# Patient Record
Sex: FEMALE | Race: BLACK OR AFRICAN AMERICAN | NOT HISPANIC OR LATINO | Employment: UNEMPLOYED | URBAN - METROPOLITAN AREA
[De-identification: names, ages, dates, MRNs, and addresses within clinical notes are randomized per-mention and may not be internally consistent; named-entity substitution may affect disease eponyms.]

---

## 2017-01-19 ENCOUNTER — HOSPITAL ENCOUNTER (INPATIENT)
Facility: HOSPITAL | Age: 82
LOS: 5 days | Discharge: HOME HEALTH | DRG: 682 | End: 2017-01-24
Attending: EMERGENCY MEDICINE | Admitting: INTERNAL MEDICINE
Payer: MEDICARE

## 2017-01-19 ENCOUNTER — NURSE TRIAGE (OUTPATIENT)
Dept: ADMINISTRATIVE | Facility: CLINIC | Age: 82
End: 2017-01-19

## 2017-01-19 DIAGNOSIS — D72.829 LEUKOCYTOSIS: ICD-10-CM

## 2017-01-19 DIAGNOSIS — N17.9 AKI (ACUTE KIDNEY INJURY): ICD-10-CM

## 2017-01-19 DIAGNOSIS — L03.90 CELLULITIS, UNSPECIFIED CELLULITIS SITE: Primary | ICD-10-CM

## 2017-01-19 DIAGNOSIS — I73.9 PERIPHERAL VASCULAR DISEASE, UNSPECIFIED: ICD-10-CM

## 2017-01-19 LAB
ALBUMIN SERPL BCP-MCNC: 2.7 G/DL
ALP SERPL-CCNC: 77 U/L
ALT SERPL W/O P-5'-P-CCNC: 12 U/L
ANION GAP SERPL CALC-SCNC: 9 MMOL/L
AST SERPL-CCNC: 22 U/L
BACTERIA #/AREA URNS AUTO: ABNORMAL /HPF
BASOPHILS # BLD AUTO: 0.03 K/UL
BASOPHILS NFR BLD: 0.2 %
BILIRUB SERPL-MCNC: 0.3 MG/DL
BILIRUB UR QL STRIP: NEGATIVE
BUN SERPL-MCNC: 57 MG/DL
CALCIUM SERPL-MCNC: 9 MG/DL
CHLORIDE SERPL-SCNC: 110 MMOL/L
CLARITY UR REFRACT.AUTO: ABNORMAL
CO2 SERPL-SCNC: 22 MMOL/L
COLOR UR AUTO: YELLOW
CREAT SERPL-MCNC: 2.5 MG/DL
DIFFERENTIAL METHOD: ABNORMAL
EOSINOPHIL # BLD AUTO: 0.1 K/UL
EOSINOPHIL NFR BLD: 0.5 %
ERYTHROCYTE [DISTWIDTH] IN BLOOD BY AUTOMATED COUNT: 14.6 %
EST. GFR  (AFRICAN AMERICAN): 20.3 ML/MIN/1.73 M^2
EST. GFR  (NON AFRICAN AMERICAN): 17.6 ML/MIN/1.73 M^2
FLUAV AG SPEC QL IA: NEGATIVE
FLUBV AG SPEC QL IA: NEGATIVE
GLUCOSE SERPL-MCNC: 92 MG/DL
GLUCOSE UR QL STRIP: NEGATIVE
HCT VFR BLD AUTO: 35.5 %
HGB BLD-MCNC: 11.9 G/DL
HGB UR QL STRIP: NEGATIVE
HYALINE CASTS UR QL AUTO: 19 /LPF
KETONES UR QL STRIP: NEGATIVE
LEUKOCYTE ESTERASE UR QL STRIP: NEGATIVE
LYMPHOCYTES # BLD AUTO: 1.7 K/UL
LYMPHOCYTES NFR BLD: 9.2 %
MCH RBC QN AUTO: 31.3 PG
MCHC RBC AUTO-ENTMCNC: 33.5 %
MCV RBC AUTO: 93 FL
MICROSCOPIC COMMENT: ABNORMAL
MONOCYTES # BLD AUTO: 0.8 K/UL
MONOCYTES NFR BLD: 4.1 %
NEUTROPHILS # BLD AUTO: 15.7 K/UL
NEUTROPHILS NFR BLD: 85.7 %
NITRITE UR QL STRIP: POSITIVE
PH UR STRIP: 5 [PH] (ref 5–8)
PLATELET # BLD AUTO: 191 K/UL
PMV BLD AUTO: 10 FL
POTASSIUM SERPL-SCNC: 4.9 MMOL/L
PROT SERPL-MCNC: 7.4 G/DL
PROT UR QL STRIP: NEGATIVE
RBC # BLD AUTO: 3.8 M/UL
RBC #/AREA URNS AUTO: 1 /HPF (ref 0–4)
SODIUM SERPL-SCNC: 141 MMOL/L
SP GR UR STRIP: 1.01 (ref 1–1.03)
SPECIMEN SOURCE: NORMAL
SQUAMOUS #/AREA URNS AUTO: 1 /HPF
URN SPEC COLLECT METH UR: ABNORMAL
UROBILINOGEN UR STRIP-ACNC: NEGATIVE EU/DL
WBC # BLD AUTO: 18.29 K/UL

## 2017-01-19 PROCEDURE — 94761 N-INVAS EAR/PLS OXIMETRY MLT: CPT

## 2017-01-19 PROCEDURE — 25000003 PHARM REV CODE 250: Performed by: EMERGENCY MEDICINE

## 2017-01-19 PROCEDURE — 87400 INFLUENZA A/B EACH AG IA: CPT

## 2017-01-19 PROCEDURE — 87040 BLOOD CULTURE FOR BACTERIA: CPT | Mod: 59

## 2017-01-19 PROCEDURE — 87077 CULTURE AEROBIC IDENTIFY: CPT

## 2017-01-19 PROCEDURE — 84145 PROCALCITONIN (PCT): CPT

## 2017-01-19 PROCEDURE — 96361 HYDRATE IV INFUSION ADD-ON: CPT

## 2017-01-19 PROCEDURE — 12000002 HC ACUTE/MED SURGE SEMI-PRIVATE ROOM

## 2017-01-19 PROCEDURE — 80053 COMPREHEN METABOLIC PANEL: CPT

## 2017-01-19 PROCEDURE — 96365 THER/PROPH/DIAG IV INF INIT: CPT

## 2017-01-19 PROCEDURE — 85025 COMPLETE CBC W/AUTO DIFF WBC: CPT

## 2017-01-19 PROCEDURE — 87186 SC STD MICRODIL/AGAR DIL: CPT

## 2017-01-19 PROCEDURE — 99285 EMERGENCY DEPT VISIT HI MDM: CPT | Mod: 25

## 2017-01-19 PROCEDURE — 63600175 PHARM REV CODE 636 W HCPCS: Performed by: EMERGENCY MEDICINE

## 2017-01-19 PROCEDURE — 87086 URINE CULTURE/COLONY COUNT: CPT

## 2017-01-19 PROCEDURE — 93005 ELECTROCARDIOGRAM TRACING: CPT

## 2017-01-19 PROCEDURE — 87088 URINE BACTERIA CULTURE: CPT

## 2017-01-19 PROCEDURE — 93010 ELECTROCARDIOGRAM REPORT: CPT | Mod: ,,, | Performed by: INTERNAL MEDICINE

## 2017-01-19 PROCEDURE — 81001 URINALYSIS AUTO W/SCOPE: CPT

## 2017-01-19 PROCEDURE — 99285 EMERGENCY DEPT VISIT HI MDM: CPT | Mod: ,,, | Performed by: EMERGENCY MEDICINE

## 2017-01-19 RX ORDER — ENOXAPARIN SODIUM 100 MG/ML
40 INJECTION SUBCUTANEOUS EVERY 24 HOURS
Status: DISCONTINUED | OUTPATIENT
Start: 2017-01-20 | End: 2017-01-20

## 2017-01-19 RX ORDER — TRAMADOL HYDROCHLORIDE 50 MG/1
50 TABLET ORAL EVERY 6 HOURS PRN
Status: DISCONTINUED | OUTPATIENT
Start: 2017-01-19 | End: 2017-01-19

## 2017-01-19 RX ORDER — BRIMONIDINE TARTRATE 1.5 MG/ML
1 SOLUTION/ DROPS OPHTHALMIC 3 TIMES DAILY
Status: DISCONTINUED | OUTPATIENT
Start: 2017-01-20 | End: 2017-01-24 | Stop reason: HOSPADM

## 2017-01-19 RX ORDER — NAPROXEN SODIUM 220 MG/1
81 TABLET, FILM COATED ORAL DAILY
Status: DISCONTINUED | OUTPATIENT
Start: 2017-01-20 | End: 2017-01-24 | Stop reason: HOSPADM

## 2017-01-19 RX ORDER — SODIUM CHLORIDE 9 MG/ML
INJECTION, SOLUTION INTRAVENOUS CONTINUOUS
Status: DISCONTINUED | OUTPATIENT
Start: 2017-01-20 | End: 2017-01-20

## 2017-01-19 RX ORDER — IPRATROPIUM BROMIDE AND ALBUTEROL SULFATE 2.5; .5 MG/3ML; MG/3ML
3 SOLUTION RESPIRATORY (INHALATION) EVERY 6 HOURS PRN
Status: DISCONTINUED | OUTPATIENT
Start: 2017-01-19 | End: 2017-01-24 | Stop reason: HOSPADM

## 2017-01-19 RX ADMIN — SODIUM CHLORIDE 1000 ML: 0.9 INJECTION, SOLUTION INTRAVENOUS at 04:01

## 2017-01-19 RX ADMIN — CEFTRIAXONE 1 G: 1 INJECTION, SOLUTION INTRAVENOUS at 07:01

## 2017-01-19 RX ADMIN — AZITHROMYCIN MONOHYDRATE 500 MG: 500 INJECTION, POWDER, LYOPHILIZED, FOR SOLUTION INTRAVENOUS at 11:01

## 2017-01-19 NOTE — IP AVS SNAPSHOT
[facility]  [address]  [facility] [address]  Phone: [phone]           Patient Discharge Instructions     Our goal is to set you up for success. This packet includes information on your condition, medications, and your home care. It will help you to care for yourself so you don't get sicker and need to go back to the hospital.     Please ask your nurse if you have any questions.        There are many details to remember when preparing to leave the hospital. Here is what you will need to do:    1. Take your medicine. If you are prescribed medications, review your Medication List in the following pages. You may have new medications to  at the pharmacy and others that you'll need to stop taking. Review the instructions for how and when to take your medications. Talk with your doctor or nurses if you are unsure of what to do.     2. Go to your follow-up appointments. Specific follow-up information is listed in the following pages. Your may be contacted by a transition nurse or clinical provider about future appointments. Be sure we have all of the phone numbers to reach you, if needed. Please contact your provider's office if you are unable to make an appointment.     3. Watch for warning signs. Your doctor or nurse will give you detailed warning signs to watch for and when to call for assistance. These instructions may also include educational information about your condition. If you experience any of warning signs to your health, call your doctor.               [facility]  Unless otherwise directed by your provider, please contact [facility], our nurse care line that is available for 24/7 assistance.     [phone] (toll-free)    Registered nurses in the [facility] provide clinical advisement, health education, appointment booking, and other advisory services.                    ** Verify the list of medication(s) below is accurate and up  to date. Carry this with you in case of emergency. If your medications have changed, please notify your healthcare provider.             Medication List      START taking these medications        Additional Info                      cadexomer iodine 0.9 % gel   Commonly known as:  IODOSORB   Quantity:  40 g   Refills:  0    Instructions:  Apply topically daily as needed for Wound Care.     Begin Date    AM    Noon    PM    Bedtime       doxycycline 100 MG tablet   Commonly known as:  VIBRA-TABS   Quantity:  12 tablet   Refills:  0   Dose:  100 mg   Indications:  Skin & soft tissue    Last time this was given:  100 mg on 1/24/2017  8:16 AM   Instructions:  Take 1 tablet (100 mg total) by mouth every 12 (twelve) hours.     Begin Date    AM    Noon    PM    Bedtime       pantoprazole 40 MG tablet   Commonly known as:  PROTONIX   Quantity:  90 tablet   Refills:  0   Dose:  40 mg    Last time this was given:  40 mg on 1/24/2017  8:16 AM   Instructions:  Take 1 tablet (40 mg total) by mouth once daily.     Begin Date    AM    Noon    PM    Bedtime       senna-docusate 8.6-50 mg 8.6-50 mg per tablet   Commonly known as:  PERICOLACE   Refills:  0   Dose:  1 tablet    Last time this was given:  1 tablet on 1/24/2017  8:16 AM   Instructions:  Take 1 tablet by mouth 2 (two) times daily.     Begin Date    AM    Noon    PM    Bedtime         CHANGE how you take these medications        Additional Info                      diclofenac sodium 1 % Gel   Quantity:  200 g   Refills:  1   What changed:    - how much to take  - when to take this  - reasons to take this  - additional instructions    Instructions:  Apply topically 3 (three) times daily. 4 grams topically tid to affected joints.     Begin Date    AM    Noon    PM    Bedtime       enalapril 10 MG tablet   Commonly known as:  VASOTEC   Quantity:  90 tablet   Refills:  8   Dose:  10 mg   What changed:  See the new instructions.    Instructions:  Take 1 tablet (10 mg total) by  mouth once daily. Restart on Saturday, after your BMP is drawn and kidney function remains normal     Begin Date    AM    Noon    PM    Bedtime       hydrochlorothiazide 12.5 mg capsule   Commonly known as:  MICROZIDE   Quantity:  90 capsule   Refills:  1   What changed:  See the new instructions.    Instructions:  TAKE 1 CAPSULE DAILY     Begin Date    AM    Noon    PM    Bedtime         CONTINUE taking these medications        Additional Info                      aspirin 81 mg Tab   Refills:  0   Dose:  81 mg    Instructions:  Take 81 mg by mouth once daily.     Begin Date    AM    Noon    PM    Bedtime       brimonidine 0.2% 0.2 % Drop   Commonly known as:  ALPHAGAN   Quantity:  10 mL   Refills:  12   Dose:  1 drop   Comments:  Use Clinton #[id number]    Instructions:  Place 1 drop into the right eye 3 (three) times daily.     Begin Date    AM    Noon    PM    Bedtime       CENTRUM ORAL   Refills:  0   Dose:  1 capsule    Instructions:  Take 1 capsule by mouth once daily.     Begin Date    AM    Noon    PM    Bedtime       dorzolamide-timolol 2-0.5% 22.3-6.8 mg/mL ophthalmic solution   Commonly known as:  COSOPT   Quantity:  30 mL   Refills:  3   Dose:  1 drop   Comments:  Use robison #[id number]    Instructions:  Place 1 drop into both eyes 3 (three) times daily.     Begin Date    AM    Noon    PM    Bedtime       LUMIGAN 0.01 % Drop   Quantity:  3 Bottle   Refills:  3   Dose:  1 drop   Comments:  Use Clinton #[id number]   Generic drug:  bimatoprost    Last time this was given:  1 drop on 1/23/2017 10:11 PM   Instructions:  Place 1 drop into the right eye every evening.     Begin Date    AM    Noon    PM    Bedtime       naproxen sodium 220 MG tablet   Commonly known as:  ANAPROX   Refills:  0   Dose:  220 mg    Instructions:  Take 220 mg by mouth 2 (two) times daily as needed (for pain).     Begin Date    AM    Noon    PM    Bedtime       triamcinolone acetonide 0.1% 0.1 % ointment   Commonly known as:   KENALOG   Quantity:  453.6 g   Refills:  5    Instructions:  Apply topically 2 (two) times daily.     Begin Date    AM    Noon    PM    Bedtime            Where to Get Your Medications      These medications were sent to [facility]unk[facility] - [address]  [address] [place] [address]     Phone:  [phone]     cadexomer iodine 0.9 % gel    doxycycline 100 MG tablet    enalapril 10 MG tablet    pantoprazole 40 MG tablet         You can get these medications from any pharmacy     You don't need a prescription for these medications     senna-docusate 8.6-50 mg 8.6-50 mg per tablet                  Please bring to all follow up appointments:    1. A copy of your discharge instructions.  2. All medicines you are currently taking in their original bottles.  3. Identification and insurance card.    Please arrive 15 minutes ahead of scheduled appointment time.    Please call 24 hours in advance if you must reschedule your appointment and/or time.        Your Scheduled Appointments     Jan 31, 2017  3:00 PM CST   Consult with MD Fawad Savage - Dermatology ([facility] )    [address]  [place] [address]   [phone]            Feb 01, 2017 10:00 AM CST   Established Patient Visit with MD Fawad Aguirre Jr. - Internal Medicine ([facility])    [address]  [place] [address]   [phone]            Mar 21, 2017  9:15 AM CDT   Established Patient Visit with JACOB Mantilla - Podiatry ([place] [facility] )    [address]  [place] [address]   [phone]            Mar 24, 2017 10:00 AM CDT   Established Patient Visit with MD Fawad Larry - Ophthalmology ([facility] )    [address]  [place] [address]   [phone]              Follow-up Information     Follow up with Fawad Hernandez - Podiatry In 1 week.    Specialty:  Podiatry    Contact  "information:    [address]  [facility] [id number] [phone]    Additional information:    [address] - [address]th Floor, [address]        Follow up with Lenin Sandhu MD In 1 week.    Specialty:  Internal Medicine    Contact information:    [address]  [facility] [address]  [phone]          Discharge Instructions     Future Orders    BASIC METABOLIC PANEL     Activity as tolerated     Call MD for:  increased confusion or weakness     Call MD for:  redness, tenderness, or signs of infection (pain, swelling, redness, odor or green/yellow discharge around incision site)     Call MD for:  severe uncontrolled pain     Call MD for:  temperature >100.4     Call MD for:  worsening rash     Diet general     Questions:    Total calories:      Fat restriction, if any:      Protein restriction, if any:      Na restriction, if any:      Fluid restriction:      Additional restrictions:          Primary Diagnosis     Your primary diagnosis was:  Acute Nontraumatic Kidney Injury      Admission Information     Date & Time Provider Department CSN    1/19/2017  4:02 PM Caterina Martinez MD [facility] [id number]      Care Providers     Provider Role Specialty Primary office phone    Caterina Martinez MD Attending Provider Hospitalist [phone]    Caterina Martinez MD Team Attending  Hospitalist [phone]      Your Vitals Were     BP Pulse Temp Resp Height Weight    138/65 (BP Location: Left arm, Patient Position: Lying, BP Method: Automatic) 78 98.6 °F (37 °C) (Oral) 18 5' 3" (1.6 m) 85.3 kg (188 lb)    SpO2 BMI             95% 33.3 kg/m2         Recent Lab Values     No lab values to display.      Pending Labs     Order Current Status    Aerobic culture Preliminary result    Blood culture Preliminary result    Blood culture Preliminary result      Allergies as of 1/24/2017        Reactions    Codeine     Other reaction(s): Unknown      Advance Directives     An advance directive " is a document which, in the event you are no longer able to make decisions for yourself, tells your healthcare team what kind of treatment you do or do not want to receive, or who you would like to make those decisions for you.  If you do not currently have an advance directive, [facility] encourages you to create one.  For more information call:  [phone]), [phone] ([phone]),  or log on to [email or web address].        Smoking Cessation     If you would like to quit smoking:   You may be eligible for free services if you are a [place] resident and started smoking cigarettes before September 1, 1988.  Call the Smoking Cessation Trust (SCT) toll free at (447) 304-1920 or (594) 392-9547.   Call 6-557-QUIT-NOW if you do not meet the above criteria.            Language Assistance Services     ATTENTION: Language assistance services are available, free of charge. Please call [phone].      ATENCIÓN: Si habla español, tiene a solomon disposición servicios gratuitos de asistencia lingüística. Llame al [phone].     CHÚ Ý: N?u b?n nói Ti?ng Vi?t, có các d?ch v? h? tr? ngôn ng? mi?n phí dành cho b?n. G?i s? [phone].        [facility] Sign-Up     Activating your [facility] account is as easy as 1-2-3!     1) Visit [email or web address], select Sign Up Now, enter this activation code and your date of birth, then select Next.  [email or web address]  Expires: 3/10/2017  4:09 PM      2) Create a username and password to use when you visit [facility] in the future and select a security question in case you lose your password and select Next.    3) Enter your e-mail address and click Sign Up!    Additional Information  If you have questions, please e-mail [email or web address] or call [phone] to talk to our [facility] staff. Remember, [facility] is NOT to be used for urgent needs. For medical emergencies, dial 911.          [facility] complies with applicable Federal civil  rights laws and does not discriminate on the basis of race, color, national origin, age, disability, or sex.

## 2017-01-19 NOTE — TELEPHONE ENCOUNTER
Called client in regard to her low blood pressure to see if she went to the emergency room the clients daughter stated that she was waiting on her father to go. She was more concerned with scheduling a dermatology appt to which I assisted her with. I explained to her that Per the on call nurse that It would be wise for her mother to visit the emergency room.

## 2017-01-19 NOTE — TELEPHONE ENCOUNTER
"  Reason for Disposition   Systolic BP < 80 and NOT dizzy, lightheaded or weak (feels normal)     EC calling states patient B/P 65/43 HR 78 EC denies any symptoms at time of call. EC states patient "passed out last night". Patient advised per [facility] protocol verbalized understanding, agreed with recommendations have another adult transport her to local ED of choice for medical evaluation treatment/intervention of current symptoms; EC had no further questions at end of call.    Protocols used: ST LOW BLOOD PRESSURE-A-OH    "

## 2017-01-19 NOTE — ED PROVIDER NOTES
"Encounter Date: 1/19/2017    SCRIBE #1 NOTE: I, Caterina Medina, am scribing for, and in the presence of,  Dr. Talley. I have scribed the entire note.       History     Chief Complaint   Patient presents with    Hypotension     60/35 at home taken by family; "feeling bad"     Review of patient's allergies indicates:   Allergen Reactions    Codeine      Other reaction(s): Unknown     HPI Comments: Time patient was seen by the provider: 4:18 PM      The patient is a 81 y.o. female with history of: HTN and COPD that presents to the ED with a complaint of hypotension with a systolic pressure in the 60's obtained 4-8 hours ago. Patient presented to an urgent care clinic this morning, and when similar readings (around 67 systolic per family) were obtained, patient was advised to present to the ED. At the time that patient was hypotensive, she did not report feeling ill. Upon arrival, patient's blood pressure is back to baseline. Patient did not take her regular blood pressure medications this morning and has not eaten today. She denies abdominal pain, diarrhea, loose stools, or cough, though she does note the she has not been urinating very much secondary to not drinking enough water. Patient has chronic bilateral lower extremity wounds that have been present for 5-6 months but does not currently have wound care due to difficulty in getting an appointment. Family notes that she has an appointment scheduled for 1/31/17.          The history is provided by the patient and a relative.     Past Medical History   Diagnosis Date    Arthritis of knee     Cataract     COPD (chronic obstructive pulmonary disease)     CTS (carpal tunnel syndrome)     Glaucoma (increased eye pressure)     HTN (hypertension)     Joint pain     Psoriasis 2007    Smoker     Strabismus      XT OS     Past Medical History Pertinent Negatives   Diagnosis Date Noted    Amblyopia 5/1/2014    Diabetes mellitus 5/1/2014    Diabetic retinopathy " 5/1/2014    Macular degeneration 5/1/2014    Retinal detachment 5/1/2014    Uveitis 5/1/2014     Past Surgical History   Procedure Laterality Date    Tubal ligation      Cataract extraction w/ intraocular lens implantw/ trabeculectomy Left 8/13/14     os ()    Cataract extraction w/  intraocular lens implant Left 8/13/2014     OS ()    Ahmed implant and surgical pi Left 1/27/16           Family History   Problem Relation Age of Onset    Glaucoma Father     Cancer Neg Hx     Psoriasis Neg Hx     Amblyopia Neg Hx     Blindness Neg Hx     Macular degeneration Neg Hx     Retinal detachment Neg Hx     Strabismus Neg Hx      Social History   Substance Use Topics    Smoking status: Current Every Day Smoker     Packs/day: 1.00     Years: 57.00    Smokeless tobacco: Never Used      Comment: Pt not ready to quit.    Alcohol use Yes      Comment: seldom alcohol use.     Review of Systems   Constitutional: Negative for chills.   HENT: Negative for congestion.    Eyes: Negative for redness.   Respiratory: Negative for cough.    Cardiovascular: Negative for chest pain.        Low blood pressure earlier today    Gastrointestinal: Negative for abdominal pain, constipation and diarrhea.   Genitourinary: Positive for decreased urine volume (secondary to decreased fluid intake).   Musculoskeletal: Negative for back pain and neck pain.   Skin: Positive for wound (bilateral lower extremities, chronic).   Neurological: Negative for headaches.       Physical Exam   Initial Vitals   BP Pulse Resp Temp SpO2   01/19/17 1310 01/19/17 1310 01/19/17 1310 01/19/17 1310 01/19/17 1310   156/72 80 16 97.9 °F (36.6 °C) 95 %     Physical Exam    Nursing note and vitals reviewed.  Constitutional: She appears well-developed and well-nourished. She is not diaphoretic. No distress.   HENT:   Head: Normocephalic and atraumatic.   Mouth/Throat: Oropharynx is clear and moist.   Edentulous    Eyes: Pupils  are equal, round, and reactive to light.   Cardiovascular: Normal rate, regular rhythm and normal heart sounds.   No murmur heard.  Pulmonary/Chest: Breath sounds normal. No respiratory distress. She has no wheezes. She has no rhonchi. She has no rales.   Abdominal: Soft. She exhibits no distension. There is no tenderness. There is no rebound and no guarding.   Hyperactive bowel sounds    Musculoskeletal: Normal range of motion.   Neurological: She is alert and oriented to person, place, and time.   Skin: Skin is warm and dry.   Chronic venous stasis changes associated with multiple ulcerations to bilateral lower extremities. There is mild erythema, but no tenderness or discharge noted.   Psychiatric: She has a normal mood and affect.         ED Course   Procedures  Labs Reviewed   CBC W/ AUTO DIFFERENTIAL - Abnormal; Notable for the following:        Result Value    WBC 18.29 (*)     RBC 3.80 (*)     Hemoglobin 11.9 (*)     Hematocrit 35.5 (*)     MCH 31.3 (*)     RDW 14.6 (*)     Gran # 15.7 (*)     Gran% 85.7 (*)     Lymph% 9.2 (*)     All other components within normal limits   COMPREHENSIVE METABOLIC PANEL - Abnormal; Notable for the following:     CO2 22 (*)     BUN, Bld 57 (*)     Creatinine 2.5 (*)     Albumin 2.7 (*)     eGFR if  20.3 (*)     eGFR if non  17.6 (*)     All other components within normal limits   CULTURE, URINE   CULTURE, BLOOD   CULTURE, BLOOD   URINALYSIS   INFLUENZA A AND B ANTIGEN     EKG Readings: (Independently Interpreted)   Rhythm: Normal Sinus Rhythm. Heart Rate: 84. Axis: Normal.   Normal intervals, no hypertrophy, no ST or T wave abnormalities, unchanged compared to previous EKG on 6/10/16          Medical Decision Making:   History:   Old Medical Records: I decided to obtain old medical records.  Initial Assessment:   This is an emergent evaluation of a 81 y.o.female patient with presentation of episode of asymptomatic hypotension, now resolved.    Initial differentials include but are not limited to: medication effect, vasovagal episode, orthostatic hypotension, less-likely arrhythmia, doubt infectious process or shock.   Plan: basic labs, cardiac monitoring, EKG, and IV fluids.    After my evaluation, believe the patient has acute kidney injury with creatinine 2.5, prior 1.1.  Patient also has noted leukocytosis, therefore chest x-ray and urinalysis was performed.  Chest x-ray showed possible left lower lobe pneumonia.  Urinalysis pending at this time.  I will give Rocephin to cover for either.  Will add azithromycin if UA is clear.  At this time, I believe the patient should be admitted to the hospital for further evaluation and management.    Case discussed with hospitalist, Dr. Cheung, who agrees and will assume care of this patient under their service.      Independently Interpreted Test(s):   I have ordered and independently interpreted EKG Reading(s) - see prior notes  Clinical Tests:   Lab Tests: Ordered and Reviewed  Medical Tests: Reviewed    Additional MDM:   EKG: I have independently interpreted EKG(s) - see notes.          Scribe Attestation:   Scribe #1: I performed the above scribed service and the documentation accurately describes the services I performed. I attest to the accuracy of the note.    Attending Attestation:           Physician Attestation for Scribe:  Physician Attestation Statement for Scribe #1: I, Dr. Talley, reviewed documentation, as scribed by Caterina Medina in my presence, and it is both accurate and complete.                 ED Course   Value Comment By Time   BP: (!) 156/72 (Reviewed) Rey Talley MD 01/19 1610   Temp: 97.9 °F (36.6 °C) (Reviewed) Rey Talley MD 01/19 1610   Pulse: 80 (Reviewed) Rey Talley MD 01/19 1610   Resp: 16 (Reviewed) Rey Talley MD 01/19 1610   SpO2: 95 % (Reviewed) Rey Talley MD 01/19 1610   WBC: (!) 18.29 (Reviewed) Rey Talley MD 01/19 1754   Hemoglobin: (!) 11.9  (Reviewed) Rey Talley MD 01/19 1754   Hematocrit: (!) 35.5 (Reviewed) Rey Talley MD 01/19 1754     Clinical Impression:   The primary encounter diagnosis was RCIARDO (acute kidney injury). A diagnosis of Leukocytosis was also pertinent to this visit.    Disposition:   Disposition: Admitted       Rey Talley MD  01/19/17 3086

## 2017-01-20 PROBLEM — I95.9 HYPOTENSION: Status: ACTIVE | Noted: 2017-01-20

## 2017-01-20 PROBLEM — I73.9 PERIPHERAL VASCULAR DISEASE, UNSPECIFIED: Status: ACTIVE | Noted: 2017-01-20

## 2017-01-20 LAB
ANION GAP SERPL CALC-SCNC: 7 MMOL/L
ANION GAP SERPL CALC-SCNC: 9 MMOL/L
BASOPHILS # BLD AUTO: 0.02 K/UL
BASOPHILS NFR BLD: 0.2 %
BUN SERPL-MCNC: 43 MG/DL
BUN SERPL-MCNC: 48 MG/DL
CALCIUM SERPL-MCNC: 8.4 MG/DL
CALCIUM SERPL-MCNC: 8.6 MG/DL
CHLORIDE SERPL-SCNC: 113 MMOL/L
CHLORIDE SERPL-SCNC: 113 MMOL/L
CO2 SERPL-SCNC: 19 MMOL/L
CO2 SERPL-SCNC: 20 MMOL/L
CREAT SERPL-MCNC: 1.6 MG/DL
CREAT SERPL-MCNC: 1.8 MG/DL
DIFFERENTIAL METHOD: ABNORMAL
EOSINOPHIL # BLD AUTO: 0.1 K/UL
EOSINOPHIL NFR BLD: 0.8 %
ERYTHROCYTE [DISTWIDTH] IN BLOOD BY AUTOMATED COUNT: 14.1 %
EST. GFR  (AFRICAN AMERICAN): 30.2 ML/MIN/1.73 M^2
EST. GFR  (AFRICAN AMERICAN): 34.8 ML/MIN/1.73 M^2
EST. GFR  (NON AFRICAN AMERICAN): 26.2 ML/MIN/1.73 M^2
EST. GFR  (NON AFRICAN AMERICAN): 30.2 ML/MIN/1.73 M^2
GLUCOSE SERPL-MCNC: 74 MG/DL
GLUCOSE SERPL-MCNC: 86 MG/DL
HCT VFR BLD AUTO: 31.1 %
HGB BLD-MCNC: 10.4 G/DL
LYMPHOCYTES # BLD AUTO: 1.4 K/UL
LYMPHOCYTES NFR BLD: 15.3 %
MAGNESIUM SERPL-MCNC: 2.3 MG/DL
MCH RBC QN AUTO: 31.1 PG
MCHC RBC AUTO-ENTMCNC: 33.4 %
MCV RBC AUTO: 93 FL
MONOCYTES # BLD AUTO: 0.8 K/UL
MONOCYTES NFR BLD: 9 %
NEUTROPHILS # BLD AUTO: 6.9 K/UL
NEUTROPHILS NFR BLD: 74.5 %
PHOSPHATE SERPL-MCNC: 3.3 MG/DL
PLATELET # BLD AUTO: 205 K/UL
PMV BLD AUTO: 10.2 FL
POTASSIUM SERPL-SCNC: 4.2 MMOL/L
POTASSIUM SERPL-SCNC: 4.2 MMOL/L
PROCALCITONIN SERPL IA-MCNC: <0.09 NG/ML
RBC # BLD AUTO: 3.34 M/UL
SODIUM SERPL-SCNC: 140 MMOL/L
SODIUM SERPL-SCNC: 141 MMOL/L
T4 FREE SERPL-MCNC: 1.05 NG/DL
TSH SERPL DL<=0.005 MIU/L-ACNC: 0.34 UIU/ML
WBC # BLD AUTO: 9.27 K/UL

## 2017-01-20 PROCEDURE — 83735 ASSAY OF MAGNESIUM: CPT

## 2017-01-20 PROCEDURE — 25000003 PHARM REV CODE 250: Performed by: EMERGENCY MEDICINE

## 2017-01-20 PROCEDURE — 85025 COMPLETE CBC W/AUTO DIFF WBC: CPT

## 2017-01-20 PROCEDURE — 93922 UPR/L XTREMITY ART 2 LEVELS: CPT | Performed by: SURGERY

## 2017-01-20 PROCEDURE — 94761 N-INVAS EAR/PLS OXIMETRY MLT: CPT

## 2017-01-20 PROCEDURE — 25000003 PHARM REV CODE 250: Performed by: INTERNAL MEDICINE

## 2017-01-20 PROCEDURE — 80048 BASIC METABOLIC PNL TOTAL CA: CPT

## 2017-01-20 PROCEDURE — 99223 1ST HOSP IP/OBS HIGH 75: CPT | Mod: AI,GC,, | Performed by: INTERNAL MEDICINE

## 2017-01-20 PROCEDURE — 80048 BASIC METABOLIC PNL TOTAL CA: CPT | Mod: 91

## 2017-01-20 PROCEDURE — 11000001 HC ACUTE MED/SURG PRIVATE ROOM

## 2017-01-20 PROCEDURE — 87077 CULTURE AEROBIC IDENTIFY: CPT

## 2017-01-20 PROCEDURE — 87075 CULTR BACTERIA EXCEPT BLOOD: CPT

## 2017-01-20 PROCEDURE — 84443 ASSAY THYROID STIM HORMONE: CPT

## 2017-01-20 PROCEDURE — 87070 CULTURE OTHR SPECIMN AEROBIC: CPT

## 2017-01-20 PROCEDURE — 99222 1ST HOSP IP/OBS MODERATE 55: CPT | Mod: ,,, | Performed by: PODIATRIST

## 2017-01-20 PROCEDURE — 84439 ASSAY OF FREE THYROXINE: CPT

## 2017-01-20 PROCEDURE — 87186 SC STD MICRODIL/AGAR DIL: CPT

## 2017-01-20 PROCEDURE — 36415 COLL VENOUS BLD VENIPUNCTURE: CPT

## 2017-01-20 PROCEDURE — 84100 ASSAY OF PHOSPHORUS: CPT

## 2017-01-20 PROCEDURE — 63600175 PHARM REV CODE 636 W HCPCS: Performed by: INTERNAL MEDICINE

## 2017-01-20 RX ORDER — SODIUM CHLORIDE 9 MG/ML
INJECTION, SOLUTION INTRAVENOUS CONTINUOUS
Status: DISCONTINUED | OUTPATIENT
Start: 2017-01-20 | End: 2017-01-20

## 2017-01-20 RX ORDER — PANTOPRAZOLE SODIUM 40 MG/1
40 TABLET, DELAYED RELEASE ORAL DAILY
Status: DISCONTINUED | OUTPATIENT
Start: 2017-01-20 | End: 2017-01-24 | Stop reason: HOSPADM

## 2017-01-20 RX ORDER — ONDANSETRON 4 MG/1
4 TABLET, ORALLY DISINTEGRATING ORAL EVERY 8 HOURS PRN
Status: DISCONTINUED | OUTPATIENT
Start: 2017-01-20 | End: 2017-01-24 | Stop reason: HOSPADM

## 2017-01-20 RX ORDER — HYDROCODONE BITARTRATE AND ACETAMINOPHEN 5; 325 MG/1; MG/1
1 TABLET ORAL EVERY 4 HOURS PRN
Status: DISCONTINUED | OUTPATIENT
Start: 2017-01-20 | End: 2017-01-24 | Stop reason: HOSPADM

## 2017-01-20 RX ORDER — ENOXAPARIN SODIUM 100 MG/ML
30 INJECTION SUBCUTANEOUS EVERY 24 HOURS
Status: DISCONTINUED | OUTPATIENT
Start: 2017-01-20 | End: 2017-01-20

## 2017-01-20 RX ORDER — HYDROCODONE BITARTRATE AND ACETAMINOPHEN 10; 325 MG/1; MG/1
1 TABLET ORAL EVERY 4 HOURS PRN
Status: DISCONTINUED | OUTPATIENT
Start: 2017-01-20 | End: 2017-01-24 | Stop reason: HOSPADM

## 2017-01-20 RX ORDER — CLINDAMYCIN HYDROCHLORIDE 150 MG/1
300 CAPSULE ORAL EVERY 6 HOURS
Status: DISCONTINUED | OUTPATIENT
Start: 2017-01-20 | End: 2017-01-23

## 2017-01-20 RX ORDER — ACETAMINOPHEN 325 MG/1
650 TABLET ORAL EVERY 6 HOURS PRN
Status: DISCONTINUED | OUTPATIENT
Start: 2017-01-20 | End: 2017-01-24 | Stop reason: HOSPADM

## 2017-01-20 RX ORDER — HEPARIN SODIUM 5000 [USP'U]/ML
5000 INJECTION, SOLUTION INTRAVENOUS; SUBCUTANEOUS 3 TIMES DAILY
Status: DISCONTINUED | OUTPATIENT
Start: 2017-01-20 | End: 2017-01-21

## 2017-01-20 RX ORDER — AMOXICILLIN 250 MG
1 CAPSULE ORAL 2 TIMES DAILY
Status: DISCONTINUED | OUTPATIENT
Start: 2017-01-20 | End: 2017-01-24 | Stop reason: HOSPADM

## 2017-01-20 RX ADMIN — CEFTRIAXONE 1 G: 1 INJECTION, SOLUTION INTRAVENOUS at 12:01

## 2017-01-20 RX ADMIN — BRIMONIDINE TARTRATE 1 DROP: 1.5 SOLUTION OPHTHALMIC at 10:01

## 2017-01-20 RX ADMIN — SODIUM CHLORIDE: 0.9 INJECTION, SOLUTION INTRAVENOUS at 05:01

## 2017-01-20 RX ADMIN — STANDARDIZED SENNA CONCENTRATE AND DOCUSATE SODIUM 1 TABLET: 8.6; 5 TABLET, FILM COATED ORAL at 09:01

## 2017-01-20 RX ADMIN — ASPIRIN 81 MG CHEWABLE TABLET 81 MG: 81 TABLET CHEWABLE at 09:01

## 2017-01-20 RX ADMIN — BRIMONIDINE TARTRATE 1 DROP: 1.5 SOLUTION OPHTHALMIC at 02:01

## 2017-01-20 RX ADMIN — CLINDAMYCIN HYDROCHLORIDE 300 MG: 150 CAPSULE ORAL at 05:01

## 2017-01-20 RX ADMIN — BRIMONIDINE TARTRATE 1 DROP: 1.5 SOLUTION OPHTHALMIC at 05:01

## 2017-01-20 RX ADMIN — CLINDAMYCIN HYDROCHLORIDE 300 MG: 150 CAPSULE ORAL at 06:01

## 2017-01-20 RX ADMIN — CLINDAMYCIN HYDROCHLORIDE 300 MG: 150 CAPSULE ORAL at 12:01

## 2017-01-20 RX ADMIN — SODIUM CHLORIDE 500 ML: 0.9 INJECTION, SOLUTION INTRAVENOUS at 12:01

## 2017-01-20 RX ADMIN — PANTOPRAZOLE SODIUM 40 MG: 40 TABLET, DELAYED RELEASE ORAL at 09:01

## 2017-01-20 RX ADMIN — STANDARDIZED SENNA CONCENTRATE AND DOCUSATE SODIUM 1 TABLET: 8.6; 5 TABLET, FILM COATED ORAL at 10:01

## 2017-01-20 RX ADMIN — BIMATOPROST 1 DROP: 0.1 SOLUTION/ DROPS OPHTHALMIC at 10:01

## 2017-01-20 RX ADMIN — SODIUM CHLORIDE: 0.9 INJECTION, SOLUTION INTRAVENOUS at 01:01

## 2017-01-20 RX ADMIN — HEPARIN SODIUM 5000 UNITS: 5000 INJECTION, SOLUTION INTRAVENOUS; SUBCUTANEOUS at 10:01

## 2017-01-20 RX ADMIN — HEPARIN SODIUM 5000 UNITS: 5000 INJECTION, SOLUTION INTRAVENOUS; SUBCUTANEOUS at 02:01

## 2017-01-20 NOTE — ED NOTES
Dr. Talley made aware that a BC x 1 was obtained and held from 1650 earlier today. The second set was not obtained because it was ordered after antibiotic therapy initiation.

## 2017-01-20 NOTE — PROGRESS NOTES
Patient Consulted by CTTS:     The following was discussed by the Tobacco Treatment Specialist: Pt states that she has no interest in quitting smoking at this time.

## 2017-01-20 NOTE — ED NOTES
Patient identifiers for Nelia Brizuela checked and correct.    LOC: Patient is awake, alert, and aware of environment with an appropriate affect. Patient is oriented x 3 and speaking appropriately.  APPEARANCE: Patient resting comfortably and in no acute distress. Patient is clean and well groomed, patient's clothing is properly fastened.  SKIN: The skin is warm and dry. Patient has normal skin turgor and moist mucus membranes. Multiple lesions noted to bilateral legs at different stages of healing, white drainage noted with an odor. Skin to bilateral feet appear dry and scaly. Describes a stinging pain to both feet.   MUSKULOSKELETAL: Patient is moving all extremities, generalized weakness noted, no obvious deformities noted. Pulses intact.   RESPIRATORY: Airway is open and patent. Respirations are spontaneous and non-labored with normal effort and rate.  CARDIAC: Patient has a normal rate and rhythm.  SR on cardiac monitor, bilateral leg edema  ABDOMEN: No distention noted. Soft and non-tender upon palpation.  NEUROLOGICAL: Facial expression is symmetrical. Hand grasps are equal bilaterally. Normal sensation in all extremities when touched with finger.    Allergies reported:   Review of patient's allergies indicates:   Allergen Reactions    Codeine      Other reaction(s): Unknown

## 2017-01-20 NOTE — CONSULTS
Consult received on patient. Multiple ulceration noted to patient's lower extremities and feet. Wound bed with moderate amount of yellow slough and exudate. Surrounding tissue dry with darker complexion, unsure if fungal/cellulitic involvement. Patient sees podiatry on outpatient basis. Recommend consulting Podiatry for further recommendations. Discussed with Dr. Caterina Martinez. Nursing to continue care.    Left leg    Right leg    Right lateral leg

## 2017-01-20 NOTE — ASSESSMENT & PLAN NOTE
-on bilateral LE with ulcer noted to have purulent drainage. Also with leukocytosis. Will treat for cellulitis with course of clinda.   -wound care consulted.

## 2017-01-20 NOTE — ASSESSMENT & PLAN NOTE
-Likely pre-renal in setting of hypotension and cellulitis.   -follow up urine electrolytes.   -received 1 L in ED and will start continuous fluids

## 2017-01-20 NOTE — PROGRESS NOTES
Brief Progress note    Patient seen and discussed on rounds    For further detail regarding plan please see H&P    RICARDO  - improving, will bolus 500 CC today.   - continue to monitor     Hypotension  - Improving   - infection vs poor PO intake    Leukocytosis  - UTI vs cellulitis   - will add ceftriaxone, obtain CT chest for concern regarding PNA     Lower extremity edema/concern for cellulitis    - Will continue clindamycin and perform JOSE ALBERTO.  - Will consult wound care/ Podiatry    Dispo: Continue inpatient care and IV abx, follow up on JOSE ALBERTO       Isra Nieves M.D.  IM PGY-1  Pager [phone]

## 2017-01-20 NOTE — PHARMACY MED REC
"MedMined Medication Reconciliation  Template    Patient was admitted on 1/19/2017 for RICARDO (acute kidney injury).      Patient's prior to admission medication regimen was as follows:  Prescriptions Prior to Admission   Medication Sig Dispense Refill Last Dose    aspirin 81 mg Tab Take 81 mg by mouth once daily.    1/18/2017    brimonidine 0.2% (ALPHAGAN) 0.2 % Drop Place 1 drop into the right eye 3 (three) times daily. 10 mL 12 1/19/2017    diclofenac sodium 1 % Gel Apply topically 3 (three) times daily. 4 grams topically tid to affected joints. (Patient taking differently: Apply 4 g topically 3 (three) times daily as needed (for joint pain). ) 200 g 1 1/18/2017    dorzolamide-timolol 2-0.5% (COSOPT) 22.3-6.8 mg/mL ophthalmic solution Place 1 drop into both eyes 3 (three) times daily. 30 mL 3 1/19/2017    enalapril (VASOTEC) 10 MG tablet TAKE 1 TABLET DAILY (Patient taking differently: TAKE 1 TABLET BY MOUTH DAILY) 90 tablet 8 1/18/2017    FOLIC ACID/MV,FE,OTHER MIN (CENTRUM ORAL) Take 1 capsule by mouth once daily.    1/18/2017    hydrochlorothiazide (MICROZIDE) 12.5 mg capsule TAKE 1 CAPSULE DAILY (Patient taking differently: TAKE 1 CAPSULE BY MOUTH DAILY) 90 capsule 1 1/18/2017    LUMIGAN 0.01 % Drop Place 1 drop into the right eye every evening. 3 Bottle 3 1/19/2017    naproxen sodium (ANAPROX) 220 MG tablet Take 220 mg by mouth 2 (two) times daily as needed (for pain).        triamcinolone acetonide 0.1% (KENALOG) 0.1 % ointment Apply topically 2 (two) times daily. 453.6 g 5          Please add appropriate    SmartPhrase below:  Admission Medication Reconciliation - Pharmacy Consult Note    The home medication history was taken by Christine Antony, pharmacy technician.  Based on information gathered and subsequent review by the clinical pharmacist, the items below may need attention.     You may go to "Admission" then "Reconcile Home Medications" tabs to review and/or act upon these items. Based on " information gathered and subsequent review by the clinical pharmacist, the items below may need attention.    Potentially problematic discrepancies with current MAR  o Patient IS taking the following which was not ordered upon admit  o Aspirin 81 mg QD  o Enalapril 10 mg QD (held)  o HCTZ 12.5 mg QD (held)  o Folic acid 1 mg QD  o Cosopt opht solution one gtt each eye TID     Please address this information as you see fit.  Feel free to contact us if you have any questions or require assistance.    Justine Greco, Pharm.D  EXT [phone]

## 2017-01-20 NOTE — SUBJECTIVE & OBJECTIVE
Review of Systems   Constitutional: Positive for chills. Negative for fever.   HENT: Negative for rhinorrhea, sinus pressure, sneezing and sore throat.    Eyes: Negative for visual disturbance.   Respiratory: Negative for cough and shortness of breath.    Cardiovascular: Negative for chest pain, palpitations and leg swelling.   Gastrointestinal: Positive for abdominal pain. Negative for constipation, diarrhea, nausea and vomiting.   Endocrine: Negative for polyuria.   Genitourinary: Negative for dysuria, frequency and hematuria.   Musculoskeletal: Negative for arthralgias and back pain.   Allergic/Immunologic: Negative for immunocompromised state.   Neurological: Negative for dizziness, syncope and light-headedness.   Hematological: Negative for adenopathy. Does not bruise/bleed easily.   Psychiatric/Behavioral: Negative for agitation and behavioral problems.     Objective:     Vital Signs (Most Recent):  Temp: 97.9 °F (36.6 °C) (01/19/17 2202)  Pulse: 83 (01/20/17 0116)  Resp: (!) 23 (01/20/17 0116)  BP: 133/70 (01/20/17 0116)  SpO2: 97 % (01/20/17 0116) Vital Signs (24h Range):  Temp:  [97.9 °F (36.6 °C)] 97.9 °F (36.6 °C)  Pulse:  [77-93] 83  Resp:  [16-23] 23  SpO2:  [95 %-100 %] 97 %  BP: (112-156)/(51-77) 133/70     Weight: 83.9 kg (185 lb)  Body mass index is 31.76 kg/(m^2).  No intake or output data in the 24 hours ending 01/20/17 0420   Physical Exam   Constitutional: She is oriented to person, place, and time. She appears well-developed and well-nourished.   HENT:   Head: Normocephalic and atraumatic.   Eyes: EOM are normal. Pupils are equal, round, and reactive to light.   Neck: Normal range of motion. Neck supple. No thyromegaly present.   Cardiovascular: Normal rate and regular rhythm.    No murmur heard.  Pulmonary/Chest: Effort normal. No respiratory distress. She has no wheezes.   Abdominal: Soft. She exhibits no distension. There is no tenderness.   Musculoskeletal:   Thick flaking skin with   multiple ulcerations to bilateral lower extremities. There is mild erythema, and purulent drainage noted from ulcerations.       Lymphadenopathy:     She has no cervical adenopathy.   Neurological: She is alert and oriented to person, place, and time. No cranial nerve deficit.   Psychiatric: She has a normal mood and affect. Her behavior is normal.       Significant Labs:   BMP:   Recent Labs  Lab 01/19/17  1806   GLU 92      K 4.9      CO2 22*   BUN 57*   CREATININE 2.5*   CALCIUM 9.0     CBC:   Recent Labs  Lab 01/19/17  1654   WBC 18.29*   HGB 11.9*   HCT 35.5*        CMP:   Recent Labs  Lab 01/19/17  1806      K 4.9      CO2 22*   GLU 92   BUN 57*   CREATININE 2.5*   CALCIUM 9.0   PROT 7.4   ALBUMIN 2.7*   BILITOT 0.3   ALKPHOS 77   AST 22   ALT 12   ANIONGAP 9   EGFRNONAA 17.6*       Significant Imaging: I have reviewed all pertinent imaging results/findings within the past 24 hours.

## 2017-01-20 NOTE — CONSULTS
Consult Note  Podiatry    Consult Requested By: Caterina Martinez MD  Reason for Consult: Concern for lower extremity cellulitis, requested by wound care    SUBJECTIVE     History of Present Illness:  Nelia Brizuela is a 81 y.o. female  has a past medical history of Arthritis of knee; Cataract; COPD (chronic obstructive pulmonary disease); CTS (carpal tunnel syndrome); Glaucoma (increased eye pressure); HTN (hypertension); Joint pain; Psoriasis; Smoker; and Strabismus. Admitted for RICARDO, likely pre-renal with hypotension and cellulitis, and consulted to podiatry for concern for lower extremity cellulitis, requested by wound care. Per chart review, pt has chronic rafi lower extremity wounds that have been present for 5-6 mo. Pt relates she has not been treating her wounds. Relates to pain on the lateral wound of the right leg. No other complaints at this time.     Scheduled Meds:   aspirin  81 mg Oral Daily    bimatoprost  1 drop Right Eye QHS    brimonidine 0.15 % OPTH DROP  1 drop Right Eye TID    cefTRIAXone (ROCEPHIN) IVPB  1 g Intravenous Q24H    clindamycin  300 mg Oral Q6H    heparin (porcine)  5,000 Units Subcutaneous TID    pantoprazole  40 mg Oral Daily    senna-docusate 8.6-50 mg  1 tablet Oral BID     Continuous Infusions:   PRN Meds:acetaminophen, albuterol-ipratropium 2.5mg-0.5mg/3mL, hydrocodone-acetaminophen 10-325mg, hydrocodone-acetaminophen 5-325mg, ondansetron    Review of patient's allergies indicates:   Allergen Reactions    Codeine      Other reaction(s): Unknown        Past Medical History   Diagnosis Date    Arthritis of knee     Cataract     COPD (chronic obstructive pulmonary disease)     CTS (carpal tunnel syndrome)     Glaucoma (increased eye pressure)     HTN (hypertension)     Joint pain     Psoriasis 2007    Smoker     Strabismus      XT OS     Past Surgical History   Procedure Laterality Date    Tubal ligation      Cataract extraction w/ intraocular lens implantw/  trabeculectomy Left 8/13/14     os ()    Cataract extraction w/  intraocular lens implant Left 8/13/2014     OS ()    Ahmed implant and surgical pi Left 1/27/16           Family History   Problem Relation Age of Onset    Glaucoma Father     Cancer Neg Hx     Psoriasis Neg Hx     Amblyopia Neg Hx     Blindness Neg Hx     Macular degeneration Neg Hx     Retinal detachment Neg Hx     Strabismus Neg Hx      Social History   Substance Use Topics    Smoking status: Current Every Day Smoker     Packs/day: 1.00     Years: 57.00    Smokeless tobacco: Never Used      Comment: Pt not ready to quit.    Alcohol use Yes      Comment: seldom alcohol use.       Review of Systems:  Constitutional: no fever or chills  Respiratory: no cough or shortness of breath  Cardiovascular: no chest pain or palpitations  Gastrointestinal: no nausea or vomiting, tolerating diet  Integument/Breast: no rash or pruritis  Musculoskeletal: no arthralgias or myalgias  Neurological: no history of numbness or paresthesias in the feet  Behavioral/Psych: no auditory or visual hallucinations    OBJECTIVE     Vital Signs (Most Recent):  Temp: 98.5 °F (36.9 °C) (01/20/17 1246)  Pulse: 93 (01/20/17 1300)  Resp: 20 (01/20/17 1246)  BP: 119/61 (01/20/17 1246)  SpO2: 96 % (01/20/17 1246)    Vital Signs Range (Last 24H):  Temp:  [97.9 °F (36.6 °C)-98.5 °F (36.9 °C)]   Pulse:  [75-93]   Resp:  [18-23]   BP: (111-140)/(51-77)   SpO2:  [94 %-100 %]     Physical Exam:  GENERAL: alert, cooperative  VASCULAR: Dorsalis Pedis: Left: trace Right: trace       Posterior Tibialis: Left: trace Right: trace  NEURO:      SWMF: Left: decreased Right: decreased  DERM: Hair: Left: absent Right: absent       Interdigital Spaces: Left: maceration Right: maceration atrophic skin changes noted from the distal leg inferiorly with tinea pedis on plantar aspects of foot, rafi.     Wound 1 anterior leg, left: Superficial granular ulcer  measuring 2.1x2.0 cm. No malodor, no periwound edema or erythema noted. Mildly tender to palpation.     Wound 2 anterior left, right Superficial granular ulcer measuring 2.0x1.9 cm. Mildly malodorous. No periwound edema or erythema noted. Mildly tender to palpation.     Wound 3 lateral leg, right. Superficial granular ulcer measuring 3.0x2.1. Malodorous with mild periwound edema and erythema. Very tender to palpation.                     Laboratory:  CBC:   Recent Labs  Lab 01/20/17  1107   WBC 9.27   RBC 3.34*   HGB 10.4*   HCT 31.1*      MCV 93   MCH 31.1*   MCHC 33.4     BMP:   Recent Labs  Lab 01/20/17  0742 01/20/17  1107   GLU 74 86    141   K 4.2 4.2   * 113*   CO2 20* 19*   BUN 48* 43*   CREATININE 1.8* 1.6*   CALCIUM 8.6* 8.4*   MG 2.3  --      CMP:   Recent Labs  Lab 01/19/17  1806  01/20/17  1107   GLU 92  < > 86   CALCIUM 9.0  < > 8.4*   ALBUMIN 2.7*  --   --    PROT 7.4  --   --      < > 141   K 4.9  < > 4.2   CO2 22*  < > 19*     < > 113*   BUN 57*  < > 43*   CREATININE 2.5*  < > 1.6*   ALKPHOS 77  --   --    ALT 12  --   --    AST 22  --   --    BILITOT 0.3  --   --    < > = values in this interval not displayed.  ESR: No results for input(s): SEDRATE in the last 168 hours.  CRP: No results for input(s): CRP in the last 168 hours.  Coagulation: No results for input(s): INR, APTT in the last 168 hours.    Invalid input(s): PT    Diagnostic Results:    VAS JOSE ALBERTO 1/20/17: Impression:   Rt JOSE ALBERTO (0.62) Segment/Brachial Index and PVR waveforms indicate moderate peripheral arterial obstructive disease.    Lt JOSE ALBERTO (0.77) Segment/Brachial Index and PVR waveforms indicate mild peripheral arterial obstructive disease.        ASSESSMENT/PLAN     Assessment:  LE cellulitis  Stasis ulcer x3, rafi  PVD  RICARDO    Plan:  -No acute surgical intervention necessary at this time, pt with cellulitis and stasis ulcers x 3  -Obtained culture of lateral right painful ulcer of distal extremity, continue  current ABX, tailor to culture results, consider ID consult  -Pt with low JOSE ALBERTO of .6 on right and .7 on left, recommend vascular consult for PVD  -Pt to elevate LE, rafi. Do not apply compression to LE due to low JOSE ALBERTO/PVD  -Wounds were dressed with adaptic, betadine, 2 rolls of kerlix, 2 rolls of loose ACE  -Nursing to apply daily dressing changes as above, orders placed.   -Appreciate the consult, podiatry will follow  -If questions, please contact the on-call podiatry resident    Thank you,     Yuridia Solares  DPM PGY-1  [phone]

## 2017-01-20 NOTE — H&P
[facility] Hospital Medicine  History and Physical     Patient Name: Nelia Brizuela  MRN: [id number]  Patient Class: IP- Inpatient   Admission Date: 1/19/2017  Length of Stay: 1 days  Attending Physician: Caterina Martinez MD  Primary Care Provider: Lenin Sandhu MD    [facility] Medicine Team: [facility] MED 3 DENG Peace    Subjective:     Principal Problem:RICARDO (acute kidney injury)    HPI:  Patient is an 80 yo female with history of HTN and COPD who presented to ED today after she had SBP reading of 60's x2. Patient's  takes her BP every morning with their home cuff and states it was ~60 SBP. They then took her to urgent care where it was again found to be ~60s systolic. She was advised to go to ED. Upon arrival her BP had returned to ~120 systolic without any intervention. Patient states she had some chills last night and mild abdominal pain but has otherwise felt at her baseline health yesterday and today. She did not take her BP meds this AM. She denies abdominal pain, diarrhea, loose stools, or cough, though she does note the she has not been urinating very much secondary to not drinking enough water. Patient has chronic bilateral lower extremity wounds that have been present for 5-6 months but does not currently have wound care due to difficulty in getting an appointment. Family notes that she has an appointment scheduled for 1/31/17.      In ED found to have RICARDO and leukocytosis on labs. Given IV CTX and azithro for possible PNA seen on CXR however patient is afebrile, and without SOB, chest pain cough or hypoxia. Given 1 L IVFs            Review of Systems   Constitutional: Positive for chills. Negative for fever.   HENT: Negative for rhinorrhea, sinus pressure, sneezing and sore throat.    Eyes: Negative for visual disturbance.   Respiratory: Negative for cough and shortness of breath.    Cardiovascular: Negative for chest pain, palpitations and leg swelling.    Gastrointestinal: Positive for abdominal pain. Negative for constipation, diarrhea, nausea and vomiting.   Endocrine: Negative for polyuria.   Genitourinary: Negative for dysuria, frequency and hematuria.   Musculoskeletal: Negative for arthralgias and back pain.   Allergic/Immunologic: Negative for immunocompromised state.   Neurological: Negative for dizziness, syncope and light-headedness.   Hematological: Negative for adenopathy. Does not bruise/bleed easily.   Psychiatric/Behavioral: Negative for agitation and behavioral problems.     Objective:     Vital Signs (Most Recent):  Temp: 97.9 °F (36.6 °C) (01/19/17 2202)  Pulse: 83 (01/20/17 0116)  Resp: (!) 23 (01/20/17 0116)  BP: 133/70 (01/20/17 0116)  SpO2: 97 % (01/20/17 0116) Vital Signs (24h Range):  Temp:  [97.9 °F (36.6 °C)] 97.9 °F (36.6 °C)  Pulse:  [77-93] 83  Resp:  [16-23] 23  SpO2:  [95 %-100 %] 97 %  BP: (112-156)/(51-77) 133/70     Weight: 83.9 kg (185 lb)  Body mass index is 31.76 kg/(m^2).  No intake or output data in the 24 hours ending 01/20/17 0420   Physical Exam   Constitutional: She is oriented to person, place, and time. She appears well-developed and well-nourished.   HENT:   Head: Normocephalic and atraumatic.   Eyes: EOM are normal. Pupils are equal, round, and reactive to light.   Neck: Normal range of motion. Neck supple. No thyromegaly present.   Cardiovascular: Normal rate and regular rhythm.    No murmur heard.  Pulmonary/Chest: Effort normal. No respiratory distress. She has no wheezes.   Abdominal: Soft. She exhibits no distension. There is no tenderness.   Musculoskeletal:   Thick flaking skin with  multiple ulcerations to bilateral lower extremities. There is mild erythema, and purulent drainage noted from ulcerations.       Lymphadenopathy:     She has no cervical adenopathy.   Neurological: She is alert and oriented to person, place, and time. No cranial nerve deficit.   Psychiatric: She has a normal mood and affect. Her  behavior is normal.       Significant Labs:   BMP:   Recent Labs  Lab 01/19/17  1806   GLU 92      K 4.9      CO2 22*   BUN 57*   CREATININE 2.5*   CALCIUM 9.0     CBC:   Recent Labs  Lab 01/19/17  1654   WBC 18.29*   HGB 11.9*   HCT 35.5*        CMP:   Recent Labs  Lab 01/19/17  1806      K 4.9      CO2 22*   GLU 92   BUN 57*   CREATININE 2.5*   CALCIUM 9.0   PROT 7.4   ALBUMIN 2.7*   BILITOT 0.3   ALKPHOS 77   AST 22   ALT 12   ANIONGAP 9   EGFRNONAA 17.6*       Significant Imaging: I have reviewed all pertinent imaging results/findings within the past 24 hours.    Assessment/Plan:      * RICARDO (acute kidney injury)  -Likely pre-renal in setting of hypotension and cellulitis.   -follow up urine electrolytes.   -received 1 L in ED and will start continuous fluids     Hypotension  Unclear etiology. Maybe from infection as patient has signs of cellulitis and WBC of 18. Resolved without intervention upon arrival to ED. Will continue to hold home BP meds and monitor.   -UA is positive for nitrites and has many bacteria. Follow up urine culture.     Psoriasis  -on bilateral LE with ulcer noted to have purulent drainage. Also with leukocytosis. Will treat for cellulitis with course of clinda.   -wound care consulted.     Tobacco abuse  Tobacco cessation         VTE Risk Mitigation         Ordered     enoxaparin injection 40 mg  Daily     Route:  Subcutaneous        01/19/17 1881     Medium Risk of VTE  Once      01/20/17 0034     Place SHANIKA hose  Until discontinued      01/20/17 0034     Place sequential compression device  Until discontinued      01/20/17 0034          DENG Peace  Department of Hospital Medicine   [facility]

## 2017-01-20 NOTE — PLAN OF CARE
01/20/17 1729   Discharge Assessment   Assessment Type Discharge Planning Assessment   Confirmed/corrected address and phone number on facesheet? Yes   Assessment information obtained from? Patient;Caregiver;Medical Record  (daughter at bedside)   Expected Length of Stay (days) 3   Communicated expected length of stay with patient/caregiver yes   Prior to hospitilization cognitive status: Alert/Oriented   Prior to hospitalization functional status: Assistive Equipment   Current cognitive status: Alert/Oriented   Current Functional Status: Assistive Equipment   Arrived From home or self-care   Lives With spouse   Able to Return to Prior Arrangements yes   Is patient able to care for self after discharge? Yes   Who are your caregiver(s) and their phone number(s)? 5 children live locally and can assist   Patient's perception of discharge disposition home or selfcare;home health   Readmission Within The Last 30 Days no previous admission in last 30 days   Patient currently being followed by outpatient case management? No   Patient currently receives home health services? No  (previously with [facility])   Does the patient currently use HME? Yes   Patient currently receives private duty nursing? No   Patient currently receives any other outside agency services? No   Equipment Currently Used at Home cane, straight;rollator   Do you have any problems affording any of your prescribed medications? No   Is the patient taking medications as prescribed? yes   Do you have any financial concerns preventing you from receiving the healthcare you need? No   Does the patient have transportation to healthcare appointments? Yes   Transportation Available family or friend will provide   On Dialysis? No   Does the patient receive services at the [facility]mad[facility] Clinic? No   Are there any open cases? No   Discharge Plan A Home with family;Home Health   Discharge Plan B Skilled Nursing Facility   Patient/Family In Agreement With Plan yes

## 2017-01-20 NOTE — ASSESSMENT & PLAN NOTE
Unclear etiology. Maybe from infection as patient has signs of cellulitis and WBC of 18. Resolved without intervention upon arrival to ED. Will continue to hold home BP meds and monitor.   -UA is positive for nitrites and has many bacteria. Follow up urine culture.

## 2017-01-21 PROBLEM — N39.0 UTI (URINARY TRACT INFECTION): Status: ACTIVE | Noted: 2017-01-21

## 2017-01-21 PROBLEM — L03.90 CELLULITIS: Status: ACTIVE | Noted: 2017-01-21

## 2017-01-21 PROBLEM — D72.829 LEUKOCYTOSIS: Status: ACTIVE | Noted: 2017-01-21

## 2017-01-21 LAB
ALBUMIN SERPL BCP-MCNC: 2.3 G/DL
ALP SERPL-CCNC: 58 U/L
ALT SERPL W/O P-5'-P-CCNC: 16 U/L
ANION GAP SERPL CALC-SCNC: 11 MMOL/L
ANION GAP SERPL CALC-SCNC: 9 MMOL/L
AST SERPL-CCNC: 39 U/L
BACTERIA UR CULT: NORMAL
BASOPHILS # BLD AUTO: 0.03 K/UL
BASOPHILS NFR BLD: 0.5 %
BILIRUB SERPL-MCNC: 0.3 MG/DL
BUN SERPL-MCNC: 28 MG/DL
BUN SERPL-MCNC: 31 MG/DL
CALCIUM SERPL-MCNC: 8.6 MG/DL
CALCIUM SERPL-MCNC: 9 MG/DL
CHLORIDE SERPL-SCNC: 112 MMOL/L
CHLORIDE SERPL-SCNC: 114 MMOL/L
CO2 SERPL-SCNC: 19 MMOL/L
CO2 SERPL-SCNC: 20 MMOL/L
CREAT SERPL-MCNC: 1.2 MG/DL
CREAT SERPL-MCNC: 1.3 MG/DL
DIFFERENTIAL METHOD: ABNORMAL
EOSINOPHIL # BLD AUTO: 0.1 K/UL
EOSINOPHIL NFR BLD: 1.5 %
ERYTHROCYTE [DISTWIDTH] IN BLOOD BY AUTOMATED COUNT: 14.4 %
EST. GFR  (AFRICAN AMERICAN): 44.8 ML/MIN/1.73 M^2
EST. GFR  (AFRICAN AMERICAN): 49.3 ML/MIN/1.73 M^2
EST. GFR  (NON AFRICAN AMERICAN): 38.8 ML/MIN/1.73 M^2
EST. GFR  (NON AFRICAN AMERICAN): 42.8 ML/MIN/1.73 M^2
GLUCOSE SERPL-MCNC: 62 MG/DL
GLUCOSE SERPL-MCNC: 77 MG/DL
HCT VFR BLD AUTO: 31.4 %
HGB BLD-MCNC: 10.5 G/DL
LYMPHOCYTES # BLD AUTO: 1.6 K/UL
LYMPHOCYTES NFR BLD: 24.6 %
MAGNESIUM SERPL-MCNC: 2 MG/DL
MCH RBC QN AUTO: 31.3 PG
MCHC RBC AUTO-ENTMCNC: 33.4 %
MCV RBC AUTO: 94 FL
MONOCYTES # BLD AUTO: 0.8 K/UL
MONOCYTES NFR BLD: 11.6 %
NEUTROPHILS # BLD AUTO: 4 K/UL
NEUTROPHILS NFR BLD: 61.5 %
PHOSPHATE SERPL-MCNC: 2.7 MG/DL
PLATELET # BLD AUTO: 210 K/UL
PMV BLD AUTO: 10.2 FL
POTASSIUM SERPL-SCNC: 4 MMOL/L
POTASSIUM SERPL-SCNC: 4.4 MMOL/L
PROT SERPL-MCNC: 6.4 G/DL
RBC # BLD AUTO: 3.36 M/UL
SODIUM SERPL-SCNC: 142 MMOL/L
SODIUM SERPL-SCNC: 143 MMOL/L
WBC # BLD AUTO: 6.54 K/UL

## 2017-01-21 PROCEDURE — 36415 COLL VENOUS BLD VENIPUNCTURE: CPT

## 2017-01-21 PROCEDURE — 99233 SBSQ HOSP IP/OBS HIGH 50: CPT | Mod: GC,,, | Performed by: INTERNAL MEDICINE

## 2017-01-21 PROCEDURE — 84100 ASSAY OF PHOSPHORUS: CPT

## 2017-01-21 PROCEDURE — 25000003 PHARM REV CODE 250: Performed by: INTERNAL MEDICINE

## 2017-01-21 PROCEDURE — 25000003 PHARM REV CODE 250: Performed by: EMERGENCY MEDICINE

## 2017-01-21 PROCEDURE — 11000001 HC ACUTE MED/SURG PRIVATE ROOM

## 2017-01-21 PROCEDURE — 83735 ASSAY OF MAGNESIUM: CPT

## 2017-01-21 PROCEDURE — 85025 COMPLETE CBC W/AUTO DIFF WBC: CPT

## 2017-01-21 PROCEDURE — 97165 OT EVAL LOW COMPLEX 30 MIN: CPT

## 2017-01-21 PROCEDURE — 63600175 PHARM REV CODE 636 W HCPCS: Performed by: INTERNAL MEDICINE

## 2017-01-21 PROCEDURE — 97530 THERAPEUTIC ACTIVITIES: CPT

## 2017-01-21 PROCEDURE — 97161 PT EVAL LOW COMPLEX 20 MIN: CPT

## 2017-01-21 PROCEDURE — 80048 BASIC METABOLIC PNL TOTAL CA: CPT

## 2017-01-21 PROCEDURE — 99223 1ST HOSP IP/OBS HIGH 75: CPT | Mod: ,,, | Performed by: SURGERY

## 2017-01-21 PROCEDURE — 80053 COMPREHEN METABOLIC PANEL: CPT

## 2017-01-21 RX ORDER — ENOXAPARIN SODIUM 100 MG/ML
40 INJECTION SUBCUTANEOUS EVERY 24 HOURS
Status: DISCONTINUED | OUTPATIENT
Start: 2017-01-21 | End: 2017-01-24 | Stop reason: HOSPADM

## 2017-01-21 RX ORDER — SODIUM BICARBONATE 650 MG/1
650 TABLET ORAL 2 TIMES DAILY
Status: DISCONTINUED | OUTPATIENT
Start: 2017-01-21 | End: 2017-01-22

## 2017-01-21 RX ADMIN — STANDARDIZED SENNA CONCENTRATE AND DOCUSATE SODIUM 1 TABLET: 8.6; 5 TABLET, FILM COATED ORAL at 10:01

## 2017-01-21 RX ADMIN — BIMATOPROST 1 DROP: 0.1 SOLUTION/ DROPS OPHTHALMIC at 10:01

## 2017-01-21 RX ADMIN — CLINDAMYCIN HYDROCHLORIDE 300 MG: 150 CAPSULE ORAL at 05:01

## 2017-01-21 RX ADMIN — BRIMONIDINE TARTRATE 1 DROP: 1.5 SOLUTION OPHTHALMIC at 02:01

## 2017-01-21 RX ADMIN — ASPIRIN 81 MG CHEWABLE TABLET 81 MG: 81 TABLET CHEWABLE at 10:01

## 2017-01-21 RX ADMIN — CEFTRIAXONE 1 G: 1 INJECTION, SOLUTION INTRAVENOUS at 10:01

## 2017-01-21 RX ADMIN — HEPARIN SODIUM 5000 UNITS: 5000 INJECTION, SOLUTION INTRAVENOUS; SUBCUTANEOUS at 06:01

## 2017-01-21 RX ADMIN — BRIMONIDINE TARTRATE 1 DROP: 1.5 SOLUTION OPHTHALMIC at 10:01

## 2017-01-21 RX ADMIN — PANTOPRAZOLE SODIUM 40 MG: 40 TABLET, DELAYED RELEASE ORAL at 10:01

## 2017-01-21 RX ADMIN — CLINDAMYCIN HYDROCHLORIDE 300 MG: 150 CAPSULE ORAL at 06:01

## 2017-01-21 RX ADMIN — SODIUM BICARBONATE 650 MG TABLET 650 MG: at 10:01

## 2017-01-21 RX ADMIN — BRIMONIDINE TARTRATE 1 DROP: 1.5 SOLUTION OPHTHALMIC at 06:01

## 2017-01-21 RX ADMIN — SODIUM CHLORIDE 500 ML: 0.9 INJECTION, SOLUTION INTRAVENOUS at 10:01

## 2017-01-21 RX ADMIN — ENOXAPARIN SODIUM 40 MG: 100 INJECTION SUBCUTANEOUS at 02:01

## 2017-01-21 RX ADMIN — CLINDAMYCIN HYDROCHLORIDE 300 MG: 150 CAPSULE ORAL at 01:01

## 2017-01-21 RX ADMIN — CLINDAMYCIN HYDROCHLORIDE 300 MG: 150 CAPSULE ORAL at 12:01

## 2017-01-21 NOTE — PROGRESS NOTES
[facility] Hospital Medicine  Progress Note    Patient Name: Nelia Brizuela  MRN: [id number]  Patient Class: IP- Inpatient   Admission Date: 1/19/2017  Length of Stay: 2 days  Attending Physician: Caterina Martinez MD  Primary Care Provider: Lenin Sandhu MD    [facility] Medicine Team: [facility] HOSP MED 3 DENG Mijares    Subjective:     Principal Problem:RICARDO (acute kidney injury)    HPI:  Patient is an 80 yo female with history of HTN and COPD who presented to ED today after she had SBP reading of 60's x2. Patient's  takes her BP every morning with their home cuff and states it was ~60 SBP. They then took her to urgent care where it was again found to be ~60s systolic. She was advised to go to ED. Upon arrival her BP had returned to ~120 systolic without any intervention. Patient states she had some chills last night and mild abdominal pain but has otherwise felt at her baseline health yesterday and today. She did not take her BP meds this AM. She denies abdominal pain, diarrhea, loose stools, or cough, though she does note the she has not been urinating very much secondary to not drinking enough water. Patient has chronic bilateral lower extremity wounds that have been present for 5-6 months but does not currently have wound care due to difficulty in getting an appointment. Family notes that she has an appointment scheduled for 1/31/17.      Hospital Course:       Interval History:   NAEON. Reports that she feels better today after being seated up in bed.     Review of Systems   Denies any chest pain or SOB. No fevers or chills. No abdominal pain n/v/d.   Objective:     Vital Signs (Most Recent):  Temp: 98 °F (36.7 °C) (01/21/17 0754)  Pulse: 92 (01/21/17 0900)  Resp: 18 (01/21/17 0754)  BP: 118/61 (01/21/17 0754)  SpO2: (!) 94 % (01/21/17 0754) Vital Signs (24h Range):  Temp:  [98 °F (36.7 °C)-98.7 °F (37.1 °C)] 98 °F (36.7 °C)  Pulse:  [74-95] 92  Resp:  [18-20] 18  SpO2:  [94 %-96 %]  94 %  BP: (109-128)/(54-67) 118/61     Weight: 85.3 kg (188 lb)  Body mass index is 33.3 kg/(m^2).  No intake or output data in the 24 hours ending 01/21/17 1228   Physical Exam   General: elderly in NAD  Neck: supple, symmetrical, trachea midline, no JVD  Cardiovascular: Heart: regular rate and rhythm, S1, S2 normal, no murmur, click, rub or gallop.  Lungs: clear to auscultation bilaterally and normal respiratory effort  Chest Wall: no tenderness  Abdomen/Rectal: Abdomen: Non distended. + BS. No masses. No TTP. No rebound or guarding. Negative Lopez's sign. Rectal: not examined  Extremities: LE wrapped b/l and appear c/d/i    Significant Labs:   Blood Culture:   Recent Labs  Lab 01/19/17  1650 01/19/17  2255   LABBLOO No Growth to date  No Growth to date No Growth to date  No Growth to date     CBC:   Recent Labs  Lab 01/19/17  1654 01/20/17  1107 01/21/17  0412   WBC 18.29* 9.27 6.54   HGB 11.9* 10.4* 10.5*   HCT 35.5* 31.1* 31.4*    205 210     CMP:   Recent Labs  Lab 01/19/17  1806 01/20/17  0742 01/20/17  1107 01/21/17  0412    140 141 142   K 4.9 4.2 4.2 4.0    113* 113* 114*   CO2 22* 20* 19* 19*   GLU 92 74 86 62*   BUN 57* 48* 43* 31*   CREATININE 2.5* 1.8* 1.6* 1.3   CALCIUM 9.0 8.6* 8.4* 8.6*   PROT 7.4  --   --  6.4   ALBUMIN 2.7*  --   --  2.3*   BILITOT 0.3  --   --  0.3   ALKPHOS 77  --   --  58   AST 22  --   --  39   ALT 12  --   --  16   ANIONGAP 9 7* 9 9   EGFRNONAA 17.6* 26.2* 30.2* 38.8*     Urine Culture:   Recent Labs  Lab 01/19/17 1948   LABURIN PRESUMPTIVE E COLI>100,000 cfu/mlIdentification and susceptibility pending         Assessment/Plan:      * RICARDO (acute kidney injury)  -etiology Likely pre-renal in setting of hypotension and cellulitis  -urine studies indicative of pre-renal injury   -Cr improving today to 1.7 with baseline being 1.1  -bolus today with NS 500ml and re-check afternoon bmp  -sod bicarb for metabolic acidosis    HTN (hypertension)  Hold home hctz  in light of recent low BP and resulting RICARDO      Hypotension  Multifactorial likely infectious and iatrogenic   hold home BP meds and monitor  Resolved        UTI (urinary tract infection)  Urine cx growing E coli  Continue rocephin pending cx sensitivity      Leukocytosis  Likely secondary to UTI and/or cellulitis  Resolved with wbc 9k today      Peripheral vascular disease, unspecified  Likely secondary to hx of smoking   JOSE ALBERTO with stenosis right JOSE ALBERTO of 0.62 and left JOSE ALBERTO revealing 0.77  Vascular surgery consulted, recommending angiogram prior to discharge. Await RICARDO to resolve   Continue ASA      Cellulitis  Hx of PVD   cx sent  Podiatry with wound recommendations  Continue po clindamycin for now      Tobacco abuse  Tobacco cessation        Psoriasis  -on bilateral LE with ulcer noted to have purulent drainage. Also with leukocytosis. Will treat for cellulitis with course of clinda.   -wound care consulted.       VTE Risk Mitigation         Ordered     enoxaparin injection 40 mg  Daily     Route:  Subcutaneous        01/21/17 1105     Medium Risk of VTE  Once      01/20/17 0034     Place SHANIKA hose  Until discontinued      01/20/17 0034     Place sequential compression device  Until discontinued      01/20/17 0034          DENG Mijares  Department of Hospital Medicine   [facility]

## 2017-01-21 NOTE — PLAN OF CARE
Problem: Occupational Therapy Goal  Goal: Occupational Therapy Goal  Goals to be met by: 1/28/17     Patient will increase functional independence with ADLs by performing:    UE Dressing with Modified [place].  LE Dressing with Modified [place] and Assistive Devices as needed.  Grooming while standing at sink with Modified [place].  Toileting from toilet with Modified [place] for hygiene and clothing management.   Stand pivot transfers with Modified [place].  Toilet transfer to toilet with Modified [place].  Outcome: Ongoing (interventions implemented as appropriate)  OT eval completed. The above goals are established to improve functional (I) and mobility.     DAVON Torres  1/21/2017  Pager: [phone]

## 2017-01-21 NOTE — ASSESSMENT & PLAN NOTE
-etiology Likely pre-renal in setting of hypotension and cellulitis  -urine studies indicative of pre-renal injury   -Cr improving today to 1.7 with baseline being 1.1  -bolus today with NS 500ml and re-check afternoon bmp  -sod bicarb for metabolic acidosis

## 2017-01-21 NOTE — ASSESSMENT & PLAN NOTE
Likely secondary to hx of smoking   JOSE ALBERTO with stenosis right JOSE ALBERTO of 0.62 and left JOSE ALBERTO revealing 0.77  Vascular surgery consulted, recommending angiogram prior to discharge. Await RICARDO to resolve   Continue ASA

## 2017-01-21 NOTE — PT/OT/SLP EVAL
Occupational Therapy  Evaluation    Nelia Brizuela   MRN: [id number]   Admitting Diagnosis: RICARDO (acute kidney injury)    OT Date of Treatment: 01/21/17   OT Start Time: 0939  OT Stop Time: 1008  OT Total Time (min): 29 min    Billable Minutes:  Evaluation 29 minutes    Diagnosis: RICARDO (acute kidney injury)   Decreased endurance; decreased balance, ROM.    Past Medical History   Diagnosis Date    Arthritis of knee     Cataract     COPD (chronic obstructive pulmonary disease)     CTS (carpal tunnel syndrome)     Glaucoma (increased eye pressure)     HTN (hypertension)     Joint pain     Psoriasis 2007    Smoker     Strabismus      XT OS      Past Surgical History   Procedure Laterality Date    Tubal ligation      Cataract extraction w/ intraocular lens implantw/ trabeculectomy Left 8/13/14     os ()    Cataract extraction w/  intraocular lens implant Left 8/13/2014     OS ()    Ahmed implant and surgical pi Left 1/27/16             Referring physician: CECE Martinez  Date referred to OT: 1/21/2017    General Precautions: Standard, fall    Do you have any cultural, spiritual, Ep[employer]alian conflicts, given your current situation?: none     Patient History:  Living Environment  Lives With: spouse  Living Arrangements: house  Home Accessibility:  (tub/shower is not a walk-in)  Home Layout: Able to live on 1st floor  Stair Railings at Home: none  Transportation Available: family or friend will provide  Living Environment Comment: Pt lives with  in a 1SH with no MICKEY, has a tub/shower with shower chair and regular toilet. Pt ambulates with QC or Rollator and uses w/c in community. Pt's  or daughter assists with dressing, tub t/f  Equipment Currently Used at Home: shower chair, rollator, cane, quad, bedside commode    Prior level of function:   Bed Mobility/Transfers: needs device  Grooming: independent  Bathing: needs device and assist (A with transfer; pt able to perform  "task)  Upper Body Dressing: needs assist  Lower Body Dressing: needs assist  Toileting: independent  Home Management Skills: needs assist  Homemaking Responsibilities: No  Mode of Transportation: Family, Friends     Dominant hand: right    Subjective:  Communicated with RN prior to session.  "I can't raise my arms too well. My  helps me get dressed and helps me into the tub."  Chief Complaint: none  Patient/Family stated goals: Go home    Pain Ratin/10  Pain Rating Post-Intervention: 0/10    Objective:  Patient found with: peripheral IV, telemetry, pt found supine in bed and agreeable to coeval with OT/PT    Cognitive Exam:  Oriented to: Person, Place, Time and Situation  Follows Commands/attention: Follows multistep  commands  Communication: clear/fluent  Memory:  No Deficits noted  Safety awareness/insight to disability: intact  Coping skills/emotional control: Appropriate to situation    Visual/perceptual:  Intact    Physical Exam:  Postural examination/scapula alignment: Rounded shoulder  Skin integrity: B LE covered with bandages from hindfoot to middle of lower leg  Edema: Moderate LEs    Sensation:   Intact UEs, some tingling in fingers 2/2 carpal tunnel per pt report    Upper Extremity Range of Motion:  Right Upper Extremity: AROM of shoulder flexion ~40 degrees, abduction 25 degrees; PROM ~ 75 degrees of flexion and 50 degrees of abduction  Left Upper Extremity: AROM of shoulder flexion ~90 degrees, abduction ~80 degrees    Upper Extremity Strength:  Right Upper Extremity: 2/5  Left Upper Extremity: 2+/5   Strength: Fair    Fine motor coordination:   Intact    Gross motor coordination: WFL    Functional Mobility:  Bed Mobility:  Scooting/Bridging: Supervision (to EOB)  Supine to Sit: Supervision    Transfers:  Sit <> Stand Assistance: Supervision  Sit <> Stand Assistive Device: Rolling Walker    Functional Ambulation: Supervision with RW, slow ondina, no LOB    Activities of Daily " "Living:    Feeding: Setup A.  UE Dressing Level of Assistance: Minimum assistance (to don gown around back and provide cues for technique 2/2 decreased ROM of R shoulder)  LE Dressing Level of Assistance: Minimum assistance (pt has Crocs which have a moveable heel strap; A provided to pull heel strap over heel 2/2 thick bandages)    Balance:   Static Sit: NORMAL: No deviations seen in posture held statically  Dynamic Sit: GOOD: Maintains balance through MODERATE excursions of active trunk movement  Static Stand: GOOD-: Takes MODERATE challenges from all directions inconsistently  Dynamic stand: GOOD-: Needs SUPERVISION only during gait and able to self right with moderate     Therapeutic Activities and Exercises:  Pt ed re OT role and POC. Pt performed supine to sit and scoot to EOB with S. Pt performed strength and ROM testing. Pt required Min A to don gown around back and for v/c for technique 2/2 R frozen shoulder. Pt donned slip on shoes with Setup A initially, but required Min A to pull heel strap over heel 2/2 bandages bilaterally. Pt performed sit to stand t/f with S and RW and ambulated with S and RW in hallway and back to room. Pt sat EOB and was setup with food tray.    AM-PAC 6 CLICK ADL  How much help from another person does this patient currently need?  1 = Unable, Total/Dependent Assistance  2 = A lot, Maximum/Moderate Assistance  3 = A little, Minimum/Contact Guard/Supervision  4 = None, Modified [place]/Independent    Putting on and taking off regular lower body clothing? : 3  Bathing (including washing, rinsing, drying)?: 3  Toileting, which includes using toilet, bedpan, or urinal? : 3  Putting on and taking off regular upper body clothing?: 3  Taking care of personal grooming such as brushing teeth?: 4  Eating meals?: 4  Total Score: 20    AM-PAC Raw Score CMS "G-Code Modifier Level of Impairment Assistance   6 % Total / Unable   7 - 9 CM 80 - 100% Maximal Assist   10 - 14 CL 60 - 80% " Moderate Assist   15 - 19 CK 40 - 60% Moderate Assist   20 - 22 CJ 20 - 40% Minimal Assist   23 CI 1-20% SBA / CGA   24 CH 0% Independent/ Mod I       Patient left sitting EOB with all lines intact, call button in reach and  present    Assessment:  Nelia Brizuela is a 81 y.o. female with a medical diagnosis of RICARDO (acute kidney injury) and presents with good bed mobility, transfer and ambulation skills. Pt able to perform most ADLs (I)ly, but has good family support and assistance as needed. Pt would benefit from cont OT while at [facility] to improve endurance for self care tasks. Pt would benefit from OPPT/OT to address R frozen shoulder, and further improve balance and endurance for safe mobility.    Rehab identified problem list/impairments: Rehab identified problem list/impairments: weakness, impaired endurance, impaired sensation, impaired self care skills, impaired functional mobilty, decreased lower extremity function, decreased upper extremity function, decreased ROM, impaired skin    Rehab potential is good.    Activity tolerance: Fair    Discharge recommendations: Discharge Facility/Level Of Care Needs: outpatient PT, outpatient OT (for frozen shoulder; for balance and endurance)     Barriers to discharge: Barriers to Discharge: None    Equipment recommendations: none     GOALS:   Occupational Therapy Goals        Problem: Occupational Therapy Goal    Goal Priority Disciplines Outcome Interventions   Occupational Therapy Goal     OT, PT/OT Ongoing (interventions implemented as appropriate)    Description:  Goals to be met by: 1/28/17     Patient will increase functional independence with ADLs by performing:    UE Dressing with Modified [place].  LE Dressing with Modified [place] and Assistive Devices as needed.  Grooming while standing at sink with Modified [place].  Toileting from toilet with Modified [place] for hygiene and clothing management.   Stand pivot transfers with Modified  [place].  Toilet transfer to toilet with Modified [place].                PLAN:  Patient to be seen 3 x/week to address the above listed problems via self-care/home management, therapeutic activities, therapeutic exercises  Plan of Care expires: 02/20/17  Plan of Care reviewed with: patient, spouse    Kishore Duran DAVON  1/21/2017  Pager: [phone]

## 2017-01-21 NOTE — CONSULTS
Consult Note  Vascular Surgery    Vascular Surgery Staff  I have seen and examined the patient and reviewed the residents note. I agree with their assessment and plan.    Chronic lower leg wounds consistent with venous stasis disease but also has significant underlying PAD.  Plan angiogram to evaluate LE perfusion.    May be discharged and scheduled as outpatient pending OR availability tomorrow.    Mercedes Jackson MD [facility]  Vascular & Endovascular Surgery  -----------------------------------------------------------------------------------------------------------------------------------------          Consult Requested By: medicine  Reason for Consult: bilateral feet ulcers    SUBJECTIVE:     History of Present Illness:  Patient is a 81 y.o. female with h/o COPD, HTN presenting with hypotension and RICARDO (creatinine 2.5 now to 1.6) with findings of bilateral lower extremity cellulitis and possible UTI (leukocytosis of 18 on presentation). Vascular surgery consulted for bilateral lower extremity wound evaluation for PAD.   Patient reports that she has had bilateral lower extremity wounds for several months- was supposed to see a dermatologist but has not been established- has history of psoriasis.  She is able to ambulate minimally. Has complaints of burning foot pain at night.    No h/o MI/stroke.  50 or more pack year smoking history; is on a program and smokes a few cigarettes a day    Scheduled Meds:   aspirin  81 mg Oral Daily    bimatoprost  1 drop Right Eye QHS    brimonidine 0.15 % OPTH DROP  1 drop Right Eye TID    cefTRIAXone (ROCEPHIN) IVPB  1 g Intravenous Q24H    clindamycin  300 mg Oral Q6H    heparin (porcine)  5,000 Units Subcutaneous TID    pantoprazole  40 mg Oral Daily    senna-docusate 8.6-50 mg  1 tablet Oral BID     Continuous Infusions:   PRN Meds:acetaminophen, albuterol-ipratropium 2.5mg-0.5mg/3mL, hydrocodone-acetaminophen 10-325mg, hydrocodone-acetaminophen 5-325mg,  ondansetron    Review of patient's allergies indicates:   Allergen Reactions    Codeine      Other reaction(s): Unknown       Past Medical History   Diagnosis Date    Arthritis of knee     Cataract     COPD (chronic obstructive pulmonary disease)     CTS (carpal tunnel syndrome)     Glaucoma (increased eye pressure)     HTN (hypertension)     Joint pain     Psoriasis 2007    Smoker     Strabismus      XT OS     Past Surgical History   Procedure Laterality Date    Tubal ligation      Cataract extraction w/ intraocular lens implantw/ trabeculectomy Left 8/13/14     os ()    Cataract extraction w/  intraocular lens implant Left 8/13/2014     OS ()    Ahmed implant and surgical pi Left 1/27/16           Family History   Problem Relation Age of Onset    Glaucoma Father     Cancer Neg Hx     Psoriasis Neg Hx     Amblyopia Neg Hx     Blindness Neg Hx     Macular degeneration Neg Hx     Retinal detachment Neg Hx     Strabismus Neg Hx      Social History   Substance Use Topics    Smoking status: Current Every Day Smoker     Packs/day: 1.00     Years: 57.00    Smokeless tobacco: Never Used      Comment: Pt not ready to quit.    Alcohol use Yes      Comment: seldom alcohol use.        Review of Systems:  Peripheral Vascular: Ischemic Rest Pain: none  Constitutional: no fever or chills  Respiratory: no cough or shortness of breath  Cardiovascular: no chest pain or palpitations  Gastrointestinal: no nausea or vomiting, tolerating diet  Integument/Breast: no rash or pruritis  Hematologic/Lymphatic: no easy bruising or lymphadenopathy  Musculoskeletal: no arthralgias or myalgias    OBJECTIVE:     Vital Signs (Most Recent)  Temp: 98.3 °F (36.8 °C) (01/20/17 1630)  Pulse: 95 (01/20/17 1700)  Resp: 18 (01/20/17 1630)  BP: (!) 109/54 (01/20/17 1630)  SpO2: 95 % (01/20/17 1630)    Vital Signs Range (Last 24H):  Temp:  [97.9 °F (36.6 °C)-98.5 °F (36.9 °C)]   Pulse:  [75-95]    Resp:  [18-23]   BP: (109-133)/(52-77)   SpO2:  [94 %-100 %]     Physical Exam:  General: well developed, well nourished, appears stated age, no distress  Head: normocephalic, atraumatic  Lungs:  clear to auscultation bilaterally and normal respiratory effort  Heart: regular rate and rhythm, S1, S2 normal, no murmur, rub or gallop  Abdomen: soft, non-tender non-distented; bowel sounds normal; no masses,  no organomegaly; aorta not palpable  Extremities: chronic evidence of venous stasis; left medial calf superficial ulceration; superficial ulceration of right great toe and lateral calf (see pictures below)  Pulses: 2+ femoral pulses bilaterally; signals distally;with biphasic AT on the left; monophasic AT and monophasic PT on the right  Neurologic:  No focal numbness or weakness  Alert and oriented. Thought content appropriate  Right leg:    Left Leg:         Laboratory:  CBC:   Recent Labs  Lab 01/20/17  1107   WBC 9.27   RBC 3.34*   HGB 10.4*   HCT 31.1*        CMP:   Recent Labs  Lab 01/19/17  1806  01/20/17  1107   GLU 92  < > 86   CALCIUM 9.0  < > 8.4*   ALBUMIN 2.7*  --   --    PROT 7.4  --   --      < > 141   K 4.9  < > 4.2   CO2 22*  < > 19*     < > 113*   BUN 57*  < > 43*   CREATININE 2.5*  < > 1.6*   ALKPHOS 77  --   --    ALT 12  --   --    AST 22  --   --    BILITOT 0.3  --   --    < > = values in this interval not displayed.  Coagulation: No results for input(s): INR, APTT in the last 168 hours.    Invalid input(s): PT    Diagnostic Results:  Indication:  -I73.9.  Results:  Lower Extremities Segmental Pressure [mmHg]:                    Right             Left  _______________________________________________________________  Brachial          111                 Posterior Tibial  69                86  Dorsalis Pedis    66                71  JOSE ALBERTO (Post. Tib.)  0.62              0.77  JOSE ALBERTO (Dors. Ped.)  0.59              0.64    Report Summary:  Impression:   Rt JOSE ALBERTO (0.62)  Segment/Brachial Index and PVR waveforms indicate moderate peripheral arterial obstructive disease.    Lt JOSE ALBERTO (0.77) Segment/Brachial Index and PVR waveforms indicate mild peripheral arterial obstructive disease.        ASSESSMENT/PLAN:   Patient is a 81 y.o. female with h/o COPD, HTN, psoariasis presenting with hypotension and RICARDO (creatinine 2.5 now to 1.6) with findings of bilateral lower extremity cellulitis and possible UTI (leukocytosis of 18 on presentation) with evidence of venous stasis ulcerations with likely arterial disease.    Recommend wrapping bilateral lower extremities and elevating above level of heart  Will need unna boots as outpatient; recommend setting up with [facility] wound care  Will discuss possible arterial disease; may consider angiogram prior to discharge  Needs smoking cessation  Vascular surgery to follow    Alejandra Prather DO  PGY-6, Vascular fellow   [phone]

## 2017-01-21 NOTE — SUBJECTIVE & OBJECTIVE
Interval History:   LUIS MIGUEL. Reports that she feels better today after being seated up in bed.     Review of Systems   Denies any chest pain or SOB. No fevers or chills. No abdominal pain n/v/d.   Objective:     Vital Signs (Most Recent):  Temp: 98 °F (36.7 °C) (01/21/17 0754)  Pulse: 92 (01/21/17 0900)  Resp: 18 (01/21/17 0754)  BP: 118/61 (01/21/17 0754)  SpO2: (!) 94 % (01/21/17 0754) Vital Signs (24h Range):  Temp:  [98 °F (36.7 °C)-98.7 °F (37.1 °C)] 98 °F (36.7 °C)  Pulse:  [74-95] 92  Resp:  [18-20] 18  SpO2:  [94 %-96 %] 94 %  BP: (109-128)/(54-67) 118/61     Weight: 85.3 kg (188 lb)  Body mass index is 33.3 kg/(m^2).  No intake or output data in the 24 hours ending 01/21/17 1228   Physical Exam   General: elderly in NAD  Neck: supple, symmetrical, trachea midline, no JVD  Cardiovascular: Heart: regular rate and rhythm, S1, S2 normal, no murmur, click, rub or gallop.  Lungs: clear to auscultation bilaterally and normal respiratory effort  Chest Wall: no tenderness  Abdomen/Rectal: Abdomen: Non distended. + BS. No masses. No TTP. No rebound or guarding. Negative Lopez's sign. Rectal: not examined  Extremities: LE wrapped b/l and appear c/d/i    Significant Labs:   Blood Culture:   Recent Labs  Lab 01/19/17  1650 01/19/17  2255   LABBLOO No Growth to date  No Growth to date No Growth to date  No Growth to date     CBC:   Recent Labs  Lab 01/19/17  1654 01/20/17  1107 01/21/17  0412   WBC 18.29* 9.27 6.54   HGB 11.9* 10.4* 10.5*   HCT 35.5* 31.1* 31.4*    205 210     CMP:   Recent Labs  Lab 01/19/17  1806 01/20/17  0742 01/20/17  1107 01/21/17  0412    140 141 142   K 4.9 4.2 4.2 4.0    113* 113* 114*   CO2 22* 20* 19* 19*   GLU 92 74 86 62*   BUN 57* 48* 43* 31*   CREATININE 2.5* 1.8* 1.6* 1.3   CALCIUM 9.0 8.6* 8.4* 8.6*   PROT 7.4  --   --  6.4   ALBUMIN 2.7*  --   --  2.3*   BILITOT 0.3  --   --  0.3   ALKPHOS 77  --   --  58   AST 22  --   --  39   ALT 12  --   --  16   ANIONGAP 9 7* 9  9   EGFRNONAA 17.6* 26.2* 30.2* 38.8*     Urine Culture:   Recent Labs  Lab 01/19/17 1948   LABURIN PRESUMPTIVE E COLI>100,000 cfu/mlIdentification and susceptibility pending

## 2017-01-21 NOTE — PLAN OF CARE
Problem: Patient Care Overview  Goal: Plan of Care Review  POC reviewed with pt and daughter. Pt given 500cc bolus today and started on oral antibiotics. Pt notified that she has pain medication if needed. No complaints of pain this shift. Pt has no questions or concerns at this time.

## 2017-01-21 NOTE — PLAN OF CARE
Problem: Physical Therapy Goal  Goal: Physical Therapy Goal  Goals to be met by: 2017     Patient will increase functional independence with mobility by performin. Supine to sit with Set-up [place]  2. Sit to supine with Set-up [place]  3. Sit to stand transfer with Supervision  4. Gait x 200 feet with Supervision using appropriate device  5. Stand for 10 minutes with Supervision using appropriate device  6. Lower extremity exercise program x 15 reps per handout, with assistance as needed  Outcome: Ongoing (interventions implemented as appropriate)  PT initial evaluation complete. Goals set.     Laurie Salmon, PT, DPT  2017  Pager: [phone]

## 2017-01-21 NOTE — PT/OT/SLP EVAL
Physical Therapy  Evaluation/ Treatment    Nelia Brizuela   MRN: [id number]   Admitting Diagnosis: RICARDO (acute kidney injury)    PT Received On: 01/21/17  PT Start Time: 0935     PT Stop Time: 1006    PT Total Time (min): 31 min   (co-eval with OT)    Billable Minutes:  Evaluation 20 and Therapeutic Activity 11    Diagnosis: RICARDO (acute kidney injury)    Past Medical History   Diagnosis Date    Arthritis of knee     Cataract     COPD (chronic obstructive pulmonary disease)     CTS (carpal tunnel syndrome)     Glaucoma (increased eye pressure)     HTN (hypertension)     Joint pain     Psoriasis 2007    Smoker     Strabismus      XT OS      Past Surgical History   Procedure Laterality Date    Tubal ligation      Cataract extraction w/ intraocular lens implantw/ trabeculectomy Left 8/13/14     os ()    Cataract extraction w/  intraocular lens implant Left 8/13/2014     OS ()    Ahmed implant and surgical pi Left 1/27/16           Referring physician: DENG Benitez  Date referred to PT: 1/21/17    General Precautions: Standard, fall  Orthopedic Precautions: N/A   Braces: N/A       Do you have any cultural, spiritual, Buddhism conflicts, given your current situation?: none stated    Patient History:  Lives With: spouse  Living Arrangements: house  Home Accessibility: other (see comments) (tub/shower is not walk in)  Home Layout: Able to live on 1st floor  Stair Railings at Home: none  Transportation Available: family or friend will provide  Living Environment Comment: Per pt, she lives with her  in single-level home, no MICKEY.  She has tub/shower combination, shower chair, and standard commode.  PTA, pt reports ambulating household distances with quad cane or rollator and uses wheelchair in the community.    Equipment Currently Used at Home: cane, quad, bedside commode, rollator, wheelchair, shower chair    Previous Level of Function:  Ambulation Skills: needs  "device  Transfer Skills: needs device  ADL Skills: needs device and assist  Work/Leisure Activity: needs device    Subjective:  Communicated with RN prior to session.  Pt agreeable to PT evaluation/ treatment.    "I have been in the bed for to long."    Chief Complaint: generalized weakness   Patient goals: to return to PLOF    Pain Ratin/10   Pain Rating Post-Intervention: 0/10    Objective:   Patient found supine, HOB elevated,  present with: peripheral IV, telemetry     Cognitive Exam:  Oriented to: Person, Place, Time and Situation    Follows Commands/attention: Follows multistep  commands  Communication: clear/fluent  Safety awareness/insight to disability: intact    Physical Exam:  Postural examination/scapula alignment: Rounded shoulder, Head forward and Posterior pelvic tilt    Skin integrity: Visible skin intact and intact guaze dressings to B LEs   Edema: None noted BLEs    Sensation:   Impaired  light/touch B LEs distal to knee    Lower Extremity Range of Motion:  Right Lower Extremity: WFL  Left Lower Extremity: WFL    Lower Extremity Strength:  Right Lower Extremity: WFL except hip flexors 2+/5  Left Lower Extremity: WFL except hip flexors 2+/5     Fine motor coordination:  Intact    Gross motor coordination: WFL    Functional Mobility:  Bed Mobility:  Scooting/Bridging: Stand by Assistance (seated anterior scoot to edge of bed)  Supine to Sit: Stand by Assistance, With side rail (increased time to complete task)  Sit to Supine:  (not assessed 2/2 pt sitting bedside to eat breakfast)    Transfers:  Sit <> Stand Assistance: Stand By Assistance (x 1 trial from bed in lowest position)  Sit <> Stand Assistive Device: Rolling Walker (verbal cues for RW and hand placement. pt places BUEs on RW prior to sit <> stand transfer despite verbal instruction to push-off of bed with BUEs.)    Gait:   Gait Distance: 90 ft with RW and SBA  Assistance 1: Stand by Assistance  Gait Assistive Device: Rolling " walker  Gait Pattern: reciprocal  Gait Deviation(s): decreased ondina, decreased step length, decreased stride length (Pt requires increased time to complete task.  Pt with difficulty performing dual task activity, turning head to speak to PT and walk while talking.  Pt performed static standing to talk and turn head and then continued with gait trial.)     Balance:   Static Sit: FAIR+: Able to take MINIMAL challenges from all directions  Dynamic Sit: FAIR+: Maintains balance through MINIMAL excursions of active trunk motion  Static Stand: FAIR: Maintains without assist but unable to take challenges  Dynamic stand: FAIR: Needs CONTACT GUARD during gait    Therapeutic Activities and Exercises:  PT initial evaluation complete. Pt educated on role of PT, safety with mobility, POC.  Pt sat edge of bed x 20 minutes with SBA for safety.  Pt with intermittent posterior lean and able to self-correct to return to midline position.   Pt educated on safety with RW management and importance of out of bed activity.     AM-PAC 6 CLICK MOBILITY  How much help from another person does this patient currently need?   1 = Unable, Total/Dependent Assistance  2 = A lot, Maximum/Moderate Assistance  3 = A little, Minimum/Contact Guard/Supervision  4 = None, Modified [place]/Independent    Turning over in bed (including adjusting bedclothes, sheets and blankets)?: 3  Sitting down on and standing up from a chair with arms (e.g., wheelchair, bedside commode, etc.): 3  Moving from lying on back to sitting on the side of the bed?: 3  Moving to and from a bed to a chair (including a wheelchair)?: 3  Need to walk in hospital room?: 3  Climbing 3-5 steps with a railing?: 2  Total Score: 17     AM-PAC Raw Score CMS G-Code Modifier Level of Impairment Assistance   6 % Total / Unable   7 - 9 CM 80 - 100% Maximal Assist   10 - 14 CL 60 - 80% Moderate Assist   15 - 19 CK 40 - 60% Moderate Assist   20 - 22 CJ 20 - 40% Minimal Assist   23  CI 1-20% SBA / CGA   24 CH 0% Independent/ Mod I     Patient left sitting at bedside with all lines intact, call button in reach and  present.    Assessment:   Nelia Brizuela is a 81 y.o. female with a medical diagnosis of RICARDO (acute kidney injury) and presents with below impairments.  She currently requires SBA with functional mobility and increased time to complete tasks.  She had difficulty performing dual task activity with gait trial, decreased ondina and static standing to compensate.  She will continue to benefit from skilled PT intervention to reduce risk of falls, reduce caregiver burden, and progress towards PLOF.  Upon discharge, she will benefit from OPPT services to address decreased shoulder strength/ ROM, impaired balance and endurance.      Rehab identified problem list/impairments: Rehab identified problem list/impairments: weakness, impaired endurance, impaired sensation, impaired functional mobilty, impaired self care skills, impaired balance, decreased lower extremity function, impaired skin    Rehab potential is good.    Activity tolerance: Good    Discharge recommendations: Discharge Facility/Level Of Care Needs: outpatient PT (for shoulder, balance, and endurance)     Barriers to discharge: Barriers to Discharge: None    Equipment recommendations: Equipment Needed After Discharge: none (pt has needed equipment)     GOALS:   Physical Therapy Goals        Problem: Physical Therapy Goal    Goal Priority Disciplines Outcome Goal Variances Interventions   Physical Therapy Goal     PT/OT, PT Ongoing (interventions implemented as appropriate)     Description:  Goals to be met by: 2017     Patient will increase functional independence with mobility by performin. Supine to sit with Set-up [address]  2. Sit to supine with Set-up [address]  3. Sit to stand transfer with Supervision  4. Gait  x 200 feet with Supervision using appropriate device  5. Stand for 10 minutes with  Supervision using appropriate device  6. Lower extremity exercise program x 15 reps per handout, with assistance as needed                PLAN:    Patient to be seen 3 x/week to address the above listed problems via gait training, therapeutic activities, therapeutic exercises  Plan of Care expires: 02/20/17  Plan of Care reviewed with: patient, spouse        Laurie Luis Antonio, PT, DPT  1/21/2017  Pager: [phone]

## 2017-01-22 PROBLEM — I95.9 HYPOTENSION: Status: RESOLVED | Noted: 2017-01-20 | Resolved: 2017-01-22

## 2017-01-22 LAB
ALBUMIN SERPL BCP-MCNC: 2.2 G/DL
ALP SERPL-CCNC: 54 U/L
ALT SERPL W/O P-5'-P-CCNC: 18 U/L
ANION GAP SERPL CALC-SCNC: 7 MMOL/L
AST SERPL-CCNC: 34 U/L
BASOPHILS # BLD AUTO: 0.02 K/UL
BASOPHILS NFR BLD: 0.4 %
BILIRUB SERPL-MCNC: 0.3 MG/DL
BUN SERPL-MCNC: 23 MG/DL
CALCIUM SERPL-MCNC: 8.7 MG/DL
CHLORIDE SERPL-SCNC: 114 MMOL/L
CO2 SERPL-SCNC: 22 MMOL/L
CREAT SERPL-MCNC: 1 MG/DL
DIFFERENTIAL METHOD: ABNORMAL
EOSINOPHIL # BLD AUTO: 0.1 K/UL
EOSINOPHIL NFR BLD: 2.4 %
ERYTHROCYTE [DISTWIDTH] IN BLOOD BY AUTOMATED COUNT: 14.4 %
EST. GFR  (AFRICAN AMERICAN): >60 ML/MIN/1.73 M^2
EST. GFR  (NON AFRICAN AMERICAN): 53.3 ML/MIN/1.73 M^2
GLUCOSE SERPL-MCNC: 69 MG/DL
HCT VFR BLD AUTO: 32.1 %
HGB BLD-MCNC: 10.5 G/DL
LYMPHOCYTES # BLD AUTO: 1.7 K/UL
LYMPHOCYTES NFR BLD: 31.3 %
MAGNESIUM SERPL-MCNC: 1.8 MG/DL
MCH RBC QN AUTO: 30.4 PG
MCHC RBC AUTO-ENTMCNC: 32.7 %
MCV RBC AUTO: 93 FL
MONOCYTES # BLD AUTO: 0.6 K/UL
MONOCYTES NFR BLD: 10.7 %
NEUTROPHILS # BLD AUTO: 2.9 K/UL
NEUTROPHILS NFR BLD: 54.8 %
PHOSPHATE SERPL-MCNC: 2.9 MG/DL
PLATELET # BLD AUTO: 252 K/UL
PMV BLD AUTO: 9.9 FL
POTASSIUM SERPL-SCNC: 4.6 MMOL/L
PROT SERPL-MCNC: 6.5 G/DL
RBC # BLD AUTO: 3.45 M/UL
SODIUM SERPL-SCNC: 143 MMOL/L
WBC # BLD AUTO: 5.31 K/UL

## 2017-01-22 PROCEDURE — 99233 SBSQ HOSP IP/OBS HIGH 50: CPT | Mod: GC,,, | Performed by: INTERNAL MEDICINE

## 2017-01-22 PROCEDURE — 25000003 PHARM REV CODE 250: Performed by: EMERGENCY MEDICINE

## 2017-01-22 PROCEDURE — 25000003 PHARM REV CODE 250: Performed by: INTERNAL MEDICINE

## 2017-01-22 PROCEDURE — 83735 ASSAY OF MAGNESIUM: CPT

## 2017-01-22 PROCEDURE — 84100 ASSAY OF PHOSPHORUS: CPT

## 2017-01-22 PROCEDURE — 80053 COMPREHEN METABOLIC PANEL: CPT

## 2017-01-22 PROCEDURE — 85025 COMPLETE CBC W/AUTO DIFF WBC: CPT

## 2017-01-22 PROCEDURE — 11000001 HC ACUTE MED/SURG PRIVATE ROOM

## 2017-01-22 PROCEDURE — 63600175 PHARM REV CODE 636 W HCPCS: Performed by: INTERNAL MEDICINE

## 2017-01-22 PROCEDURE — 36415 COLL VENOUS BLD VENIPUNCTURE: CPT

## 2017-01-22 PROCEDURE — 25000003 PHARM REV CODE 250: Performed by: STUDENT IN AN ORGANIZED HEALTH CARE EDUCATION/TRAINING PROGRAM

## 2017-01-22 PROCEDURE — 99232 SBSQ HOSP IP/OBS MODERATE 35: CPT | Mod: ,,, | Performed by: SURGERY

## 2017-01-22 RX ORDER — AMOXICILLIN AND CLAVULANATE POTASSIUM 500; 125 MG/1; MG/1
1 TABLET, FILM COATED ORAL 2 TIMES DAILY
Status: DISCONTINUED | OUTPATIENT
Start: 2017-01-22 | End: 2017-01-23

## 2017-01-22 RX ORDER — SODIUM CHLORIDE 9 MG/ML
INJECTION, SOLUTION INTRAVENOUS CONTINUOUS
Status: DISCONTINUED | OUTPATIENT
Start: 2017-01-23 | End: 2017-01-24

## 2017-01-22 RX ADMIN — STANDARDIZED SENNA CONCENTRATE AND DOCUSATE SODIUM 1 TABLET: 8.6; 5 TABLET, FILM COATED ORAL at 09:01

## 2017-01-22 RX ADMIN — BRIMONIDINE TARTRATE 1 DROP: 1.5 SOLUTION OPHTHALMIC at 08:01

## 2017-01-22 RX ADMIN — PANTOPRAZOLE SODIUM 40 MG: 40 TABLET, DELAYED RELEASE ORAL at 09:01

## 2017-01-22 RX ADMIN — SODIUM CHLORIDE: 0.9 INJECTION, SOLUTION INTRAVENOUS at 11:01

## 2017-01-22 RX ADMIN — ENOXAPARIN SODIUM 40 MG: 100 INJECTION SUBCUTANEOUS at 12:01

## 2017-01-22 RX ADMIN — CLINDAMYCIN HYDROCHLORIDE 300 MG: 150 CAPSULE ORAL at 12:01

## 2017-01-22 RX ADMIN — CLINDAMYCIN HYDROCHLORIDE 300 MG: 150 CAPSULE ORAL at 06:01

## 2017-01-22 RX ADMIN — AMOXICILLIN AND CLAVULANATE POTASSIUM 500 MG: 500; 125 TABLET, FILM COATED ORAL at 12:01

## 2017-01-22 RX ADMIN — BRIMONIDINE TARTRATE 1 DROP: 1.5 SOLUTION OPHTHALMIC at 09:01

## 2017-01-22 RX ADMIN — BIMATOPROST 1 DROP: 0.1 SOLUTION/ DROPS OPHTHALMIC at 09:01

## 2017-01-22 RX ADMIN — BRIMONIDINE TARTRATE 1 DROP: 1.5 SOLUTION OPHTHALMIC at 01:01

## 2017-01-22 RX ADMIN — ASPIRIN 81 MG CHEWABLE TABLET 81 MG: 81 TABLET CHEWABLE at 08:01

## 2017-01-22 RX ADMIN — AMOXICILLIN AND CLAVULANATE POTASSIUM 500 MG: 500; 125 TABLET, FILM COATED ORAL at 09:01

## 2017-01-22 RX ADMIN — STANDARDIZED SENNA CONCENTRATE AND DOCUSATE SODIUM 1 TABLET: 8.6; 5 TABLET, FILM COATED ORAL at 08:01

## 2017-01-22 NOTE — ASSESSMENT & PLAN NOTE
- Resolved   - Multifactorial likely infectious and iatrogenic   - hold home BP meds and monitor

## 2017-01-22 NOTE — ASSESSMENT & PLAN NOTE
- Hx of PVD   - cx sent  - Podiatry with wound recommendations  - Continue po clindamycin for now

## 2017-01-22 NOTE — SUBJECTIVE & OBJECTIVE
Interval History: NAEON.  Reports she is doing well this morning.  Tolerating diet without complaint, denies changes in BM.  Recently seen by vascular who told her she would be getting an angio on Monday.  Otherwise reports she feels in her usual state of health.          Review of Systems   Constitutional: Negative for chills and fever.   Respiratory: Negative for shortness of breath.    Cardiovascular: Negative for chest pain.   Gastrointestinal: Negative for constipation, diarrhea, nausea and vomiting.   Neurological: Negative for headaches.     Objective:     Vital Signs (Most Recent):  Temp: 98 °F (36.7 °C) (01/22/17 0804)  Pulse: 70 (01/22/17 0900)  Resp: 18 (01/22/17 0804)  BP: (!) 150/71 (01/22/17 0804)  SpO2: 95 % (01/22/17 0804) Vital Signs (24h Range):  Temp:  [97.9 °F (36.6 °C)-98.1 °F (36.7 °C)] 98 °F (36.7 °C)  Pulse:  [60-86] 70  Resp:  [18] 18  SpO2:  [93 %-96 %] 95 %  BP: (115-166)/(56-72) 150/71     Weight: 85.3 kg (188 lb)  Body mass index is 33.3 kg/(m^2).  No intake or output data in the 24 hours ending 01/22/17 0942   Physical Exam   Constitutional: She is oriented to person, place, and time. She appears well-developed and well-nourished.   HENT:   Head: Normocephalic and atraumatic.   Mouth/Throat: Oropharynx is clear and moist.   Eyes: EOM are normal. Pupils are equal, round, and reactive to light.   Neck: Normal range of motion. Neck supple.   Cardiovascular: Normal rate and regular rhythm.    No murmur heard.  Pulmonary/Chest: Effort normal and breath sounds normal. No respiratory distress. She has no wheezes.   Abdominal: Soft. Bowel sounds are normal. She exhibits no distension. There is no tenderness.   Musculoskeletal:        Neurological: She is alert and oriented to person, place, and time. No cranial nerve deficit.   Skin: Skin is warm and dry. There is erythema.   Thick flaking skin with  multiple ulcerations to bilateral lower extremities. There is mild erythema, and purulent  drainage noted from ulcerations. On admission, currenlty wrapped in bandage   Psychiatric: She has a normal mood and affect. Her behavior is normal.       Significant Labs:   CBC:   Recent Labs  Lab 01/20/17  1107 01/21/17  0412 01/22/17  0340   WBC 9.27 6.54 5.31   HGB 10.4* 10.5* 10.5*   HCT 31.1* 31.4* 32.1*    210 252     CMP:   Recent Labs  Lab 01/21/17  0412 01/21/17  1454 01/22/17  0340    143 143   K 4.0 4.4 4.6   * 112* 114*   CO2 19* 20* 22*   GLU 62* 77 69*   BUN 31* 28* 23   CREATININE 1.3 1.2 1.0   CALCIUM 8.6* 9.0 8.7   PROT 6.4  --  6.5   ALBUMIN 2.3*  --  2.2*   BILITOT 0.3  --  0.3   ALKPHOS 58  --  54*   AST 39  --  34   ALT 16  --  18   ANIONGAP 9 11 7*   EGFRNONAA 38.8* 42.8* 53.3*       Significant Imaging: I have reviewed all pertinent imaging results/findings within the past 24 hours.

## 2017-01-22 NOTE — ASSESSMENT & PLAN NOTE
- on bilateral LE with ulcer noted to have purulent drainage. Also with leukocytosis. Will treat for cellulitis with course of clinda.   - wound care consulted.

## 2017-01-22 NOTE — SUBJECTIVE & OBJECTIVE
Interval History: NAEON. Reports she is doing well.  Tolerating PO       Review of Systems   Constitutional: Negative for chills and fever.   Respiratory: Negative for shortness of breath.    Cardiovascular: Negative for chest pain and palpitations.   Gastrointestinal: Negative for constipation, diarrhea, nausea and vomiting.   Musculoskeletal:        Bilateral leg pain      Objective:     Vital Signs (Most Recent):  Temp: 98 °F (36.7 °C) (01/22/17 0804)  Pulse: 70 (01/22/17 0900)  Resp: 18 (01/22/17 0804)  BP: (!) 150/71 (01/22/17 0804)  SpO2: 95 % (01/22/17 0804) Vital Signs (24h Range):  Temp:  [97.9 °F (36.6 °C)-98.1 °F (36.7 °C)] 98 °F (36.7 °C)  Pulse:  [60-86] 70  Resp:  [18] 18  SpO2:  [93 %-96 %] 95 %  BP: (115-166)/(56-72) 150/71     Weight: 85.3 kg (188 lb)  Body mass index is 33.3 kg/(m^2).  No intake or output data in the 24 hours ending 01/22/17 1027   Physical Exam   Constitutional: She is oriented to person, place, and time. She appears well-developed and well-nourished.   HENT:   Head: Normocephalic and atraumatic.   Mouth/Throat: Oropharynx is clear and moist.   Eyes: EOM are normal. Pupils are equal, round, and reactive to light.   Neck: Normal range of motion. Neck supple.   Cardiovascular: Normal rate and regular rhythm.    No murmur heard.  Pulmonary/Chest: Effort normal and breath sounds normal. No respiratory distress. She has no wheezes.   Abdominal: Soft. Bowel sounds are normal. She exhibits no distension. There is no tenderness.   Musculoskeletal:        Neurological: She is alert and oriented to person, place, and time. No cranial nerve deficit.   Skin: Skin is warm and dry. There is erythema.   Thick flaking skin with  multiple ulcerations to bilateral lower extremities. There is mild erythema, and purulent drainage noted from ulcerations. On admission, currenlty wrapped in bandage   Psychiatric: She has a normal mood and affect. Her behavior is normal.       Significant Labs:   CBC:    Recent Labs  Lab 01/20/17  1107 01/21/17  0412 01/22/17  0340   WBC 9.27 6.54 5.31   HGB 10.4* 10.5* 10.5*   HCT 31.1* 31.4* 32.1*    210 252     CMP:   Recent Labs  Lab 01/21/17  0412 01/21/17  1454 01/22/17  0340    143 143   K 4.0 4.4 4.6   * 112* 114*   CO2 19* 20* 22*   GLU 62* 77 69*   BUN 31* 28* 23   CREATININE 1.3 1.2 1.0   CALCIUM 8.6* 9.0 8.7   PROT 6.4  --  6.5   ALBUMIN 2.3*  --  2.2*   BILITOT 0.3  --  0.3   ALKPHOS 58  --  54*   AST 39  --  34   ALT 16  --  18   ANIONGAP 9 11 7*   EGFRNONAA 38.8* 42.8* 53.3*       Significant Imaging: I have reviewed all pertinent imaging results/findings within the past 24 hours.

## 2017-01-22 NOTE — PROGRESS NOTES
[facility] Hospital Medicine  Progress Note    Patient Name: Nelia Brizuela  MRN: [id number]  Patient Class: IP- Inpatient   Admission Date: 1/19/2017  Length of Stay: 3 days  Attending Physician: Caterina Martinez MD  Primary Care Provider: Lenin Sandhu MD    [facility] Medicine Team: [facility] MED 3 Isra Nieves MD    Subjective:     Principal Problem:RICARDO (acute kidney injury)    HPI:  Patient is an 80 yo female with history of HTN and COPD who presented to ED today after she had SBP reading of 60's x2. Patient's  takes her BP every morning with their home cuff and states it was ~60 SBP. They then took her to urgent care where it was again found to be ~60s systolic. She was advised to go to ED. Upon arrival her BP had returned to ~120 systolic without any intervention. Patient states she had some chills last night and mild abdominal pain but has otherwise felt at her baseline health yesterday and today. She did not take her BP meds this AM. She denies abdominal pain, diarrhea, loose stools, or cough, though she does note the she has not been urinating very much secondary to not drinking enough water. Patient has chronic bilateral lower extremity wounds that have been present for 5-6 months but does not currently have wound care due to difficulty in getting an appointment. Family notes that she has an appointment scheduled for 1/31/17.             No new subjective & objective note has been filed under this hospital service since the last note was generated.    Assessment/Plan:      * RICARDO (acute kidney injury)  - improving/ resolved   -etiology Likely pre-renal in setting of hypotension and cellulitis  -urine studies indicative of pre-renal injury     Peripheral vascular disease, unspecified  - Likely secondary to hx of smoking   - JOSE ALBERTO with stenosis right JOSE ALBERTO of 0.62 and left JOSE ALBERTO revealing 0.77  - Vascular surgery consulted, recommending angiogram Monday    - Continue ASA, NPO overnight      Hypotension, resolved as of 1/22/2017  - Resolved   - Multifactorial likely infectious and iatrogenic   - hold home BP meds and monitor    Cellulitis  - Hx of PVD   - cx sent  - Podiatry with wound recommendations  - Continue po clindamycin for now    UTI (urinary tract infection)  - Urine cx growing E coli  - Continue rocephin, can transition to PO cipro given sensitivities     HTN (hypertension)  - Hold home hctz in light of recent low BP and resulting RICARDO  - will consider restarting soon, BP trending up     Psoriasis  - on bilateral LE with ulcer noted to have purulent drainage. Also with leukocytosis. Will treat for cellulitis with course of clinda.   - wound care consulted.       VTE Risk Mitigation         Ordered     enoxaparin injection 40 mg  Daily     Route:  Subcutaneous        01/21/17 1105     Medium Risk of VTE  Once      01/20/17 0034     Place SHANIKA hose  Until discontinued      01/20/17 0034     Place sequential compression device  Until discontinued      01/20/17 0034        Will discuss case with staff     Dispo: Continue inpatient care, Angio planned for tomorrow.      Isra Nieves MD  Department of Hospital Medicine   [facility]

## 2017-01-22 NOTE — ASSESSMENT & PLAN NOTE
- improving/ resolved   -etiology Likely pre-renal in setting of hypotension and cellulitis  -urine studies indicative of pre-renal injury

## 2017-01-22 NOTE — ASSESSMENT & PLAN NOTE
- Hold home hctz in light of recent low BP and resulting RICARDO  - will consider restarting soon, BP trending up

## 2017-01-22 NOTE — PROGRESS NOTES
Vascular Surgery  Progress Note    SUBJECTIVE:  No acute events overnight.  Afebrile and HD stable.  No complaints of pain this AM.    OBJECTIVE:  Gen: NAD  Heart: RRR  Lungs: CTA b/l with normal resp effort  Abd: soft, NT, ND  Ext: chronic venous stasis; left medial calf superficial ulceration; superficial ulceration of right great toe and lateral calf (see pictures below)  Pulse: 2+ femoral pulses bilaterally; signals distally;with biphasic AT on the left; monophasic AT and monophasic PT on the right    ASSESSMENT/PLAN:   Nelia Brizuela is a 81 y.o. female with h/o COPD, HTN, psoariasis presenting with hypotension and RICARDO (creatinine 2.5 now to 1.6) with findings of bilateral lower extremity cellulitis and possible UTI (leukocytosis of 18 on presentation) with evidence of venous stasis ulcerations with likely arterial disease.    -Continue compression wraps and elevation of b/l LE; recommend Unna boot as outpatient  -Smoking cessation of paramount importance  -Will plan for RLE angiogram   -Will need LLE angiogram in future    JD Hedrick MD  PGY-3  Pager: [phone]      Vascular Surgery Staff  I have seen and examined the patient and reviewed the residents note. I agree with their assessment and plan.    Plan right leg angio for tissue loss  I have explained the risks, benefits, medical treatments, and alternatives of the procedure in detail. Risks include but are not limited to bleeding, injury to vessel, kidney damage or failure, need for emergency surgery, limb loss or need for additional procedures or bypass surgery.  The patient voices understanding and all questions have been answered.  They agree to proceed as planned.      Mercedes Jackson MD FACS [facility]  Vascular & Endovascular Surgery

## 2017-01-22 NOTE — ASSESSMENT & PLAN NOTE
- Likely secondary to hx of smoking   - JOSE ALBERTO with stenosis right JOSE ALBERTO of 0.62 and left JOSE ALBERTO revealing 0.77  - Vascular surgery consulted, recommending angiogram Monday    - Continue ASA, NPO overnight

## 2017-01-23 LAB
ALBUMIN SERPL BCP-MCNC: 2.2 G/DL
ALP SERPL-CCNC: 47 U/L
ALT SERPL W/O P-5'-P-CCNC: 19 U/L
ANION GAP SERPL CALC-SCNC: 6 MMOL/L
AST SERPL-CCNC: 37 U/L
BASOPHILS # BLD AUTO: 0.02 K/UL
BASOPHILS NFR BLD: 0.3 %
BILIRUB SERPL-MCNC: 0.3 MG/DL
BUN SERPL-MCNC: 15 MG/DL
CALCIUM SERPL-MCNC: 8.5 MG/DL
CHLORIDE SERPL-SCNC: 113 MMOL/L
CO2 SERPL-SCNC: 23 MMOL/L
CREAT SERPL-MCNC: 1 MG/DL
DIFFERENTIAL METHOD: ABNORMAL
EOSINOPHIL # BLD AUTO: 0.1 K/UL
EOSINOPHIL NFR BLD: 2.4 %
ERYTHROCYTE [DISTWIDTH] IN BLOOD BY AUTOMATED COUNT: 14.3 %
EST. GFR  (AFRICAN AMERICAN): >60 ML/MIN/1.73 M^2
EST. GFR  (NON AFRICAN AMERICAN): 53.3 ML/MIN/1.73 M^2
GLUCOSE SERPL-MCNC: 83 MG/DL
HCT VFR BLD AUTO: 31 %
HGB BLD-MCNC: 11.2 G/DL
LYMPHOCYTES # BLD AUTO: 1.9 K/UL
LYMPHOCYTES NFR BLD: 31.7 %
MAGNESIUM SERPL-MCNC: 1.8 MG/DL
MCH RBC QN AUTO: 33.9 PG
MCHC RBC AUTO-ENTMCNC: 36.1 %
MCV RBC AUTO: 94 FL
MONOCYTES # BLD AUTO: 0.7 K/UL
MONOCYTES NFR BLD: 11.7 %
NEUTROPHILS # BLD AUTO: 3.2 K/UL
NEUTROPHILS NFR BLD: 53.6 %
PHOSPHATE SERPL-MCNC: 2.9 MG/DL
PLATELET # BLD AUTO: 222 K/UL
PMV BLD AUTO: 9.3 FL
POTASSIUM SERPL-SCNC: 4.7 MMOL/L
PROT SERPL-MCNC: 6.5 G/DL
RBC # BLD AUTO: 3.3 M/UL
SODIUM SERPL-SCNC: 142 MMOL/L
WBC # BLD AUTO: 5.89 K/UL

## 2017-01-23 PROCEDURE — C1894 INTRO/SHEATH, NON-LASER: HCPCS

## 2017-01-23 PROCEDURE — 99233 SBSQ HOSP IP/OBS HIGH 50: CPT | Mod: GC,,, | Performed by: INTERNAL MEDICINE

## 2017-01-23 PROCEDURE — 36246 INS CATH ABD/L-EXT ART 2ND: CPT | Mod: GC,,, | Performed by: SURGERY

## 2017-01-23 PROCEDURE — 94761 N-INVAS EAR/PLS OXIMETRY MLT: CPT

## 2017-01-23 PROCEDURE — 25000003 PHARM REV CODE 250: Performed by: INTERNAL MEDICINE

## 2017-01-23 PROCEDURE — 25000003 PHARM REV CODE 250: Performed by: STUDENT IN AN ORGANIZED HEALTH CARE EDUCATION/TRAINING PROGRAM

## 2017-01-23 PROCEDURE — 76937 US GUIDE VASCULAR ACCESS: CPT | Mod: 26,GC,, | Performed by: SURGERY

## 2017-01-23 PROCEDURE — B41GYZZ FLUOROSCOPY OF LEFT LOWER EXTREMITY ARTERIES USING OTHER CONTRAST: ICD-10-PCS | Performed by: SURGERY

## 2017-01-23 PROCEDURE — 99232 SBSQ HOSP IP/OBS MODERATE 35: CPT | Mod: ,,, | Performed by: SURGERY

## 2017-01-23 PROCEDURE — 11000001 HC ACUTE MED/SURG PRIVATE ROOM

## 2017-01-23 PROCEDURE — 94640 AIRWAY INHALATION TREATMENT: CPT

## 2017-01-23 PROCEDURE — 84100 ASSAY OF PHOSPHORUS: CPT

## 2017-01-23 PROCEDURE — 85025 COMPLETE CBC W/AUTO DIFF WBC: CPT

## 2017-01-23 PROCEDURE — 25000003 PHARM REV CODE 250: Performed by: SURGERY

## 2017-01-23 PROCEDURE — 83735 ASSAY OF MAGNESIUM: CPT

## 2017-01-23 PROCEDURE — B41FYZZ FLUOROSCOPY OF RIGHT LOWER EXTREMITY ARTERIES USING OTHER CONTRAST: ICD-10-PCS | Performed by: SURGERY

## 2017-01-23 PROCEDURE — 36415 COLL VENOUS BLD VENIPUNCTURE: CPT

## 2017-01-23 PROCEDURE — 25000003 PHARM REV CODE 250

## 2017-01-23 PROCEDURE — 63600175 PHARM REV CODE 636 W HCPCS

## 2017-01-23 PROCEDURE — 75716 ARTERY X-RAYS ARMS/LEGS: CPT | Mod: 26,GC,, | Performed by: SURGERY

## 2017-01-23 PROCEDURE — 25000242 PHARM REV CODE 250 ALT 637 W/ HCPCS: Performed by: STUDENT IN AN ORGANIZED HEALTH CARE EDUCATION/TRAINING PROGRAM

## 2017-01-23 PROCEDURE — 25000003 PHARM REV CODE 250: Performed by: EMERGENCY MEDICINE

## 2017-01-23 PROCEDURE — 80053 COMPREHEN METABOLIC PANEL: CPT

## 2017-01-23 RX ORDER — IPRATROPIUM BROMIDE AND ALBUTEROL SULFATE 2.5; .5 MG/3ML; MG/3ML
3 SOLUTION RESPIRATORY (INHALATION) ONCE
Status: COMPLETED | OUTPATIENT
Start: 2017-01-23 | End: 2017-01-23

## 2017-01-23 RX ORDER — CLINDAMYCIN HYDROCHLORIDE 150 MG/1
300 CAPSULE ORAL EVERY 6 HOURS
Status: DISPENSED | OUTPATIENT
Start: 2017-01-23 | End: 2017-01-23

## 2017-01-23 RX ORDER — DOXYCYCLINE HYCLATE 100 MG
100 TABLET ORAL EVERY 12 HOURS
Status: DISCONTINUED | OUTPATIENT
Start: 2017-01-24 | End: 2017-01-24 | Stop reason: HOSPADM

## 2017-01-23 RX ADMIN — ASPIRIN 81 MG CHEWABLE TABLET 81 MG: 81 TABLET CHEWABLE at 08:01

## 2017-01-23 RX ADMIN — AMOXICILLIN AND CLAVULANATE POTASSIUM 500 MG: 500; 125 TABLET, FILM COATED ORAL at 08:01

## 2017-01-23 RX ADMIN — BRIMONIDINE TARTRATE 1 DROP: 1.5 SOLUTION OPHTHALMIC at 03:01

## 2017-01-23 RX ADMIN — CLINDAMYCIN HYDROCHLORIDE 300 MG: 150 CAPSULE ORAL at 12:01

## 2017-01-23 RX ADMIN — CLINDAMYCIN HYDROCHLORIDE 300 MG: 150 CAPSULE ORAL at 05:01

## 2017-01-23 RX ADMIN — STANDARDIZED SENNA CONCENTRATE AND DOCUSATE SODIUM 1 TABLET: 8.6; 5 TABLET, FILM COATED ORAL at 08:01

## 2017-01-23 RX ADMIN — IPRATROPIUM BROMIDE AND ALBUTEROL SULFATE 3 ML: .5; 3 SOLUTION RESPIRATORY (INHALATION) at 10:01

## 2017-01-23 RX ADMIN — BRIMONIDINE TARTRATE 1 DROP: 1.5 SOLUTION OPHTHALMIC at 10:01

## 2017-01-23 RX ADMIN — BIMATOPROST 1 DROP: 0.1 SOLUTION/ DROPS OPHTHALMIC at 10:01

## 2017-01-23 RX ADMIN — BRIMONIDINE TARTRATE 1 DROP: 1.5 SOLUTION OPHTHALMIC at 08:01

## 2017-01-23 RX ADMIN — PANTOPRAZOLE SODIUM 40 MG: 40 TABLET, DELAYED RELEASE ORAL at 08:01

## 2017-01-23 RX ADMIN — STANDARDIZED SENNA CONCENTRATE AND DOCUSATE SODIUM 1 TABLET: 8.6; 5 TABLET, FILM COATED ORAL at 10:01

## 2017-01-23 NOTE — ASSESSMENT & PLAN NOTE
- Hold home hctz in light of recent low BP and resulting RICARDO  - continue to hold as patient will get contrast today

## 2017-01-23 NOTE — PLAN OF CARE
Problem: Fall Risk (Adult)  Goal: Identify Related Risk Factors and Signs and Symptoms  Related risk factors and signs and symptoms are identified upon initiation of Human Response Clinical Practice Guideline (CPG)   No falls/injuries noted.  Denies pain, remains npo for procedure this am except medications. Able to reposition self in bed with minimal to no assistance needed.  Cont to monitor.

## 2017-01-23 NOTE — ASSESSMENT & PLAN NOTE
- resolved   - etiology Likely pre-renal in setting of hypotension and cellulitis  - urine studies indicative of pre-renal injury

## 2017-01-23 NOTE — ASSESSMENT & PLAN NOTE
- Hx of PVD   - Cx sent, NGTD  - Podiatry with wound recommendations  - Continue po clindamycin for 4 days total (Day 4 today 1/23)  - will start doxycycline tomorrow (10 day total treatment of ABx)

## 2017-01-23 NOTE — ASSESSMENT & PLAN NOTE
- Urine cx growing E coli, uncomplicated UTI (requires 3-5 days of ABx)  - transitioned to augmentin, was on rocephin for 3 days complete augmentin dose today  - will be put on doxy tomorrow for cellulitis as well

## 2017-01-23 NOTE — PROGRESS NOTES
Dept of Vascular Surgery     Patient with appropriate lower extremity blood flow bilaterally   No additional vascular surgery intervention indicated at this time  Continue local wound care   Follow up with vascular surgery margot Rolon DO   Vascular Surgery Fellow  01/23/2017

## 2017-01-23 NOTE — ASSESSMENT & PLAN NOTE
- Likely secondary to hx of smoking   - JOSE ALBERTO with stenosis right JOSE ALBERTO of 0.62 and left JOSE ALBERTO revealing 0.77  - Vascular surgery consulted, will get angio today  - Continue ASA

## 2017-01-23 NOTE — OP NOTE
[facility]  Vascular Surgery  Operative Note    SUMMARY     Date of Procedure: 1/23/2017     Procedure:   1. Aortogram and bilateral lower extremity angiograms    Surgeon(s) and Role:     * Mercedes Jackson MD - Primary    Assisting Surgeon: Liz Rolon DO - fellow       Pre-Operative Diagnosis: Peripheral vascular disease, unspecified [I73.9]    Post-Operative Diagnosis: Post-Op Diagnosis Codes:     * Peripheral vascular disease, unspecified [I73.9]    Anesthesia: Local MAC    Description of the Findings of the Procedure:   RLE: patent CFA, Profunda, SFA, Popliteal, PT and AT   LLE: patent CFA, Profunda, SFA, Popliteal, PT and AT         Complications: No       Implants: * No implants in log *    Specimens:   Specimen     None           Estimated Blood Loss (EBL): 5cc        Indication: 81 y.o. female with h/o COPD, HTN, psoariasis presenting with hypotension and RICARDO (creatinine 2.5 now to 1.6) with findings of bilateral lower extremity cellulitis and possible UTI (leukocytosis of 18 on presentation) with evidence of venous stasis ulcerations with underlying peripheral arterial disease.    Operative Dictation:   After an informed consent was obtained, the patient was brought to the interventional suite and placed in the supine position. An appropriate time out was performed and the patient received perioperative antibiotics. The patient's bilateral groins were prepped and draped in usual sterile fashion. Using ultrasound guidance, the left common femoral artery vessel patency was confirmed and this was documented in the chart. Then direct ultrasound guidance the left CFA was entered with a 21-G needle, Mandril wire and 4-Fr micropuncture dilator/sheath. Then under fluoroscopic guidance, an 0.035-in wire was placed into the distal aorta. The micropuncture dilator was then exchanged over the wire for a 5-Tajik sheath. Sheathogram demonstrated access in the CFA. Next a VCF-catheter was placed over the  wire and into the distal aorta under fluoroscopy and an aortogram was performed which demonstrated patency (though very small caliber) distal abdominal aorta, patent bilateral common, internal and external iliac arteries and patent bilateral common femoral arteries. Next, the VCF catheter and 0.035 stiff angled glide wire were used to select the right common iliac artery. The wire was then advanced into the right external iliac artery and the VCF catheter was advanced over the wire into the distal right external iliac artery. The wire was removed and a right lower extremity angiogram with run off was performed which demonstrated patency of the right CFA, profunda and SFA throughout its length. The right popliteal artery is patent. There is patency of the right trifurcation with patent AT and PT to the right foot. The right peroneal is patent proximally but then tapers off in the mid right calf. Based on the images from this diagnostic angio, a decision was made that no intervention was necessary at this time. The wire and catheter were withdrawn under image guidance. Next, a left lower extremity angiogram was performed via the left femoral sheath which demonstrated patency of the left CFA, profunda, SFA and popliteal arteries. There is patency of the left trifurcation. The left AT and PT are patent in their entirety and supply perfusion to the left foot. The left peroneal is patent proximally but then becomes diminutive and occludes in the mid left calf. We then deployed a 5-Maori Mynx closure device without issue. At the conclusion of the case the patient was noted to have no evidence of a groin hematoma. All instrument and sponge counts were correct at the end of the case. The patient tolerated the procedure well and was transferred to the pacu for further recovery.                      Condition: Good    Disposition: PACU - hemodynamically stable.    Liz Rolon, DO   Vascular Surgery  Fellow  01/23/2017

## 2017-01-23 NOTE — ASSESSMENT & PLAN NOTE
- on bilateral LE with ulcer noted to have purulent drainage. Also with leukocytosis. Will treat for cellulitis with course of clindamycin PO.   - wound care consulted.

## 2017-01-23 NOTE — PROGRESS NOTES
[facility] Hospital Medicine  Progress Note    Patient Name: Nelia Brizuela  MRN: [id number]  Patient Class: IP- Inpatient   Admission Date: 1/19/2017  Length of Stay: 4 days  Attending Physician: Caterina Martinez MD  Primary Care Provider: Lenin Sandhu MD    [facility] Team: [facility] HOSP MED 3 Clarissa Blake MD    Subjective:     Principal Problem:RICARDO (acute kidney injury)    HPI:  Patient is an 82 yo female with history of HTN and COPD who presented to ED today after she had SBP reading of 60's x2. Patient's  takes her BP every morning with their home cuff and states it was ~60 SBP. They then took her to urgent care where it was again found to be ~60s systolic. She was advised to go to ED. Upon arrival her BP had returned to ~120 systolic without any intervention. Patient states she had some chills last night and mild abdominal pain but has otherwise felt at her baseline health yesterday and today. She did not take her BP meds this AM. She denies abdominal pain, diarrhea, loose stools, or cough, though she does note the she has not been urinating very much secondary to not drinking enough water. Patient has chronic bilateral lower extremity wounds that have been present for 5-6 months but does not currently have wound care due to difficulty in getting an appointment. Family notes that she has an appointment scheduled for 1/31/17.      Hospital Course:  Patient was admitted to hospital medicine for hypotension, RICARDO and concern for cellulitis/UTI.  Patient was started on clindamycin and rehydrated via IV fluids.  Her RICARDO and hypotension rapidly resolved.  Given her lower extremity wounds podiatry was consulted.  In addition Vascular Surgery were consulted regarding concerns for PAD given diminished pulses and JOSE ALBERTO results.  Vascular surgery plans an angiogram 1/23.  Patient was recently transition to Augmentin for UTI coverage given culture results.      Interval History: NAEON. No new  complaints. Patient to get angio today. Dressings clean dry and intact    Review of Systems   Constitutional: Negative for chills and fever.   Respiratory: Negative for shortness of breath.    Cardiovascular: Negative for chest pain and palpitations.   Gastrointestinal: Negative for constipation, diarrhea, nausea and vomiting.   Musculoskeletal:        Bilateral leg pain      Objective:     Vital Signs (Most Recent):  Temp: 97.8 °F (36.6 °C) (01/23/17 0735)  Pulse: 88 (01/23/17 0735)  Resp: 18 (01/23/17 0735)  BP: (!) 150/64 (01/23/17 0735)  SpO2: (!) 92 % (01/23/17 0735) Vital Signs (24h Range):  Temp:  [97.8 °F (36.6 °C)-98.5 °F (36.9 °C)] 97.8 °F (36.6 °C)  Pulse:  [70-94] 88  Resp:  [18] 18  SpO2:  [81 %-97 %] 92 %  BP: (128-150)/(64-72) 150/64     Weight: 85.3 kg (188 lb)  Body mass index is 33.3 kg/(m^2).  No intake or output data in the 24 hours ending 01/23/17 0856   Physical Exam   Constitutional: She is oriented to person, place, and time. She appears well-developed and well-nourished.   HENT:   Head: Normocephalic and atraumatic.   Mouth/Throat: Oropharynx is clear and moist.   Eyes: EOM are normal. Pupils are equal, round, and reactive to light.   Neck: Normal range of motion. Neck supple.   Cardiovascular: Normal rate and regular rhythm.    No murmur heard.  Pulmonary/Chest: Effort normal. No respiratory distress. She has wheezes.   Abdominal: Soft. Bowel sounds are normal. She exhibits no distension. There is no tenderness.   Musculoskeletal:        Neurological: She is alert and oriented to person, place, and time. No cranial nerve deficit.   Skin: Skin is warm and dry. There is erythema.   Thick flaking skin with multiple ulcerations to bilateral lower extremities. There is mild erythema, and purulent drainage noted from ulcerations. Ace bandages bilaterally. Warm extremities   Psychiatric: She has a normal mood and affect. Her behavior is normal.       Significant Labs:   CBC:     Recent Labs  Lab  01/22/17  0340 01/23/17  0738   WBC 5.31 5.89   HGB 10.5* 11.2*   HCT 32.1* 31.0*    222     CMP:     Recent Labs  Lab 01/21/17  1454 01/22/17  0340 01/23/17  0459    143 142   K 4.4 4.6 4.7   * 114* 113*   CO2 20* 22* 23   GLU 77 69* 83   BUN 28* 23 15   CREATININE 1.2 1.0 1.0   CALCIUM 9.0 8.7 8.5*   PROT  --  6.5 6.5   ALBUMIN  --  2.2* 2.2*   BILITOT  --  0.3 0.3   ALKPHOS  --  54* 47*   AST  --  34 37   ALT  --  18 19   ANIONGAP 11 7* 6*   EGFRNONAA 42.8* 53.3* 53.3*       Significant Imaging: I have reviewed all pertinent imaging results/findings within the past 24 hours.    Assessment/Plan:      * RICARDO (acute kidney injury)  - resolved   - etiology Likely pre-renal in setting of hypotension and cellulitis  - urine studies indicative of pre-renal injury         HTN (hypertension)  - Hold home hctz in light of recent low BP and resulting RICARDO  - continue to hold as patient will get contrast today       Psoriasis  - on bilateral LE with ulcer noted to have purulent drainage. Also with leukocytosis. Will treat for cellulitis with course of clindamycin PO.   - wound care consulted.       Peripheral vascular disease, unspecified  - Likely secondary to hx of smoking   - JOSE ALBERTO with stenosis right JOSE ALBERTO of 0.62 and left JOSE ALBERTO revealing 0.77  - Vascular surgery consulted, will get angio today  - Continue ASA      Cellulitis  - Hx of PVD   - Cx sent, NGTD  - Podiatry with wound recommendations  - Continue po clindamycin for 4 days total (Day 4 today 1/23)  - will start doxycycline tomorrow (10 day total treatment of ABx)      UTI (urinary tract infection)  - Urine cx growing E coli, uncomplicated UTI (requires 3-5 days of ABx)  - transitioned to augmentin, was on rocephin for 3 days complete augmentin dose today  - will be put on doxy tomorrow for cellulitis as well        VTE Risk Mitigation         Ordered     enoxaparin injection 40 mg  Daily     Route:  Subcutaneous        01/21/17 1105     Medium Risk of  VTE  Once      01/20/17 0034     Place SHANIKA hose  Until discontinued      01/20/17 0034     Place sequential compression device  Until discontinued      01/20/17 0034        Signing Physician:  Clarissa Blake MD   Internal Medicine PGY-1

## 2017-01-23 NOTE — SUBJECTIVE & OBJECTIVE
Interval History: NAEON. No new complaints. Patient to get angio today. Dressings clean dry and intact    Review of Systems   Constitutional: Negative for chills and fever.   Respiratory: Negative for shortness of breath.    Cardiovascular: Negative for chest pain and palpitations.   Gastrointestinal: Negative for constipation, diarrhea, nausea and vomiting.   Musculoskeletal:        Bilateral leg pain      Objective:     Vital Signs (Most Recent):  Temp: 97.8 °F (36.6 °C) (01/23/17 0735)  Pulse: 88 (01/23/17 0735)  Resp: 18 (01/23/17 0735)  BP: (!) 150/64 (01/23/17 0735)  SpO2: (!) 92 % (01/23/17 0735) Vital Signs (24h Range):  Temp:  [97.8 °F (36.6 °C)-98.5 °F (36.9 °C)] 97.8 °F (36.6 °C)  Pulse:  [70-94] 88  Resp:  [18] 18  SpO2:  [81 %-97 %] 92 %  BP: (128-150)/(64-72) 150/64     Weight: 85.3 kg (188 lb)  Body mass index is 33.3 kg/(m^2).  No intake or output data in the 24 hours ending 01/23/17 0856   Physical Exam   Constitutional: She is oriented to person, place, and time. She appears well-developed and well-nourished.   HENT:   Head: Normocephalic and atraumatic.   Mouth/Throat: Oropharynx is clear and moist.   Eyes: EOM are normal. Pupils are equal, round, and reactive to light.   Neck: Normal range of motion. Neck supple.   Cardiovascular: Normal rate and regular rhythm.    No murmur heard.  Pulmonary/Chest: Effort normal. No respiratory distress. She has wheezes.   Abdominal: Soft. Bowel sounds are normal. She exhibits no distension. There is no tenderness.   Musculoskeletal:        Neurological: She is alert and oriented to person, place, and time. No cranial nerve deficit.   Skin: Skin is warm and dry. There is erythema.   Thick flaking skin with multiple ulcerations to bilateral lower extremities. There is mild erythema, and purulent drainage noted from ulcerations. Ace bandages bilaterally. Warm extremities   Psychiatric: She has a normal mood and affect. Her behavior is normal.       Significant  Labs:   CBC:     Recent Labs  Lab 01/22/17  0340 01/23/17  0738   WBC 5.31 5.89   HGB 10.5* 11.2*   HCT 32.1* 31.0*    222     CMP:     Recent Labs  Lab 01/21/17  1454 01/22/17  0340 01/23/17  0459    143 142   K 4.4 4.6 4.7   * 114* 113*   CO2 20* 22* 23   GLU 77 69* 83   BUN 28* 23 15   CREATININE 1.2 1.0 1.0   CALCIUM 9.0 8.7 8.5*   PROT  --  6.5 6.5   ALBUMIN  --  2.2* 2.2*   BILITOT  --  0.3 0.3   ALKPHOS  --  54* 47*   AST  --  34 37   ALT  --  18 19   ANIONGAP 11 7* 6*   EGFRNONAA 42.8* 53.3* 53.3*       Significant Imaging: I have reviewed all pertinent imaging results/findings within the past 24 hours.

## 2017-01-23 NOTE — PROGRESS NOTES
Vascular Surgery  Progress Note    SUBJECTIVE:  No acute events overnight.  Afebrile, VSS, on room air. No complaints of pain.    OBJECTIVE:  Gen: NAD  Heart: RRR  Lungs: normal resp effort  Abd: soft, NT, ND  Ext: chronic venous stasis; left medial calf superficial ulceration; superficial ulceration of right great toe and lateral calf  Pulse: right DP biphasic, right PT monophasic. Left DP monophasic, left P biphasic    ASSESSMENT/PLAN:   Nelia Brizuela is a 81 y.o. female with h/o COPD, HTN, psoariasis presenting with hypotension and RICARDO (creatinine 2.5 now to 1.6) with findings of bilateral lower extremity cellulitis and possible UTI (leukocytosis of 18 on presentation) with evidence of venous stasis ulcerations with likely arterial disease.    - To OR today for RLE angiogram  -Continue compression wraps and elevation of b/l LE; recommend Unna boot as outpatient  -Smoking cessation of paramount importance  -Will need LLE angiogram in future    Jammie Marie MD, PGY-1  General Surgery  [phone]

## 2017-01-23 NOTE — CONSULTS
Consult received per nursing for BLE. Patient being seen by podiatry now for BLE. Will defer to podiatry's recommendations.

## 2017-01-23 NOTE — NURSING
Dressing changes done to bilateral lower extremis per physician orders.  Wounds very clean skin very dry discoloured flaky.  Slight odor noted, no drainage noted.  Patient tolerated procedure without any complaints.

## 2017-01-24 VITALS
TEMPERATURE: 98 F | OXYGEN SATURATION: 96 % | HEART RATE: 97 BPM | DIASTOLIC BLOOD PRESSURE: 74 MMHG | RESPIRATION RATE: 18 BRPM | BODY MASS INDEX: 33.31 KG/M2 | SYSTOLIC BLOOD PRESSURE: 148 MMHG | WEIGHT: 188 LBS | HEIGHT: 63 IN

## 2017-01-24 LAB
ANION GAP SERPL CALC-SCNC: 7 MMOL/L
BACTERIA BLD CULT: NORMAL
BACTERIA SPEC ANAEROBE CULT: NORMAL
BASOPHILS # BLD AUTO: 0.02 K/UL
BASOPHILS NFR BLD: 0.3 %
BUN SERPL-MCNC: 11 MG/DL
CALCIUM SERPL-MCNC: 8.3 MG/DL
CHLORIDE SERPL-SCNC: 115 MMOL/L
CO2 SERPL-SCNC: 19 MMOL/L
CREAT SERPL-MCNC: 0.8 MG/DL
DIFFERENTIAL METHOD: ABNORMAL
EOSINOPHIL # BLD AUTO: 0.1 K/UL
EOSINOPHIL NFR BLD: 2.1 %
ERYTHROCYTE [DISTWIDTH] IN BLOOD BY AUTOMATED COUNT: 14.3 %
EST. GFR  (AFRICAN AMERICAN): >60 ML/MIN/1.73 M^2
EST. GFR  (NON AFRICAN AMERICAN): >60 ML/MIN/1.73 M^2
GLUCOSE SERPL-MCNC: 74 MG/DL
HCT VFR BLD AUTO: 30.9 %
HGB BLD-MCNC: 10.3 G/DL
LYMPHOCYTES # BLD AUTO: 1.4 K/UL
LYMPHOCYTES NFR BLD: 24.2 %
MAGNESIUM SERPL-MCNC: 1.2 MG/DL
MAGNESIUM SERPL-MCNC: 2.4 MG/DL
MCH RBC QN AUTO: 31 PG
MCHC RBC AUTO-ENTMCNC: 33.3 %
MCV RBC AUTO: 93 FL
MONOCYTES # BLD AUTO: 0.6 K/UL
MONOCYTES NFR BLD: 10.6 %
NEUTROPHILS # BLD AUTO: 3.6 K/UL
NEUTROPHILS NFR BLD: 62.5 %
PHOSPHATE SERPL-MCNC: 3 MG/DL
PLATELET # BLD AUTO: 271 K/UL
PMV BLD AUTO: 9.7 FL
POTASSIUM SERPL-SCNC: 4 MMOL/L
RBC # BLD AUTO: 3.32 M/UL
SODIUM SERPL-SCNC: 141 MMOL/L
WBC # BLD AUTO: 5.78 K/UL

## 2017-01-24 PROCEDURE — 99238 HOSP IP/OBS DSCHRG MGMT 30/<: CPT | Mod: GC,,, | Performed by: INTERNAL MEDICINE

## 2017-01-24 PROCEDURE — 83735 ASSAY OF MAGNESIUM: CPT

## 2017-01-24 PROCEDURE — G0008 ADMIN INFLUENZA VIRUS VAC: HCPCS | Performed by: INTERNAL MEDICINE

## 2017-01-24 PROCEDURE — 63600175 PHARM REV CODE 636 W HCPCS: Performed by: STUDENT IN AN ORGANIZED HEALTH CARE EDUCATION/TRAINING PROGRAM

## 2017-01-24 PROCEDURE — 63600175 PHARM REV CODE 636 W HCPCS: Performed by: INTERNAL MEDICINE

## 2017-01-24 PROCEDURE — 80048 BASIC METABOLIC PNL TOTAL CA: CPT

## 2017-01-24 PROCEDURE — 25000003 PHARM REV CODE 250: Performed by: EMERGENCY MEDICINE

## 2017-01-24 PROCEDURE — 25000003 PHARM REV CODE 250: Performed by: INTERNAL MEDICINE

## 2017-01-24 PROCEDURE — 83735 ASSAY OF MAGNESIUM: CPT | Mod: 91

## 2017-01-24 PROCEDURE — 84100 ASSAY OF PHOSPHORUS: CPT

## 2017-01-24 PROCEDURE — 3E0234Z INTRODUCTION OF SERUM, TOXOID AND VACCINE INTO MUSCLE, PERCUTANEOUS APPROACH: ICD-10-PCS | Performed by: INTERNAL MEDICINE

## 2017-01-24 PROCEDURE — 85025 COMPLETE CBC W/AUTO DIFF WBC: CPT

## 2017-01-24 PROCEDURE — 90662 IIV NO PRSV INCREASED AG IM: CPT | Performed by: INTERNAL MEDICINE

## 2017-01-24 PROCEDURE — 90471 IMMUNIZATION ADMIN: CPT | Performed by: INTERNAL MEDICINE

## 2017-01-24 PROCEDURE — 99232 SBSQ HOSP IP/OBS MODERATE 35: CPT | Mod: ,,, | Performed by: PODIATRIST

## 2017-01-24 PROCEDURE — 36415 COLL VENOUS BLD VENIPUNCTURE: CPT

## 2017-01-24 RX ORDER — MAGNESIUM SULFATE HEPTAHYDRATE 40 MG/ML
2 INJECTION, SOLUTION INTRAVENOUS ONCE
Status: COMPLETED | OUTPATIENT
Start: 2017-01-24 | End: 2017-01-24

## 2017-01-24 RX ORDER — ENALAPRIL MALEATE 10 MG/1
10 TABLET ORAL DAILY
Qty: 90 TABLET | Refills: 8 | Status: SHIPPED | OUTPATIENT
Start: 2017-01-24 | End: 2017-02-01

## 2017-01-24 RX ORDER — PANTOPRAZOLE SODIUM 40 MG/1
40 TABLET, DELAYED RELEASE ORAL DAILY
Qty: 90 TABLET | Refills: 0 | Status: SHIPPED | OUTPATIENT
Start: 2017-01-24 | End: 2017-05-01 | Stop reason: SDUPTHER

## 2017-01-24 RX ORDER — AMOXICILLIN 250 MG
1 CAPSULE ORAL 2 TIMES DAILY
COMMUNITY
Start: 2017-01-24 | End: 2021-09-28

## 2017-01-24 RX ORDER — POLYETHYLENE GLYCOL 3350 17 G/17G
17 POWDER, FOR SOLUTION ORAL DAILY
Status: DISCONTINUED | OUTPATIENT
Start: 2017-01-24 | End: 2017-01-24 | Stop reason: HOSPADM

## 2017-01-24 RX ORDER — DOXYCYCLINE HYCLATE 100 MG
100 TABLET ORAL EVERY 12 HOURS
Qty: 12 TABLET | Refills: 0 | Status: SHIPPED | OUTPATIENT
Start: 2017-01-24 | End: 2017-01-30

## 2017-01-24 RX ADMIN — INFLUENZA A VIRUS A/CALIFORNIA/7/2009 X-179A (H1N1) ANTIGEN (FORMALDEHYDE INACTIVATED), INFLUENZA A VIRUS A/HONG KONG/4801/2014 X-263B (H3N2) ANTIGEN (FORMALDEHYDE INACTIVATED), AND INFLUENZA B VIRUS B/BRISBANE/60/2008 ANTIGEN (FORMALDEHYDE INACTIVATED) 0.5 ML: 60; 60; 60 INJECTION, SUSPENSION INTRAMUSCULAR at 04:01

## 2017-01-24 RX ADMIN — BRIMONIDINE TARTRATE 1 DROP: 1.5 SOLUTION OPHTHALMIC at 05:01

## 2017-01-24 RX ADMIN — MAGNESIUM SULFATE IN WATER 2 G: 40 INJECTION, SOLUTION INTRAVENOUS at 11:01

## 2017-01-24 RX ADMIN — DOXYCYCLINE HYCLATE 100 MG: 100 TABLET, COATED ORAL at 08:01

## 2017-01-24 RX ADMIN — ASPIRIN 81 MG CHEWABLE TABLET 81 MG: 81 TABLET CHEWABLE at 08:01

## 2017-01-24 RX ADMIN — PANTOPRAZOLE SODIUM 40 MG: 40 TABLET, DELAYED RELEASE ORAL at 08:01

## 2017-01-24 RX ADMIN — BRIMONIDINE TARTRATE 1 DROP: 1.5 SOLUTION OPHTHALMIC at 02:01

## 2017-01-24 RX ADMIN — MAGNESIUM SULFATE IN WATER 2 G: 40 INJECTION, SOLUTION INTRAVENOUS at 08:01

## 2017-01-24 RX ADMIN — ENOXAPARIN SODIUM 40 MG: 100 INJECTION SUBCUTANEOUS at 11:01

## 2017-01-24 RX ADMIN — STANDARDIZED SENNA CONCENTRATE AND DOCUSATE SODIUM 1 TABLET: 8.6; 5 TABLET, FILM COATED ORAL at 08:01

## 2017-01-24 NOTE — PLAN OF CARE
Problem: Patient Care Overview  Goal: Plan of Care Review  Plan of care discussed with patient.  Fall precautions in place. Patient has no complaints of pain. Discussed medications and care. Patient has no questions at this time.White board updated. Bed locked in lowest position. Side rails up x2. Will continue to monitor.

## 2017-01-24 NOTE — PROGRESS NOTES
D/C instruction given and reviewed with pt daughter/ spouse. IV removed with catheter intact. Pt waiting at bedside for hosp transport

## 2017-01-24 NOTE — ASSESSMENT & PLAN NOTE
- Likely secondary to hx of smoking   - JOSE ALBERTO with stenosis right JOSE ALBERTO of 0.62 and left JOSE ALBERTO revealing 0.77  - Vascular surgery following, angio yesterday shows patent arteries, no intervention needed  - Continue ASA

## 2017-01-24 NOTE — ASSESSMENT & PLAN NOTE
- Hold home hctz/enalapril in light of recent low BP and resulting RICARDO  - continue to hold as patient will got contrast yesterday

## 2017-01-24 NOTE — PLAN OF CARE
[facility]    HOME HEALTH ORDERS  FACE TO FACE ENCOUNTER    Patient Name: Nelia Brizuela  YOB: 1935    PCP: Lenin Sandhu MD   PCP Address: [address] / [place] [address]  PCP Phone Number: [phone]  PCP Fax: [phone]    Encounter Date: 01/24/2017    Admit to Home Health    Diagnoses:  Active Hospital Problems    Diagnosis  POA    *RICARDO (acute kidney injury) [N17.9]  Yes    Cellulitis [L03.90]  Yes    UTI (urinary tract infection) [N39.0]  Yes    Leukocytosis [D72.829]  Yes    Peripheral vascular disease, unspecified [I73.9]  Yes    Tobacco abuse [Z72.0]  Yes    Psoriasis [L40.9]  Yes     On feet  Tried multiple topicals including TAC and clobetasol.     [place]    8/2012: cbc, cbp and lipid panel normal      HTN (hypertension) [I10]  Yes      Resolved Hospital Problems    Diagnosis Date Resolved POA    Hypotension [I95.9] 01/22/2017 Yes       Future Appointments  Date Time Provider Department Center   1/31/2017 3:00 PM Breanna Farmer MD [facility]   2/1/2017 10:00 AM Lenin Sandhu Jr., MD [facility]   3/21/2017 9:15 AM Kathy Ruiz DPM [facility] POD [facility]   3/24/2017 10:00 AM Clotilde Mcelroy MD [facility]PH[facility]AL [facility]     Follow-up Information     Follow up with [facility] - Podiatry In 1 week.    Specialty:  Podiatry    Contact information:    [address]  [facility] [address] [phone]    Additional information:    [facility] [address]            I have seen and examined this patient face to face today. My clinical findings that support the need for the home health skilled services and home bound status are the following:  Weakness/numbness causing balance and gait disturbance due to Weakness/Debility making it taxing to leave home.    Allergies:  Review of patient's allergies indicates:   Allergen Reactions    Codeine      Other reaction(s): Unknown       Diet: regular  diet    Activities: activity as tolerated, elevate both legs while sitting/laying    Nursing:   SN to complete comprehensive assessment including routine vital signs. Instruct on disease process and s/s of complications to report to MD. Review/verify medication list sent home with the patient at time of discharge  and instruct patient/caregiver as needed. Frequency may be adjusted depending on start of care date.    Notify MD if SBP > 160 or < 90; DBP > 90 or < 50; HR > 120 or < 50; Temp > 101; Other:         CONSULTS:    Physical Therapy to evaluate and treat. Evaluate for home safety and equipment needs; Establish/upgrade home exercise program. Perform / instruct on therapeutic exercises, gait training, transfer training, and Range of Motion.  Aide to provide assistance with personal care, ADLs, and vital signs.    MISCELLANEOUS CARE:  Please draw BMP and send results to Dr. Clarissa Blake at [facility]    WOUND CARE ORDERS  Consult ET nurse        Apply the following to wound:   -iodosorb, gauze, 2 rolls of kerlix, 2 rolls of loose ACE      Medications: Review discharge medications with patient and family and provide education.      Current Discharge Medication List      START taking these medications    Details   cadexomer iodine (IODOSORB) 0.9 % gel Apply topically daily as needed for Wound Care.  Qty: 40 g, Refills: 0      doxycycline (VIBRA-TABS) 100 MG tablet Take 1 tablet (100 mg total) by mouth every 12 (twelve) hours.  Qty: 12 tablet, Refills: 0      pantoprazole (PROTONIX) 40 MG tablet Take 1 tablet (40 mg total) by mouth once daily.  Qty: 90 tablet, Refills: 0      senna-docusate 8.6-50 mg (PERICOLACE) 8.6-50 mg per tablet Take 1 tablet by mouth 2 (two) times daily.         CONTINUE these medications which have CHANGED    Details   enalapril (VASOTEC) 10 MG tablet Take 1 tablet (10 mg total) by mouth once daily. Restart on Saturday, after your BMP is drawn and kidney function remains normal  Qty:  90 tablet, Refills: 8         CONTINUE these medications which have NOT CHANGED    Details   aspirin 81 mg Tab Take 81 mg by mouth once daily.       brimonidine 0.2% (ALPHAGAN) 0.2 % Drop Place 1 drop into the right eye 3 (three) times daily.  Qty: 10 mL, Refills: 12    Comments: Use [place] #[id number]  Associated Diagnoses: POAG (primary open-angle glaucoma), severe stage      diclofenac sodium 1 % Gel Apply topically 3 (three) times daily. 4 grams topically tid to affected joints.  Qty: 200 g, Refills: 1    Associated Diagnoses: Arthritis of knee      dorzolamide-timolol 2-0.5% (COSOPT) 22.3-6.8 mg/mL ophthalmic solution Place 1 drop into both eyes 3 (three) times daily.  Qty: 30 mL, Refills: 3    Comments: Use [place] #[id number]  Associated Diagnoses: Chronic angle-closure glaucoma, severe stage, left      FOLIC ACID/MV,FE,OTHER MIN (CENTRUM ORAL) Take 1 capsule by mouth once daily.       hydrochlorothiazide (MICROZIDE) 12.5 mg capsule TAKE 1 CAPSULE DAILY  Qty: 90 capsule, Refills: 1      LUMIGAN 0.01 % Drop Place 1 drop into the right eye every evening.  Qty: 3 Bottle, Refills: 3    Comments: Use [place] #[id number]  Associated Diagnoses: Chronic angle-closure glaucoma, severe stage, left      naproxen sodium (ANAPROX) 220 MG tablet Take 220 mg by mouth 2 (two) times daily as needed (for pain).       triamcinolone acetonide 0.1% (KENALOG) 0.1 % ointment Apply topically 2 (two) times daily.  Qty: 453.6 g, Refills: 5             I certify that this patient is confined to her home and needs intermittent skilled nursing care and physical therapy.

## 2017-01-24 NOTE — DISCHARGE SUMMARY
[facility] Hospital Medicine  Discharge Summary      Patient Name: Nelia Brizuela  MRN: [id number]  Admission Date: 1/19/2017  Hospital Length of Stay: 5 days  Discharge Date and Time: No discharge date for patient encounter.  Attending Physician: Caterina Martinez MD   Discharging Provider: Clarissa Blake MD  Primary Care Provider: Lenin Sandhu MD  [facility] Medicine Team: [facility] HOSP MED 3 Clarissa Blake MD    HPI:   Patient is an 80 yo female with history of HTN and COPD who presented to ED today after she had SBP reading of 60's x2. Patient's  takes her BP every morning with their home cuff and states it was ~60 SBP. They then took her to urgent care where it was again found to be ~60s systolic. She was advised to go to ED. Upon arrival her BP had returned to ~120 systolic without any intervention. Patient states she had some chills last night and mild abdominal pain but has otherwise felt at her baseline health yesterday and today. She did not take her BP meds this AM. She denies abdominal pain, diarrhea, loose stools, or cough, though she does note the she has not been urinating very much secondary to not drinking enough water. Patient has chronic bilateral lower extremity wounds that have been present for 5-6 months but does not currently have wound care due to difficulty in getting an appointment. Family notes that she has an appointment scheduled for 1/31/17.      Procedure(s) (LRB):  ANGIOGRAM-EXTREMITY-LOWER (Right)      Indwelling Lines/Drains at time of discharge:   Lines/Drains/Airways          No matching active lines, drains, or airways        Hospital Course:       RICARDO (acute kidney injury)  - resolved   - etiology Likely pre-renal in setting of hypotension and cellulitis  - urine studies indicative of pre-renal injury   - will get BMP in two days to follow-up Cr before restarting ace       HTN (hypertension)  - Hold home enalapril in light of recent low BP and resolving RICARDO  - continue to  hold as patient will got contrast yesterday, will follow up BMP and restart this weekend if Cr still WNL     Psoriasis  - on bilateral LE with ulcer noted to have purulent drainage. Also with leukocytosis. Will treat for cellulitis with 10 day course of ABx (patient to go home with doxycyline)  - wound care consulted and will get HH with wound care        Peripheral vascular disease, unspecified  - Likely secondary to hx of smoking   - JOSE ALBERTO with stenosis right JOSE ALBERTO of 0.62 and left JOSE ALBERTO revealing 0.77  - Vascular surgery following, angio yesterday shows patent arteries, no intervention needed  - Continue ASA        Cellulitis  - Hx of PVD   - Cx sent, NGTD  - Podiatry with wound recommendations  - finished 4 days of clindamycin  - started doxycycline today (will complete 10 day total treatment of ABx)        UTI (urinary tract infection)  - Urine cx growing E coli, uncomplicated UTI (requires 3-5 days of ABx)  - transitioned to augmentin, was on rocephin for 3 days complete augmentin dose today  - completed ABx course for this    Consults:   Consults         Status Ordering Provider     Inpatient consult to Podiatry  Once     Provider:  (Not yet assigned)    Final result CARLOS SWARTZ     Inpatient consult to Vascular Surgery  Once     Provider:  (Not yet assigned)    Completed JENNIFER SHARIF     IP consult to case management  Once     Provider:  (Not yet assigned)    Acknowledged JENNIFER SHARIF          Significant Diagnostic Studies: Labs:   BMP:   Recent Labs  Lab 01/23/17  0459 01/24/17  0400 01/24/17  1450   GLU 83 74  --     141  --    K 4.7 4.0  --    * 115*  --    CO2 23 19*  --    BUN 15 11  --    CREATININE 1.0 0.8  --    CALCIUM 8.5* 8.3*  --    MG 1.8 1.2* 2.4   , CMP   Recent Labs  Lab 01/23/17  0459 01/24/17  0400    141   K 4.7 4.0   * 115*   CO2 23 19*   GLU 83 74   BUN 15 11   CREATININE 1.0 0.8   CALCIUM 8.5* 8.3*   PROT 6.5  --    ALBUMIN 2.2*  --    BILITOT 0.3   --    ALKPHOS 47*  --    AST 37  --    ALT 19  --    ANIONGAP 6* 7*   ESTGFRAFRICA >60.0 >60.0   EGFRNONAA 53.3* >60.0   , CBC   Recent Labs  Lab 01/23/17  0738 01/24/17  0400   WBC 5.89 5.78   HGB 11.2* 10.3*   HCT 31.0* 30.9*    271     Microbiology:   Urine Culture    Lab Results   Component Value Date    LABURIN ESCHERICHIA COLI  >100,000 cfu/ml   01/19/2017     Radiology:   Imaging Results         CT Chest Without Contrast (Final result) Result time:  01/21/17 01:31:58    Final result by Juana Torres MD (01/21/17 01:31:58)    Impression:      1.  Bibasilar atelectasis (left greater than right).  Interlobular septal thickening which can be seen with pulmonary edema.    2.  There is a 0.3 cm pulmonary micronodule in the right upper lobe. Per Fleischner Society guidelines; in a low risk patient, no follow up is needed. In a high risk patient (history of smoking or malignancy), consider 12 month CT chest follow up to assess for stability.    4.  Significant calcific atherosclerosis of the coronary vessels and aortic atherosclerosis.    5.  Degenerative change of the thoracic spine.        Electronically signed by: JUANA TORRES  Date:     01/21/17  Time:    01:31     Narrative:    Technique:  The chest was surveyed from the lung apices through the costophrenic angles without the use of intravenous contrast material.  Data was reformatted for contiguous 5 mm images in the axial, sagittal, and coronal planes.  Data was post processed for extraction of 1.25 mm images at 10 mm increments for effective high-resolution CT imaging without additional radiation to the patient.    Comparison: Chest radiograph 1/19/2017.    Findings:    Examination of the vascular and soft tissue structures at the base of the neck is unremarkable.    The thoracic aorta is non-aneurysmal with atherosclerotic calcification.  The heart is within normal limits of size and significant calcific atherosclerosis of the coronary vessels.   There is no significant axillary or mediastinal lymphadenopathy.  Hilar contours are within normal limits..      The trachea is midline, the proximal airways are patent and the lungs are symmetrically expanded.  There is no pneumothorax.  There is bibasilar atelectasis (left greater than right).  There is a 0.3 cm pulmonary micronodule in the right upper lobe (axial series 1, image 29). Per Fleischner Society guidelines; in a low risk patient, no follow up is needed. In a high risk patient (history of smoking or malignancy), consider 12 month CT chest follow up to assess for stability.  There is also interlobular septal thickening, which can be seen with pulmonary edema.    Incidentally visualized structures in the upper abdomen are within normal limits.    Osseous structures demonstrate multilevel degenerative change with degenerative endplate sclerosis noted.            VAS Ankle Brachial Indices Resting (Final result) Result time:  17 13:59:46    Final result by Interface, Lab In [facility] (17 13:59:46)    Narrative:    PAT NAME: RADHA LAMB  MED REC#: [id number]  :      1935  SEX:      F  EXAM NIMISHA: 2017 12:03  REF PHYS: REFERRAL, SELF      Indication:  -I73.9.  Results:  Lower Extremities Segmental Pressure [mmHg]:                    Right             Left  _______________________________________________________________  Brachial          111                 Posterior Tibial  69                86  Dorsalis Pedis    66                71  JOSE ALBERTO (Post. Tib.)  0.62              0.77  JOSE ALBERTO (Dors. Ped.)  0.59              0.64    Report Summary:  Impression:   Rt JOSE ALBERTO (0.62) Segment/Brachial Index and PVR waveforms indicate moderate peripheral arterial obstructive disease.    Lt JOSE ALBERTO (0.77) Segment/Brachial Index and PVR waveforms indicate mild peripheral arterial obstructive disease.    Sonographer: Ascencion Suarez RVT    Electronically Signed by:  Mercedes Jackson MD [[id number]                         On: 01/20/2017 13:59            X-Ray Chest AP Portable (Final result) Result time:  01/19/17 19:14:52    Final result by Sunny Cordova MD (01/19/17 19:14:52)    Impression:      Subtle patchy increased parenchymal attenuation projected over the left lower lung zone, nonspecific, could reflect atelectasis however developing consolidation is a configuration.  This is superimposed upon coarse interstitial attenuation bilaterally, grossly similar to the previous exam, mild superimpose edema is a consideration.        Electronically signed by: SUNNY CORDOVA MD  Date:     01/19/17  Time:    19:14     Narrative:    Chest AP portable    Indication:Elevated white blood cell count    Comparison:6/10/2014    Findings:  The cardiomediastinal silhouette is mildly prominent noting calcification of the aorta.  There is no pleural effusion.  The trachea is midline.  The lungs are symmetrically expanded bilaterally with coarse interstitial attenuation bilaterally, grossly similar to the previous exam, mild superimposed edema is not excluded. No large focal consolidation seen, noting domelike increased focus of attenuation projected over the right ron-diaphragm is suspected to reflect lobulation of the diaphragm rather than consolidative change.  There is patchy increased parenchymal attenuation projected over the left lower lung zone.  There is no pneumothorax.  The osseous structures are remarkable for degenerative changes of the spine and shoulders.                Pending Diagnostic Studies:     None        Final Active Diagnoses:    Diagnosis Date Noted POA    PRINCIPAL PROBLEM:  RICARDO (acute kidney injury) [N17.9] 01/19/2017 Yes    Cellulitis [L03.90] 01/21/2017 Yes    UTI (urinary tract infection) [N39.0] 01/21/2017 Yes    Leukocytosis [D72.829] 01/21/2017 Yes    Peripheral vascular disease, unspecified [I73.9] 01/20/2017 Yes    Tobacco abuse [Z72.0] 07/29/2014 Yes    Psoriasis [L40.9]  Yes    HTN  (hypertension) [I10]  Yes      Problems Resolved During this Admission:    Diagnosis Date Noted Date Resolved POA    Hypotension [I95.9] 01/20/2017 01/22/2017 Yes      No new Assessment & Plan notes have been filed under this hospital service since the last note was generated.  Service: Hospital Medicine      Discharged Condition: stable    Disposition: Home with Home health    Follow Up:  Follow-up Information     Follow up with Fawda Hernandez - Pod[facility] In 1 week.    Specialty:  Podiatry    Contact information:    [address]  [facility] [address] [phone]    Additional information:    [facility] - [address], [address]        Follow up with Lenin Sandhu MD In 1 week.    Specialty:  Internal Medicine    Contact information:    [address]  [facility] [address]  [phone]          Patient Instructions:     BASIC METABOLIC PANEL   Standing Status: Future  Standing Exp. Date: 03/25/18     Diet general     Activity as tolerated     Call MD for:  temperature >100.4     Call MD for:  severe uncontrolled pain     Call MD for:  redness, tenderness, or signs of infection (pain, swelling, redness, odor or green/yellow discharge around incision site)     Call MD for:  worsening rash     Call MD for:  increased confusion or weakness       Medications:  Reconciled Home Medications:   Current Discharge Medication List      START taking these medications    Details   cadexomer iodine (IODOSORB) 0.9 % gel Apply topically daily as needed for Wound Care.  Qty: 40 g, Refills: 0      doxycycline (VIBRA-TABS) 100 MG tablet Take 1 tablet (100 mg total) by mouth every 12 (twelve) hours.  Qty: 12 tablet, Refills: 0      pantoprazole (PROTONIX) 40 MG tablet Take 1 tablet (40 mg total) by mouth once daily.  Qty: 90 tablet, Refills: 0      senna-docusate 8.6-50 mg (PERICOLACE) 8.6-50 mg per tablet Take 1 tablet by mouth 2 (two) times daily.         CONTINUE these medications which have CHANGED    Details    enalapril (VASOTEC) 10 MG tablet Take 1 tablet (10 mg total) by mouth once daily. Restart on Saturday, after your BMP is drawn and kidney function remains normal  Qty: 90 tablet, Refills: 8         CONTINUE these medications which have NOT CHANGED    Details   aspirin 81 mg Tab Take 81 mg by mouth once daily.       brimonidine 0.2% (ALPHAGAN) 0.2 % Drop Place 1 drop into the right eye 3 (three) times daily.  Qty: 10 mL, Refills: 12    Comments: Use [facility] [facility] #[phone]  Associated Diagnoses: POAG (primary open-angle glaucoma), severe stage      diclofenac sodium 1 % Gel Apply topically 3 (three) times daily. 4 grams topically tid to affected joints.  Qty: 200 g, Refills: 1    Associated Diagnoses: Arthritis of knee      dorzolamide-timolol 2-0.5% (COSOPT) 22.3-6.8 mg/mL ophthalmic solution Place 1 drop into both eyes 3 (three) times daily.  Qty: 30 mL, Refills: 3    Comments: Use [facility] #[phone]  Associated Diagnoses: Chronic angle-closure glaucoma, severe stage, left      FOLIC ACID/MV,FE,OTHER MIN (CENTRUM ORAL) Take 1 capsule by mouth once daily.       hydrochlorothiazide (MICROZIDE) 12.5 mg capsule TAKE 1 CAPSULE DAILY  Qty: 90 capsule, Refills: 1      LUMIGAN 0.01 % Drop Place 1 drop into the right eye every evening.  Qty: 3 Bottle, Refills: 3    Comments: Use [facility] #[phone]  Associated Diagnoses: Chronic angle-closure glaucoma, severe stage, left      naproxen sodium (ANAPROX) 220 MG tablet Take 220 mg by mouth 2 (two) times daily as needed (for pain).       triamcinolone acetonide 0.1% (KENALOG) 0.1 % ointment Apply topically 2 (two) times daily.  Qty: 453.6 g, Refills: 5             Clarissa Blake MD  Department of Hospital Medicine  [facility]

## 2017-01-24 NOTE — SUBJECTIVE & OBJECTIVE
Interval History: NAEON. No new complaints. Patient happy to go home today and followup with podiatry in clinic.     Review of Systems   Constitutional: Negative for chills and fever.   Respiratory: Negative for shortness of breath.    Cardiovascular: Negative for chest pain and palpitations.   Gastrointestinal: Negative for constipation, diarrhea, nausea and vomiting.   Musculoskeletal:        Bilateral leg pain      Objective:     Vital Signs (Most Recent):  Temp: 98.6 °F (37 °C) (01/24/17 1150)  Pulse: 78 (01/24/17 1150)  Resp: 18 (01/24/17 1150)  BP: 138/65 (01/24/17 1150)  SpO2: 95 % (01/24/17 1150) Vital Signs (24h Range):  Temp:  [97.9 °F (36.6 °C)-98.6 °F (37 °C)] 98.6 °F (37 °C)  Pulse:  [67-84] 78  Resp:  [15-18] 18  SpO2:  [94 %-100 %] 95 %  BP: (119-150)/(63-75) 138/65     Weight: 85.3 kg (188 lb)  Body mass index is 33.3 kg/(m^2).  No intake or output data in the 24 hours ending 01/24/17 1307   Physical Exam   Constitutional: She is oriented to person, place, and time. She appears well-developed and well-nourished.   HENT:   Head: Normocephalic and atraumatic.   Mouth/Throat: Oropharynx is clear and moist.   Eyes: EOM are normal. Pupils are equal, round, and reactive to light.   Neck: Normal range of motion. Neck supple.   Cardiovascular: Normal rate and regular rhythm.    No murmur heard.  Pulmonary/Chest: Effort normal. No respiratory distress. She has wheezes.   Abdominal: Soft. Bowel sounds are normal. She exhibits no distension. There is no tenderness.   Musculoskeletal:        Neurological: She is alert and oriented to person, place, and time. No cranial nerve deficit.   Skin: Skin is warm and dry. There is erythema.   Thick flaking skin with multiple ulcerations to bilateral lower extremities. There is mild erythema, and purulent drainage noted from ulcerations. Ace bandages bilaterally. Warm extremities   Psychiatric: She has a normal mood and affect. Her behavior is normal.       Significant  Labs:   CBC:     Recent Labs  Lab 01/23/17  0738 01/24/17  0400   WBC 5.89 5.78   HGB 11.2* 10.3*   HCT 31.0* 30.9*    271     CMP:     Recent Labs  Lab 01/23/17  0459 01/24/17  0400    141   K 4.7 4.0   * 115*   CO2 23 19*   GLU 83 74   BUN 15 11   CREATININE 1.0 0.8   CALCIUM 8.5* 8.3*   PROT 6.5  --    ALBUMIN 2.2*  --    BILITOT 0.3  --    ALKPHOS 47*  --    AST 37  --    ALT 19  --    ANIONGAP 6* 7*   EGFRNONAA 53.3* >60.0       Significant Imaging: I have reviewed all pertinent imaging results/findings within the past 24 hours.

## 2017-01-24 NOTE — PHYSICIAN QUERY
PT Name: Nelia Brizuela  MR #: [id number]     Physician Query Form - Diagnosis Clarification    Reviewer Amanda Jason RN  Ext [id number]    This form is a permanent document in the medical record.     Query Date: January 24, 2017    By submitting this query, we are merely seeking further clarification of documentation.  Please utilize your independent clinical judgment when addressing the question(s) below.     (The Medical record reflects the following:)      Findings Supporting Clinical Information Location in Medical Record   Pneumonia  Given IV CTX and azithro for possible PNA seen on CXR however patient is afebrile, and without SOB, chest pain cough or hypoxia    will add ceftriaxone, obtain CT chest for concern regarding PNA     Bibasilar atelectasis (left greater than right). Interlobular septal thickening which can be seen with pulmonary edema.  H&P 1/20/17        PN 1/20/17       CT chest 1/20/17     Please clarify if the __Pneumonia _______ diagnosis has been:                 [   x]   Ruled In               [   ]   Ruled In, Now Resolved               [   ]   Resolved Prior to My Assessment               [   ]   Ruled Out               [   ]   Clinically insignificant               [   ]   Clinically undetermined               [   ]   Other/Clarification of findings:_____________________________________       Please document in your progress notes daily for the duration of treatment, until resolved, and include in your discharge summary.

## 2017-01-24 NOTE — ASSESSMENT & PLAN NOTE
- Hx of PVD   - Cx sent, NGTD  - Podiatry with wound recommendations  - finished 4 days of clindamycin  - started doxycycline today (will complete 10 day total treatment of ABx)

## 2017-01-24 NOTE — PROGRESS NOTES
[facility] Hospital Medicine  Progress Note    Patient Name: Nelia Brizuela  MRN: [id number]  Patient Class: IP- Inpatient   Admission Date: 1/19/2017  Length of Stay: 5 days  Attending Physician: Caterina Martinez MD  Primary Care Provider: Lenin Sandhu MD    [facility] Medicine Team: [facility] MED 3 Clarisas Blake MD    Subjective:     Principal Problem:RICARDO (acute kidney injury)      Interval History: NAEON. No new complaints. Patient happy to go home today and followup with podiatry in clinic.     Review of Systems   Constitutional: Negative for chills and fever.   Respiratory: Negative for shortness of breath.    Cardiovascular: Negative for chest pain and palpitations.   Gastrointestinal: Negative for constipation, diarrhea, nausea and vomiting.   Musculoskeletal:        Bilateral leg pain      Objective:     Vital Signs (Most Recent):  Temp: 98.6 °F (37 °C) (01/24/17 1150)  Pulse: 78 (01/24/17 1150)  Resp: 18 (01/24/17 1150)  BP: 138/65 (01/24/17 1150)  SpO2: 95 % (01/24/17 1150) Vital Signs (24h Range):  Temp:  [97.9 °F (36.6 °C)-98.6 °F (37 °C)] 98.6 °F (37 °C)  Pulse:  [67-84] 78  Resp:  [15-18] 18  SpO2:  [94 %-100 %] 95 %  BP: (119-150)/(63-75) 138/65     Weight: 85.3 kg (188 lb)  Body mass index is 33.3 kg/(m^2).  No intake or output data in the 24 hours ending 01/24/17 1307   Physical Exam   Constitutional: She is oriented to person, place, and time. She appears well-developed and well-nourished.   HENT:   Head: Normocephalic and atraumatic.   Mouth/Throat: Oropharynx is clear and moist.   Eyes: EOM are normal. Pupils are equal, round, and reactive to light.   Neck: Normal range of motion. Neck supple.   Cardiovascular: Normal rate and regular rhythm.    No murmur heard.  Pulmonary/Chest: Effort normal. No respiratory distress. She has wheezes.   Abdominal: Soft. Bowel sounds are normal. She exhibits no distension. There is no tenderness.   Musculoskeletal:        Neurological: She is alert and  oriented to person, place, and time. No cranial nerve deficit.   Skin: Skin is warm and dry. There is erythema.   Thick flaking skin with multiple ulcerations to bilateral lower extremities. There is mild erythema, and purulent drainage noted from ulcerations. Ace bandages bilaterally. Warm extremities   Psychiatric: She has a normal mood and affect. Her behavior is normal.       Significant Labs:   CBC:     Recent Labs  Lab 01/23/17  0738 01/24/17  0400   WBC 5.89 5.78   HGB 11.2* 10.3*   HCT 31.0* 30.9*    271     CMP:     Recent Labs  Lab 01/23/17  0459 01/24/17  0400    141   K 4.7 4.0   * 115*   CO2 23 19*   GLU 83 74   BUN 15 11   CREATININE 1.0 0.8   CALCIUM 8.5* 8.3*   PROT 6.5  --    ALBUMIN 2.2*  --    BILITOT 0.3  --    ALKPHOS 47*  --    AST 37  --    ALT 19  --    ANIONGAP 6* 7*   EGFRNONAA 53.3* >60.0       Significant Imaging: I have reviewed all pertinent imaging results/findings within the past 24 hours.    Assessment/Plan:      * RICARDO (acute kidney injury)  - resolved   - etiology Likely pre-renal in setting of hypotension and cellulitis  - urine studies indicative of pre-renal injury   - will get BMP in two days to follow-up Cr before restarting ace        HTN (hypertension)  - Hold home hctz/enalapril in light of recent low BP and resulting RICARDO  - continue to hold as patient will got contrast yesterday    Psoriasis  - on bilateral LE with ulcer noted to have purulent drainage. Also with leukocytosis. Will treat for cellulitis with 10 day course of ABx (patient to go home with doxycyline)  - wound care consulted and will get HH with wound care      Peripheral vascular disease, unspecified  - Likely secondary to hx of smoking   - JOSE ALBERTO with stenosis right JOSE ALBERTO of 0.62 and left JOSE ALBERTO revealing 0.77  - Vascular surgery following, angio yesterday shows patent arteries, no intervention needed  - Continue ASA      Cellulitis  - Hx of PVD   - Cx sent, NGTD  - Podiatry with wound  recommendations  - finished 4 days of clindamycin  - started doxycycline today (will complete 10 day total treatment of ABx)      UTI (urinary tract infection)  - Urine cx growing E coli, uncomplicated UTI (requires 3-5 days of ABx)  - transitioned to augmentin, was on rocephin for 3 days complete augmentin dose today  - completed ABx course for this        VTE Risk Mitigation         Ordered     enoxaparin injection 40 mg  Daily     Route:  Subcutaneous        01/21/17 1105     Medium Risk of VTE  Once      01/20/17 0034     Place SHANIKA hose  Until discontinued      01/20/17 0034     Place sequential compression device  Until discontinued      01/20/17 0034          Dispo: home with home health today, f/u BMP in 2 days.     Signing Physician:  Clarissa Blake MD   Internal Medicine PGY-1

## 2017-01-24 NOTE — ASSESSMENT & PLAN NOTE
- Urine cx growing E coli, uncomplicated UTI (requires 3-5 days of ABx)  - transitioned to augmentin, was on rocephin for 3 days complete augmentin dose today  - completed ABx course for this

## 2017-01-24 NOTE — ASSESSMENT & PLAN NOTE
- on bilateral LE with ulcer noted to have purulent drainage. Also with leukocytosis. Will treat for cellulitis with 10 day course of ABx (patient to go home with doxycyline)  - wound care consulted and will get HH with wound care

## 2017-01-24 NOTE — ASSESSMENT & PLAN NOTE
- resolved   - etiology Likely pre-renal in setting of hypotension and cellulitis  - urine studies indicative of pre-renal injury   - will get BMP in two days to follow-up Cr before restarting ace

## 2017-01-24 NOTE — PROGRESS NOTES
Progress Note  Podiatry      SUBJECTIVE     History of Present Illness:  Nelia Brizuela is a 81 y.o. female  has a past medical history of Arthritis of knee; Cataract; COPD (chronic obstructive pulmonary disease); CTS (carpal tunnel syndrome); Glaucoma (increased eye pressure); HTN (hypertension); Joint pain; Psoriasis; Smoker; and Strabismus. Admitted for RICARDO, likely pre-renal with hypotension and cellulitis, and consulted to podiatry for concern for lower extremity cellulitis, requested by wound care. Per chart review, pt has chronic rafi lower extremity wounds that have been present for 5-6 mo. Pt relates she has not been treating her wounds. Relates to pain on the lateral wound of the right leg. No other complaints at this time.     1/24/17: Patient was seen bedside for dressing changes. No acute distress, denies pain except the occasional burning pain to the wounds that doesn't last long. No acute changes noted.    Scheduled Meds:   aspirin  81 mg Oral Daily    bimatoprost  1 drop Right Eye QHS    brimonidine 0.15 % OPTH DROP  1 drop Right Eye TID    doxycycline  100 mg Oral Q12H    enoxaparin  40 mg Subcutaneous Daily    magnesium sulfate IVPB  2 g Intravenous Once    pantoprazole  40 mg Oral Daily    polyethylene glycol  17 g Oral Daily    senna-docusate 8.6-50 mg  1 tablet Oral BID     Continuous Infusions:   sodium chloride 0.9% 100 mL/hr at 01/22/17 2318     PRN Meds:acetaminophen, albuterol-ipratropium 2.5mg-0.5mg/3mL, cadexomer iodine, hydrocodone-acetaminophen 10-325mg, hydrocodone-acetaminophen 5-325mg, flu vacc [id number] 65yr up(PF), ondansetron    Review of patient's allergies indicates:   Allergen Reactions    Codeine      Other reaction(s): Unknown        Past Medical History   Diagnosis Date    Arthritis of knee     Cataract     COPD (chronic obstructive pulmonary disease)     CTS (carpal tunnel syndrome)     Glaucoma (increased eye pressure)     HTN (hypertension)     Joint pain      Psoriasis 2007    Smoker     Strabismus      XT OS     Past Surgical History   Procedure Laterality Date    Tubal ligation      Cataract extraction w/ intraocular lens implantw/ trabeculectomy Left 8/13/14     os ()    Cataract extraction w/  intraocular lens implant Left 8/13/2014     OS ()    Ahmed implant and surgical pi Left 1/27/16           Family History   Problem Relation Age of Onset    Glaucoma Father     Cancer Neg Hx     Psoriasis Neg Hx     Amblyopia Neg Hx     Blindness Neg Hx     Macular degeneration Neg Hx     Retinal detachment Neg Hx     Strabismus Neg Hx      Social History   Substance Use Topics    Smoking status: Current Every Day Smoker     Packs/day: 1.00     Years: 57.00    Smokeless tobacco: Never Used      Comment: Pt not ready to quit.    Alcohol use Yes      Comment: seldom alcohol use.       Review of Systems:  Constitutional: no fever or chills  Respiratory: no cough or shortness of breath  Cardiovascular: no chest pain or palpitations  Gastrointestinal: no nausea or vomiting, tolerating diet  Integument/Breast: no rash or pruritis  Musculoskeletal: no arthralgias or myalgias  Neurological: no history of numbness or paresthesias in the feet  Behavioral/Psych: no auditory or visual hallucinations    OBJECTIVE     Vital Signs (Most Recent):  Temp: 98.6 °F (37 °C) (01/24/17 0745)  Pulse: 73 (01/24/17 0745)  Resp: 18 (01/24/17 0745)  BP: 119/65 (01/24/17 0745)  SpO2: (!) 94 % (01/24/17 0745)    Vital Signs Range (Last 24H):  Temp:  [97.9 °F (36.6 °C)-98.6 °F (37 °C)]   Pulse:  [67-84]   Resp:  [14-22]   BP: ()/(54-75)   SpO2:  [94 %-100 %]     Physical Exam:  GENERAL: alert, cooperative  VASCULAR: Dorsalis Pedis: Left: trace Right: trace       Posterior Tibialis: Left: trace Right: trace  NEURO:      SWMF: Left: decreased Right: decreased  DERM: Hair: Left: absent Right: absent       Interdigital Spaces: Left: maceration Right:  maceration atrophic skin changes noted from the distal leg inferiorly with tinea pedis on plantar aspects of foot, rafi.     Wound 1 anterior leg, left: Superficial granular ulcer measuring 2.1x2.0 cm. No malodor, no periwound edema or erythema noted. Mildly tender to palpation.     Wound 2 anterior left, right Superficial granular ulcer measuring 2.0x1.9 cm. No malodor. No periwound edema or erythema noted. Mildly tender to palpation.     Wound 3 lateral leg, right. Superficial granular ulcer measuring 3.0x2.1. No malodor with no periwound edema and erythema. Tender to palpation.                     Laboratory:  CBC:     Recent Labs  Lab 01/24/17  0400   WBC 5.78   RBC 3.32*   HGB 10.3*   HCT 30.9*      MCV 93   MCH 31.0   MCHC 33.3     BMP:     Recent Labs  Lab 01/24/17  0400   GLU 74      K 4.0   *   CO2 19*   BUN 11   CREATININE 0.8   CALCIUM 8.3*   MG 1.2*     CMP:     Recent Labs  Lab 01/23/17  0459 01/24/17  0400   GLU 83 74   CALCIUM 8.5* 8.3*   ALBUMIN 2.2*  --    PROT 6.5  --     141   K 4.7 4.0   CO2 23 19*   * 115*   BUN 15 11   CREATININE 1.0 0.8   ALKPHOS 47*  --    ALT 19  --    AST 37  --    BILITOT 0.3  --      ESR: No results for input(s): SEDRATE in the last 168 hours.  CRP: No results for input(s): CRP in the last 168 hours.  Coagulation: No results for input(s): INR, APTT in the last 168 hours.    Invalid input(s): PT    Diagnostic Results:    VAS JOSE ALBERTO 1/20/17: Impression:   Rt JOSE ALBERTO (0.62) Segment/Brachial Index and PVR waveforms indicate moderate peripheral arterial obstructive disease.    Lt JOSE ALBERTO (0.77) Segment/Brachial Index and PVR waveforms indicate mild peripheral arterial obstructive disease.        ASSESSMENT/PLAN     Assessment:  LE cellulitis  Stasis ulcer x3, rafi  PVD  RICARDO    Plan:  -No acute surgical intervention necessary at this time cellulitis resolving with antibiotics and wound care, ulcers appear healthy.  -Angiogram yesterday per vascular surgery  Patient with appropriate lower extremity blood flow bilaterally. No additional vascular surgery intervention indicated at this time  -Pt to elevate LE, rafi. Do not apply compression to LE due to low JOSE ALBERTO/PVD  -Wounds were dressed with iodosorb, gauze, 2 rolls of kerlix, 2 rolls of loose ACE  -Nursing to apply daily dressing changes as above.   -Appreciate the consult, podiatry will follow  -OK for discharge from podiatry perspective, follow up in 1 week in clinic  -If questions, please contact the on-call podiatry resident    Thank you,     Manuel Jeff DPM PGY-3  Pager [phone]

## 2017-01-25 ENCOUNTER — PATIENT OUTREACH (OUTPATIENT)
Dept: ADMINISTRATIVE | Facility: CLINIC | Age: 82
End: 2017-01-25
Payer: MEDICARE

## 2017-01-25 LAB — BACTERIA BLD CULT: NORMAL

## 2017-01-25 NOTE — PATIENT INSTRUCTIONS
Discharge Instructions for Acute Kidney Injury  You have been diagnosed with acute kidney injury. This means that your kidneys are not working properly. When both kidneys are healthy, they help filter out fluid and waste from the blood and body. Acute kidney injury has many causes including urinary blockages, infection, lack of enough blood supply, and medications that can injure kidneys. In some cases, acute kidney injury is temporary, lasting several days to a few months, because the kidney has the capacity to repair itself. But, it may also result in chronic kidney disease or end stage renal failure. Here are some instructions for you to follow as you recover.  Home care  · Follow any instructions for eating and drinking given to you by your doctor.  ¨ Drink less fluid, if instructed by your doctor.  ¨ Keep a record of everything you eat and drink.  · Measure the amount of urine and stool you have each day.  · Weigh yourself every day, at the same time of day, and in the same kind of clothes. Keep a daily record of your daily weights.  · Take your temperature every day. Keep a record of the results.  · Learn to take your own blood pressure. Keep a record of your results. Ask your doctor when you should seek emergency medical attention. He or she will tell you which blood pressure reading is dangerous.  · Avoid contact with people who have infections (colds, bronchitis, or skin conditions).  · Practice good personal hygiene. This is especially important if you have a catheter in place when you leave the hospital. Doing so helps keep you safe from infection.  · Take your medications exactly as directed.  · You may require frequent blood and urine tests to monitor your kidney function.  Follow-up care  Make a follow-up appointment as directed by our staff.     When to seek medical care  Call your doctor right away if you have any of the following:  · Signs of bladder infection (urinating more often than usual;  burning, pain, bleeding, or hesitancy when you urinate)  · Signs of infection around your catheter (redness, swelling, warmth, or drainage)  · Rapid weight loss or gain, such as 3 pounds or more in 24 hours or 6 pounds or more in 7 days  · Fever above 100.4°F (38.0°C) or chills  · Muscle aches  · Night sweats  · Very little or no urine output  · Swelling of your hands, legs, or feet  · Back pain  · Abdominal pain  · Extreme tiredness   © 6445-8761 [employer]. [address] [place] [id number]. All rights reserved. This information is not intended as a substitute for professional medical care. Always follow your healthcare professional's instructions.

## 2017-01-25 NOTE — MEDICAL/APP STUDENT
HISTORY & PHYSICAL  Hospital Medicine    Team: [facility] [place] HOSP MED 3    PRESENTING HISTORY     Chief Complaint/Reason for Admission: Weakness, nausea, low blood pressure   History of Present Illness:  Ms. Nelia Brizuela is a 81 y.o. female with history of HTN and COPD who was admitted from ER after presenting with systolic blood pressure in the 60's and associated weakness and nausea. Ms. Brizuela states that she was in her normal state of health until the night before presentation, at which time she experienced some sweating and chills after going to bed. She states she awoke the next morning feeling better, but her daily home blood pressure reading was low (systolic ~60). She then presented to urgent care, where subsequent readings were similarly low, and she was advised to present to ER. During travel to urgent care Ms. Brizuela began to feel weak and nauseous, which continued off and on until presentation to ER at [facility]. Upon arrival at ER she was given fluids, at which time her blood pressure and symptoms improved.     Ms. Brizuela states that she likely ate and drank less the day or two prior to presenting, but denies nausea, vomiting, or diarrhea, and states that her urine output has been normal. She denies hematuria and dysuria. She did experience chills and sweating the night before presentation, but did not record her temperature and is unsure if she had a fever. She denies chest pain, shortness or breath, palpitations, or syncope. She denies cough, sputum, and rhinorrhea. She and her daughter had her medications with them at bedside and denied any recent adverse reactions or alterations in dose.     Review of Systems:  Constitutional: positive for chills, malaise and sweats, negative for anorexia, night sweats and weight loss  Eyes: positive for contacts/glasses, negative for color blindness, icterus, irritation, redness and visual disturbance  ENT: negative for nasal congestion, sore mouth and sore  throat  Respiratory: negative for cough, dyspnea on exertion, hemoptysis, pleurisy/chest pain, sputum and stridor  Cardiovascular: positive for lower extremity edema, negative for chest pain, chest pressure/discomfort, dyspnea, palpitations, near-syncope and orthopnea  Gastrointestinal: negative for abdominal pain, diarrhea, nausea and vomiting  Genitourinary: negative for dysuria, frequency, nocturia and urinary incontinence  Hematologic/Lymphatic: negative for bleeding, easy bruising, lymphadenopathy and petechiae  Musculoskeletal: positive for arthralgias, negative for bone pain, muscle weakness, myalgias and neck pain  Neurological: negative for coordination problems, dizziness, headaches, memory problems, paresthesia, seizures, speech problems and tremors    PAST HISTORY:     Past Medical History   Diagnosis Date    Arthritis of knee     Cataract     COPD (chronic obstructive pulmonary disease)     CTS (carpal tunnel syndrome)     Glaucoma (increased eye pressure)     HTN (hypertension)     Joint pain     Psoriasis 2007    Smoker     Strabismus      XT OS       Past Surgical History   Procedure Laterality Date    Tubal ligation      Cataract extraction w/ intraocular lens implantw/ trabeculectomy Left 8/13/14     os ()    Cataract extraction w/  intraocular lens implant Left 8/13/2014     OS ()    Ahmed implant and surgical pi Left 1/27/16             Family History   Problem Relation Age of Onset    Glaucoma Father     Cancer Neg Hx     Psoriasis Neg Hx     Amblyopia Neg Hx     Blindness Neg Hx     Macular degeneration Neg Hx     Retinal detachment Neg Hx     Strabismus Neg Hx        Social History     Social History    Marital status:      Spouse name: Murray    Number of children: N/A    Years of education: N/A     Occupational History    Retired      Retired     Social History Main Topics    Smoking status: Current Every Day Smoker      Packs/day: 1.00     Years: 57.00    Smokeless tobacco: Never Used      Comment: Pt not ready to quit.    Alcohol use Yes      Comment: seldom alcohol use.    Drug use: No    Sexual activity: Not Asked     Other Topics Concern    Are You Pregnant Or Think You May Be? No    Breast-Feeding No     Social History Narrative       MEDICATIONS & ALLERGIES:     No current facility-administered medications on file prior to encounter.      Current Outpatient Prescriptions on File Prior to Encounter   Medication Sig Dispense Refill    aspirin 81 mg Tab Take 81 mg by mouth once daily.       brimonidine 0.2% (ALPHAGAN) 0.2 % Drop Place 1 drop into the right eye 3 (three) times daily. 10 mL 12    diclofenac sodium 1 % Gel Apply topically 3 (three) times daily. 4 grams topically tid to affected joints. (Patient taking differently: Apply 4 g topically 3 (three) times daily as needed (for joint pain). ) 200 g 1    dorzolamide-timolol 2-0.5% (COSOPT) 22.3-6.8 mg/mL ophthalmic solution Place 1 drop into both eyes 3 (three) times daily. 30 mL 3    FOLIC ACID/MV,FE,OTHER MIN (CENTRUM ORAL) Take 1 capsule by mouth once daily.       hydrochlorothiazide (MICROZIDE) 12.5 mg capsule TAKE 1 CAPSULE DAILY (Patient taking differently: TAKE 1 CAPSULE BY MOUTH DAILY) 90 capsule 1    LUMIGAN 0.01 % Drop Place 1 drop into the right eye every evening. 3 Bottle 3    naproxen sodium (ANAPROX) 220 MG tablet Take 220 mg by mouth 2 (two) times daily as needed (for pain).       triamcinolone acetonide 0.1% (KENALOG) 0.1 % ointment Apply topically 2 (two) times daily. 453.6 g 5    [DISCONTINUED] enalapril (VASOTEC) 10 MG tablet TAKE 1 TABLET DAILY (Patient taking differently: TAKE 1 TABLET BY MOUTH DAILY) 90 tablet 8        Review of patient's allergies indicates:   Allergen Reactions    Codeine      Other reaction(s): Unknown       OBJECTIVE:     Vital Signs:  Temp: 98.5 F (36.9 C)  UT: 77-93  RR: 16-23  BP: 111-156/ 51-77  O2 Sat.:  94%-100% on room air   BMI is 33.3 kg/m^2      Physical Exam:  General: well developed, well nourished, no distress  Eyes: conjunctivae/corneas clear. PERRL..  Neck: supple, symmetrical, trachea midline, no JVD  Lungs:  wheezes anterior - bilateral  Cardiovascular: Heart: regular rate and rhythm, S1, S2 normal, no murmur, click, rub or gallop. Chest Wall: no tenderness. Extremities: no cyanosis or edema, or clubbing and lower limbs showed pitting edema bilaterally up to the knee, dry, darkened, and cracked skin. Preulcerative lesions are noted on the feet and ankles of both lower limbs, with a purulent wound on the right lateral leg. . Pulses: Right Pulses: RAD: barely palpable, FEM: not examined, POP: absent, DP: absent, PT: absent  Left Pulses: RAD: barely palpable, FEM: not examined, POP: absent, DP: absent, PT: absent.  Abdomen/Rectal: Abdomen: soft, non-tender non-distented; bowel sounds normal; no masses,  no organomegaly. Rectal: normal tone, no masses or tenderness and not examined  Skin: as described in lower limb findings   Neurologic: Normal strength and tone. No focal numbness or weakness  Psych/Behavioral:  Alert and oriented, appropriate affect.    Laboratory  Significant Labs:   BMP:   Recent Labs  Lab 01/19/17  1806   GLU 92      K 4.9      CO2 22*   BUN 57*   CREATININE 2.5*   CALCIUM 9.0      CBC:   Recent Labs  Lab 01/19/17  1654   WBC 18.29*   HGB 11.9*   HCT 35.5*         CMP:   Recent Labs  Lab 01/19/17  1806      K 4.9      CO2 22*   GLU 92   BUN 57*   CREATININE 2.5*   CALCIUM 9.0   PROT 7.4   ALBUMIN 2.7*   BILITOT 0.3   ALKPHOS 77   AST 22   ALT 12   ANIONGAP 9   EGFRNONAA 17.6*         Significant Imaging:     CXR: Showed patchy, course interstitial attenuation, and parenchymal attenuation over the L lower lobe    Diagnostic Results:    EKG: Sinus rhythm     Urinalysis: 1 RBC, Bacteria, Hyaline casts, positive nitrites; negative for WBC, protein, sugars,  ketones, or blood     Urine Cultures: No growth to date     ASSESSMENT & PLAN:       Problem Assessment & Treatment Plan:  Assessment:   Given her symptoms of subjective fever and chills the night before presentation, hypotension, and elevated WBC and creatinine, it is likely that Ms. Brizuela experienced prerenal acute kidney injury secondary to hypotension from infection. If, as she noted on history she also had lower-than-usual PO intake, this could have exacerbated her hypotension. The source of the infection is likely the apparent cellulitis on her right lateral leg, a UTI, or both. Ms. Brizuela appears to have peripheral vascular disease (absent pulses, skin changes, ulcerations), likely attributed to her long history of smoking, and her cellulitis likely began as infection of one of these ulcers. While speaking with Ms. Brizuela I noted that she uses a bedpan to urinate and was not comfortable ambulating to the toilet. It is possible that her reduced mobility may have resulted in fewer trips to the bathroom and subsequent UTI.      Other differentials I considered include RICARDO secondary to hypotension caused by pneumonia, hemodynamic instability following an acute MI, arrhythmia, and an adverse reaction to her thiazide diuretic therapy. Ms. Brizuela's chest x-ray did indicate possible early consolidation, she has an elevated WBC count, and she did have subjective fever and chills the night before presenting, however she reported no other symptoms of pneumonia, specifically cough, SOB, or pleuritic chest pain. On examination she had some wheeze, but no areas of decreased breath sounds, dullness to percussion, increased vocal resonance/fremitus, or egophony. Ms. Brizuela may have experienced an acute MI the night before or the morning of presentation, which caused hemodynamic instability and RICARDO. She did deny chest pain, however given her age and gender, it is possible that her symptoms may have been less severe.  However, her normal EKG results and correction of hemodynamic stability with fluids only make this less likely as well. Similarly, her normal EKG and improvement with fluids makes an arrhythmia and subsequent hemodynamic instability an unlikely cause of her hypotension and RICARDO. Finally, thiazide diuretics can cause hypotension, however this reaction is usually postural in nature and would likely not be sustained long enough to cause RICARDO. Furthermore the patient did not report any changes in dose or prior problems with this medication, making it a less likely precipitant of her symptoms.     Treatment plan:   1. RICARDO   -given fluids in ER  -continue to monitor     2. PVD  -JOSE ALBERTO ordered   -Consult vascular surgery     3. Cellulitis   -Obtain wound cultures   -Consult podiatry regarding wound care   -Begin p.o. Clindamycin     4. Hypotension  -Resolved   -Hold hold BP medications      5. UTI   -Urine culture pending  -Begin empiric therapy with Rocephin     6. HTN  -hold hydrochlorothiazide given recent RICARDO     7. Psoriasis  -consult podiatry     REFERENCES  1. Miguelito CEDENO, Antonino S, James A, Miguelito PENG. Sharath's Principles of Internal Medicine. 19th ed.. ed. [place]: [facility]; 2015.  2. Victor Hugo OLIVAREZ MD. Overview of the management of acute kidney injury (acute renal failure). UpToDate. 2016.  3. Stan CEDENO, Renetta MG. Loop diuretics cause less postural hypotension than thiazide diuretics in the frail elderly. Current medical research and opinion. 1988;11(4):232-5.       [place] [id number]

## 2017-01-25 NOTE — MEDICAL/APP STUDENT
Progress Note  Hospital Medicine    Patient Name: Nelia Brizuela  YOB: 1935    Admit Date: 1/19/2017                     LOS: 5    SUBJECTIVE:     Reason for Admission:  RICARDO (acute kidney injury)  Nelia Brizuela is an 82 y/o female with a history of COPD, 57 pack years of smoking, hypertension, and psoriasis who was admitted on 1/19/17 from ER for RICARDO secondary to hypotension caused by cellulitis and UTI.     Interval history: I visited Ms. Brizuela this morning. She had no acute events overnight and no complaints. She is NPO since last night ahead of her angiogram this morning, but reports a good appetite prior to that. She has not had a bowel motion and states that this is less frequent than usual for her. She reports no change in urine output, no dysuria, and no hematuria. Other than some discomfort yesterday after cleaning of her ulcers/wound culture, she has had no pain.     ROS:   CONSTITUTIONAL: no fever, chills, or sweating   CVS: no chest pain or palpitations   RESP: no SOB   : no dysuria, hematuria, or urinary frequency   RENAL: no flank pain   LIMBS: no change in amount of swelling or discomfort        OBJECTIVE:     Vital Signs Range (Last 24H):  Tmax: 98.5   CT: 69-94  RR: 18   BP: 128-150/64-72   O2 Sat: 81-97% on room air     Physical Exam:  General: well developed, well nourished, no distress  Eyes: conjunctivae/corneas clear. PERRL..  Lungs:  wheezes anterior - bilateral  Cardiovascular: Heart: regular rate and rhythm, S1, S2 normal, no murmur, click, rub or gallop. Chest Wall: no tenderness. Extremities: edema pitting edema up to the knee  and Lower limbs bandaged following wound care yesterday, dressings clean and dry, not causing discomfort . Pulses: Right Pulses: RAD: barely palpable, FEM: not palpated, POP: absent, DP: absent, PT: absent  Left Pulses: RAD: barely palpable, FEM: not palpated, POP: absent, DP: absent, PT: absent.  Abdomen/Rectal: Abdomen: soft, non-tender  non-distented; bowel sounds normal; no masses,  no organomegaly. Rectal: normal tone, no masses or tenderness and not examined  Skin: as described in lower limb exam   Psych/Behavioral:  Alert and oriented, appropriate affect.    Diagnostic Results:  Significant Labs:        Recent Labs  Lab 01/23/17  0738 01/24/17  0400   WBC 5.89 5.78   HGB 11.2* 10.3*   HCT 31.0* 30.9*    271      CMP:      Recent Labs  Lab 01/23/17  0459 01/24/17  0400    141   K 4.7 4.0   * 115*   CO2 23 19*   GLU 83 74   BUN 15 11   CREATININE 1.0 0.8   CALCIUM 8.5* 8.3*   PROT 6.5 --    ALBUMIN 2.2* --    BILITOT 0.3 --    ALKPHOS 47* --    AST 37 --    ALT 19 --    ANIONGAP 6* 7*   EGFRNONAA 53.3* >60.0       ASSESSMENT/PLAN:   Ms. Brizuela is an 82 y/o woman with       1. RICARDO   Resolved   -Likely pre-renal secondary to hypotension and cellulitis/UTI     2. Peripheral Vascular Disease  Same  -Likely secondary to smoking   -Vascular consult and JOSE ALBERTO results on 1/23 indicated moderate peripheral vascular disease in the right lower limb, mild in the left   -Currently being prepped for RLL angiogram   -Will likely need an angiogram in the LLL in the future, can follow up as an outpatient    3. Hypotension   Resolved   Hold home BP meds     4. Cellulitis   Improving  -Continue treatment with 10 days of doxycycline 100mg PO 1/day     5. UTI  Urine culture grew E. Coli; switch treatment to augmentin     6. VTE Prophylaxis  -Enoxaparin, 40mg injection 1/day     Plans for discharge:   Ms. Brizuela will most likely be ready for discharge following angiogram. Will make arrangements for home health today, should follow up on [facility] in 2 days.          Maddison Menjivar-Ash

## 2017-01-26 ENCOUNTER — TELEPHONE (OUTPATIENT)
Dept: INTERNAL MEDICINE | Facility: CLINIC | Age: 82
End: 2017-01-26

## 2017-01-26 DIAGNOSIS — L03.90 CELLULITIS, UNSPECIFIED CELLULITIS SITE: Primary | ICD-10-CM

## 2017-01-26 NOTE — TELEPHONE ENCOUNTER
----- Message from Leela Wynn sent at 1/26/2017  2:15 PM CST -----  Contact: Pt's  Murray Brizuela  Pt called asking for advice about a nurse that suppose to come out to care for her after she was discharged from the hospital.        Please call pt at [phone].        Thanks!

## 2017-01-26 NOTE — TELEPHONE ENCOUNTER
Pt  is requesting HH after hospital discharge  for dressing change Im not sure if the hospital was suppose to set this up but her  states that no one has came out if possible can you order HH for the client the  states that he has no one at home to help him with this     Please advise

## 2017-01-27 LAB — BACTERIA SPEC AEROBE CULT: NORMAL

## 2017-01-27 RX ORDER — HYDROCHLOROTHIAZIDE 12.5 MG/1
CAPSULE ORAL
Qty: 90 CAPSULE | Refills: 1 | Status: SHIPPED | OUTPATIENT
Start: 2017-01-27 | End: 2017-02-01

## 2017-01-31 ENCOUNTER — INITIAL CONSULT (OUTPATIENT)
Dept: DERMATOLOGY | Facility: CLINIC | Age: 82
End: 2017-01-31
Payer: MEDICARE

## 2017-01-31 DIAGNOSIS — I89.0 ELEPHANTIASIS: ICD-10-CM

## 2017-01-31 DIAGNOSIS — I89.0 LYMPHEDEMA OF BOTH LOWER EXTREMITIES: Primary | ICD-10-CM

## 2017-01-31 PROCEDURE — 99999 PR PBB SHADOW E&M-EST. PATIENT-LVL III: CPT | Mod: PBBFAC,,, | Performed by: DERMATOLOGY

## 2017-01-31 PROCEDURE — 99202 OFFICE O/P NEW SF 15 MIN: CPT | Mod: S$PBB,,, | Performed by: DERMATOLOGY

## 2017-01-31 PROCEDURE — 99213 OFFICE O/P EST LOW 20 MIN: CPT | Mod: PBBFAC | Performed by: DERMATOLOGY

## 2017-01-31 NOTE — PROGRESS NOTES
"  Subjective:       Patient ID:  Nelia Brizuela is a 81 y.o. female who presents for   Chief Complaint   Patient presents with    Psoriasis     HPI Comments: 81 y.o W with h/o PVD, HTN, COPD who presents to clinic for evaluation of lower extremities.  Pt was last seen by Dr. Tovar on 11/13/12 for psoriasis of bilateral plantar feet.  Today, she presents with 5-6 month history of bilateral lower extremity crusting, swelling and ulcerations.  Associated symptoms include burning and occasional itch.  Admitted to [facility] from 1/19/17-1/24/17, during which time she was treated for cellulitis with 10-day course of Doxycycline due to presence of ulcers (?).  Also underwent Aortogram and bilateral lower extremity angiograms w/Vascular Surgery on 1/23/16 and was found to have appropriate lower extremity blood flow bilaterally.  Vascular did not feel that there was any additional vascular surgery intervention indicated at this time and recommendations included continuing local wound care.  Pt was discharged with home health, including nurses who come to her house twice/week to change her leg dressings.  Pt states that nurses are "applying creams" to her legs, but is unsure which kinds.    Denies fevers, chills, night sweats.        Past Medical History   Diagnosis Date    Arthritis of knee     Cataract     COPD (chronic obstructive pulmonary disease)     CTS (carpal tunnel syndrome)     Glaucoma (increased eye pressure)     HTN (hypertension)     Joint pain     Psoriasis 2007    Smoker     Strabismus      XT OS     Review of Systems   Constitutional: Negative for fever, chills and night sweats.   HENT: Negative for sore throat.    Musculoskeletal: Negative for arthralgias.   Skin: Positive for itching, rash and dry skin. Negative for daily sunscreen use and activity-related sunscreen use.        Objective:    Physical Exam   Constitutional: She appears well-developed and well-nourished. No distress. "   Neurological: She is alert and oriented to person, place, and time. She is not disoriented.   Psychiatric: She has a normal mood and affect.   Skin:   Areas Examined (abnormalities noted in diagram):   Head / Face Inspection Performed  RUE Inspected  LUE Inspection Performed  RLE Inspected  LLE Inspection Performed  Nails and Digits Inspection Performed              Diagram Legend     Erythematous scaling macule/papule c/w actinic keratosis       Vascular papule c/w angioma      Pigmented verrucoid papule/plaque c/w seborrheic keratosis      Yellow umbilicated papule c/w sebaceous hyperplasia      Irregularly shaped tan macule c/w lentigo     1-2 mm smooth white papules consistent with Milia      Movable subcutaneous cyst with punctum c/w epidermal inclusion cyst      Subcutaneous movable cyst c/w pilar cyst      Firm pink to brown papule c/w dermatofibroma      Pedunculated fleshy papule(s) c/w skin tag(s)      Evenly pigmented macule c/w junctional nevus     Mildly variegated pigmented, slightly irregular-bordered macule c/w mildly atypical nevus      Flesh colored to evenly pigmented papule c/w intradermal nevus       Pink pearly papule/plaque c/w basal cell carcinoma      Erythematous hyperkeratotic cursted plaque c/w SCC      Surgical scar with no sign of skin cancer recurrence      Open and closed comedones      Inflammatory papules and pustules      Verrucoid papule consistent consistent with wart     Erythematous eczematous patches and plaques     Dystrophic onycholytic nail with subungual debris c/w onychomycosis     Umbilicated papule    Erythematous-base heme-crusted tan verrucoid plaque consistent with inflamed seborrheic keratosis     Erythematous Silvery Scaling Plaque c/w Psoriasis     See annotation      Assessment / Plan:        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

## 2017-01-31 NOTE — LETTER
January 31, 2017      Kathy Ruiz, DPM  [address] [facility] [address]           [facility] - Dermatology  [address]  [place] [address]  Phone: [phone]  Fax: [phone]          Patient: Nelia Brizuela   MR Number: [id number]   YOB: 1935   Date of Visit: 1/31/2017       Dear Dr. Kathy Ruiz:    Thank you for referring Nelia Brizuela to me for evaluation. Attached you will find relevant portions of my assessment and plan of care.    If you have questions, please do not hesitate to call me. I look forward to following Nelia Brizuela along with you.    Sincerely,    Breanna Farmer MD    Enclosure  CC:  No Recipients    If you would like to receive this communication electronically, please contact [email or web address] or [phone] to request more information on [facility] Link access.    For providers and/or their staff who would like to refer a patient to [facility], please contact us through our one-stop-shop provider referral line, [facility], at [phone].    If you feel you have received this communication in error or would no longer like to receive these types of communications, please e-mail [email or web address]

## 2017-02-01 ENCOUNTER — OFFICE VISIT (OUTPATIENT)
Dept: INTERNAL MEDICINE | Facility: CLINIC | Age: 82
End: 2017-02-01
Payer: MEDICARE

## 2017-02-01 VITALS
BODY MASS INDEX: 34.88 KG/M2 | HEART RATE: 71 BPM | DIASTOLIC BLOOD PRESSURE: 62 MMHG | HEIGHT: 63 IN | SYSTOLIC BLOOD PRESSURE: 138 MMHG | WEIGHT: 196.88 LBS

## 2017-02-01 DIAGNOSIS — I10 ESSENTIAL HYPERTENSION: ICD-10-CM

## 2017-02-01 DIAGNOSIS — I73.9 PERIPHERAL VASCULAR DISEASE, UNSPECIFIED: Primary | ICD-10-CM

## 2017-02-01 DIAGNOSIS — J44.9 CHRONIC OBSTRUCTIVE PULMONARY DISEASE, UNSPECIFIED COPD TYPE: ICD-10-CM

## 2017-02-01 DIAGNOSIS — N17.9 AKI (ACUTE KIDNEY INJURY): ICD-10-CM

## 2017-02-01 DIAGNOSIS — R60.0 BILATERAL LEG EDEMA: ICD-10-CM

## 2017-02-01 DIAGNOSIS — Z72.0 TOBACCO ABUSE: ICD-10-CM

## 2017-02-01 PROBLEM — D72.829 LEUKOCYTOSIS: Status: RESOLVED | Noted: 2017-01-21 | Resolved: 2017-02-01

## 2017-02-01 PROBLEM — N39.0 UTI (URINARY TRACT INFECTION): Status: RESOLVED | Noted: 2017-01-21 | Resolved: 2017-02-01

## 2017-02-01 PROBLEM — L03.90 CELLULITIS: Status: RESOLVED | Noted: 2017-01-21 | Resolved: 2017-02-01

## 2017-02-01 PROCEDURE — 99214 OFFICE O/P EST MOD 30 MIN: CPT | Mod: S$PBB,,, | Performed by: INTERNAL MEDICINE

## 2017-02-01 PROCEDURE — 90670 PCV13 VACCINE IM: CPT | Mod: PBBFAC | Performed by: INTERNAL MEDICINE

## 2017-02-01 PROCEDURE — 99213 OFFICE O/P EST LOW 20 MIN: CPT | Mod: PBBFAC | Performed by: INTERNAL MEDICINE

## 2017-02-01 PROCEDURE — 99999 PR PBB SHADOW E&M-EST. PATIENT-LVL III: CPT | Mod: PBBFAC,,, | Performed by: INTERNAL MEDICINE

## 2017-02-01 RX ORDER — HYDROCHLOROTHIAZIDE 12.5 MG/1
12.5 CAPSULE ORAL DAILY
Qty: 90 CAPSULE | Refills: 1 | Status: SHIPPED | OUTPATIENT
Start: 2017-02-01 | End: 2017-06-07 | Stop reason: SDUPTHER

## 2017-02-01 NOTE — MR AVS SNAPSHOT
[facility]  [address]  [facility] [address]  Phone: [phone]  Fax: [phone]                  Nelia Brizuela   2017 10:00 AM   Office Visit    Description:  Female : 1935   Provider:  Lenin Sandhu Jr., MD   Department:  [facility]           Reason for Visit     Follow-up           Diagnoses this Visit        Comments    Peripheral vascular disease, unspecified    -  Primary     RICARDO (acute kidney injury)         Bilateral leg edema         Tobacco abuse         Chronic obstructive pulmonary disease, unspecified COPD type         Essential hypertension                To Do List           Future Appointments        Provider Department Dept Phone    3/21/2017 9:15 AM JACOB Mantilla [facility] - Podiatry [phone]    3/24/2017 10:00 AM Clotilde Mcelroy MD [facility] - Ophthalmology [phone]      Goals (5 Years of Data)     None       These Medications        Disp Refills Start End    hydrochlorothiazide (MICROZIDE) 12.5 mg capsule 90 capsule 1 2017     Take 1 capsule (12.5 mg total) by mouth once daily. - Oral    Pharmacy: [facility] - [address] #: [phone]         [facility] On Call     [facility] On Call Nurse Care Line -  Assistance  Registered nurses in the [facility] On Call Center provide clinical advisement, health education, appointment booking, and other advisory services.  Call for this free service at [phone].             Medications           Message regarding Medications     Verify the changes and/or additions to your medication regime listed below are the same as discussed with your clinician today.  If any of these changes or additions are incorrect, please notify your healthcare provider.        CHANGE how you are taking these medications     Start Taking Instead of    hydrochlorothiazide (MICROZIDE) 12.5 mg capsule hydrochlorothiazide (MICROZIDE)  12.5 mg capsule    Dosage:  Take 1 capsule (12.5 mg total) by mouth once daily. Dosage:  TAKE 1 CAPSULE DAILY      STOP taking these medications     enalapril (VASOTEC) 10 MG tablet Take 1 tablet (10 mg total) by mouth once daily. Restart on Saturday, after your BMP is drawn and kidney function remains normal    naproxen sodium (ANAPROX) 220 MG tablet Take 220 mg by mouth 2 (two) times daily as needed (for pain).            Verify that the below list of medications is an accurate representation of the medications you are currently taking.  If none reported, the list may be blank. If incorrect, please contact your healthcare provider. Carry this list with you in case of emergency.           Current Medications     aspirin 81 mg Tab Take 81 mg by mouth once daily.     brimonidine 0.2% (ALPHAGAN) 0.2 % Drop Place 1 drop into the right eye 3 (three) times daily.    cadexomer iodine (IODOSORB) 0.9 % gel Apply topically daily as needed for Wound Care.    diclofenac sodium 1 % Gel Apply topically 3 (three) times daily. 4 grams topically tid to affected joints.    dorzolamide-timolol 2-0.5% (COSOPT) 22.3-6.8 mg/mL ophthalmic solution Place 1 drop into both eyes 3 (three) times daily.    FOLIC ACID/MV,FE,OTHER MIN (CENTRUM ORAL) Take 1 capsule by mouth once daily.     hydrochlorothiazide (MICROZIDE) 12.5 mg capsule Take 1 capsule (12.5 mg total) by mouth once daily.    LUMIGAN 0.01 % Drop Place 1 drop into the right eye every evening.    pantoprazole (PROTONIX) 40 MG tablet Take 1 tablet (40 mg total) by mouth once daily.    senna-docusate 8.6-50 mg (PERICOLACE) 8.6-50 mg per tablet Take 1 tablet by mouth 2 (two) times daily.    triamcinolone acetonide 0.1% (KENALOG) 0.1 % ointment Apply topically 2 (two) times daily.           Clinical Reference Information           Vital Signs - Last Recorded  Most recent update: 2/1/2017 10:38 AM by Fernanda Em LPN    BP Pulse Ht Wt BMI    138/62 (BP Location: Left arm, Patient  "Position: Sitting, BP Method: Automatic) 71 5' 3" (1.6 m) 89.3 kg (196 lb 13.9 oz) 34.87 kg/m2      Blood Pressure          Most Recent Value    BP  138/62      Allergies as of 2/1/2017     Codeine      Immunizations Administered on Date of Encounter - 2/1/2017     Name Date Dose VIS Date Route    Pneumococcal Conjugate - 13 Valent 2/1/2017 0.5 mL 11/5/2015 Intramuscular      Orders Placed During Today's Visit      Normal Orders This Visit    Pneumococcal Conjugate Vaccine (13 Valent) (IM)       [facility] Sign-Up     Activating your [facility] account is as easy as 1-2-3!     1) Visit [email or web address], select Sign Up Now, enter this activation code and your date of birth, then select Next.  [email or web address]  Expires: 3/10/2017  4:09 PM      2) Create a username and password to use when you visit [facility] in the future and select a security question in case you lose your password and select Next.    3) Enter your e-mail address and click Sign Up!    Additional Information  If you have questions, please e-mail [email or web address] or call [phone] to talk to our [facility] staff. Remember, [facility] is NOT to be used for urgent needs. For medical emergencies, dial 911.         Smoking Cessation     If you would like to quit smoking:   You may be eligible for free services if you are a [place] resident and started smoking cigarettes before September 1, 1988.  Call the Smoking Cessation Trust (SCT) toll free at (504) 365-9284 or (342) 074-0183.   Call 9-900-QUIT-NOW if you do not meet the above criteria.            "

## 2017-02-01 NOTE — PROGRESS NOTES
"She is a 81 -year-old female coming in to follow up her ongoing medical problems. SHe was hospitalized form 1/19- 1/24 for cellulitis, RICARDO and a uti.  She has home health.             1. She has COPD; still smoking ( some per her and 'plenty" Per her -- 1 ppd). She denies any difficulty breathing. She denies having a    chronic cough and has not had to use her inhaler recently.         2. She has bilateral lower extremity edema. SHe is off hctz due to RICARDO.  SHe  Is having home health wrap her legs twice a week.        3. Hypertension, has had for several years. She was on HCTZ and enalapril.   Blood pressure today is 138/62.  She denies any chest   pain, shortness of breath, palpitations, nausea or vomiting.      4. Arthritis of the knees SHe take either Naprosen for this and it has been doing well.    5. Glaucoma. She saw Optho recently and that not was reviewed.      7. tobacco abuse -- she smokes 1ppd per family      ROS : Gen - no fatigue -- weight stable Eyes - she has poor vision, but no new visual changes  ENT - no hoarseness or sore throat  CV - No chest pain or SOB. NO palpitations.-- she is sleeping in a chair at night- because she feels better. No PND or Orthopnea.    Pulm - no cough or wheezing  GI - no N/V/D   no dysuria or incontinence  MS - chronic back pain and pain of L>R hand and both feet.   Skin - no rash, or c/o of skin lesions  Neuro - no HA, dizziness--- memory is doing well.   Heme - no abnormal bleeding or bruising  Endo - no polydipsia, or temperature changes  Psych - no anxiety or depression        PHYSICAL EXAMINATION:  GENERAL: She is an elderly-appearing female in no acute distress.Her , daughter,  and son are with her today. SHe is sitting in wheel chair.    PERRL- wearing glassed.Hearing grossly normal-- no CI, Tm's normal.    NECK: Supple. She has no JVD. Thyroid is not enlarged.  CARDIOVASCULAR: S1 and S2, regular rate and rhythm.  Lunch are clear bilaterally- Breath " sounds are decreased.    ABDOMEN: Soft, nontender. No hepatosplenomegaly. No guarding or rebound    tenderness.  LOWER EXTREMITIES: She does have 2+ edema bilaterally. She is wearing  compression wraps.   She is in wheel chair today. She looks very comfortable today          ASSESSMENT:  1. Hypertension. Continue enalapril and the hydrochlorothiazide. coutinue check bp at home   2. Lower extremity edema; resart  compression stockings--  - 3. COPD and tobacco abuse. Recommend she stop smoking.  4. Tobacco abuse.   5. Elephantiasis-- continue wrapping legs.  - Has home health to wrap legs -- needs to continue .

## 2017-02-02 ENCOUNTER — TELEPHONE (OUTPATIENT)
Dept: INTERNAL MEDICINE | Facility: CLINIC | Age: 82
End: 2017-02-02

## 2017-02-02 NOTE — TELEPHONE ENCOUNTER
----- Message from Sarah Lizama sent at 1/31/2017  8:09 AM CST -----  Contact: lori [facility]/danuta/[phone]  [facility] called in regards to having questions about the pt Rx for enalapril (VASOTEC) 10 MG tablet. She also had questions about some labs that was suppose to have been drawn.        Please advise

## 2017-02-02 NOTE — TELEPHONE ENCOUNTER
Called home health and left a voice mail message to give the office a call back in  Regard to the clients medication

## 2017-02-03 NOTE — TELEPHONE ENCOUNTER
Gricelda with HH states that she feels the client needs a  to be sent to the house. She states that the  has been being very forceful with the client having her sit in her chair all day without moving now the client has two break downs one on her sacrum and another on her buttock Gricelda with HH has set up a wound care appt for the client please let me know when the orders for  is in the system

## 2017-02-07 ENCOUNTER — TELEPHONE (OUTPATIENT)
Dept: ALLERGY | Facility: CLINIC | Age: 82
End: 2017-02-07

## 2017-02-07 NOTE — TELEPHONE ENCOUNTER
----- Message from Abby Clark sent at 2/6/2017 11:05 AM CST -----  Contact: self [phone]  Gricelda with  is calling to check the status of an order for  .please advise, Thanks !

## 2017-02-08 ENCOUNTER — HOSPITAL ENCOUNTER (OUTPATIENT)
Dept: WOUND CARE | Facility: HOSPITAL | Age: 82
Discharge: HOME | End: 2017-02-08
Attending: SURGERY
Payer: MEDICARE

## 2017-02-08 VITALS
HEIGHT: 64 IN | WEIGHT: 196 LBS | BODY MASS INDEX: 33.46 KG/M2 | HEART RATE: 92 BPM | TEMPERATURE: 98 F | SYSTOLIC BLOOD PRESSURE: 128 MMHG | DIASTOLIC BLOOD PRESSURE: 59 MMHG

## 2017-02-08 DIAGNOSIS — M19.90 OSTEOARTHRITIS, UNSPECIFIED OSTEOARTHRITIS TYPE, UNSPECIFIED SITE: ICD-10-CM

## 2017-02-08 DIAGNOSIS — H04.123 DRY EYES: ICD-10-CM

## 2017-02-08 DIAGNOSIS — H40.89 GLAUCOMA OF LEFT EYE DUE TO COMBINATION OF MECHANISMS: ICD-10-CM

## 2017-02-08 DIAGNOSIS — Z72.0 TOBACCO ABUSE: ICD-10-CM

## 2017-02-08 DIAGNOSIS — H25.11 NUCLEAR SCLEROSIS, RIGHT: ICD-10-CM

## 2017-02-08 DIAGNOSIS — R60.0 BILATERAL LEG EDEMA: ICD-10-CM

## 2017-02-08 DIAGNOSIS — I10 ESSENTIAL HYPERTENSION: ICD-10-CM

## 2017-02-08 DIAGNOSIS — M75.00 ADHESIVE CAPSULITIS OF SHOULDER, UNSPECIFIED LATERALITY: ICD-10-CM

## 2017-02-08 DIAGNOSIS — J44.9 CHRONIC OBSTRUCTIVE PULMONARY DISEASE, UNSPECIFIED COPD TYPE: ICD-10-CM

## 2017-02-08 DIAGNOSIS — I73.9 PERIPHERAL VASCULAR DISEASE, UNSPECIFIED: Primary | ICD-10-CM

## 2017-02-08 DIAGNOSIS — M17.10 ARTHRITIS OF KNEE: ICD-10-CM

## 2017-02-08 DIAGNOSIS — H25.10 SENILE NUCLEAR SCLEROSIS, UNSPECIFIED LATERALITY: ICD-10-CM

## 2017-02-08 DIAGNOSIS — H50.112 EXOTROPIA OF LEFT EYE: ICD-10-CM

## 2017-02-08 DIAGNOSIS — N17.9 AKI (ACUTE KIDNEY INJURY): ICD-10-CM

## 2017-02-08 PROCEDURE — 99204 OFFICE O/P NEW MOD 45 MIN: CPT | Mod: 25

## 2017-02-08 PROCEDURE — 29580 STRAPPING UNNA BOOT: CPT

## 2017-02-08 NOTE — PROGRESS NOTES
Much of the documentation for this visit was completed in the [facility] system. Please see the attached documentation for further details about this patient's care.     Sandra Solis RN

## 2017-02-08 NOTE — PROGRESS NOTES
Much of the documentation for this visit was completed in the [facility] system. Please see the attached documentation for further details about this patient's care.     Robert Ramirez MD

## 2017-02-15 ENCOUNTER — HOSPITAL ENCOUNTER (OUTPATIENT)
Dept: WOUND CARE | Facility: HOSPITAL | Age: 82
Discharge: HOME | End: 2017-02-15
Attending: SURGERY
Payer: MEDICARE

## 2017-02-15 VITALS
DIASTOLIC BLOOD PRESSURE: 82 MMHG | TEMPERATURE: 97 F | BODY MASS INDEX: 33.46 KG/M2 | SYSTOLIC BLOOD PRESSURE: 143 MMHG | HEART RATE: 80 BPM | HEIGHT: 64 IN | WEIGHT: 196 LBS

## 2017-02-15 DIAGNOSIS — R60.0 BILATERAL LEG EDEMA: ICD-10-CM

## 2017-02-15 DIAGNOSIS — I73.9 PERIPHERAL VASCULAR DISEASE, UNSPECIFIED: Primary | ICD-10-CM

## 2017-02-15 PROCEDURE — 99212 OFFICE O/P EST SF 10 MIN: CPT

## 2017-02-15 NOTE — PATIENT INSTRUCTIONS
Elevate feet and legs to keep swelling down. Obtain compression stockings and wear toe to knee each leg daily. Moisturizer to dry skin legs and feet daily, not between toes. Return to Wound Center if new wounds develop.

## 2017-02-24 NOTE — PT/OT/SLP DISCHARGE
Occupational Therapy Discharge Summary    Nelia Brizuela  MRN: [id number]   RICARDO (acute kidney injury)   Patient Discharged from acute Occupational Therapy on 1/23/17.  Please refer to prior OT note dated on 1/21/17 for functional status.     Assessment:   Patient appropriate for care in another setting.  GOALS:   Occupational Therapy Goals        Problem: Occupational Therapy Goal    Goal Priority Disciplines Outcome Interventions   Occupational Therapy Goal     OT, PT/OT Ongoing (interventions implemented as appropriate)    Description:  Goals to be met by: 1/28/17     Patient will increase functional independence with ADLs by performing:    UE Dressing with Modified Hood.  LE Dressing with Modified Hood and Assistive Devices as needed.  Grooming while standing at sink with Modified Hood.  Toileting from toilet with Modified Hood for hygiene and clothing management.   Stand pivot transfers with Modified Hood.  Toilet transfer to toilet with Modified Hood.              Reasons for Discontinuation of Therapy Services  Transfer to alternate level of care.      Plan:  Patient Discharged to: Home with Home Health Service.    DAVON Torres  2/24/2017  Pager: [phone]

## 2017-03-21 ENCOUNTER — TELEPHONE (OUTPATIENT)
Dept: INTERNAL MEDICINE | Facility: CLINIC | Age: 82
End: 2017-03-21

## 2017-03-21 NOTE — TELEPHONE ENCOUNTER
----- Message from Kasandra Gustafson sent at 3/20/2017  3:44 PM CDT -----  This is a request for signature on home health orders. Please note the home health orders for this patient has been scanned into the MEDIA section of the patients chart under home health orders outside correspondence dated 3/20/2017. Please have Dr Sandhu sign orders and have orders faxed back to [facility] at [phone] as soon as possible to assist the home care agency to stay within state compliance. If you have any questions regarding home care orders, please call Donita Panda or Judit Hood [phone].

## 2017-03-24 ENCOUNTER — OFFICE VISIT (OUTPATIENT)
Dept: OPHTHALMOLOGY | Facility: CLINIC | Age: 82
End: 2017-03-24
Payer: MEDICARE

## 2017-03-24 DIAGNOSIS — H25.11 NUCLEAR SCLEROSIS, RIGHT: ICD-10-CM

## 2017-03-24 DIAGNOSIS — H40.1123 PRIMARY OPEN-ANGLE GLAUCOMA, LEFT EYE, SEVERE STAGE: Primary | ICD-10-CM

## 2017-03-24 DIAGNOSIS — H21.562 AFFERENT PUPILLARY DEFECT, LEFT: ICD-10-CM

## 2017-03-24 DIAGNOSIS — H31.402 CHOROIDAL DETACHMENT, LEFT: ICD-10-CM

## 2017-03-24 DIAGNOSIS — H40.043 STEROID RESPONDER, BILATERAL: ICD-10-CM

## 2017-03-24 DIAGNOSIS — H50.112 EXOTROPIA OF LEFT EYE: ICD-10-CM

## 2017-03-24 DIAGNOSIS — H04.123 DRY EYES, BILATERAL: ICD-10-CM

## 2017-03-24 DIAGNOSIS — H21.542 POSTERIOR SYNECHIAE, LEFT: ICD-10-CM

## 2017-03-24 DIAGNOSIS — H40.1112 PRIMARY OPEN-ANGLE GLAUCOMA, RIGHT EYE, MODERATE STAGE: ICD-10-CM

## 2017-03-24 DIAGNOSIS — H01.119 DERMATITIS OF EYELID, CONTACT OR ALLERGIC, UNSPECIFIED LATERALITY: ICD-10-CM

## 2017-03-24 DIAGNOSIS — H40.9 SEVERE STAGE GLAUCOMA: ICD-10-CM

## 2017-03-24 PROCEDURE — 92012 INTRM OPH EXAM EST PATIENT: CPT | Mod: S$PBB,,, | Performed by: OPHTHALMOLOGY

## 2017-03-24 PROCEDURE — 99212 OFFICE O/P EST SF 10 MIN: CPT | Mod: PBBFAC | Performed by: OPHTHALMOLOGY

## 2017-03-24 PROCEDURE — 99999 PR PBB SHADOW E&M-EST. PATIENT-LVL II: CPT | Mod: PBBFAC,,, | Performed by: OPHTHALMOLOGY

## 2017-03-24 RX ORDER — DORZOLAMIDE HYDROCHLORIDE AND TIMOLOL MALEATE 20; 5 MG/ML; MG/ML
1 SOLUTION/ DROPS OPHTHALMIC 3 TIMES DAILY
Qty: 30 ML | Refills: 3 | Status: SHIPPED | OUTPATIENT
Start: 2017-03-24 | End: 2017-11-06 | Stop reason: SDUPTHER

## 2017-03-24 NOTE — PROGRESS NOTES
HPI     DLS: 10/28/16    Pt here for IOP check;    Meds: Cosopt tid ou             Brimonidine tid od             Lumigan qhs od             AT's prn os     1. Primary open angle glaucoma, left eye, severe stage  2. Primary open angle glaucoma, right eye, moderate stage  3. Afferent pupillary defect, left eye   4. Posterior synechiae, left  5. Steroid responder, bilateral  6. Dermatitis of eyelid, contact or allergic, unspecified laterality  7. Exotropia of left eye  8 Dry eyes, bilateral       Last edited by Helena Mejias on 3/24/2017 10:25 AM.         Assessment /Plan     For exam results, see Encounter Report.    Primary open-angle glaucoma, left eye, severe stage    Primary open-angle glaucoma, right eye, moderate stage    Afferent pupillary defect, left    Posterior synechiae, left    Steroid responder, bilateral    Dermatitis of eyelid, contact or allergic, unspecified laterality    Exotropia of left eye - Left Eye    Dry eyes, bilateral    Severe stage glaucoma    Nuclear sclerosis, right    Choroidal detachment, left      1. 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   S/p ALT OU  S/p repeat ALT OS (3/17/93)  S/p SLT OS (12/4/08)  First HVF 1993  First photos 1992  On gtts when started at [facility] in 1992          Family history    +father        Glaucoma meds    cosopt ou / bimonidine ou/lumigan ou // dakota os        H/O adverse rxn to glaucoma drops    Intolerant to diamox 2/2 kidney problems        LASERS    H/O ALT ou / repeat ALT os 3/1993 / SLT os 12/4/2008        GLAUCOMA SURGERIES    Combined OS 8/13/2014 (early over filtration after LSL - had SF6 gas into AC)        OTHER EYE SURGERIES    none        CDR    0.7/0.9        Tbase    ?off gtt  (14-18/ 14-22 on T1/2 ou)        Tmax    ? Off gtts // on gtts 19/27            Ttarget  ?         HVF    8 test 1993 to 2008 - HVF //                   GVF - 11 test - 1997 to 2015  - full /  [place]        Gonio    +3 od/ PAS OS        CCT    505/497 - thin ou        OCT   5 test 2005 to 2014 - RNFL bord S od / dec. S/T/I, bord N os        HRT   11  test 2004 to 2016 - MR -  Dec sup/bord temp od // full (unreliable) os /// CDR 0.66 od // 0.35 (unreliable)os        Disc photos    1992, 2004 - slides // 2011 - OIS    - Ttoday    19/ 10   - Test done today IOP check    2. APD os   3. Nuclear sclerosis - becoming visually sig. OS>OD   4. Dry eyes - ATs. D/W pt at length   5. Exotropia of left eye - longstanding. observe   6. Neuralgic migraines - stable. Longstanding    7. 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   S/P combined - complex phaco / iol / trabeculectomy with mitomycin OS  Date:8/13/2014 - SN60WF 23.0  Filter failed - developed angle closure with iris bombe    POST -op ahmed IN  os - 1/27/2016   POY 1     anterior chamber deep  Bleb appearance  - elevated over plate  sidell neg   IOP   9 -  ( so low can try stopping cosopt again )   nasal choroidal - resolved  // AC fully formed     Eye drops   - cont glaucoma gtts   cont brimonidine tid od   IOP low os - stop cosopt os // continue tid od only  (3/24/2017)   Cont lumigan q hs od       Inf nasal  cornea thinning- + dellen with pannus over around site of tube insertion- OS   - increase lubrication - monitor - thera tears 3-6 x day // add refresh PM ointment at night   - consider referral to cornea clinic.      IOP stable  - NO glaucoma gtts os - stop cosopt   Increase the lubrication drops - 3-6 x day - thera tears // add ointment q hs  os - ++ Thinning near tube insertion -     if IOP increases os  can add  lumigan  back - or any of the other gtts she is using od          f/u in 3 months, (same day as  ) IOP check off cosopt od and corneal thinning check where tube enters IN quadrant os - ointment q hs added    - no  extra testing - in future will need GVF 's again

## 2017-04-05 ENCOUNTER — TELEPHONE (OUTPATIENT)
Dept: INTERNAL MEDICINE | Facility: CLINIC | Age: 82
End: 2017-04-05

## 2017-04-05 NOTE — TELEPHONE ENCOUNTER
Home Health orders faxed to [facility]. Spoke with Angie at [facility] to provide confirmation that the fax was sent.

## 2017-04-05 NOTE — TELEPHONE ENCOUNTER
----- Message from Kasandra Gustafson sent at 4/4/2017  3:50 PM CDT -----  This is a request for signature on home health orders. Please note the home health orders for this patient has been scanned into the MEDIA section of the patients chart under home health orders outside correspondence dated 4/4/2017. Please have Dr Sandhu sign orders and have orders faxed back to [facility] at [phone] as soon as possible to assist the home care agency to stay within state compliance. Please provide us confirmation that the signed orders have been faxed for our records. If you have any questions regarding home care orders, please call Donita Panda or Judit Hood [phone].     Thank you!

## 2017-05-01 RX ORDER — PANTOPRAZOLE SODIUM 40 MG/1
40 TABLET, DELAYED RELEASE ORAL DAILY
Qty: 90 TABLET | Refills: 3 | Status: SHIPPED | OUTPATIENT
Start: 2017-05-01 | End: 2017-06-07 | Stop reason: SDUPTHER

## 2017-05-01 NOTE — TELEPHONE ENCOUNTER
----- Message from Milka Mcgee sent at 5/1/2017 11:05 AM CDT -----  Contact: Danelle/ Yessica/ [phone]   Type: Rx    Name of medication(s): pantoprazole (PROTONIX) 40 MG tablet    Is this a refill? New rx? Refill     Who prescribed medication? Dr. Sandhu     Pharmacy Name, Phone, & Location: [facility] on file     Comments: Danelle is calling to have a refill on the medication above. Please call and advise     Thank you

## 2017-05-02 ENCOUNTER — TELEPHONE (OUTPATIENT)
Dept: INTERNAL MEDICINE | Facility: CLINIC | Age: 82
End: 2017-05-02

## 2017-05-02 NOTE — TELEPHONE ENCOUNTER
Contacted patient and scheduled appointment... Patient will  refill from local pharmacy.    VAISHNAVI

## 2017-05-02 NOTE — TELEPHONE ENCOUNTER
----- Message from Sabas Brice MA sent at 5/2/2017  2:53 PM CDT -----  Contact: Murray/Blfisd-[phone]  Please advise that the Pt Spouse is requesting a call back. He would like to know if the Pt needs to take the Medication pantoprazole (PROTONIX) 40 MG tablet. Please call. Thanks!

## 2017-06-07 ENCOUNTER — HOSPITAL ENCOUNTER (OUTPATIENT)
Dept: RADIOLOGY | Facility: HOSPITAL | Age: 82
Discharge: HOME | End: 2017-06-07
Attending: INTERNAL MEDICINE
Payer: MEDICARE

## 2017-06-07 ENCOUNTER — OFFICE VISIT (OUTPATIENT)
Dept: INTERNAL MEDICINE | Facility: CLINIC | Age: 82
End: 2017-06-07
Payer: MEDICARE

## 2017-06-07 VITALS
HEIGHT: 64 IN | BODY MASS INDEX: 31.09 KG/M2 | WEIGHT: 182.13 LBS | OXYGEN SATURATION: 96 % | HEART RATE: 87 BPM | DIASTOLIC BLOOD PRESSURE: 82 MMHG | SYSTOLIC BLOOD PRESSURE: 136 MMHG

## 2017-06-07 DIAGNOSIS — R60.0 BILATERAL LEG EDEMA: ICD-10-CM

## 2017-06-07 DIAGNOSIS — M75.01 ADHESIVE CAPSULITIS OF RIGHT SHOULDER: ICD-10-CM

## 2017-06-07 DIAGNOSIS — Z72.0 TOBACCO ABUSE: ICD-10-CM

## 2017-06-07 DIAGNOSIS — J44.9 CHRONIC OBSTRUCTIVE PULMONARY DISEASE, UNSPECIFIED COPD TYPE: ICD-10-CM

## 2017-06-07 DIAGNOSIS — I10 ESSENTIAL HYPERTENSION: Primary | ICD-10-CM

## 2017-06-07 DIAGNOSIS — M19.90 OSTEOARTHRITIS, UNSPECIFIED OSTEOARTHRITIS TYPE, UNSPECIFIED SITE: ICD-10-CM

## 2017-06-07 DIAGNOSIS — D64.9 ANEMIA, UNSPECIFIED TYPE: ICD-10-CM

## 2017-06-07 PROCEDURE — 99214 OFFICE O/P EST MOD 30 MIN: CPT | Mod: PBBFAC | Performed by: INTERNAL MEDICINE

## 2017-06-07 PROCEDURE — 73030 X-RAY EXAM OF SHOULDER: CPT | Mod: TC,RT

## 2017-06-07 PROCEDURE — 99214 OFFICE O/P EST MOD 30 MIN: CPT | Mod: S$PBB,,, | Performed by: INTERNAL MEDICINE

## 2017-06-07 PROCEDURE — 1159F MED LIST DOCD IN RCRD: CPT | Mod: ,,, | Performed by: INTERNAL MEDICINE

## 2017-06-07 PROCEDURE — 90471 IMMUNIZATION ADMIN: CPT | Mod: PBBFAC

## 2017-06-07 PROCEDURE — 99999 PR PBB SHADOW E&M-EST. PATIENT-LVL IV: CPT | Mod: PBBFAC,,, | Performed by: INTERNAL MEDICINE

## 2017-06-07 PROCEDURE — 73030 X-RAY EXAM OF SHOULDER: CPT | Mod: 26,RT,, | Performed by: RADIOLOGY

## 2017-06-07 PROCEDURE — 90715 TDAP VACCINE 7 YRS/> IM: CPT | Mod: PBBFAC

## 2017-06-07 PROCEDURE — 1125F AMNT PAIN NOTED PAIN PRSNT: CPT | Mod: ,,, | Performed by: INTERNAL MEDICINE

## 2017-06-07 RX ORDER — PANTOPRAZOLE SODIUM 40 MG/1
40 TABLET, DELAYED RELEASE ORAL DAILY
Qty: 90 TABLET | Refills: 3 | Status: SHIPPED | OUTPATIENT
Start: 2017-06-07 | End: 2018-06-07

## 2017-06-07 RX ORDER — HYDROCHLOROTHIAZIDE 12.5 MG/1
12.5 CAPSULE ORAL DAILY
Qty: 90 CAPSULE | Refills: 3 | Status: SHIPPED | OUTPATIENT
Start: 2017-06-07 | End: 2018-06-25 | Stop reason: SDUPTHER

## 2017-06-07 NOTE — PROGRESS NOTES
"She is a 81 -year-old female coming in to follow up her ongoing medical problems. SHe was hospitalizedin Jan 2017 for cellulitis, RICARDO and a uti.  She has finished home health.             1. She has COPD; still smoking ( some per her and plenty" Per her -- 1 ppd). She denies any difficulty breathing. She denies having a    chronic cough and has not had to use her inhaler recently.         2. She has bilateral lower extremity edema. SHe was off hctz due to RICARDO , SHe is back on the hctz now.    She was  having home health wrap her legs twice a week, but that has finished.  It goes up and down.          3. Hypertension, has had for several years. She was on HCTZ and enalapril.   Blood pressure today is 136/82.  She denies any chest   pain, shortness of breath, palpitations, nausea or vomiting.      4. OsteoArthritis of the knees SHe take either Naprosen for this and it has been doing well- they are not hurting today- she is stiff in the morning.  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 But it did not help very much.  Id does cause some pain.       5. Glaucoma. She saw Optho recently and that not was reviewed.      7. tobacco abuse -- she smokes 1ppd per family      ROS : Gen - no fatigue -- weight stable Eyes - she has poor vision, but no new visual changes--"doing pretty good"   ENT - no hoarseness or sore throat  CV - No chest pain or SOB. NO palpitations.-- she is sleeping in a chair at night- because she feels better. No PND or Orthopnea.    Pulm - no cough or wheezing  GI - no N/V/D   no dysuria or incontinence  MS - chronic back pain and pain of L>R hand and both feet.   Skin - no rash, or c/o of skin lesions  Neuro - no HA, dizziness--- memory is doing well.   Heme - no abnormal bleeding or bruising  Endo - no polydipsia, or temperature changes  Psych - no anxiety or depression        PHYSICAL EXAMINATION:  GENERAL: She is an " elderly-appearing female in no acute distress.Her , daughter,  and son are with her today. SHe is sitting in wheel chair.    PERRL- wearing glassed.Hearing grossly normal--   NECK: Supple. She has no JVD. Thyroid is not enlarged.  CARDIOVASCULAR: S1 and S2, regular rate and rhythm.  Lunch are clear bilaterally- Breath sounds are decreased.    ABDOMEN: Soft, nontender. No hepatosplenomegaly. No guarding or rebound    tenderness.  LOWER EXTREMITIES: She does have 2+ beefy edema bilaterally. She is not wearing  compression wraps.   She is in wheel chair today. She looks very comfortable today         ASSESSMENT:  1. Hypertension. Continue enalapril and the hydrochlorothiazide. coutinue check bp at home   2. Lower extremity edema;discussed   compression stockings- with her frozen shoulder this might be difficult to get on.  But she has been using it and it does help.  -  - 3. COPD and tobacco abuse. Recommend she stop smoking.  4. Tobacco abuse.   5. Recheck cmp and anemia from Jan 2017- labs- while in hosptial   6. Right rotator cuff problems-- decrease ROM unable to raise arm- will get xray and have her see ortho

## 2017-07-16 DIAGNOSIS — H40.2223 CHRONIC ANGLE-CLOSURE GLAUCOMA, SEVERE STAGE, LEFT: ICD-10-CM

## 2017-07-17 RX ORDER — BIMATOPROST 0.1 MG/ML
SOLUTION/ DROPS OPHTHALMIC
Qty: 15 ML | Refills: 2 | Status: SHIPPED | OUTPATIENT
Start: 2017-07-17 | End: 2017-11-06 | Stop reason: SDUPTHER

## 2017-07-28 ENCOUNTER — OFFICE VISIT (OUTPATIENT)
Dept: OPHTHALMOLOGY | Facility: CLINIC | Age: 82
End: 2017-07-28
Payer: MEDICARE

## 2017-07-28 DIAGNOSIS — H21.542 POSTERIOR SYNECHIAE, LEFT: ICD-10-CM

## 2017-07-28 DIAGNOSIS — H04.123 DRY EYES, BILATERAL: ICD-10-CM

## 2017-07-28 DIAGNOSIS — H40.1123 PRIMARY OPEN-ANGLE GLAUCOMA, LEFT EYE, SEVERE STAGE: Primary | ICD-10-CM

## 2017-07-28 DIAGNOSIS — H40.1112 PRIMARY OPEN-ANGLE GLAUCOMA, RIGHT EYE, MODERATE STAGE: ICD-10-CM

## 2017-07-28 DIAGNOSIS — H50.112 EXOTROPIA OF LEFT EYE: ICD-10-CM

## 2017-07-28 DIAGNOSIS — H21.562 AFFERENT PUPILLARY DEFECT, LEFT: ICD-10-CM

## 2017-07-28 DIAGNOSIS — H40.043 STEROID RESPONDER, BILATERAL: ICD-10-CM

## 2017-07-28 DIAGNOSIS — H01.119 DERMATITIS OF EYELID, CONTACT OR ALLERGIC, UNSPECIFIED LATERALITY: ICD-10-CM

## 2017-07-28 PROCEDURE — 99999 PR PBB SHADOW E&M-EST. PATIENT-LVL II: CPT | Mod: PBBFAC,,, | Performed by: OPHTHALMOLOGY

## 2017-07-28 PROCEDURE — 92012 INTRM OPH EXAM EST PATIENT: CPT | Mod: S$PBB,,, | Performed by: OPHTHALMOLOGY

## 2017-07-28 PROCEDURE — 99212 OFFICE O/P EST SF 10 MIN: CPT | Mod: PBBFAC | Performed by: OPHTHALMOLOGY

## 2017-07-28 NOTE — PROGRESS NOTES
HPI     DLS: 3/24/17    Pt here for IOP check;  Pt states OS seems to be blurry.     Meds: Blink Tears prn ou            Refresh PM qhs ou            Dorzolamide tid ou            Alphagan tid od            Lumigan qhs od    1. Primary open angle glaucoma, left eye, severe stage  2. Primary open angle glaucoma, right eye, moderate stage  3. Afferent pupillary defect, left eye  4. Posterior synechiae, left eye  5. Steroid responder, bilateral  6. Dermatitis of eyelid, contact or allergic, unspecified laterality  7. Exotropia of left eye  8. Dry eyes, bilateral  9. Severe stage glaucoma  10. Nuclear sclerosis, right  11. Choroidal detachment, left    Last edited by Helena Mejias on 7/28/2017 11:15 AM. (History)            Assessment /Plan     For exam results, see Encounter Report.    There are no diagnoses linked to this encounter.    1. 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   S/p ALT OU  S/p repeat ALT OS (3/17/93)  S/p SLT OS (12/4/08)  First HVF 1993  First photos 1992  On gtts when started at [facility] in 1992          Family history    +father        Glaucoma meds    cosopt ou / bimonidine ou/lumigan ou // dakota os        H/O adverse rxn to glaucoma drops    Intolerant to diamox 2/2 kidney problems        LASERS    H/O ALT ou / repeat ALT os 3/1993 / SLT os 12/4/2008        GLAUCOMA SURGERIES    Combined OS 8/13/2014 (early over filtration after LSL - had SF6 gas into AC)        OTHER EYE SURGERIES    none        CDR    0.7/0.9        Tbase    ?off gtt  (14-18/ 14-22 on T1/2 ou)        Tmax    ? Off gtts // on gtts 19/27            Ttarget  ?         HVF    8 test 1993 to 2008 - HVF //                   GVF - 11 test - 1997 to 2015 - full /  [place]        Gonio    +3 od/ PAS OS        CCT    505/497 - thin ou        OCT   5 test 2005 to 2014 - RNFL bord S od / dec. S/T/I, bord N os        HRT   11  test 2004 to 2016 - MR -   Dec sup/bord temp od // full (unreliable) os /// CDR 0.66 od // 0.35 (unreliable)os        Disc photos    1992, 2004 - slides // 2011 - OIS    - Ttoday    19/ 10   - Test done today IOP check    2. APD os   3. Nuclear sclerosis - becoming visually sig. OS>OD   4. Dry eyes - ATs. D/W pt at length   5. Exotropia of left eye - longstanding. observe   6. Neuralgic migraines - stable. Longstanding    7. 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   S/P combined - complex phaco / iol / trabeculectomy with mitomycin OS  Date:8/13/2014 - SN60WF 23.0  Filter failed - developed angle closure with iris bombe    POST -op ahmed [place]  os - 1/27/2016   POY 1     anterior chamber deep  Bleb appearance  - elevated over plate  sidell neg   IOP   9 -  ( so low can try stopping cosopt again )   nasal choroidal - resolved  // AC fully formed     Eye drops   - cont glaucoma gtts   cont brimonidine tid od   Cosopt TID OU  Cont lumigan q hs od       Inf nasal  cornea thinning- + dellen with pannus over around site of tube insertion- OS   - increase lubrication - monitor - thera tears 3-6 x day // add refresh PM ointment at night   - consider referral to cornea clinic.      IOP stable  - NO glaucoma gtts os - stop cosopt   Increase the lubrication drops - 3-6 x day - thera tears // add ointment q hs  os - ++ Thinning near tube insertion -     if IOP increases os  can add  lumigan  back - or any of the other gtts she is using od          f/u in 4 months, (same day as  ) IOP check off cosopt od and corneal thinning check where tube enters IN quadrant os - ointment q hs added    - no extra testing - in future will need GVF 's again   - F/U with DFE / OCT - hold off on any further VF testing for now - even the GVF is becoming difficult

## 2017-11-06 ENCOUNTER — OFFICE VISIT (OUTPATIENT)
Dept: OPHTHALMOLOGY | Facility: CLINIC | Age: 82
End: 2017-11-06
Payer: MEDICARE

## 2017-11-06 DIAGNOSIS — H50.112 EXOTROPIA OF LEFT EYE: ICD-10-CM

## 2017-11-06 DIAGNOSIS — H40.1112 PRIMARY OPEN-ANGLE GLAUCOMA, RIGHT EYE, MODERATE STAGE: ICD-10-CM

## 2017-11-06 DIAGNOSIS — H21.542 POSTERIOR SYNECHIAE, LEFT: ICD-10-CM

## 2017-11-06 DIAGNOSIS — H40.043 STEROID RESPONDER, BILATERAL: ICD-10-CM

## 2017-11-06 DIAGNOSIS — H01.119 DERMATITIS OF EYELID, CONTACT OR ALLERGIC, UNSPECIFIED LATERALITY: ICD-10-CM

## 2017-11-06 DIAGNOSIS — H40.2223 CHRONIC ANGLE-CLOSURE GLAUCOMA, SEVERE STAGE, LEFT: ICD-10-CM

## 2017-11-06 DIAGNOSIS — H21.562 AFFERENT PUPILLARY DEFECT, LEFT: ICD-10-CM

## 2017-11-06 DIAGNOSIS — H40.1123 PRIMARY OPEN-ANGLE GLAUCOMA, LEFT EYE, SEVERE STAGE: Primary | ICD-10-CM

## 2017-11-06 DIAGNOSIS — H04.123 DRY EYES, BILATERAL: ICD-10-CM

## 2017-11-06 PROCEDURE — 99212 OFFICE O/P EST SF 10 MIN: CPT | Mod: PBBFAC | Performed by: OPHTHALMOLOGY

## 2017-11-06 PROCEDURE — 99999 PR PBB SHADOW E&M-EST. PATIENT-LVL II: CPT | Mod: PBBFAC,,, | Performed by: OPHTHALMOLOGY

## 2017-11-06 PROCEDURE — 92012 INTRM OPH EXAM EST PATIENT: CPT | Mod: S$PBB,,, | Performed by: OPHTHALMOLOGY

## 2017-11-06 PROCEDURE — 92133 CPTRZD OPH DX IMG PST SGM ON: CPT | Mod: PBBFAC | Performed by: OPHTHALMOLOGY

## 2017-11-06 RX ORDER — DORZOLAMIDE HYDROCHLORIDE AND TIMOLOL MALEATE 20; 5 MG/ML; MG/ML
1 SOLUTION/ DROPS OPHTHALMIC 3 TIMES DAILY
Qty: 30 ML | Refills: 3 | Status: SHIPPED | OUTPATIENT
Start: 2017-11-06 | End: 2018-03-06 | Stop reason: SDUPTHER

## 2017-11-06 RX ORDER — BIMATOPROST 0.1 MG/ML
SOLUTION/ DROPS OPHTHALMIC
Qty: 15 ML | Refills: 2 | Status: SHIPPED | OUTPATIENT
Start: 2017-11-06 | End: 2018-03-06 | Stop reason: SDUPTHER

## 2017-11-06 RX ORDER — BRIMONIDINE TARTRATE 2 MG/ML
1 SOLUTION/ DROPS OPHTHALMIC 3 TIMES DAILY
Qty: 10 ML | Refills: 12 | Status: SHIPPED | OUTPATIENT
Start: 2017-11-06 | End: 2018-03-06 | Stop reason: SDUPTHER

## 2017-11-06 NOTE — PROGRESS NOTES
HPI     DLS: 7/28/17    Pt here for 4 month check;    Meds: Blink Tears prn ou             Refresh PM qhs ou             Dorzolamide/timolol tid oD             Alphagan tid od            Lumigan qhs od    1. Primary open angle glaucoma, left eye, severe stage  2. Primary open angle glaucoma, right eye, moderate stage  3. Afferent pupillary defect, left eye  4. Posterior synechiae, left eye  5. Steroid responder, bilateral  6. Dermatitis of eyelid, contact or allergic, unspecified laterality  7. Exotropia of left eye  8. Dry eyes, bilateral  9. Severe stage glaucoma  10. Nuclear sclerosis, right  11. Choroidal detachment, left     Last edited by Clotilde Mcelroy MD on 11/6/2017 12:34 PM. (History)          Assessment /Plan     For exam results, see Encounter Report.    Afferent pupillary defect, left    Primary open-angle glaucoma, left eye, severe stage  -     Posterior Segment OCT Optic Nerve- Both eyes    Primary open-angle glaucoma, right eye, moderate stage  -     Posterior Segment OCT Optic Nerve- Both eyes    Posterior synechiae, left    Steroid responder, bilateral    Dermatitis of eyelid, contact or allergic, unspecified laterality    Exotropia of left eye - Left Eye    Dry eyes, bilateral        1. 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   S/p ALT OU  S/p repeat ALT OS (3/17/93)  S/p SLT OS (12/4/08)  First HVF 1993  First photos 1992  On gtts when started at [facility] in 1992          Family history    +father        Glaucoma meds    cosopt ou / bimonidine ou/lumigan ou // dakota os        H/O adverse rxn to glaucoma drops    Intolerant to diamox 2/2 kidney problems        LASERS    H/O ALT ou / repeat ALT os 3/1993 / SLT os 12/4/2008        GLAUCOMA SURGERIES    Combined OS 8/13/2014 (early over filtration after LSL - had SF6 gas into AC)        OTHER EYE SURGERIES    none        CDR    0.7/0.9        Tbase    ?off gtt   (14-18/ 14-22 on T1/2 ou)        Tmax    ? Off gtts // on gtts 19/27            Ttarget  ?         HVF    8 test 1993 to 2008 - HVF //                   GVF - 11 test - 1997 to 2015 - full /  [place]        Gonio    +3 od/ PAS OS        CCT    505/497 - thin ou        OCT   6 test 2005 to 2017 - RNFL wnl od / border NI os        HRT   11  test 2004 to 2016 - MR -  Dec sup/bord temp od // full (unreliable) os /// CDR 0.66 od // 0.35 (unreliable)os        Disc photos    1992, 2004 - slides // 2011 - OIS    - Ttoday    16/ 10   - Test done today OCT    2. APD os   3. Nuclear sclerosis - becoming visually sig. OS>OD   4. Dry eyes - ATs. D/W pt at length   5. Exotropia of left eye - longstanding. observe   6. Neuralgic migraines - stable. Longstanding    7. 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   S/P combined - complex phaco / iol / trabeculectomy with mitomycin OS  Date:8/13/2014 - SN60WF 23.0  Filter failed - developed angle closure with iris bombe    S/p ahmed IN  os - 1/27/2016   anterior chamber deep  Bleb appearance  - elevated over plate  sidell neg   IOP   10  nasal choroidal - resolved  // AC fully formed     Eye drops   cont brimonidine tid od   Cosopt BID OD  Cont lumigan q hs od   Not using glaucoma drops in her left eye      Inf nasal  cornea thinning- + dellen with pannus over around site of tube insertion- OS   - increase lubrication - monitor - thera tears 3-6 x day // add refresh PM ointment at night   - consider referral to cornea clinic.    - appears stable    IOP stable  - NO glaucoma gtts os - stop cosopt   Increase the lubrication drops - 3-6 x day - thera tears // add ointment q hs  os - ++ Thinning near tube insertion -     if IOP increases os  can add  lumigan  back - or any of the other gtts she is using od          f/u in 4 months, (same day as  ) - F/U with HRT /  Gonio -  hold off on any further VF testing for now - even the GVF is becoming difficult

## 2018-03-01 ENCOUNTER — EXTERNAL CHRONIC CARE MANAGEMENT (OUTPATIENT)
Dept: PRIMARY CARE CLINIC | Facility: CLINIC | Age: 83
End: 2018-03-01
Payer: MEDICARE

## 2018-03-06 ENCOUNTER — OFFICE VISIT (OUTPATIENT)
Dept: OPHTHALMOLOGY | Facility: CLINIC | Age: 83
End: 2018-03-06
Payer: MEDICARE

## 2018-03-06 ENCOUNTER — CLINICAL SUPPORT (OUTPATIENT)
Dept: OPHTHALMOLOGY | Facility: CLINIC | Age: 83
End: 2018-03-06
Payer: MEDICARE

## 2018-03-06 DIAGNOSIS — H01.119 DERMATITIS OF EYELID, CONTACT OR ALLERGIC, UNSPECIFIED LATERALITY: ICD-10-CM

## 2018-03-06 DIAGNOSIS — H40.1112 PRIMARY OPEN-ANGLE GLAUCOMA, RIGHT EYE, MODERATE STAGE: Primary | ICD-10-CM

## 2018-03-06 DIAGNOSIS — Z96.89 HISTORY OF GLAUCOMA TUBE SHUNT PROCEDURE: ICD-10-CM

## 2018-03-06 DIAGNOSIS — H21.542 POSTERIOR SYNECHIAE, LEFT: ICD-10-CM

## 2018-03-06 DIAGNOSIS — H18.49 CORNEAL DELLEN OF LEFT EYE: ICD-10-CM

## 2018-03-06 DIAGNOSIS — H04.123 DRY EYES, BILATERAL: ICD-10-CM

## 2018-03-06 DIAGNOSIS — H25.11 SENILE NUCLEAR SCLEROSIS, RIGHT: ICD-10-CM

## 2018-03-06 DIAGNOSIS — H50.112 EXOTROPIA OF LEFT EYE: ICD-10-CM

## 2018-03-06 DIAGNOSIS — H40.043 STEROID RESPONDER, BILATERAL: ICD-10-CM

## 2018-03-06 DIAGNOSIS — H21.562 AFFERENT PUPILLARY DEFECT, LEFT: ICD-10-CM

## 2018-03-06 DIAGNOSIS — H40.2223 CHRONIC ANGLE-CLOSURE GLAUCOMA, SEVERE STAGE, LEFT: ICD-10-CM

## 2018-03-06 DIAGNOSIS — Z96.1 PSEUDOPHAKIA, LEFT EYE: ICD-10-CM

## 2018-03-06 PROCEDURE — 99999 PR PBB SHADOW E&M-EST. PATIENT-LVL II: CPT | Mod: PBBFAC,,, | Performed by: OPHTHALMOLOGY

## 2018-03-06 PROCEDURE — 92134 CPTRZ OPH DX IMG PST SGM RTA: CPT | Mod: PBBFAC | Performed by: OPHTHALMOLOGY

## 2018-03-06 PROCEDURE — 92012 INTRM OPH EXAM EST PATIENT: CPT | Mod: S$PBB,,, | Performed by: OPHTHALMOLOGY

## 2018-03-06 PROCEDURE — 99212 OFFICE O/P EST SF 10 MIN: CPT | Mod: PBBFAC | Performed by: OPHTHALMOLOGY

## 2018-03-06 PROCEDURE — 92133 CPTRZD OPH DX IMG PST SGM ON: CPT | Mod: 26,S$PBB,, | Performed by: OPHTHALMOLOGY

## 2018-03-06 RX ORDER — BIMATOPROST 0.1 MG/ML
SOLUTION/ DROPS OPHTHALMIC
Qty: 3 BOTTLE | Refills: 3 | Status: SHIPPED | OUTPATIENT
Start: 2018-03-06 | End: 2018-10-28 | Stop reason: SDUPTHER

## 2018-03-06 RX ORDER — DORZOLAMIDE HYDROCHLORIDE AND TIMOLOL MALEATE 20; 5 MG/ML; MG/ML
1 SOLUTION/ DROPS OPHTHALMIC 3 TIMES DAILY
Qty: 30 ML | Refills: 3 | Status: SHIPPED | OUTPATIENT
Start: 2018-03-06 | End: 2018-10-29 | Stop reason: SDUPTHER

## 2018-03-06 RX ORDER — BRIMONIDINE TARTRATE 2 MG/ML
1 SOLUTION/ DROPS OPHTHALMIC 3 TIMES DAILY
Qty: 30 ML | Refills: 3 | Status: SHIPPED | OUTPATIENT
Start: 2018-03-06 | End: 2018-10-29 | Stop reason: SDUPTHER

## 2018-03-06 NOTE — PROGRESS NOTES
HPI     DLS: 11/06/17    Pt here for HRT review;    Meds: Blink tears prn ou             Refresh PM qhs ou             Dorzolamide/Timolol tid od             Alphagan tid od              Lumigan qhs od     1. Primary open angle glaucoma, left eye, severe stage   2. Primary open angle glaucoma, right eye, moderate stage   3. Afferent pupillary defect, left eye   4. Posterior synechiae, left eye   5. Steroid responder, bilateral   6. Dermatitis of eyelid, contact or allergic, unspecified laterality   7. Exotropia of left eye   8. Dry eyes, bilateral   9. Severe stage glaucoma   10. Nuclear sclerosis, right   11. Choroidal detachment, left     Last edited by Helena Mejias on 3/6/2018 10:23 AM. (History)            Assessment /Plan     For exam results, see Encounter Report.    Primary open-angle glaucoma, right eye, moderate stage  -     Berlin Retina Tomography (HRT) - OU - Both Eyes    Chronic angle-closure glaucoma, severe stage, left    Afferent pupillary defect, left    Posterior synechiae, left    Steroid responder, bilateral    Senile nuclear sclerosis, right    Dermatitis of eyelid, contact or allergic, unspecified laterality    Exotropia of left eye - Left Eye    Dry eyes, bilateral    Corneal dellen of left eye    History of glaucoma tube shunt procedure    Pseudophakia, left eye        1. 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   S/p ALT OU  S/p repeat ALT OS (3/17/93)  S/p SLT OS (12/4/08)  First HVF 1993  First photos 1992  On gtts when started at [facility] in 1992          Family history    +father        Glaucoma meds    cosopt ou / bimonidine ou/lumigan ou // dakota os        H/O adverse rxn to glaucoma drops    Intolerant to diamox 2/2 kidney problems        LASERS    H/O ALT ou / repeat ALT os 3/1993 / SLT os 12/4/2008        GLAUCOMA SURGERIES    Combined OS 8/13/2014 (early over filtration after LSL - had SF6 gas  into AC)           Ahmed - [place] - os - 1/27/2016         OTHER EYE SURGERIES    none        CDR    0.7/0.9        Tbase    ?off gtt  (14-18/ 14-22 on T1/2 ou)        Tmax    ? Off gtts // on gtts 19/27            Ttarget  ?         HVF    8 test 1993 to 2008 - HVF //                   GVF - 11 test - 1997 to 2015 - full /  [place]        Gonio    +3 od/ PAS OS        CCT    505/497 - thin ou        OCT   6 test 2005 to 2017 - RNFL wnl od / border NI os        HRT   12  test 2004 to 2018 - MR -  Dec. S/T, bord I od // xx os /// CDR 0.71 od // xx os         Disc photos    1992, 2004 - slides // 2011 - OIS    - Ttoday    14 / 10   - Test done today HRT - OD    2. APD os   3. Nuclear sclerosis - becoming visually sig. OS>OD   4. Dry eyes - ATs. D/W pt at length   5. Exotropia of left eye - longstanding. observe   6. Neuralgic migraines - stable. Longstanding    7. 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

## 2018-03-08 ENCOUNTER — TELEPHONE (OUTPATIENT)
Dept: OPHTHALMOLOGY | Facility: CLINIC | Age: 83
End: 2018-03-08

## 2018-03-31 PROCEDURE — 99490 CHRNC CARE MGMT STAFF 1ST 20: CPT | Mod: S$PBB,,, | Performed by: INTERNAL MEDICINE

## 2018-03-31 PROCEDURE — 99490 CHRNC CARE MGMT STAFF 1ST 20: CPT | Mod: PBBFAC | Performed by: INTERNAL MEDICINE

## 2018-04-01 ENCOUNTER — EXTERNAL CHRONIC CARE MANAGEMENT (OUTPATIENT)
Dept: PRIMARY CARE CLINIC | Facility: CLINIC | Age: 83
End: 2018-04-01
Payer: MEDICARE

## 2018-04-16 ENCOUNTER — OFFICE VISIT (OUTPATIENT)
Dept: INTERNAL MEDICINE | Facility: CLINIC | Age: 83
End: 2018-04-16
Payer: MEDICARE

## 2018-04-16 ENCOUNTER — LAB VISIT (OUTPATIENT)
Dept: LAB | Facility: HOSPITAL | Age: 83
End: 2018-04-16
Attending: INTERNAL MEDICINE
Payer: MEDICARE

## 2018-04-16 VITALS
BODY MASS INDEX: 26.61 KG/M2 | HEART RATE: 68 BPM | DIASTOLIC BLOOD PRESSURE: 82 MMHG | WEIGHT: 155.88 LBS | SYSTOLIC BLOOD PRESSURE: 100 MMHG | HEIGHT: 64 IN

## 2018-04-16 DIAGNOSIS — R60.0 BILATERAL LEG EDEMA: ICD-10-CM

## 2018-04-16 DIAGNOSIS — Z72.0 TOBACCO ABUSE: ICD-10-CM

## 2018-04-16 DIAGNOSIS — I10 ESSENTIAL HYPERTENSION: ICD-10-CM

## 2018-04-16 DIAGNOSIS — I73.9 PERIPHERAL VASCULAR DISEASE, UNSPECIFIED: ICD-10-CM

## 2018-04-16 DIAGNOSIS — J44.9 CHRONIC OBSTRUCTIVE PULMONARY DISEASE, UNSPECIFIED COPD TYPE: Primary | ICD-10-CM

## 2018-04-16 DIAGNOSIS — R63.4 ABNORMAL WEIGHT LOSS: ICD-10-CM

## 2018-04-16 DIAGNOSIS — M75.01 ADHESIVE CAPSULITIS OF RIGHT SHOULDER: ICD-10-CM

## 2018-04-16 PROBLEM — N17.9 AKI (ACUTE KIDNEY INJURY): Status: RESOLVED | Noted: 2017-01-19 | Resolved: 2018-04-16

## 2018-04-16 LAB
ALBUMIN SERPL BCP-MCNC: 3.2 G/DL
ALP SERPL-CCNC: 64 U/L
ALT SERPL W/O P-5'-P-CCNC: 14 U/L
ANION GAP SERPL CALC-SCNC: 11 MMOL/L
AST SERPL-CCNC: 21 U/L
BASOPHILS # BLD AUTO: 0.04 K/UL
BASOPHILS NFR BLD: 0.5 %
BILIRUB SERPL-MCNC: 0.3 MG/DL
BUN SERPL-MCNC: 23 MG/DL
CALCIUM SERPL-MCNC: 10 MG/DL
CHLORIDE SERPL-SCNC: 102 MMOL/L
CHOLEST SERPL-MCNC: 190 MG/DL
CHOLEST/HDLC SERPL: 3.5 {RATIO}
CO2 SERPL-SCNC: 29 MMOL/L
CREAT SERPL-MCNC: 0.8 MG/DL
DIFFERENTIAL METHOD: ABNORMAL
EOSINOPHIL # BLD AUTO: 0.3 K/UL
EOSINOPHIL NFR BLD: 3.7 %
ERYTHROCYTE [DISTWIDTH] IN BLOOD BY AUTOMATED COUNT: 15.1 %
EST. GFR  (AFRICAN AMERICAN): >60 ML/MIN/1.73 M^2
EST. GFR  (NON AFRICAN AMERICAN): >60 ML/MIN/1.73 M^2
GLUCOSE SERPL-MCNC: 97 MG/DL
HCT VFR BLD AUTO: 38.7 %
HDLC SERPL-MCNC: 55 MG/DL
HDLC SERPL: 28.9 %
HGB BLD-MCNC: 12.5 G/DL
LDLC SERPL CALC-MCNC: 114.2 MG/DL
LYMPHOCYTES # BLD AUTO: 1.6 K/UL
LYMPHOCYTES NFR BLD: 20.4 %
MCH RBC QN AUTO: 30 PG
MCHC RBC AUTO-ENTMCNC: 32.3 G/DL
MCV RBC AUTO: 93 FL
MONOCYTES # BLD AUTO: 0.6 K/UL
MONOCYTES NFR BLD: 7.7 %
NEUTROPHILS # BLD AUTO: 5.3 K/UL
NEUTROPHILS NFR BLD: 67.4 %
NONHDLC SERPL-MCNC: 135 MG/DL
PLATELET # BLD AUTO: 319 K/UL
PMV BLD AUTO: 9.6 FL
POTASSIUM SERPL-SCNC: 3.9 MMOL/L
PROT SERPL-MCNC: 8.4 G/DL
RBC # BLD AUTO: 4.16 M/UL
SODIUM SERPL-SCNC: 142 MMOL/L
TRIGL SERPL-MCNC: 104 MG/DL
TSH SERPL DL<=0.005 MIU/L-ACNC: 0.83 UIU/ML
WBC # BLD AUTO: 7.84 K/UL

## 2018-04-16 PROCEDURE — 80053 COMPREHEN METABOLIC PANEL: CPT

## 2018-04-16 PROCEDURE — 84443 ASSAY THYROID STIM HORMONE: CPT

## 2018-04-16 PROCEDURE — 99214 OFFICE O/P EST MOD 30 MIN: CPT | Mod: S$PBB,,, | Performed by: INTERNAL MEDICINE

## 2018-04-16 PROCEDURE — 36415 COLL VENOUS BLD VENIPUNCTURE: CPT

## 2018-04-16 PROCEDURE — 99213 OFFICE O/P EST LOW 20 MIN: CPT | Mod: PBBFAC | Performed by: INTERNAL MEDICINE

## 2018-04-16 PROCEDURE — 85025 COMPLETE CBC W/AUTO DIFF WBC: CPT

## 2018-04-16 PROCEDURE — 99999 PR PBB SHADOW E&M-EST. PATIENT-LVL III: CPT | Mod: PBBFAC,,, | Performed by: INTERNAL MEDICINE

## 2018-04-16 PROCEDURE — 84165 PROTEIN E-PHORESIS SERUM: CPT | Mod: 26,,, | Performed by: PATHOLOGY

## 2018-04-16 PROCEDURE — 80061 LIPID PANEL: CPT

## 2018-04-16 PROCEDURE — 84165 PROTEIN E-PHORESIS SERUM: CPT

## 2018-04-17 LAB
ALBUMIN SERPL ELPH-MCNC: 3.26 G/DL
ALPHA1 GLOB SERPL ELPH-MCNC: 0.44 G/DL
ALPHA2 GLOB SERPL ELPH-MCNC: 1.29 G/DL
B-GLOBULIN SERPL ELPH-MCNC: 0.92 G/DL
GAMMA GLOB SERPL ELPH-MCNC: 1.79 G/DL
PATHOLOGIST INTERPRETATION SPE: NORMAL
PROT SERPL-MCNC: 7.7 G/DL

## 2018-04-26 ENCOUNTER — TELEPHONE (OUTPATIENT)
Dept: ADMINISTRATIVE | Facility: HOSPITAL | Age: 83
End: 2018-04-26

## 2018-04-26 DIAGNOSIS — S81.802D WOUND OF LEFT LOWER EXTREMITY, SUBSEQUENT ENCOUNTER: Primary | ICD-10-CM

## 2018-04-26 NOTE — TELEPHONE ENCOUNTER
"Please see message from [facility] nurse-      Pt. stated ulcer around   ankle"was better but now worse", ulcer has   increased in size, no drainage per pt., flesh   around ulcer appears "white" per pt., pt. was   receiving wound care and HH for ulcer, informed   pt. to elevate legs and will contact pcp for HH   visit and wound care, pt. not currently taking   antibiotics, hx of lower leg edema bilat.,   peripheral vascular disease. please make Dr. Sandhu aware for advisement.     Thank you,   Randell Marin LPN, CC   [facility]   "

## 2018-04-30 PROCEDURE — 99490 CHRNC CARE MGMT STAFF 1ST 20: CPT | Mod: S$PBB,,, | Performed by: INTERNAL MEDICINE

## 2018-04-30 PROCEDURE — 99490 CHRNC CARE MGMT STAFF 1ST 20: CPT | Mod: PBBFAC | Performed by: INTERNAL MEDICINE

## 2018-05-01 ENCOUNTER — EXTERNAL CHRONIC CARE MANAGEMENT (OUTPATIENT)
Dept: PRIMARY CARE CLINIC | Facility: CLINIC | Age: 83
End: 2018-05-01
Payer: MEDICARE

## 2018-05-31 PROCEDURE — 99490 CHRNC CARE MGMT STAFF 1ST 20: CPT | Mod: S$PBB,,, | Performed by: INTERNAL MEDICINE

## 2018-05-31 PROCEDURE — 99490 CHRNC CARE MGMT STAFF 1ST 20: CPT | Mod: PBBFAC | Performed by: INTERNAL MEDICINE

## 2018-06-27 RX ORDER — HYDROCHLOROTHIAZIDE 12.5 MG/1
CAPSULE ORAL
Qty: 90 CAPSULE | Refills: 3 | Status: SHIPPED | OUTPATIENT
Start: 2018-06-27 | End: 2019-06-25 | Stop reason: SDUPTHER

## 2018-06-30 ENCOUNTER — EXTERNAL CHRONIC CARE MANAGEMENT (OUTPATIENT)
Dept: PRIMARY CARE CLINIC | Facility: CLINIC | Age: 83
End: 2018-06-30
Payer: MEDICARE

## 2018-06-30 PROCEDURE — 99490 CHRNC CARE MGMT STAFF 1ST 20: CPT | Mod: PBBFAC | Performed by: INTERNAL MEDICINE

## 2018-06-30 PROCEDURE — 99490 CHRNC CARE MGMT STAFF 1ST 20: CPT | Mod: S$PBB,,, | Performed by: INTERNAL MEDICINE

## 2018-07-03 ENCOUNTER — TELEPHONE (OUTPATIENT)
Dept: ADMINISTRATIVE | Facility: CLINIC | Age: 83
End: 2018-07-03

## 2018-07-03 NOTE — PROGRESS NOTES
[facility]. Lenin Sandhu.  services.

## 2018-07-04 RX ORDER — PANTOPRAZOLE SODIUM 40 MG/1
TABLET, DELAYED RELEASE ORAL
Qty: 90 TABLET | Refills: 3 | Status: SHIPPED | OUTPATIENT
Start: 2018-07-04 | End: 2019-06-28 | Stop reason: SDUPTHER

## 2018-07-17 ENCOUNTER — OFFICE VISIT (OUTPATIENT)
Dept: OPHTHALMOLOGY | Facility: CLINIC | Age: 83
End: 2018-07-17
Payer: MEDICARE

## 2018-07-17 DIAGNOSIS — H40.043 STEROID RESPONDER, BILATERAL: ICD-10-CM

## 2018-07-17 DIAGNOSIS — H25.11 SENILE NUCLEAR SCLEROSIS, RIGHT: ICD-10-CM

## 2018-07-17 DIAGNOSIS — H01.119 DERMATITIS OF EYELID, CONTACT OR ALLERGIC, UNSPECIFIED LATERALITY: ICD-10-CM

## 2018-07-17 DIAGNOSIS — H50.112 EXOTROPIA OF LEFT EYE: ICD-10-CM

## 2018-07-17 DIAGNOSIS — H40.2223 CHRONIC ANGLE-CLOSURE GLAUCOMA, SEVERE STAGE, LEFT: ICD-10-CM

## 2018-07-17 DIAGNOSIS — H40.1112 PRIMARY OPEN-ANGLE GLAUCOMA, RIGHT EYE, MODERATE STAGE: Primary | ICD-10-CM

## 2018-07-17 DIAGNOSIS — H04.123 DRY EYES, BILATERAL: ICD-10-CM

## 2018-07-17 DIAGNOSIS — H21.562 AFFERENT PUPILLARY DEFECT, LEFT: ICD-10-CM

## 2018-07-17 DIAGNOSIS — H18.49 CORNEAL DELLEN OF LEFT EYE: ICD-10-CM

## 2018-07-17 DIAGNOSIS — H21.542 POSTERIOR SYNECHIAE, LEFT: ICD-10-CM

## 2018-07-17 PROCEDURE — 92014 COMPRE OPH EXAM EST PT 1/>: CPT | Mod: S$PBB,,, | Performed by: OPHTHALMOLOGY

## 2018-07-17 PROCEDURE — 99999 PR PBB SHADOW E&M-EST. PATIENT-LVL II: CPT | Mod: PBBFAC,,, | Performed by: OPHTHALMOLOGY

## 2018-07-17 PROCEDURE — 99212 OFFICE O/P EST SF 10 MIN: CPT | Mod: PBBFAC | Performed by: OPHTHALMOLOGY

## 2018-07-17 PROCEDURE — 92250 FUNDUS PHOTOGRAPHY W/I&R: CPT | Mod: PBBFAC | Performed by: OPHTHALMOLOGY

## 2018-07-17 NOTE — PROGRESS NOTES
HPI     DLS: 3/06/18    Pt here for 4 month check/DFE;  Pt states vision seems to be cloudy especially the OS.    Meds: Blink Tears prn ou             Refresh PM qhs ou             Dorzolamide/Timolol tid od             Alphagan tid od             Lumigan qhs od     1. Primary open angle glaucoma, left eye, severe stage   2. Primary open angle glaucoma, right eye, moderate stage   3. Afferent pupillary defect, left eye   4. Posterior synechiae, left eye   5. Steroid responder, bilateral   6. Dermatitis of eyelid, contact or allergic, unspecified laterality   7. Exotropia of left eye   8. Dry eyes, bilateral   9. Severe stage glaucoma   10. Nuclear sclerosis, right   11. Choroidal detachment, left    Last edited by Helena Mejias on 7/17/2018 10:46 AM. (History)            Assessment /Plan     For exam results, see Encounter Report.    Primary open-angle glaucoma, right eye, moderate stage    Chronic angle-closure glaucoma, severe stage, left    Afferent pupillary defect, left    Posterior synechiae, left    Steroid responder, bilateral    Senile nuclear sclerosis, right    Dermatitis of eyelid, contact or allergic, unspecified laterality    Exotropia of left eye - Left Eye    Dry eyes, bilateral    Corneal dellen of left eye        1. 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   S/p ALT OU  S/p repeat ALT OS (3/17/93)  S/p SLT OS (12/4/08)  First HVF 1993  First photos 1992  On gtts when started at [facility] in 1992          Family history    +father        Glaucoma meds    cosopt ou / bimonidine ou/lumigan ou // [place] os        H/O adverse rxn to glaucoma drops    Intolerant to diamox 2/2 kidney problems        LASERS    H/O ALT ou / repeat ALT os 3/1993 / SLT os 12/4/2008        GLAUCOMA SURGERIES    Combined OS 8/13/2014 (early over filtration after LSL - had SF6 gas into AC)           Ahmed - [place] - os - 1/27/2016         OTHER EYE  SURGERIES    none        CDR    0.7/0.9        Tbase    ?off gtt  (14-18/ 14-22 on T1/2 ou)        Tmax    ? Off gtts // on gtts 19/27            Ttarget  ?         HVF    8 test 1993 to 2008 - HVF //                   GVF - 11 test - 1997 to 2015 - full /  [place]        Gonio    +3 od/ PAS OS        CCT    505/497 - thin ou        OCT   6 test 2005 to 2017 - RNFL wnl od / border NI os        HRT   12  test 2004 to 2018 - MR -  Dec. S/T, bord I od // xx os /// CDR 0.71 od // xx os         Disc photos    1992, 2004 - slides // 2011,2018- OIS    - Ttoday    18 / 15   - Test done today photos   2. APD os   3. Nuclear sclerosis - becoming visually sig. OS>OD   4. Dry eyes - ATs. D/W pt at length   5. Exotropia of left eye - longstanding. observe   6. Neuralgic migraines - stable. Longstanding    7. 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

## 2018-07-31 ENCOUNTER — EXTERNAL CHRONIC CARE MANAGEMENT (OUTPATIENT)
Dept: PRIMARY CARE CLINIC | Facility: CLINIC | Age: 83
End: 2018-07-31
Payer: MEDICARE

## 2018-07-31 PROCEDURE — 99490 CHRNC CARE MGMT STAFF 1ST 20: CPT | Mod: PBBFAC | Performed by: INTERNAL MEDICINE

## 2018-07-31 PROCEDURE — 99490 CHRNC CARE MGMT STAFF 1ST 20: CPT | Mod: S$PBB,,, | Performed by: INTERNAL MEDICINE

## 2018-08-08 ENCOUNTER — OFFICE VISIT (OUTPATIENT)
Dept: INTERNAL MEDICINE | Facility: CLINIC | Age: 83
End: 2018-08-08
Payer: MEDICARE

## 2018-08-08 VITALS
BODY MASS INDEX: 24.01 KG/M2 | HEIGHT: 64 IN | DIASTOLIC BLOOD PRESSURE: 78 MMHG | OXYGEN SATURATION: 97 % | SYSTOLIC BLOOD PRESSURE: 130 MMHG | WEIGHT: 140.63 LBS | HEART RATE: 91 BPM

## 2018-08-08 DIAGNOSIS — Z72.0 TOBACCO ABUSE: Primary | ICD-10-CM

## 2018-08-08 DIAGNOSIS — R63.4 WEIGHT LOSS: ICD-10-CM

## 2018-08-08 DIAGNOSIS — R91.1 PULMONARY NODULE: ICD-10-CM

## 2018-08-08 DIAGNOSIS — J44.9 CHRONIC OBSTRUCTIVE PULMONARY DISEASE, UNSPECIFIED COPD TYPE: ICD-10-CM

## 2018-08-08 PROCEDURE — 99999 PR PBB SHADOW E&M-EST. PATIENT-LVL IV: CPT | Mod: PBBFAC,,, | Performed by: INTERNAL MEDICINE

## 2018-08-08 PROCEDURE — 99215 OFFICE O/P EST HI 40 MIN: CPT | Mod: S$PBB,,, | Performed by: INTERNAL MEDICINE

## 2018-08-08 PROCEDURE — 99214 OFFICE O/P EST MOD 30 MIN: CPT | Mod: PBBFAC | Performed by: INTERNAL MEDICINE

## 2018-08-08 RX ORDER — NYSTATIN AND TRIAMCINOLONE ACETONIDE 100000; 1 [USP'U]/G; MG/G
CREAM TOPICAL 4 TIMES DAILY
Qty: 15 G | Refills: 4 | Status: SHIPPED | OUTPATIENT
Start: 2018-08-08 | End: 2018-08-08 | Stop reason: SDUPTHER

## 2018-08-08 RX ORDER — MEGESTROL ACETATE 40 MG/ML
400 SUSPENSION ORAL DAILY
Qty: 300 ML | Refills: 11 | Status: SHIPPED | OUTPATIENT
Start: 2018-08-08 | End: 2018-08-23

## 2018-08-08 RX ORDER — NYSTATIN AND TRIAMCINOLONE ACETONIDE 100000; 1 [USP'U]/G; MG/G
CREAM TOPICAL 4 TIMES DAILY
Qty: 15 G | Refills: 4 | Status: SHIPPED | OUTPATIENT
Start: 2018-08-08 | End: 2019-05-20 | Stop reason: SDUPTHER

## 2018-08-08 NOTE — PROGRESS NOTES
"Subjective:       Patient ID: Nelia Brizuela is a 82 y.o. female.    Chief Complaint: Follow-up and Weight Loss    Nelia Brizuela is a 82 y.o. Lady with HTN, COPD, PVD, smoker is here for loss of appetite and weight loss.    She has lost 40 pounds since 06/17, which was unintentional. She has lost her appetite since 4/18 and has reduced her food intake.   She has no fever, cough, SOB, hematuria, change in BM except for a recent episode of Diarrhea that has resolved.    She has developed a fungal infection since 1 week in both her feet, Lt >Rt, associated with itching, no pain or wound. She has applied topical Antifungals and powder.          Review of Systems   Constitutional: Positive for appetite change and unexpected weight change (Loss 40 pounds in 1 year). Negative for chills and fever.   HENT: Negative for rhinorrhea, sore throat and voice change.    Respiratory: Negative for cough and shortness of breath.    Cardiovascular: Positive for leg swelling. Negative for chest pain and palpitations.   Gastrointestinal: Negative for abdominal distention, abdominal pain, blood in stool, constipation, diarrhea, nausea and vomiting.   Endocrine: Negative for cold intolerance and heat intolerance.   Genitourinary: Negative for dysuria, frequency and hematuria.   Musculoskeletal: Negative for arthralgias (Currently no pain).   Skin: Positive for color change (Feet - white discoluration between toes).   Neurological: Negative for dizziness and headaches.   Psychiatric/Behavioral: Negative for agitation and confusion.       Objective:       /78 (BP Location: Right arm, Patient Position: Sitting, BP Method: Large (Manual))   Pulse 91   Ht 5' 4" (1.626 m)   Wt 63.8 kg (140 lb 10.5 oz)   SpO2 97%   BMI 24.14 kg/m²     Physical Exam   Constitutional: She is oriented to person, place, and time.   HENT:   Head: Normocephalic and atraumatic.   Eyes: EOM are normal. Pupils are equal, round, and reactive to light.   Neck: No " JVD present. No thyromegaly present.   Cardiovascular: Normal rate, regular rhythm and normal heart sounds.    No murmur heard.  Pulmonary/Chest: Effort normal and breath sounds normal. She has no wheezes. She has no rales.   Abdominal: Soft. Bowel sounds are normal. She exhibits no distension. There is no tenderness.   Musculoskeletal: She exhibits edema.   Lymphadenopathy:     She has no cervical adenopathy.   Neurological: She is alert and oriented to person, place, and time. No cranial nerve deficit.   Skin:   Fungal infection between toes of both feet   Psychiatric: Judgment normal.       Assessment:       1. Tobacco abuse    2. Chronic obstructive pulmonary disease, unspecified COPD type    3. Pulmonary nodule    4. Weight loss        Plan:       Weight loss  Over the last year she has lost 40 pounds, She has lost her appetite since last 4 months and has reduced her intake of food.  T stimulate her appetite -     megestrol (MEGACE) 400 mg/10 mL (40 mg/mL) Susp; Take 10 mLs (400 mg total) by mouth once daily.     Given her history of smoking, COPD, and pulmonary nodule - Have concern for lung cancer.  - CT Chest    -     Comprehensive metabolic panel; Future; Expected date: 08/08/2018  -     CBC auto differential; Future; Expected date: 08/08/2018  -     TSH; Future; Expected date: 08/08/2018    Tenia Pedis  Present between toes in both feet.  -     nystatin-triamcinolone (MYCOLOG II) cream; Apply topically 4 (four) times daily.  Dispense: 15 g; Refill: 4    Tobacco abuse  Advised on smoking cessation, Patient not ready to quit but says will try.    Chronic obstructive pulmonary disease, unspecified COPD type   Continue to smoke, no SOB / cough.  Currently not on inhalers.    Pulmonary nodule  -     CT Chest Without Contrast; Future            Leg swelling    - continue using stockings and elevation of leg.  On HCTZ    Hypertension  Controlled  On HCTZ    Health Maintenance  Glaucoma, Nuclear sclerosis -  Following up with Opthalmology.      RTC in 2 months for follow up with Dr Sandhu.    Rafael Alvarez  PGY - 1

## 2018-08-08 NOTE — PROGRESS NOTES
"I have personally taken the history and examined this patient and agree with the resident's note as stated above with the following thoughts:  /78 (BP Location: Right arm, Patient Position: Sitting, BP Method: Large (Manual))   Pulse 91   Ht 5' 4" (1.626 m)   Wt 63.8 kg (140 lb 10.5 oz)   SpO2 97%   BMI 24.14 kg/m²    Discussed liberalizing her  diet . Will try megase-- She has good support with children and  ( he is also elderly and in poor health.  She is alert and realizes she has not been eating.  WIll check SPEP, and will recheck CT of lungs.  Follow up in  2 months    "

## 2018-08-10 ENCOUNTER — HOSPITAL ENCOUNTER (OUTPATIENT)
Dept: RADIOLOGY | Facility: HOSPITAL | Age: 83
Discharge: HOME | End: 2018-08-10
Attending: INTERNAL MEDICINE
Payer: MEDICARE

## 2018-08-10 ENCOUNTER — TELEPHONE (OUTPATIENT)
Dept: INTERNAL MEDICINE | Facility: CLINIC | Age: 83
End: 2018-08-10

## 2018-08-10 DIAGNOSIS — R91.1 PULMONARY NODULE: ICD-10-CM

## 2018-08-10 DIAGNOSIS — R93.89 ABNORMAL CHEST CT: Primary | ICD-10-CM

## 2018-08-10 DIAGNOSIS — R91.8 OTHER NONSPECIFIC ABNORMAL FINDING OF LUNG FIELD: ICD-10-CM

## 2018-08-10 DIAGNOSIS — J43.1 PANLOBULAR EMPHYSEMA: ICD-10-CM

## 2018-08-10 DIAGNOSIS — Z72.0 TOBACCO ABUSE: ICD-10-CM

## 2018-08-10 PROCEDURE — 71250 CT THORAX DX C-: CPT | Mod: TC,PO

## 2018-08-10 NOTE — TELEPHONE ENCOUNTER
Dr. Sandhu patient    Abnormal CT scan of the chest, she is apparently needing a PET CT scan and she also will need to be seen in Pulmonary as soon as possible.  Referral is in for both, please assist with scheduling.  Thank you

## 2018-08-10 NOTE — TELEPHONE ENCOUNTER
----- Message from Ingrid Mohr sent at 8/10/2018  8:43 AM CDT -----  Contact: [facility]  Prior Authorization Needed    Medication: megestrol (MEGACE) 400 mg/10 mL (40 mg/mL) Susp    Pharmacy Info: [facility] [address] [place] [address], [place] - [address]    Plan does not cover this medication. Please call plan at [phone] to initiate prior authorization or call/fax pharmacy to change medication. Patient ID#[id number]    Note chart when prior authorization has been submitted.    Please notify pharmacy when prior authorization has been approved.    Thank You

## 2018-08-13 NOTE — TELEPHONE ENCOUNTER
Spoke to pt and scheduled PET CT for 8/15/18.     The first available with pulmonary is 9/20/18. Will call the department to see if an sooner appt can be made.

## 2018-08-13 NOTE — TELEPHONE ENCOUNTER
9/20/18 appt scheduled and pt was placed on cancellation list. Please let me know if this is ok

## 2018-08-15 ENCOUNTER — HOSPITAL ENCOUNTER (OUTPATIENT)
Dept: RADIOLOGY | Facility: HOSPITAL | Age: 83
Discharge: HOME | End: 2018-08-15
Attending: INTERNAL MEDICINE
Payer: MEDICARE

## 2018-08-15 ENCOUNTER — TELEPHONE (OUTPATIENT)
Dept: INTERNAL MEDICINE | Facility: CLINIC | Age: 83
End: 2018-08-15

## 2018-08-15 DIAGNOSIS — R93.89 ABNORMAL CHEST CT: ICD-10-CM

## 2018-08-15 DIAGNOSIS — R91.8 OTHER NONSPECIFIC ABNORMAL FINDING OF LUNG FIELD: ICD-10-CM

## 2018-08-15 LAB — POCT GLUCOSE: 93 MG/DL (ref 70–110)

## 2018-08-15 PROCEDURE — 78815 PET IMAGE W/CT SKULL-THIGH: CPT | Mod: 26,PS,, | Performed by: RADIOLOGY

## 2018-08-15 PROCEDURE — 78815 PET IMAGE W/CT SKULL-THIGH: CPT | Mod: TC

## 2018-08-15 PROCEDURE — A9552 F18 FDG: HCPCS

## 2018-08-15 NOTE — TELEPHONE ENCOUNTER
----- Message from Maria Eugenia Thrasher sent at 8/15/2018  2:29 PM CDT -----  Contact: , Murray Johnson call  [phone]  Patient  stated that Sher Plunkett  [phone] (Phone) or [phone] (Fax)  has been trying to reach you about her medication to fill for her appetite and your office has not gotten back to them.Patient  do not know the name of the Rx. Please call their Rx in to their pharmacy.

## 2018-08-16 ENCOUNTER — TELEPHONE (OUTPATIENT)
Dept: INTERNAL MEDICINE | Facility: CLINIC | Age: 83
End: 2018-08-16

## 2018-08-16 DIAGNOSIS — R94.8 ABNORMAL GASTROINTESTINAL PET SCAN: Primary | ICD-10-CM

## 2018-08-17 NOTE — TELEPHONE ENCOUNTER
----- Message from Mariya Mijares MD sent at 8/16/2018  5:01 PM CDT -----      ----- Message -----  From: Sergey, Rad Results In  Sent: 8/15/2018   4:20 PM  To: Mariya Mijares MD

## 2018-08-17 NOTE — TELEPHONE ENCOUNTER
Please call-- I have reviewed her Pet scan-- the recommend having her see [facility] Pulmonary doctor and also they would like to get a ct of the abdomen to better see.  Can we see about getting her in to pulmonary sooner than 9/20/2018?-- order for CT abdomen is in

## 2018-08-17 NOTE — TELEPHONE ENCOUNTER
Does she need the ct scan done before she see's pulmonary? I have her ct scan for Tuesday and they have an appointment for Pulmonary at 9:30 the same day.

## 2018-08-18 NOTE — TELEPHONE ENCOUNTER
No-- that is fine- and good work getting her in -- THe CT is of a spot in the abdomen.  Thanks again on the quick work

## 2018-08-20 ENCOUNTER — TELEPHONE (OUTPATIENT)
Dept: INTERNAL MEDICINE | Facility: CLINIC | Age: 83
End: 2018-08-20

## 2018-08-20 NOTE — TELEPHONE ENCOUNTER
----- Message from Nelsy Reis sent at 8/20/2018  2:32 PM CDT -----  Contact: Murray () [phone]  Pt will like to speak to the doctor or nurse in regarding megestrol (MEGACE) 400 mg/10 mL (40 mg/mL) Susp, insurance will not pay for it.

## 2018-08-21 ENCOUNTER — HOSPITAL ENCOUNTER (OUTPATIENT)
Dept: RADIOLOGY | Facility: HOSPITAL | Age: 83
Discharge: HOME | End: 2018-08-21
Attending: INTERNAL MEDICINE
Payer: MEDICARE

## 2018-08-21 DIAGNOSIS — R94.8 ABNORMAL GASTROINTESTINAL PET SCAN: ICD-10-CM

## 2018-08-21 PROCEDURE — 74178 CT ABD&PLV WO CNTR FLWD CNTR: CPT | Mod: TC,PO

## 2018-08-21 PROCEDURE — 25500020 PHARM REV CODE 255: Mod: PO | Performed by: INTERNAL MEDICINE

## 2018-08-21 RX ADMIN — IOHEXOL 90 ML: 350 INJECTION, SOLUTION INTRAVENOUS at 02:08

## 2018-08-21 RX ADMIN — IOHEXOL 30 ML: 300 INJECTION, SOLUTION INTRAVENOUS at 01:08

## 2018-08-22 ENCOUNTER — TELEPHONE (OUTPATIENT)
Dept: INTERNAL MEDICINE | Facility: CLINIC | Age: 83
End: 2018-08-22

## 2018-08-23 ENCOUNTER — HOSPITAL ENCOUNTER (OUTPATIENT)
Dept: PULMONOLOGY | Facility: CLINIC | Age: 83
Discharge: HOME | End: 2018-08-23
Payer: MEDICARE

## 2018-08-23 ENCOUNTER — OFFICE VISIT (OUTPATIENT)
Dept: PULMONOLOGY | Facility: CLINIC | Age: 83
End: 2018-08-23
Payer: MEDICARE

## 2018-08-23 VITALS
OXYGEN SATURATION: 97 % | WEIGHT: 140 LBS | HEART RATE: 75 BPM | HEIGHT: 64 IN | DIASTOLIC BLOOD PRESSURE: 80 MMHG | BODY MASS INDEX: 23.9 KG/M2 | SYSTOLIC BLOOD PRESSURE: 130 MMHG

## 2018-08-23 DIAGNOSIS — R06.89 DYSPNEA AND RESPIRATORY ABNORMALITIES: Primary | ICD-10-CM

## 2018-08-23 DIAGNOSIS — R06.00 DYSPNEA AND RESPIRATORY ABNORMALITIES: ICD-10-CM

## 2018-08-23 DIAGNOSIS — R06.89 DYSPNEA AND RESPIRATORY ABNORMALITIES: ICD-10-CM

## 2018-08-23 DIAGNOSIS — R06.00 DYSPNEA AND RESPIRATORY ABNORMALITIES: Primary | ICD-10-CM

## 2018-08-23 LAB
POST FEV1 FVC: 0.67
POST FEV1: 1.1
POST FVC: 1.64
PRE FEV1 FVC: 67
PRE FEV1: 1.1
PRE FVC: 1.65
PREDICTED FEV1 FVC: 76
PREDICTED FEV1: 2
PREDICTED FVC: 2.66

## 2018-08-23 PROCEDURE — 99215 OFFICE O/P EST HI 40 MIN: CPT | Mod: PBBFAC,25 | Performed by: INTERNAL MEDICINE

## 2018-08-23 PROCEDURE — 99999 PR PBB SHADOW E&M-EST. PATIENT-LVL V: CPT | Mod: PBBFAC,,, | Performed by: INTERNAL MEDICINE

## 2018-08-23 PROCEDURE — 94729 DIFFUSING CAPACITY: CPT | Mod: PBBFAC | Performed by: INTERNAL MEDICINE

## 2018-08-23 PROCEDURE — 99204 OFFICE O/P NEW MOD 45 MIN: CPT | Mod: 25,S$PBB,, | Performed by: INTERNAL MEDICINE

## 2018-08-23 PROCEDURE — 94060 EVALUATION OF WHEEZING: CPT | Mod: PBBFAC | Performed by: INTERNAL MEDICINE

## 2018-08-23 NOTE — PROGRESS NOTES
"Subjective:       Patient ID: Nelia Brizuela is a 82 y.o. female.    Chief Complaint: COPD and Abnormal Ct Scan    83 y/o female PMH COPD, tobacco abuse referred for abnormal CT and weight loss. No cough, hemoptysis, limited activity 2/2 arthritis in knees not dyspnea.  Continues to smoke 1ppd x 60. No exacerbations, no inhalers, no wheezing. Never seen a pulmonologist in the past. Appetite has improved. Denies dysphagia      Past Medical History:   Diagnosis Date    Arthritis of knee     Cataract     COPD (chronic obstructive pulmonary disease)     CTS (carpal tunnel syndrome)     Glaucoma (increased eye pressure)     HTN (hypertension)     Joint pain     Psoriasis 2007    Smoker     Strabismus     XT OS     Past Surgical History:   Procedure Laterality Date    AHMED IMPLANT AND SURGICAL PI Left 1/27/16         CATARACT EXTRACTION W/  INTRAOCULAR LENS IMPLANT Left 8/13/2014    OS ()    CATARACT EXTRACTION W/ INTRAOCULAR LENS IMPLANTW/ TRABECULECTOMY Left 8/13/14    os ()    TUBAL LIGATION       Family History   Problem Relation Age of Onset    Glaucoma Father     Cancer Neg Hx     Psoriasis Neg Hx     Amblyopia Neg Hx     Blindness Neg Hx     Macular degeneration Neg Hx     Retinal detachment Neg Hx     Strabismus Neg Hx        Review of Systems   Constitutional: Positive for weight loss and appetite change. Negative for fever, chills, weight gain, activity change, fatigue, night sweats and weakness.   HENT: Negative.    Eyes: Negative.    Respiratory: Negative.    Cardiovascular: Negative.    Genitourinary: Negative.    Endocrine: endocrine negative   Musculoskeletal: Negative.    Skin: Negative.    Gastrointestinal: Negative.    Neurological: Negative.    Psychiatric/Behavioral: Negative.        Objective:       Vitals:    08/23/18 0853   BP: 130/80   Pulse: 75   SpO2: 97%   Weight: 63.5 kg (140 lb)   Height: 5' 4" (1.626 m)       Physical Exam "   Constitutional: She appears cachectic. No distress.   HENT:   Head: Normocephalic.   Mouth/Throat: Oropharynx is clear and moist.   Neck: Normal range of motion. No JVD present.   Cardiovascular: Normal rate and regular rhythm.   Pulmonary/Chest: Normal expansion. No stridor. She has no wheezes.   Decreased throughout   Abdominal: Soft. She exhibits no distension. There is no tenderness.   Musculoskeletal: Normal range of motion. She exhibits edema.   Neurological: She is alert.   Skin: Skin is warm. She is not diaphoretic.   Psychiatric: She has a normal mood and affect. Her behavior is normal.     Personal Diagnostic Review    1. CT chest with areas of tree-n-bud and GGO in RUL RLL with infiltrate and likely some atelectasis; previous CT in 2017 without similar findings; however, CXR back to 2014 do show some opacities in RLL and blunting of the right costophrenic angle    2. PFT moderate obstruction without reversibility; DLCO moderately reduced       Assessment:        1. Abnormal CT-patient without significant symptoms; radiographically appears inflamatory.  Possibly could be silent aspiration.  No productive cough that would be consistent with a CAP or NTM. Endobronchial lesion is possible, but seems less likely.  2. COPD- Gold Class A  3. Tobacco abuse- interest in quitting     Plan:       1. Will have speech therapy assess oropharyngeal swallowing and check a short interval CT to assess for resolution of the infiltrates  2. No indication for inhaler therapy  3. Discussed smoking cessation at length, will refer to smoking cessation program  4. Prevanr at next visit    RTC after CT chest    Scar Huber MD  Department of Pulmonary & Critical Care  Cell: [phone]

## 2018-08-23 NOTE — PATIENT INSTRUCTIONS
Planning to Quit Smoking  Your healthcare provider may have told you that you need to give up tobacco. Only you can decide if and when you are ready to quit. Quitting is hard to do. But the benefits will be worth it. When you  decide to quit, come up with a plan thats right for you. Discuss your plan with your healthcare provider. And talk to your provider about medicines to help you quit.    Line up support  To quit smoking, youll need a plan and some help. Pick a date within the next 2 to 4 weeks to quit. Use the time between now and that date to arrange for support.  · Classes and counselors. Quit-smoking classes  people like you through the process. Get to know others in a class, and support each other beyond the class. Telephone counseling also helps you keep on track. Ask your healthcare provider, local hospital, or public health department to put you in touch with a class and a phone counselor.  · Family and friends. Tell your family and friends about your quit date. Ask them to support your change. If they smoke, arrange to see them in smoke-free places. Forbid smoking in your home and car.     Finding something to replace cigarettes may be hard to do. Be aware that some things you choose may be as harmful as cigarettes:  · Smokeless (chewing) tobacco is just as harmful as regular tobacco. Tobacco should not be used as a substitute for cigarettes.  · Herbal medicines or teas may affect how your body handles nicotine. Talk to your healthcare provider before using these products.  · E-cigarettes have less toxins than the smoke from a regular cigarette. But the FDA says that these devices may still have substances that can cause cancer. E-cigarettes are not well regulated. They have not been studied enough to know if they are a good aid to help you stop smoking. Talk with your healthcare provider before using these products.      Quit-smoking products  Many products can help you quit smoking. Some are  prescription medicines that help curb your cravings and withdrawal symptoms. Other products slowly lessen the level of nicotine your body absorbs. Nicotine is the highly addictive substance found in cigarettes, cigars, and chewing tobacco. Nicotine replacement products can help get your body used to slowly decreasing amounts of nicotine after you quit smoking. These products include a nicotine patch, gum, lozenge, nasal spray, and inhaler. Be sure you follow the directions for your medicine or product carefully. Your healthcare provider may tell you to start taking the prescription medicine a week before you plan to quit. Do not smoke while you use nicotine products. Doing so can be very harmful to your health.  For more information  · https://smokefree.gov/geho-wn-wb-expert  · National Cancer [place] Smoking Quitline: 877-44U-QUIT (035-751-6529)   Date Last Reviewed: 2/1/2017  © 0471-7522 [employer]. [address] [place] [address], [place] [address]. All rights reserved. This information is not intended as a substitute for professional medical care. Always follow your healthcare professional's instructions.

## 2018-08-23 NOTE — LETTER
August 23, 2018      Mariya Mijares MD  [address]  [place] [address]           [facility]  [address]  [place] [address]  Phone: [phone]          Patient: Nelia Brizuela   MR Number: [id number]   YOB: 1935   Date of Visit: 8/23/2018       Dear Dr. Mariya Mijares:    Thank you for referring Nelia Brizuela to me for evaluation. Attached you will find relevant portions of my assessment and plan of care.    If you have questions, please do not hesitate to call me. I look forward to following Nelia Brizuela along with you.    Sincerely,    Scar Huber MD    Enclosure  CC:  No Recipients    If you would like to receive this communication electronically, please contact [email or web address] or [phone] to request more information on [employer] Link access.    For providers and/or their staff who would like to refer a patient to [facility], please contact us through our one-stop-shop provider referral line, [facility], at [phone].    If you feel you have received this communication in error or would no longer like to receive these types of communications, please e-mail [email or web address]

## 2018-08-28 ENCOUNTER — TELEPHONE (OUTPATIENT)
Dept: INTERNAL MEDICINE | Facility: CLINIC | Age: 83
End: 2018-08-28

## 2018-08-28 NOTE — TELEPHONE ENCOUNTER
"----- Message from Ingrid Lynn Onur sent at 8/28/2018  8:33 AM CDT -----  Contact: [id number] [phone]  Prior Authorization Needed    Rx: megestrol (MEGACE) 400 mg/10 mL (40 mg/mL) Susp     To submit the PA:    1: Go to " [email or web address]://key.[email or web address] " and click "Enter a Key"    2. Enter the patient's last name and date of birth and the key.      KEY: [id number]    3. Complete the forms and click "send to Plan"    Note chart when prior authorization has been submitted.    Please notify pharmacy when prior authorization has been approved.    Thank You    "

## 2018-08-29 NOTE — TELEPHONE ENCOUNTER
A PA was already completed for this medication. The insurance company denied it. Is there another medication you would like to try?

## 2018-08-31 ENCOUNTER — EXTERNAL CHRONIC CARE MANAGEMENT (OUTPATIENT)
Dept: PRIMARY CARE CLINIC | Facility: CLINIC | Age: 83
End: 2018-08-31
Payer: MEDICARE

## 2018-08-31 PROCEDURE — 99490 CHRNC CARE MGMT STAFF 1ST 20: CPT | Mod: PBBFAC | Performed by: INTERNAL MEDICINE

## 2018-08-31 PROCEDURE — 99490 CHRNC CARE MGMT STAFF 1ST 20: CPT | Mod: S$PBB,,, | Performed by: INTERNAL MEDICINE

## 2018-09-03 ENCOUNTER — TELEPHONE (OUTPATIENT)
Dept: ADMINISTRATIVE | Facility: CLINIC | Age: 83
End: 2018-09-03

## 2018-09-13 ENCOUNTER — TELEPHONE (OUTPATIENT)
Dept: INTERNAL MEDICINE | Facility: CLINIC | Age: 83
End: 2018-09-13

## 2018-09-13 NOTE — TELEPHONE ENCOUNTER
----- Message from Christine Ivory sent at 9/13/2018 12:57 PM CDT -----  Contact: Jorge/ [facility]/ [phone]  Stepan MELLO is requesting a call from the nurse in regards to wound care recommendation.    Please advise, thank you

## 2018-09-13 NOTE — TELEPHONE ENCOUNTER
Spoke with Jorge he says it pt leg has healed he wants to know if you want them to continue or do want to see her.

## 2018-09-30 ENCOUNTER — EXTERNAL CHRONIC CARE MANAGEMENT (OUTPATIENT)
Dept: PRIMARY CARE CLINIC | Facility: CLINIC | Age: 83
End: 2018-09-30
Payer: MEDICARE

## 2018-09-30 PROCEDURE — 99490 CHRNC CARE MGMT STAFF 1ST 20: CPT | Mod: PBBFAC | Performed by: INTERNAL MEDICINE

## 2018-09-30 PROCEDURE — 99490 CHRNC CARE MGMT STAFF 1ST 20: CPT | Mod: S$PBB,,, | Performed by: INTERNAL MEDICINE

## 2018-10-02 ENCOUNTER — HOSPITAL ENCOUNTER (OUTPATIENT)
Dept: RADIOLOGY | Facility: HOSPITAL | Age: 83
Discharge: HOME | End: 2018-10-02
Attending: INTERNAL MEDICINE
Payer: MEDICARE

## 2018-10-02 DIAGNOSIS — R06.89 DYSPNEA AND RESPIRATORY ABNORMALITIES: ICD-10-CM

## 2018-10-02 DIAGNOSIS — R06.00 DYSPNEA AND RESPIRATORY ABNORMALITIES: ICD-10-CM

## 2018-10-02 PROCEDURE — 71250 CT THORAX DX C-: CPT | Mod: TC,PO

## 2018-10-03 ENCOUNTER — OFFICE VISIT (OUTPATIENT)
Dept: PULMONOLOGY | Facility: CLINIC | Age: 83
End: 2018-10-03
Payer: MEDICARE

## 2018-10-03 VITALS
HEART RATE: 82 BPM | OXYGEN SATURATION: 97 % | SYSTOLIC BLOOD PRESSURE: 110 MMHG | DIASTOLIC BLOOD PRESSURE: 80 MMHG | BODY MASS INDEX: 23.53 KG/M2 | WEIGHT: 137.81 LBS | HEIGHT: 64 IN

## 2018-10-03 DIAGNOSIS — T17.800D ASPIRATION INTO LOWER RESPIRATORY TRACT, SUBSEQUENT ENCOUNTER: Primary | ICD-10-CM

## 2018-10-03 DIAGNOSIS — Z72.0 TOBACCO ABUSE: ICD-10-CM

## 2018-10-03 PROCEDURE — 99215 OFFICE O/P EST HI 40 MIN: CPT | Mod: PBBFAC | Performed by: INTERNAL MEDICINE

## 2018-10-03 PROCEDURE — 99999 PR PBB SHADOW E&M-EST. PATIENT-LVL V: CPT | Mod: PBBFAC,,, | Performed by: INTERNAL MEDICINE

## 2018-10-03 PROCEDURE — 99213 OFFICE O/P EST LOW 20 MIN: CPT | Mod: S$PBB,,, | Performed by: INTERNAL MEDICINE

## 2018-10-03 RX ORDER — FLUTICASONE PROPIONATE 50 MCG
1 SPRAY, SUSPENSION (ML) NASAL DAILY
Qty: 1 BOTTLE | Refills: 12 | Status: ON HOLD | OUTPATIENT
Start: 2018-10-03 | End: 2023-01-01

## 2018-10-03 RX ORDER — CETIRIZINE HYDROCHLORIDE 10 MG/1
10 TABLET ORAL DAILY
Qty: 30 TABLET | Refills: 12 | Status: ON HOLD | COMMUNITY
Start: 2018-10-03 | End: 2021-07-02 | Stop reason: HOSPADM

## 2018-10-03 NOTE — PROGRESS NOTES
Subjective:       Patient ID: Nelia Brizuela is a 82 y.o. female.    Chief Complaint: Abnormal CT chest/COPD    HPI:  Nelia Brizuela is a 82 y.o. female PMH COPD, tobacco abuse presents for f/u regarding abnormal CT/COPD.  Patient notes occasional cough with minimal white sputum.  Able to ambulate around house at baseline. Notes weight loss.  No fever/chills/hemoptysis.  Denies dysphagia/GERD.  Notes rhinitis.        Past Medical History:   Diagnosis Date    Arthritis of knee     Cataract     COPD (chronic obstructive pulmonary disease)     CTS (carpal tunnel syndrome)     Glaucoma (increased eye pressure)     HTN (hypertension)     Joint pain     Psoriasis 2007    Smoker     Strabismus     XT OS     Past Surgical History:   Procedure Laterality Date    AHMED IMPLANT AND SURGICAL PI Left 1/27/16         ANGIOGRAM-EXTREMITY-LOWER Right 1/23/2017    [facility]    CATARACT EXTRACTION W/  INTRAOCULAR LENS IMPLANT Left 8/13/2014    OS ()    CATARACT EXTRACTION W/ INTRAOCULAR LENS IMPLANTW/ TRABECULECTOMY Left 8/13/14    os ()    INSERTION-IMPLANT-GLAUCOMA Left 1/27/2016    [facility]ST FLR    INSERTION-INTRAOCULAR LENS (IOL) Left 8/13/2014    [facility]    PHACOEMULSIFICATION-ASPIRATION-CATARACT Left 8/13/2014    [facility]LR    TRABECULECTOMY WITH  EXPRESS MINI SHUNT (CPT = 71628) Left 8/13/2014    [facility]ST FLR    TUBAL LIGATION       Family History   Problem Relation Age of Onset    Glaucoma Father     Cancer Neg Hx     Psoriasis Neg Hx     Amblyopia Neg Hx     Blindness Neg Hx     Macular degeneration Neg Hx     Retinal detachment Neg Hx     Strabismus Neg Hx        Review of Systems   Constitutional: Positive for weight loss and appetite change. Negative  "for fever, chills, weight gain, activity change, fatigue, night sweats and weakness.   HENT: Positive for postnasal drip and rhinorrhea.    Eyes: Negative.    Respiratory: Positive for cough and sputum production. Negative for hemoptysis, shortness of breath, wheezing and dyspnea on extertion.    Cardiovascular: Negative.    Genitourinary: Negative.    Endocrine: endocrine negative   Musculoskeletal: Negative.    Skin: Negative.    Gastrointestinal: Negative.    Neurological: Negative.    Psychiatric/Behavioral: Negative.        Objective:       Vitals:    10/03/18 1009   BP: 110/80   Pulse: 82   SpO2: 97%   Weight: 62.5 kg (137 lb 12.6 oz)   Height: 5' 4" (1.626 m)       Physical Exam   Constitutional: She appears cachectic. No distress.   HENT:   Head: Normocephalic.   Mouth/Throat: Oropharynx is clear and moist.   Neck: Normal range of motion. No JVD present.   Cardiovascular: Normal rate and regular rhythm.   Pulmonary/Chest: Normal expansion. No stridor. She has no wheezes.   Decreased throughout   Abdominal: Soft. She exhibits no distension. There is no tenderness.   Musculoskeletal: Normal range of motion. She exhibits edema.   Neurological: She is alert.   Skin: Skin is warm. She is not diaphoretic.   Psychiatric: She has a normal mood and affect. Her behavior is normal.     Personal Diagnostic Review    1. CT chest with areas of tree-n-bud and GGO in RUL RLL with infiltrate and likely some atelectasis; previous CT in 2017 without similar findings; however, CXR back to 2014 do show some opacities in RLL and blunting of the right costophrenic angle; CT chest 10/2018 with some resolution of RLL and RML infiltrates.    2. PFT moderate obstruction without reversibility; DLCO moderately reduced       Assessment:        1. Abnormal CT-patient without significant symptoms; radiographically still appears inflamatory.  Favor aspiration.  Minimal productive cough that would not be consistent with a NTM. Endobronchial " lesion/malignancy seems less likely   2. COPD- Gold Class A  3. Tobacco abuse- maybe interested in quiting    4. Rhinitis     Plan:       1. Discussed bronchoscopy vs. Repeat imaging/Speech eval, patient favors the latter which seems reasonable; plan for CT chest 3 months; educated on not eating within 2 hours prior to lying down and sleeping propped up on 2 pillows.  2. No indication for inhaler therapy  3. Discussed smoking cessation at length, will refer to smoking cessation program  4. Patient declined flu and PNA vaccine today, will get with her   5. Flonase/Zyrtec    RTC after CT chest    Scar Huber MD  Department of Pulmonary & Critical Care  Cell: [phone]

## 2018-10-03 NOTE — PATIENT INSTRUCTIONS
How to Quit Smoking  Smoking is one of the hardest habits to break. About half of all people who have ever smoked have been able to quit. Most people who still smoke want to quit. Here are some of the best ways to stop smoking.    Keep trying  Most smokers make many attempts at quitting before they are successful. Its important not to give up.  Go cold turkey  Most former smokers quit cold turkey (all at once). Trying to cut back gradually doesn't seem to work as well, perhaps because it continues the smoking habit. Also, it is possible to inhale more while smoking fewer cigarettes. This results in the same amount of nicotine in your body.  Get support  Support programs can be a big help, especially for heavy smokers. These groups offer lectures, ways to change behavior, and peer support. Here are some ways to find a support program:  · Free national quitline: 800-QUIT-NOW (632-134-8750).  · Hospital quit-smoking programs.  · American Lung Association: (490.129.1801).  · American Cancer Society (142-960-4813).  Support at home is important too. Nonsmokers can offer praise and encouragement. If the smoker in your life finds it hard to quit, encourage them to keep trying.  Over-the-counter medicines  Nicotine replacement therapy may make quitting easier. Certain aids, such as the nicotine patch, gum, and lozenges, are available without a prescription. It is best to use these under a doctors care, though. The skin patch provides a steady supply of nicotine. Nicotine gum and lozenges give temporary bursts of low levels of nicotine. Both methods reduce the craving for cigarettes. Warning: If you have nausea, vomiting, dizziness, weakness, or a fast heartbeat, stop using these products and see your doctor.  Prescription medicines  After reviewing your smoking patterns and past attempts to quit, your doctor may offer a prescription medicine such as bupropion, varenicline, a nicotine inhaler, or nasal spray. Each has  "advantages and side effects. Your doctor can review these with you.  Health benefits of quitting  The benefits of quitting start right away and keep improving the longer you go without smoking. These benefits occur at any age.  So whether you are 17 or 70, quitting is a good decision. Some of the benefits include:  · 20 minutes: Blood pressure and pulse return to normal.  · 8 hours: Oxygen levels return to normal.  · 2 days: Ability to smell and taste begin to improve as damaged nerves regrow.  · 2 to 3 weeks: Circulation and lung function improve.  · 1 to 9 months: Coughing, congestion, and shortness of breath decrease; tiredness decreases.  · 1 year: Risk of heart attack decreases by half.  · 5 years: Risk of lung cancer decreases by half; risk of stroke becomes the same as a nonsmokers.  For more on how to quit smoking, try these online resources:   · Smokefree.gov  · "Clearing the Air" booklet from the National Cancer [place]: smokefree.gov/sites/default/files/pdf/clearing-the-air-accessible.pdf  Date Last Reviewed: 3/1/2017  © 0046-2498 [employer], [employer]. [address] [place] [address]. All rights reserved. This information is not intended as a substitute for professional medical care. Always follow your healthcare professional's instructions.        "

## 2018-10-03 NOTE — PROGRESS NOTES
Subjective:       Patient ID: Nelia Brizuela is a 82 y.o. female.    Chief Complaint: Abnormal CT chest/COPD      Past Medical History:   Diagnosis Date    Arthritis of knee     Cataract     COPD (chronic obstructive pulmonary disease)     CTS (carpal tunnel syndrome)     Glaucoma (increased eye pressure)     HTN (hypertension)     Joint pain     Psoriasis 2007    Smoker     Strabismus     XT OS     Past Surgical History:   Procedure Laterality Date    AHMED IMPLANT AND SURGICAL PI Left 1/27/16         ANGIOGRAM-EXTREMITY-LOWER Right 1/23/2017    Performed by [facility] at [facility] CATH LAB    CATARACT EXTRACTION W/  INTRAOCULAR LENS IMPLANT Left 8/13/2014    OS ()    CATARACT EXTRACTION W/ INTRAOCULAR LENS IMPLANTW/ TRABECULECTOMY Left 8/13/14    os ()    INSERTION-IMPLANT-GLAUCOMA Left 1/27/2016    Performed [facility] at [facility] OR 1ST FLR    INSERTION-INTRAOCULAR LENS (IOL) Left 8/13/2014    [facility] OR 1ST FLR    PHACOEMULSIFICATION-ASPIRATION-CATARACT Left 8/13/2014    [facility] OR 1ST FLR    TRABECULECTOMY WITH  EXPRESS MINI SHUNT (CPT = 24045) Left 8/13/2014    [facility] OR 1ST FLR    TUBAL LIGATION       Family History   Problem Relation Age of Onset    Glaucoma Father     Cancer Neg Hx     Psoriasis Neg Hx     Amblyopia Neg Hx     Blindness Neg Hx     Macular degeneration Neg Hx     Retinal detachment Neg Hx     Strabismus Neg Hx        Review of Systems   Constitutional: Positive for weight loss and appetite change. Negative for fever, chills, weight gain, activity change, fatigue, night sweats and weakness.   HENT: Positive for postnasal drip and rhinorrhea.    Eyes: Negative.    Respiratory: Positive for cough and sputum production. Negative for hemoptysis, shortness of breath, wheezing and dyspnea on extertion.   "  Cardiovascular: Negative.    Genitourinary: Negative.    Endocrine: endocrine negative   Musculoskeletal: Negative.    Skin: Negative.    Gastrointestinal: Negative.    Neurological: Negative.    Psychiatric/Behavioral: Negative.        Objective:       Vitals:    10/03/18 1009   BP: 110/80   Pulse: 82   SpO2: 97%   Weight: 62.5 kg (137 lb 12.6 oz)   Height: 5' 4" (1.626 m)       Physical Exam   Constitutional: She appears cachectic. No distress.   HENT:   Head: Normocephalic.   Mouth/Throat: Oropharynx is clear and moist.   Neck: Normal range of motion. No JVD present.   Cardiovascular: Normal rate and regular rhythm.   Pulmonary/Chest: Normal expansion. No stridor. She has no wheezes.   Decreased throughout   Abdominal: Soft. She exhibits no distension. There is no tenderness.   Musculoskeletal: Normal range of motion. She exhibits edema.   Neurological: She is alert.   Skin: Skin is warm. She is not diaphoretic.   Psychiatric: She has a normal mood and affect. Her behavior is normal.     Personal Diagnostic Review    1. CT chest with areas of tree-n-bud and GGO in RUL RLL with infiltrate and likely some atelectasis; previous CT in 2017 without similar findings; however, CXR back to 2014 do show some opacities in RLL and blunting of the right costophrenic angle    2. PFT moderate obstruction without reversibility; DLCO moderately reduced       Assessment:        1. Abnormal CT-patient without significant symptoms; radiographically appears inflamatory.  Possibly could be silent aspiration.  No productive cough that would be consistent with a CAP or NTM. Endobronchial lesion is possible, but seems less likely.  2. COPD- Gold Class A  3. Tobacco abuse- interest in quitting     Plan:       1. Will have speech therapy assess oropharyngeal swallowing and check a short interval CT to assess for resolution of the infiltrates  2. No indication for inhaler therapy  3. Discussed smoking cessation at length, will refer to " smoking cessation program  4. Prevanr at next visit    RTC after CT chest    Scar Huber MD  Department of Pulmonary & Critical Care  Cell: [phone]

## 2018-10-15 ENCOUNTER — OFFICE VISIT (OUTPATIENT)
Dept: INTERNAL MEDICINE | Facility: CLINIC | Age: 83
End: 2018-10-15
Payer: MEDICARE

## 2018-10-15 ENCOUNTER — IMMUNIZATION (OUTPATIENT)
Dept: INTERNAL MEDICINE | Facility: CLINIC | Age: 83
End: 2018-10-15
Payer: MEDICARE

## 2018-10-15 VITALS
OXYGEN SATURATION: 96 % | HEIGHT: 64 IN | HEART RATE: 90 BPM | DIASTOLIC BLOOD PRESSURE: 74 MMHG | SYSTOLIC BLOOD PRESSURE: 132 MMHG | BODY MASS INDEX: 24.01 KG/M2 | WEIGHT: 140.63 LBS

## 2018-10-15 DIAGNOSIS — I10 ESSENTIAL HYPERTENSION: ICD-10-CM

## 2018-10-15 DIAGNOSIS — I73.9 PERIPHERAL VASCULAR DISEASE, UNSPECIFIED: ICD-10-CM

## 2018-10-15 DIAGNOSIS — J43.1 PANLOBULAR EMPHYSEMA: ICD-10-CM

## 2018-10-15 DIAGNOSIS — R91.1 PULMONARY NODULE: ICD-10-CM

## 2018-10-15 DIAGNOSIS — R60.0 BILATERAL LEG EDEMA: ICD-10-CM

## 2018-10-15 DIAGNOSIS — Z72.0 TOBACCO ABUSE: Primary | ICD-10-CM

## 2018-10-15 PROCEDURE — 99214 OFFICE O/P EST MOD 30 MIN: CPT | Mod: S$PBB,,, | Performed by: INTERNAL MEDICINE

## 2018-10-15 PROCEDURE — 99214 OFFICE O/P EST MOD 30 MIN: CPT | Mod: PBBFAC | Performed by: INTERNAL MEDICINE

## 2018-10-15 PROCEDURE — 90662 IIV NO PRSV INCREASED AG IM: CPT | Mod: PBBFAC

## 2018-10-15 PROCEDURE — 99999 PR PBB SHADOW E&M-EST. PATIENT-LVL IV: CPT | Mod: PBBFAC,,, | Performed by: INTERNAL MEDICINE

## 2018-10-15 PROCEDURE — 90732 PPSV23 VACC 2 YRS+ SUBQ/IM: CPT | Mod: PBBFAC

## 2018-10-16 NOTE — PROGRESS NOTES
HISTORY OF PRESENT ILLNESS:  Ms. Pickens is here today with her , son and   daughter.  She has lost a considerable amount of weight when I saw her last in   August, 40 pounds.  Her weight has been stable since then.  She has been eating   better.  Insurance company would not pay for her Megace, so she did not get her   Megace.  Her  reports that she has been eating better.  She agrees she   has been eating better.  She is weighing 140 pounds, BMI 24.14.  She has history   of pulmonary nodules and abnormal chest CT.  She is having some abdominal   issues.  We got a CAT scan of the abdomen.  It showed some patchy infiltrates in   the right end up getting a PET scan of her lungs and then we had her see   Pulmonary.  Now, it is on the right side.  There is question about whether she   is having aspiration, which she says she is not aspirating.  Her previous CT   scan back in 2014 did not show any of this.  She did see Pulmonary earlier this   month and they reviewed her most recent CT scan and they are going to followup   with CT scan again in about three months.  They are recommending aspiration   precautions.  She does have COPD.  She is still an active smoker, not wanting to   quit smoking.  Her weight has been stable.  She has had no fevers or chills.    She continues to have bilateral lower extremity edema.  Home Health was called   who says her legs are healed.  She has continued wrapping her legs.  She does   sleep in a chair sometimes leaving her legs down.  Family is trying to make they   are putting her legs up as much as possible.  She also has had hypertension,   which she has had for multiple years.  Blood pressure today is 132/74.  She is   on hydrochlorothiazide 12.5 for this.  Otherwise, no new complaints.  She is due   for both the flu shot and pneumococcal vaccine today.    REVIEW OF SYSTEMS:  She denies any dyspnea on exertion or any shortness of   breath.  No PND or orthopnea.  She has had  trouble with her shoulder in the   past, but this resolved.  She denies cough, wheezing.  She did continue to have   bilateral lower extremity edema.    PHYSICAL EXAMINATION:  GENERAL:  She is a well-appearing 82-year-old female.  At first, she appears to   be I want to say a little demented.  However, after raising my voice I find that   it is more of a hearing issue and she is alert and oriented x4.  She is able to   answer questions for herself and add some conversation.  She is alert to   person, place, time and situation.  NECK:  Supple.  She has no JVD.  LUNGS:  Sound clear bilaterally.  CARDIOVASCULAR:  S1 and S2, regular rate and rhythm without murmur, gallop or   rub.  ABDOMEN:  Soft, nontender, no hepatosplenomegaly, no guarding, rebound or   tenderness.  LOWER EXTREMITIES:  Both wrapped.  I got a report from Home Health that the   wounds on her legs have healed, but she has severe venous stasis changes of the   lower extremities.    ASSESSMENT:  Hypertension, bilateral lower extremity edema, weight loss, which   has stabilized, COPD with pulmonary nodules and abnormal CT scan suggestive of a   possible aspiration, but no signs or symptoms at the current time.  It looks   like she is going to followed with Pulmonary again.  Also, she is not really   having any other complaints at the current time.  We discussed continue pushing   her diet and if she has any problems, she is going to give me a call.      [facility]  dd: 10/16/2018 17:21:13 (CDT)  td: 10/17/2018 00:31:33 (CDT)  Doc ID   #[id number]  Job ID #[id number]    CC:

## 2018-10-28 DIAGNOSIS — H40.1112 PRIMARY OPEN-ANGLE GLAUCOMA, RIGHT EYE, MODERATE STAGE: ICD-10-CM

## 2018-10-29 RX ORDER — DORZOLAMIDE HYDROCHLORIDE AND TIMOLOL MALEATE 20; 5 MG/ML; MG/ML
1 SOLUTION/ DROPS OPHTHALMIC 3 TIMES DAILY
Qty: 10 ML | Refills: 3 | Status: SHIPPED | OUTPATIENT
Start: 2018-10-29 | End: 2018-11-09 | Stop reason: SDUPTHER

## 2018-10-29 RX ORDER — BIMATOPROST 0.1 MG/ML
1 SOLUTION/ DROPS OPHTHALMIC NIGHTLY
Qty: 7.5 ML | Refills: 3 | Status: SHIPPED | OUTPATIENT
Start: 2018-10-29 | End: 2018-10-29 | Stop reason: SDUPTHER

## 2018-10-29 RX ORDER — BRIMONIDINE TARTRATE 2 MG/ML
1 SOLUTION/ DROPS OPHTHALMIC 3 TIMES DAILY
Qty: 30 ML | Refills: 3 | Status: SHIPPED | OUTPATIENT
Start: 2018-10-29 | End: 2019-03-11 | Stop reason: SDUPTHER

## 2018-10-29 RX ORDER — BIMATOPROST 0.1 MG/ML
1 SOLUTION/ DROPS OPHTHALMIC NIGHTLY
Qty: 2.5 ML | Refills: 3 | Status: SHIPPED | OUTPATIENT
Start: 2018-10-29 | End: 2019-03-11 | Stop reason: SDUPTHER

## 2018-10-29 RX ORDER — BIMATOPROST 0.1 MG/ML
SOLUTION/ DROPS OPHTHALMIC
Qty: 15 ML | Refills: 2 | Status: SHIPPED | OUTPATIENT
Start: 2018-10-29 | End: 2018-10-29 | Stop reason: SDUPTHER

## 2018-10-29 RX ORDER — DORZOLAMIDE HYDROCHLORIDE AND TIMOLOL MALEATE 20; 5 MG/ML; MG/ML
1 SOLUTION/ DROPS OPHTHALMIC 3 TIMES DAILY
Qty: 30 ML | Refills: 3 | Status: SHIPPED | OUTPATIENT
Start: 2018-10-29 | End: 2018-10-29 | Stop reason: SDUPTHER

## 2018-10-31 ENCOUNTER — EXTERNAL CHRONIC CARE MANAGEMENT (OUTPATIENT)
Dept: PRIMARY CARE CLINIC | Facility: CLINIC | Age: 83
End: 2018-10-31
Payer: MEDICARE

## 2018-10-31 PROCEDURE — 99490 CHRNC CARE MGMT STAFF 1ST 20: CPT | Mod: PBBFAC | Performed by: INTERNAL MEDICINE

## 2018-10-31 PROCEDURE — 99490 CHRNC CARE MGMT STAFF 1ST 20: CPT | Mod: S$PBB,,, | Performed by: INTERNAL MEDICINE

## 2018-11-05 ENCOUNTER — TELEPHONE (OUTPATIENT)
Dept: ADMINISTRATIVE | Facility: CLINIC | Age: 83
End: 2018-11-05

## 2018-11-05 NOTE — PROGRESS NOTES
See initial evaluation in POC.    JANELLE Vega.   Clinician  Speech-Language Pathology  11/06/2018

## 2018-11-05 NOTE — TELEPHONE ENCOUNTER
Home Health Recert 10/25/2018 to 12/23/2018 with [facility] , Dr Lenin Sandhu.  service.

## 2018-11-06 ENCOUNTER — CLINICAL SUPPORT (OUTPATIENT)
Dept: REHABILITATION | Facility: HOSPITAL | Age: 83
End: 2018-11-06
Attending: INTERNAL MEDICINE
Payer: MEDICARE

## 2018-11-06 DIAGNOSIS — R13.10 DYSPHAGIA, UNSPECIFIED TYPE: ICD-10-CM

## 2018-11-06 PROCEDURE — G8996 SWALLOW CURRENT STATUS: HCPCS | Mod: CJ,PO

## 2018-11-06 PROCEDURE — 92610 EVALUATE SWALLOWING FUNCTION: CPT | Mod: PO

## 2018-11-06 PROCEDURE — G8997 SWALLOW GOAL STATUS: HCPCS | Mod: CI,PO

## 2018-11-06 NOTE — PROGRESS NOTES
Assessment /Plan     For exam results, see Encounter Report.    Primary open-angle glaucoma, right eye, moderate stage    Chronic angle-closure glaucoma, severe stage, left    Afferent pupillary defect, left    Posterior synechiae, left    Steroid responder, bilateral    Senile nuclear sclerosis, right    Dermatitis of eyelid, contact or allergic, unspecified laterality    Exotropia of left eye - Left Eye    Dry eyes, bilateral    Corneal dellen of left eye    History of glaucoma tube shunt procedure            1. 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   S/p ALT OU  S/p repeat ALT OS (3/17/93)  S/p SLT OS (12/4/08)  First HVF 1993  First photos 1992  On gtts when started at [facility] in 1992          Family history    +father        Glaucoma meds    cosopt ou / bimonidine ou/lumigan ou // [place] os        H/O adverse rxn to glaucoma drops    Intolerant to diamox 2/2 kidney problems        LASERS    H/O ALT ou / repeat ALT os 3/1993 / SLT os 12/4/2008        GLAUCOMA SURGERIES    Combined OS 8/13/2014 (early over filtration after LSL - had SF6 gas into AC)           Ahmed - [place] - os - 1/27/2016         OTHER EYE SURGERIES    none        CDR    0.7/0.9        Tbase    ?off gtt  (14-18/ 14-22 on T1/2 ou)        Tmax    ? Off gtts // on gtts 19/27            Ttarget  ?         HVF    8 test 1993 to 2008 - HVF //                   GVF - 11 test - 1997 to 2015 - full /  [place]        Gonio    +3 od/ PAS OS        CCT    505/497 - thin ou        OCT   6 test 2005 to 2017 - RNFL wnl od / border NI os        HRT   12  test 2004 to 2018 - MR -  Dec. S/T, bord I od // xx os /// CDR 0.71 od // xx os         Disc photos    1992, 2004 - slides // 2011,2018- OIS    - Ttoday    17/17  - Test done today photos- IOP    2. APD os   3. Nuclear sclerosis - becoming visually sig. OS>OD   4. Dry eyes - ATs. D/W pt at length   5. Exotropia of  left eye - longstanding. observe   6. Neuralgic migraines - stable. Longstanding    7. 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

## 2018-11-06 NOTE — PLAN OF CARE
Outpatient Neurological Rehabilitation  Speech and Language Therapy Evaluation    Date: 11/6/2018   Start Time:  1115  Stop Time:  1150    Name: Nelia Brizuela   MRN: [id number]    Therapy Diagnosis:   Encounter Diagnosis   Name Primary?    Dysphagia, unspecified type      Physician: Scar Huber MD  Physician Orders: Amb consult to   Medical Diagnosis: T17.800D (ICD-10-CM) - Aspiration into lower respiratory tract, subsequent encounter    Visit #:  1  Visits Authorized: 20  Date of Evaluation:  11/6/2018  Insurance Authorization Period:  10/3/2018 - 12/31/2018  Plan of Care Certification:    11/6/2018 - 1/3/2019     Procedure Min.   Swallowing and Oral Function Evaluation    35     Total Minutes: 35  Total Untimed Units: 0  Charges Billed/# of units: 1    Precautions:Standard, Fall and aspiration  Subjective   Date of Onset: August 2018  History of Current Condition:   Pt reports she does not have issues swallowing, however, she feels that the food is not digesting properly. Pt's  is concerned about pt's swallow function and is concerned the food is going into her lungs. Pt reported she does not cough before, during, or after meals. Per chart review, pt was previously diagnosed with COPD (diagnosis date unknown, per pt's chart)  Past Medical History: Nelia Brizuela  has a past medical history of Arthritis of knee, Cataract, COPD (chronic obstructive pulmonary disease), CTS (carpal tunnel syndrome), Glaucoma (increased eye pressure), HTN (hypertension), Joint pain, Psoriasis (2007), Smoker, and Strabismus.  Nelia Brizuela  has a past surgical history that includes Tubal ligation; Cataract extraction w/ intraocular lens implant w/ trabeculectomy (Left, 8/13/14); Cataract extraction w/  intraocular lens implant (Left, 8/13/2014); AHMED IMPLANT AND SURGICAL PI (Left, 1/27/16); ANGIOGRAM-EXTREMITY-LOWER (Right, 1/23/2017); INSERTION-IMPLANT-GLAUCOMA (Left, 1/27/2016); PHACOEMULSIFICATION-ASPIRATION-CATARACT  "(Left, 8/13/2014); INSERTION-INTRAOCULAR LENS (IOL) (Left, 8/13/2014); and TRABECULECTOMY WITH  EXPRESS MINI SHUNT (CPT = 47901) (Left, 8/13/2014).  Medical Hx and Allergies: Nelia has a current medication list which includes the following prescription(s): aspirin, brimonidine 0.2%, cetirizine, diclofenac sodium, dorzolamide-timolol 2-0.5%, fluticasone, folic acid/multivit,iron,, hydrochlorothiazide, lumigan, nystatin-triamcinolone, pantoprazole, senna-docusate 8.6-50 mg, triamcinolone acetonide 0.1%, and megestrol.   Review of patient's allergies indicates:   Allergen Reactions    Codeine      Other reaction(s): Unknown     Imaging:     CT scan:   "-Lungs: Patchy nodular interstitial and alveolar opacities are seen throughout the right lower lobe and right middle lobe and to a lesser degree right upper lobe concerning for multifocal airspace disease.  Left lung is clear.  -Pleura: No thickening or fluid.  -Mediastinum/Ashley:No significant adenopathy  -Axilla: No adenopathy.  -Thyroid: Normal lower gland.  -Heart/Aorta: Heart size is normal.  Severe coronary artery disease.  No pericardial effusion. Aorta normal caliber. Aorta demonstrates atherosclerotic disease.  -Bones/Chest Wall: Multilevel disc degenerative changes throughout the mid cervical spine with significant disc space narrowing and ankylosis.  No vertebral body height loss.  -Upper Abdomen: Moderate constipation."    X-ray:  "Heart: Normal size. No effusion.  Coronary artery disease.  No pericardial effusion.  Lung Bases: Right lower lobe infiltrate consistent with pneumonia.  Left lung is clear.  Liver: Normal size and attenuation. No focal lesions.  Gallbladder: No calcified gallstones.  Bile Ducts: No dilatation.  Pancreas: No mass. No peripancreatic fat stranding.  Spleen: Normal.  Adrenals: Normal.  Kidneys/Ureters: Normal enhancement.  No mass or  hydroureteronephrosis.  Bladder: No wall thickening.  Reproductive organs: Enlarged myomatous " "uterus.  GI Tract/Mesentery: No evidence of bowel obstruction or inflammation.  Moderate to severe constipation.  Normal appendix.  Scattered diverticulosis seen throughout the descending and sigmoid colon.  Peritoneal Space: No ascites or free air.  Retroperitoneum: No significant adenopathy.  Abdominal wall: Eventration or wide mouth hernia at the umbilicus.  Vasculature: No aneurysm.  Severe atherosclerotic disease.  Bones: No acute fracture.  Moderate multilevel DJD throughout the bones.  No suspicious lytic or sclerotic lesions."     Pet CT:  "Quality of the study: Adequate.  There are scattered areas of patchy reticulation in the right upper and middle lobes.  There is a ill-defined focal area of consolidation in the lateral segment right lower lobe that demonstrates radiotracer avidity.  There are patchy nodular opacities throughout the right lower lobe with focal consolidation in the medial/posterior segment.  This area demonstrates hypermetabolic activity.  These findings are stable in appearance since CT chest 08/10/2018 and are new since CT chest 01/20/2017 and are favored to represent a inflammatory changes.  Neoplastic etiology cannot be definitively excluded and follow-up is recommended.  There is a focal area increased radiotracer uptake abutting or within the lumen of small bowel in the left upper quadrant.  Significance of this lesion is unknown and may represent a neoplastic process.  This area is not well visualized without contrast.  Follow-up recommended.  Index areas:  Lateral segment right lower lobe consolidation, SUV max 2.3  Medial/posterior segment right lower lobe base consolidation, SUV max 5.0  Lesion abutting or within the small bowel left upper quadrant, SUV max 5.8  Physiologic uptake of the tracer is present within the brain, salivary glands, myocardium, GI and  tracts.  Incidental CT findings:  Abundant coronary artery and aortic calcification.  Large calcified fibroids. " " Diverticulosis coli without evidence of diverticulitis.  Multilevel degenerative changes of the spine."    Prior Therapy:  N/a  Social History:   lives with an adult   Prior Level of Function: Assistance   Current Level of Function: Assistance - dressing, walking, cooking/eating   Pain:   8/10  Pain Location / Description: Shoulder and hands (worse on left shoulder and worse in right hand)  Nutrition:  Regular diet with thin liquids  Patient Therapy Goals:  "for my food to go down better"  Objective   Formal Assessment:    Clinical Swallow Exam/ Sara Mechanism Exam:  Oral Motor Exam:  Oral Musculature: overall weakness  Structure Abnormalities: none present  Dentition: scattered - pt had dentures  Secretion Management: WFL  Mucosal Quality: WFL  Mandibular Strength and Mobility: mildly reduced  Rest: WFL   Lateralization: WFL  Protrusion: limited ROM  Retraction:  WFL  Involuntary Movement: present - small fasciculation on right side of cheek when pt alternated pucker/smile  Oral Labial Strength and Mobility: mildly reduced  Rest: WFL   Pucker: mildly reduced  Retraction: WFL  Alternating Pucker / Retraction: mildly reduced  Involuntary Movement: absent   Lingual Strength and Mobility: moderately impaired  Rest: slightly enlarged  Protrustion: moderately reduced - deviation to left side   Retraction: moderately reduced  Lateralization: mildly reduced  Involuntary Movement: absent   Velar Elevation: WFL  Rest: WFL  Symmetry: WFL  Elevation: WFL  Sustained Elevation: WFL  Involuntary Movement: absent   Buccal Strength and Mobility: moderately reduced  Volitional Cough: present  Volitional Swallow: presnt  Voice Prior to PO Intake: clear    Trigeminal Nerve (CN V): mildly reduced, indicating impairment    Facial Nerve (CN VII): mildly reduced, indicating impairment    Glossopharyngeal Nerve (IX) and Vagus (X): WFL    Hypoglossal Nerve (XII): moderately reduced, indicating impairment    Facial, " Glossopharyngeal, and Vagus Nerves: WFL    [place] Swallow Protocol:  Pass  [place] Swallow Protocol dictates pt remain NPO if fail screener; (Martínr et al. 2014) however, as objective swallow assessment is not available for greater than a week, pt will remain on current diet until objective assessment is completed unless otherwise indicated.     Clinical Swallow Examination:   Pt presented with:   THIN:-3 oz water test, self regulated cup sip of water x5    PUREE:- tsp bites of pudding x3    DENTAL SOFT:- tsp bites of peaches x2    SOLID: -bite of stormy cracker x2    DESCRIPTION:   Oral Phase: Pt demonstrated adequate lip to cup seal with thin liquids, however, she demonstrated limited ability to strip pudding from utensil for po intake. Slight anterior spillage was noted with peaches and cracker secondary to labial weakness. Increased mastication noted with dental soft and solid consistencies secondary to pt's preference to eat without dentures in place. Delayed A-P transit of puree, dental soft, and solid consistencies. Mild oral and lingual residue observed with cracker x2. Pt was prompted to initiate dry swallows x2 (unsuccessful) and liquid rise x1 (which successfully cleared residue).     Pharyngeal Phrase: Decreased hyolaryngeal elevation and excursion upon palpation with all consistencies administered, which may be appropriate due to pt's age. Initiation of the pharyngeal swallow appeared adequate and timely for patient's age. Vocal quality was clear throughout the study with no overt s/s of aspiration; however, silent aspiration cannot be ruled out at bedside.     Treatment   Treatment Time In: n/a  Treatment Time Out: n/a  Total Treatment Time: n/a  n/a    Education: MBSS, POC, role of SLP and aspiration precautions  was discussed with pt. Patient and family members expressed understanding.   Home Program: n/a  Assessment   Swallowing  Current status:  FCM:  LEVEL 5: Swallowing is safe with minimal diet  restriction and/or occasionally requires minimal cueing to use compensatory strategies. The individual may occasionally self-cue. All nutrition and hydration needs are met by mouth at mealtime.   - CJ CJ at least 20% < 40% impaired, limited or restricted  Projected status:  FCM:   LEVEL 6: Swallowing is safe, and the individual eats and drinks independently and may rarely require minimal cueing. The individual usually self-cues when difficulty occurs. May need to avoid specific food items (e.g., popcorn and nuts), or require additional time (due to dysphagia).   - CI CI at least 1% but less than 20% impaired, limited or restricted  Discharge status:  FCM:  n/a      Nelia presents to [facility] s/p medical diagnosis of Aspiration into lower respiratory tract, subsequent encounter.  Demonstrates impairments including limitations as described in the problem list.She presents with mild oropharyngeal dysphagia c/b overall generalized weakness of the oral musculature, delayed A-P transit, mild oral and lingual residue, and decreased hyolaryngeal elevation and excursion.  Positive prognostic factors include family support. Negative prognostic factors include progression and nature of COPD.Barriers to progress include transportation. Patient will benefit from skilled, outpatient neurological rehabilitation speech therapy.    Rehab Potential: fair  Pt's spiritual, cultural and educational needs considered and pt agreeable to plan of care and goals. (further goals to be determined following MBSS).    Short Term Goals (4 weeks):   1. Pt will complete MBSS to fully assess swallow function and objectively r/o aspiration/penetration (further goals to follow completion).    Long Term Goals (8 weeks):   1. Pt will maintain adequate hydration/nutrition with optimum safety and efficiency of swallowing function on PO intake without every s/s of aspiration for the highest appropriate diet  "level.     Plan     Plan of Care Certification Period: 11/6/2018 - 1/3/2019    Recommended Treatment Plan:  Patient will participate in the [facility] neurological rehabilitation program for speech therapy frequency pending results from MBSS to address her swallow deficits, to educate patient and their family, and to participate in a home exercise program.      Other Recommendations: Video Swallow Study to assess swallow function, Nutrition consult, GI consult      MARIA D Vega   Clinician  Speech-Language Pathology  11/05/2018    "I SHELLEY Dunaway, CCC-SLP certify that I was present in the room directing the student and service delivery and guiding them using my skilled judgement. As the co-signing therapist, I have reviewed the student's documentation, and am responsible for the treatment, assessment and plan."     SHELLEY Dunaway, CCC-SLP  Speech-Language Pathologist  11/08/2018    "

## 2018-11-08 PROBLEM — R13.10 DYSPHAGIA: Status: ACTIVE | Noted: 2018-11-08

## 2018-11-09 ENCOUNTER — OFFICE VISIT (OUTPATIENT)
Dept: OPHTHALMOLOGY | Facility: CLINIC | Age: 83
End: 2018-11-09
Payer: MEDICARE

## 2018-11-09 DIAGNOSIS — H21.542 POSTERIOR SYNECHIAE, LEFT: ICD-10-CM

## 2018-11-09 DIAGNOSIS — H50.112 EXOTROPIA OF LEFT EYE: ICD-10-CM

## 2018-11-09 DIAGNOSIS — H18.49 CORNEAL DELLEN OF LEFT EYE: ICD-10-CM

## 2018-11-09 DIAGNOSIS — H40.1112 PRIMARY OPEN-ANGLE GLAUCOMA, RIGHT EYE, MODERATE STAGE: Primary | ICD-10-CM

## 2018-11-09 DIAGNOSIS — H01.119 DERMATITIS OF EYELID, CONTACT OR ALLERGIC, UNSPECIFIED LATERALITY: ICD-10-CM

## 2018-11-09 DIAGNOSIS — H21.562 AFFERENT PUPILLARY DEFECT, LEFT: ICD-10-CM

## 2018-11-09 DIAGNOSIS — H25.11 SENILE NUCLEAR SCLEROSIS, RIGHT: ICD-10-CM

## 2018-11-09 DIAGNOSIS — H40.043 STEROID RESPONDER, BILATERAL: ICD-10-CM

## 2018-11-09 DIAGNOSIS — H04.123 DRY EYES, BILATERAL: ICD-10-CM

## 2018-11-09 DIAGNOSIS — Z96.89 HISTORY OF GLAUCOMA TUBE SHUNT PROCEDURE: ICD-10-CM

## 2018-11-09 DIAGNOSIS — H40.2223 CHRONIC ANGLE-CLOSURE GLAUCOMA, SEVERE STAGE, LEFT: ICD-10-CM

## 2018-11-09 PROCEDURE — 99212 OFFICE O/P EST SF 10 MIN: CPT | Mod: PBBFAC | Performed by: OPHTHALMOLOGY

## 2018-11-09 PROCEDURE — 99999 PR PBB SHADOW E&M-EST. PATIENT-LVL II: CPT | Mod: PBBFAC,,, | Performed by: OPHTHALMOLOGY

## 2018-11-09 PROCEDURE — 92012 INTRM OPH EXAM EST PATIENT: CPT | Mod: S$PBB,,, | Performed by: OPHTHALMOLOGY

## 2018-11-09 RX ORDER — DORZOLAMIDE HYDROCHLORIDE AND TIMOLOL MALEATE 20; 5 MG/ML; MG/ML
1 SOLUTION/ DROPS OPHTHALMIC 3 TIMES DAILY
Qty: 3 BOTTLE | Refills: 3 | Status: SHIPPED | OUTPATIENT
Start: 2018-11-09 | End: 2019-03-11 | Stop reason: SDUPTHER

## 2018-11-09 RX ORDER — TIMOLOL MALEATE 5 MG/ML
1 SOLUTION/ DROPS OPHTHALMIC 2 TIMES DAILY
Qty: 5 ML | Refills: 12 | Status: SHIPPED | OUTPATIENT
Start: 2018-11-09 | End: 2019-03-11

## 2018-11-09 RX ORDER — BRINZOLAMIDE 10 MG/ML
1 SUSPENSION/ DROPS OPHTHALMIC 3 TIMES DAILY
Qty: 10 ML | Refills: 12 | Status: SHIPPED | OUTPATIENT
Start: 2018-11-09 | End: 2019-03-11

## 2018-11-30 ENCOUNTER — EXTERNAL CHRONIC CARE MANAGEMENT (OUTPATIENT)
Dept: PRIMARY CARE CLINIC | Facility: CLINIC | Age: 83
End: 2018-11-30
Payer: MEDICARE

## 2018-11-30 PROCEDURE — 99490 CHRNC CARE MGMT STAFF 1ST 20: CPT | Mod: S$PBB,,, | Performed by: INTERNAL MEDICINE

## 2018-11-30 PROCEDURE — 99490 CHRNC CARE MGMT STAFF 1ST 20: CPT | Mod: PBBFAC | Performed by: INTERNAL MEDICINE

## 2018-12-13 ENCOUNTER — TELEPHONE (OUTPATIENT)
Dept: INTERNAL MEDICINE | Facility: CLINIC | Age: 83
End: 2018-12-13

## 2018-12-13 NOTE — TELEPHONE ENCOUNTER
Spoke with home health nurse, pt is feeling ok, not complaining of any health issues. HH nurse has returned to pt's home one hour later, b/p was rechecked 146/93, pulse is still between 100-116.;

## 2018-12-13 NOTE — TELEPHONE ENCOUNTER
----- Message from Jhoana Price sent at 12/13/2018 12:48 PM CST -----  Contact: [facility] - [place] [phone]  Gricelda is informing MD that patients blood pressure   Right arm 160/116  Left pal279/94   Heart rate 100-124    Please advise,thanks

## 2018-12-19 NOTE — TELEPHONE ENCOUNTER
Spoke with HH nurse she went out Monday her BP was  138/86 HR was 108. Nurse says she's going out tomorrow and if its high she'll give us a call.

## 2018-12-28 ENCOUNTER — PES CALL (OUTPATIENT)
Dept: ADMINISTRATIVE | Facility: CLINIC | Age: 83
End: 2018-12-28

## 2018-12-31 ENCOUNTER — TELEPHONE (OUTPATIENT)
Dept: ADMINISTRATIVE | Facility: CLINIC | Age: 83
End: 2018-12-31

## 2018-12-31 NOTE — TELEPHONE ENCOUNTER
Home Health Recert 112/24/2018 - 02/21/2019 with [facility] (Isaac Rapp) - Dr. Lenin Sandhu. Patient received  services.

## 2019-01-31 ENCOUNTER — EXTERNAL CHRONIC CARE MANAGEMENT (OUTPATIENT)
Dept: PRIMARY CARE CLINIC | Facility: CLINIC | Age: 84
End: 2019-01-31
Payer: MEDICARE

## 2019-01-31 PROCEDURE — 99490 PR CHRONIC CARE MGMT, 1ST 20 MIN: ICD-10-PCS | Mod: S$PBB,,, | Performed by: INTERNAL MEDICINE

## 2019-01-31 PROCEDURE — 99490 CHRNC CARE MGMT STAFF 1ST 20: CPT | Mod: S$PBB,,, | Performed by: INTERNAL MEDICINE

## 2019-01-31 PROCEDURE — 99490 CHRNC CARE MGMT STAFF 1ST 20: CPT | Mod: PBBFAC | Performed by: INTERNAL MEDICINE

## 2019-02-28 ENCOUNTER — EXTERNAL CHRONIC CARE MANAGEMENT (OUTPATIENT)
Dept: PRIMARY CARE CLINIC | Facility: CLINIC | Age: 84
End: 2019-02-28
Payer: MEDICARE

## 2019-02-28 ENCOUNTER — TELEPHONE (OUTPATIENT)
Dept: ADMINISTRATIVE | Facility: CLINIC | Age: 84
End: 2019-02-28

## 2019-02-28 PROCEDURE — 99490 CHRNC CARE MGMT STAFF 1ST 20: CPT | Mod: PBBFAC | Performed by: INTERNAL MEDICINE

## 2019-02-28 PROCEDURE — 99490 PR CHRONIC CARE MGMT, 1ST 20 MIN: ICD-10-PCS | Mod: S$PBB,,, | Performed by: INTERNAL MEDICINE

## 2019-02-28 PROCEDURE — 99490 CHRNC CARE MGMT STAFF 1ST 20: CPT | Mod: S$PBB,,, | Performed by: INTERNAL MEDICINE

## 2019-03-10 NOTE — PROGRESS NOTES
Assessment /Plan     For exam results, see Encounter Report.    Primary open-angle glaucoma, right eye, moderate stage    Chronic angle-closure glaucoma, severe stage, left    Afferent pupillary defect, left    Posterior synechiae, left    Steroid responder, bilateral    Senile nuclear sclerosis, right    Dermatitis of eyelid, contact or allergic, unspecified laterality    Exotropia of left eye - Left Eye    Dry eyes, bilateral    History of glaucoma tube shunt procedure          1. 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   S/p ALT OU  S/p repeat ALT OS (3/17/93)  S/p SLT OS (12/4/08)  First HVF 1993  First photos 1992  On gtts when started at [facility] in 1992          Family history    +father        Glaucoma meds    cosopt ou / bimonidine ou/lumigan ou // dakota os        H/O adverse rxn to glaucoma drops    Intolerant to diamox 2/2 kidney problems        LASERS    H/O ALT ou / repeat ALT os 3/1993 / SLT os 12/4/2008        GLAUCOMA SURGERIES    Combined OS 8/13/2014 (early over filtration after LSL - had SF6 gas into AC)           Ahmed - [place] - os - 1/27/2016         OTHER EYE SURGERIES    none        CDR    0.7/0.9        Tbase    ?off gtt  (14-18/ 14-22 on T1/2 ou)        Tmax    ? Off gtts // on gtts 19/27            Ttarget  ?         HVF    8 test 1993 to 2008 - HVF //                   GVF - 11 test - 1997 to 2015 - full /  [place]        Gonio    +3 od/ PAS OS        CCT    505/497 - thin ou        OCT   6 test 2005 to 2017 - RNFL wnl od / border NI os        HRT   12  test 2004 to 2018 - MR -  Dec. S/T, bord I od // xx os /// CDR 0.71 od // xx os         Disc photos    1992, 2004 - slides // 2011,2018- OIS    - Ttoday    17/17  - Test done today photos- IOP // no ancillary testing possible any more    2. APD os   3. Nuclear sclerosis - becoming visually sig. OS>OD   4. Dry eyes - ATs. D/W pt at length   5.  Exotropia of left eye - longstanding. observe   6. Neuralgic migraines - stable. Longstanding    7. 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   S/P combined - complex phaco / iol / trabeculectomy with mitomycin OS  Date:8/13/2014 - SN60WF 23.0  Filter failed - developed angle closure with iris bombe  S/p ahmed [place]  os - 1/27/2016      Eye drops   IOP stable, at target  cont brimonidine tid od   Cosopt BID OD  Cont lumigan q hs od     Not using glaucoma drops in her left eye (if IOP creeps up can add some back - but limited sight and presently off gtts post tube   AT's os and ointment at night - has a dellen anterior to the tube insertion IN        f/u in 4 months, (same day as  ) - // DILATE for fundus exam  PT IS NO LONGER ABLE TO DO ANY VF'S - NOT EVEN OROZCO'S / and not photos or OCT's ect   No further ancillary testing - can do dilated exams and fundus exams and IOP checks

## 2019-03-11 ENCOUNTER — OFFICE VISIT (OUTPATIENT)
Dept: OPHTHALMOLOGY | Facility: CLINIC | Age: 84
End: 2019-03-11
Payer: MEDICARE

## 2019-03-11 DIAGNOSIS — H01.119 DERMATITIS OF EYELID, CONTACT OR ALLERGIC, UNSPECIFIED LATERALITY: ICD-10-CM

## 2019-03-11 DIAGNOSIS — H21.542 POSTERIOR SYNECHIAE, LEFT: ICD-10-CM

## 2019-03-11 DIAGNOSIS — H04.123 DRY EYES, BILATERAL: ICD-10-CM

## 2019-03-11 DIAGNOSIS — H40.1112 PRIMARY OPEN-ANGLE GLAUCOMA, RIGHT EYE, MODERATE STAGE: Primary | ICD-10-CM

## 2019-03-11 DIAGNOSIS — H50.112 EXOTROPIA OF LEFT EYE: ICD-10-CM

## 2019-03-11 DIAGNOSIS — H40.2223 CHRONIC ANGLE-CLOSURE GLAUCOMA, SEVERE STAGE, LEFT: ICD-10-CM

## 2019-03-11 DIAGNOSIS — Z96.89 HISTORY OF GLAUCOMA TUBE SHUNT PROCEDURE: ICD-10-CM

## 2019-03-11 DIAGNOSIS — H40.043 STEROID RESPONDER, BILATERAL: ICD-10-CM

## 2019-03-11 DIAGNOSIS — H25.11 SENILE NUCLEAR SCLEROSIS, RIGHT: ICD-10-CM

## 2019-03-11 DIAGNOSIS — H21.562 AFFERENT PUPILLARY DEFECT, LEFT: ICD-10-CM

## 2019-03-11 PROCEDURE — 99999 PR PBB SHADOW E&M-EST. PATIENT-LVL II: CPT | Mod: PBBFAC,,, | Performed by: OPHTHALMOLOGY

## 2019-03-11 PROCEDURE — 92012 PR EYE EXAM, EST PATIENT,INTERMED: ICD-10-PCS | Mod: S$PBB,,, | Performed by: OPHTHALMOLOGY

## 2019-03-11 PROCEDURE — 99999 PR PBB SHADOW E&M-EST. PATIENT-LVL II: ICD-10-PCS | Mod: PBBFAC,,, | Performed by: OPHTHALMOLOGY

## 2019-03-11 PROCEDURE — 99212 OFFICE O/P EST SF 10 MIN: CPT | Mod: PBBFAC | Performed by: OPHTHALMOLOGY

## 2019-03-11 PROCEDURE — 92012 INTRM OPH EXAM EST PATIENT: CPT | Mod: S$PBB,,, | Performed by: OPHTHALMOLOGY

## 2019-03-11 RX ORDER — BRIMONIDINE TARTRATE 2 MG/ML
1 SOLUTION/ DROPS OPHTHALMIC 3 TIMES DAILY
Qty: 30 ML | Refills: 3 | Status: SHIPPED | OUTPATIENT
Start: 2019-03-11 | End: 2019-07-15 | Stop reason: SDUPTHER

## 2019-03-11 RX ORDER — BIMATOPROST 0.1 MG/ML
1 SOLUTION/ DROPS OPHTHALMIC NIGHTLY
Qty: 3 BOTTLE | Refills: 3 | Status: SHIPPED | OUTPATIENT
Start: 2019-03-11 | End: 2019-07-15 | Stop reason: SDUPTHER

## 2019-03-11 RX ORDER — DORZOLAMIDE HYDROCHLORIDE AND TIMOLOL MALEATE 20; 5 MG/ML; MG/ML
1 SOLUTION/ DROPS OPHTHALMIC 3 TIMES DAILY
Qty: 3 BOTTLE | Refills: 3 | Status: SHIPPED | OUTPATIENT
Start: 2019-03-11 | End: 2019-07-15 | Stop reason: SDUPTHER

## 2019-03-31 ENCOUNTER — EXTERNAL CHRONIC CARE MANAGEMENT (OUTPATIENT)
Dept: PRIMARY CARE CLINIC | Facility: CLINIC | Age: 84
End: 2019-03-31
Payer: MEDICARE

## 2019-03-31 PROCEDURE — 99490 PR CHRONIC CARE MGMT, 1ST 20 MIN: ICD-10-PCS | Mod: S$PBB,,, | Performed by: INTERNAL MEDICINE

## 2019-03-31 PROCEDURE — 99490 CHRNC CARE MGMT STAFF 1ST 20: CPT | Mod: PBBFAC | Performed by: INTERNAL MEDICINE

## 2019-03-31 PROCEDURE — 99490 CHRNC CARE MGMT STAFF 1ST 20: CPT | Mod: S$PBB,,, | Performed by: INTERNAL MEDICINE

## 2019-05-20 RX ORDER — NYSTATIN AND TRIAMCINOLONE ACETONIDE 100000; 1 [USP'U]/G; MG/G
CREAM TOPICAL
Qty: 15 G | Refills: 4 | Status: SHIPPED | OUTPATIENT
Start: 2019-05-20 | End: 2020-07-24

## 2019-06-11 ENCOUNTER — LAB VISIT (OUTPATIENT)
Dept: LAB | Facility: HOSPITAL | Age: 84
End: 2019-06-11
Attending: INTERNAL MEDICINE
Payer: MEDICARE

## 2019-06-11 ENCOUNTER — OFFICE VISIT (OUTPATIENT)
Dept: INTERNAL MEDICINE | Facility: CLINIC | Age: 84
End: 2019-06-11
Payer: MEDICARE

## 2019-06-11 VITALS
SYSTOLIC BLOOD PRESSURE: 132 MMHG | HEIGHT: 64 IN | BODY MASS INDEX: 21.46 KG/M2 | WEIGHT: 125.69 LBS | HEART RATE: 87 BPM | OXYGEN SATURATION: 96 % | DIASTOLIC BLOOD PRESSURE: 84 MMHG

## 2019-06-11 DIAGNOSIS — I73.9 PERIPHERAL VASCULAR DISEASE, UNSPECIFIED: ICD-10-CM

## 2019-06-11 DIAGNOSIS — J43.1 PANLOBULAR EMPHYSEMA: ICD-10-CM

## 2019-06-11 DIAGNOSIS — R63.4 WEIGHT LOSS: ICD-10-CM

## 2019-06-11 DIAGNOSIS — Z72.0 TOBACCO ABUSE: ICD-10-CM

## 2019-06-11 DIAGNOSIS — I10 ESSENTIAL HYPERTENSION: Primary | ICD-10-CM

## 2019-06-11 DIAGNOSIS — R91.1 PULMONARY NODULE: ICD-10-CM

## 2019-06-11 LAB
ALBUMIN SERPL BCP-MCNC: 2.8 G/DL (ref 3.5–5.2)
ALP SERPL-CCNC: 58 U/L (ref 55–135)
ALT SERPL W/O P-5'-P-CCNC: 7 U/L (ref 10–44)
ANION GAP SERPL CALC-SCNC: 11 MMOL/L (ref 8–16)
AST SERPL-CCNC: 19 U/L (ref 10–40)
BASOPHILS # BLD AUTO: 0.04 K/UL (ref 0–0.2)
BASOPHILS NFR BLD: 0.7 % (ref 0–1.9)
BILIRUB SERPL-MCNC: 0.2 MG/DL (ref 0.1–1)
BUN SERPL-MCNC: 24 MG/DL (ref 8–23)
CALCIUM SERPL-MCNC: 9.7 MG/DL (ref 8.7–10.5)
CHLORIDE SERPL-SCNC: 102 MMOL/L (ref 95–110)
CO2 SERPL-SCNC: 30 MMOL/L (ref 23–29)
CREAT SERPL-MCNC: 1.1 MG/DL (ref 0.5–1.4)
DIFFERENTIAL METHOD: ABNORMAL
EOSINOPHIL # BLD AUTO: 0.1 K/UL (ref 0–0.5)
EOSINOPHIL NFR BLD: 1.2 % (ref 0–8)
ERYTHROCYTE [DISTWIDTH] IN BLOOD BY AUTOMATED COUNT: 16.8 % (ref 11.5–14.5)
EST. GFR  (AFRICAN AMERICAN): 54 ML/MIN/1.73 M^2
EST. GFR  (NON AFRICAN AMERICAN): 47 ML/MIN/1.73 M^2
GLUCOSE SERPL-MCNC: 62 MG/DL (ref 70–110)
HCT VFR BLD AUTO: 37.5 % (ref 37–48.5)
HGB BLD-MCNC: 11.7 G/DL (ref 12–16)
LYMPHOCYTES # BLD AUTO: 1.8 K/UL (ref 1–4.8)
LYMPHOCYTES NFR BLD: 29.6 % (ref 18–48)
MCH RBC QN AUTO: 30 PG (ref 27–31)
MCHC RBC AUTO-ENTMCNC: 31.2 G/DL (ref 32–36)
MCV RBC AUTO: 96 FL (ref 82–98)
MONOCYTES # BLD AUTO: 0.6 K/UL (ref 0.3–1)
MONOCYTES NFR BLD: 9.4 % (ref 4–15)
NEUTROPHILS # BLD AUTO: 3.6 K/UL (ref 1.8–7.7)
NEUTROPHILS NFR BLD: 58.9 % (ref 38–73)
PLATELET # BLD AUTO: 211 K/UL (ref 150–350)
PMV BLD AUTO: 9.7 FL (ref 9.2–12.9)
POTASSIUM SERPL-SCNC: 3.9 MMOL/L (ref 3.5–5.1)
PROT SERPL-MCNC: 8.4 G/DL (ref 6–8.4)
RBC # BLD AUTO: 3.9 M/UL (ref 4–5.4)
SODIUM SERPL-SCNC: 143 MMOL/L (ref 136–145)
WBC # BLD AUTO: 6.04 K/UL (ref 3.9–12.7)

## 2019-06-11 PROCEDURE — 85025 COMPLETE CBC W/AUTO DIFF WBC: CPT

## 2019-06-11 PROCEDURE — 99213 OFFICE O/P EST LOW 20 MIN: CPT | Mod: PBBFAC | Performed by: INTERNAL MEDICINE

## 2019-06-11 PROCEDURE — 99999 PR PBB SHADOW E&M-EST. PATIENT-LVL III: ICD-10-PCS | Mod: PBBFAC,,, | Performed by: INTERNAL MEDICINE

## 2019-06-11 PROCEDURE — 36415 COLL VENOUS BLD VENIPUNCTURE: CPT

## 2019-06-11 PROCEDURE — 80053 COMPREHEN METABOLIC PANEL: CPT

## 2019-06-11 PROCEDURE — 99214 OFFICE O/P EST MOD 30 MIN: CPT | Mod: S$PBB,,, | Performed by: INTERNAL MEDICINE

## 2019-06-11 PROCEDURE — 99214 PR OFFICE/OUTPT VISIT, EST, LEVL IV, 30-39 MIN: ICD-10-PCS | Mod: S$PBB,,, | Performed by: INTERNAL MEDICINE

## 2019-06-11 PROCEDURE — 99999 PR PBB SHADOW E&M-EST. PATIENT-LVL III: CPT | Mod: PBBFAC,,, | Performed by: INTERNAL MEDICINE

## 2019-06-11 NOTE — PROGRESS NOTES
HISTORY OF PRESENT ILLNESS:  Ms. Pickens is here today with her   and    .  She has lost 15 # since last visit.  She had lost weight last year and then stabilized.     She has been eating Regularly but not a lot.  SHe has not been drinking ensure.  Insurance company would not pay for her Megace, so she did not get her   Megace.  Her  reports that she has been eating better.    She is weighing 125  pounds, BMI 21.      She has historyof pulmonary nodules and abnormal chest CT.  She is having some abdominal   issues.  We got a CAT scan of the abdomen.  It showed some patchy infiltrates in   the right end up getting a PET scan of her lungs and then we had her see   Pulmonary.  Now, it is on the right side.  There is question about whether she   is having aspiration, which she says she is not aspirating.  Her previous CT   scan back in 2014 did not show any of this.  She did see Pulmonary  In October 2018.  That not was reviewed today.  SHe was suppose to follow up again with a CT scan again ealier this year but never followed up.  She has a minimal cough.  . Pulmonary  recommended aspiration   precautions.  She does have COPD.  She is still an active smoker, not wanting to   quit smoking.   She has had no fevers or chills.    She continues to have bilateral lower extremity edema.   She has continued wrapping her legs.  She does   sleep in a chair sometimes leaving her legs down.  Family is trying to make they   are putting her legs up as much as possible.      She also has had hypertension,   which she has had for multiple years.  Blood pressure today is 132/84.  She is   on hydrochlorothiazide 12.5 for this.  Otherwise, no new complaints.         REVIEW OF SYSTEMS:  She denies any dyspnea on exertion or any shortness of   breath.  No PND or orthopnea.  She has had trouble with her shoulder in the   past, but this resolved.  She denies cough, wheezing.  She did continue to have   bilateral lower extremity  "edema. SHe denies coughing after eating .       PHYSICAL EXAMINATION: /84 (BP Location: Right arm, Patient Position: Sitting, BP Method: Medium (Manual))   Pulse 87   Ht 5' 4" (1.626 m)   Wt 57 kg (125 lb 10.6 oz)   SpO2 96%   BMI 21.57 kg/m²     GENERAL:  She is a chronic ill appearing 83-year-old female.  At first, she appears to   Well.  Memoery is poor, hearing is poor.  .  However, after raising my voice I find that   it is more of a hearing issue and she is alert and oriented x4.    She is alert to   person, place, time and situation.  NECK:  Supple.  She has no JVD.  LUNGS:  Sound clear bilaterally.  CARDIOVASCULAR:  S1 and S2, regular rate and rhythm without murmur, gallop or   rub.  ABDOMEN:  Soft, nontender, no hepatosplenomegaly, no guarding, rebound or   tenderness.  LOWER EXTREMITIES:  Both wrapped.  I got a report from Home Health that the   wounds on her legs have healed, but she has severe venous stasis changes of the   lower extremities. She gets legs wrapped twice weekly.           ASSESSMENT:  Hypertension, bilateral lower extremity edema, weight loss, which   Has restarted, COPD with pulmonary nodules and abnormal CT scan suggestive of a   possible aspiration, but no signs or symptoms at the current time. Discussed  Following up with pulmonary-- I don't know how much benefit it will be  Since she is asymptomatic and transportation is taxing.  Discussed.  We discussed continue pushing   her diet,driniking ensure and or ice cream with meals.  If she has any problems, she is going to give me a call.  Recheck chem and cbc      "

## 2019-06-22 PROCEDURE — G0179 MD RECERTIFICATION HHA PT: HCPCS | Mod: ,,, | Performed by: INTERNAL MEDICINE

## 2019-06-22 PROCEDURE — G0179 PR HOME HEALTH MD RECERTIFICATION: ICD-10-PCS | Mod: ,,, | Performed by: INTERNAL MEDICINE

## 2019-06-26 RX ORDER — HYDROCHLOROTHIAZIDE 12.5 MG/1
CAPSULE ORAL
Qty: 90 CAPSULE | Refills: 3 | Status: SHIPPED | OUTPATIENT
Start: 2019-06-26 | End: 2020-06-22

## 2019-06-30 RX ORDER — PANTOPRAZOLE SODIUM 40 MG/1
TABLET, DELAYED RELEASE ORAL
Qty: 90 TABLET | Refills: 3 | Status: SHIPPED | OUTPATIENT
Start: 2019-06-30 | End: 2020-06-24

## 2019-07-02 ENCOUNTER — EXTERNAL HOME HEALTH (OUTPATIENT)
Dept: HOME HEALTH SERVICES | Facility: HOSPITAL | Age: 84
End: 2019-07-02
Payer: MEDICARE

## 2019-07-12 NOTE — PROGRESS NOTES
HPI     DLS: 3/11/19    Pt here for 4 month check;  Pt states she runs out of the Lumigan eye drops too fast and wants to know   if she can get extra drops. Pt states OU runs water a lot lately   especially the OS.     Meds: Blink Tears prn ou              Refresh PM qhs ou              Dorzolamide/Timolol tid od              Alphagan tid od              Lumigan qhs od     1. Primary open angle glaucoma, left eye, severe stage   2. Primary open angle glaucoma, right eye, moderate stage   3. Afferent pupillary defect, left eye   4. Posterior synechiae, left eye   5. Steroid responder, bilateral   6. Dermatitis of eyelid, contact or allergic, unspecified laterality   7. Exotropia of left eye   8. Dry eyes, bilateral   9. Severe stage glaucoma   10. Nuclear sclerosis, right   11. Choroidal detachment, leftf    Last edited by Helena Mejias on 7/15/2019 10:43 AM. (History)            Assessment /Plan     For exam results, see Encounter Report.    Primary open-angle glaucoma, right eye, moderate stage    Chronic angle-closure glaucoma, severe stage, left    Afferent pupillary defect, left    Posterior synechiae, left    Steroid responder, bilateral    Senile nuclear sclerosis, right    Dermatitis of eyelid, contact or allergic, unspecified laterality    Exotropia of left eye - Left Eye    Dry eyes, bilateral    History of glaucoma tube shunt procedure      1. 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   S/p ALT OU  S/p repeat ALT OS (3/17/93)  S/p SLT OS (12/4/08)  First HVF 1993  First photos 1992  On gtts when started at [facility] in 1992          Family history    +father        Glaucoma meds    cosopt ou / bimonidine ou/lumigan ou // dakota os        H/O adverse rxn to glaucoma drops    Intolerant to diamox 2/2 kidney problems        LASERS    H/O ALT ou / repeat ALT os 3/1993 / SLT os 12/4/2008        GLAUCOMA SURGERIES    Combined OS  8/13/2014 (early over filtration after LSL - had SF6 gas into AC)           Ahmed - [place] - os - 1/27/2016         OTHER EYE SURGERIES    none        CDR    0.7/0.9        Tbase    ?off gtt  (14-18/ 14-22 on T1/2 ou)        Tmax    ? Off gtts // on gtts 19/27            Ttarget  ?         HVF    8 test 1993 to 2008 - HVF //                   GVF - 11 test - 1997 to 2015 - full /  [place]        Gonio    +3 od/ PAS OS        CCT    505/497 - thin ou        OCT   6 test 2005 to 2017 - RNFL wnl od / border NI os        HRT   12  test 2004 to 2018 - MR -  Dec. S/T, bord I od // xx os /// CDR 0.71 od // xx os         Disc photos    1992, 2004 - slides // 2011,2018-, 2019  OIS    - Ttoday    16/17  - Test done today photos- IOP // DFE   2. APD os   3. Nuclear sclerosis - becoming visually sig. OS>OD   4. Dry eyes - ATs. D/W pt at length   5. Exotropia of left eye - longstanding. observe   6. Neuralgic migraines - stable. Longstanding    7. 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   S/P combined - complex phaco / iol / trabeculectomy with mitomycin OS  Date:8/13/2014 - SN60WF 23.0  Filter failed - developed angle closure with iris bombe  S/p ahmed [place]  os - 1/27/2016      Eye drops   IOP stable, at target  cont brimonidine tid od   Cosopt BID OD  Cont lumigan q hs od     Not using glaucoma drops in her left eye (if IOP creeps up can add some back - but limited sight and presently off gtts post tube   AT's os and ointment at night - has a dellen anterior to the tube insertion IN        f/u in 4 months, (same day as  ) - gonio   PT IS NO LONGER ABLE TO DO ANY VF'S - NOT EVEN OROZCO'S / and not photos or OCT's ect   No further ancillary testing - can do dilated exams and fundus exams and IOP checks

## 2019-07-15 ENCOUNTER — OFFICE VISIT (OUTPATIENT)
Dept: OPHTHALMOLOGY | Facility: CLINIC | Age: 84
End: 2019-07-15
Payer: MEDICARE

## 2019-07-15 DIAGNOSIS — H21.542 POSTERIOR SYNECHIAE, LEFT: ICD-10-CM

## 2019-07-15 DIAGNOSIS — H40.1112 PRIMARY OPEN-ANGLE GLAUCOMA, RIGHT EYE, MODERATE STAGE: Primary | ICD-10-CM

## 2019-07-15 DIAGNOSIS — H50.112 EXOTROPIA OF LEFT EYE: ICD-10-CM

## 2019-07-15 DIAGNOSIS — H21.562 AFFERENT PUPILLARY DEFECT, LEFT: ICD-10-CM

## 2019-07-15 DIAGNOSIS — H25.11 SENILE NUCLEAR SCLEROSIS, RIGHT: ICD-10-CM

## 2019-07-15 DIAGNOSIS — Z96.89 HISTORY OF GLAUCOMA TUBE SHUNT PROCEDURE: ICD-10-CM

## 2019-07-15 DIAGNOSIS — H04.123 DRY EYES, BILATERAL: ICD-10-CM

## 2019-07-15 DIAGNOSIS — H40.2223 CHRONIC ANGLE-CLOSURE GLAUCOMA, SEVERE STAGE, LEFT: ICD-10-CM

## 2019-07-15 DIAGNOSIS — H40.043 STEROID RESPONDER, BILATERAL: ICD-10-CM

## 2019-07-15 DIAGNOSIS — H01.119 DERMATITIS OF EYELID, CONTACT OR ALLERGIC, UNSPECIFIED LATERALITY: ICD-10-CM

## 2019-07-15 PROCEDURE — 92250 FUNDUS PHOTOGRAPHY W/I&R: CPT | Mod: PBBFAC | Performed by: OPHTHALMOLOGY

## 2019-07-15 PROCEDURE — 99999 PR PBB SHADOW E&M-EST. PATIENT-LVL II: CPT | Mod: PBBFAC,,, | Performed by: OPHTHALMOLOGY

## 2019-07-15 PROCEDURE — 99212 OFFICE O/P EST SF 10 MIN: CPT | Mod: PBBFAC,25 | Performed by: OPHTHALMOLOGY

## 2019-07-15 PROCEDURE — 92014 PR EYE EXAM, EST PATIENT,COMPREHESV: ICD-10-PCS | Mod: S$PBB,,, | Performed by: OPHTHALMOLOGY

## 2019-07-15 PROCEDURE — 92014 COMPRE OPH EXAM EST PT 1/>: CPT | Mod: S$PBB,,, | Performed by: OPHTHALMOLOGY

## 2019-07-15 PROCEDURE — 99999 PR PBB SHADOW E&M-EST. PATIENT-LVL II: ICD-10-PCS | Mod: PBBFAC,,, | Performed by: OPHTHALMOLOGY

## 2019-07-15 PROCEDURE — 92250 COLOR FUNDUS PHOTOGRAPHY - OU - BOTH EYES: ICD-10-PCS | Mod: 26,S$PBB,, | Performed by: OPHTHALMOLOGY

## 2019-07-15 RX ORDER — DORZOLAMIDE HYDROCHLORIDE AND TIMOLOL MALEATE 20; 5 MG/ML; MG/ML
1 SOLUTION/ DROPS OPHTHALMIC 3 TIMES DAILY
Qty: 3 BOTTLE | Refills: 3 | Status: SHIPPED | OUTPATIENT
Start: 2019-07-15 | End: 2020-08-21 | Stop reason: SDUPTHER

## 2019-07-15 RX ORDER — BIMATOPROST 0.1 MG/ML
1 SOLUTION/ DROPS OPHTHALMIC NIGHTLY
Qty: 3 BOTTLE | Refills: 3 | Status: SHIPPED | OUTPATIENT
Start: 2019-07-15 | End: 2020-07-14

## 2019-07-15 RX ORDER — BRIMONIDINE TARTRATE 2 MG/ML
1 SOLUTION/ DROPS OPHTHALMIC 3 TIMES DAILY
Qty: 30 ML | Refills: 3 | Status: SHIPPED | OUTPATIENT
Start: 2019-07-15 | End: 2020-08-21 | Stop reason: SDUPTHER

## 2019-07-31 ENCOUNTER — EXTERNAL CHRONIC CARE MANAGEMENT (OUTPATIENT)
Dept: PRIMARY CARE CLINIC | Facility: CLINIC | Age: 84
End: 2019-07-31
Payer: MEDICARE

## 2019-07-31 PROCEDURE — 99490 CHRNC CARE MGMT STAFF 1ST 20: CPT | Mod: PBBFAC | Performed by: INTERNAL MEDICINE

## 2019-07-31 PROCEDURE — 99490 CHRNC CARE MGMT STAFF 1ST 20: CPT | Mod: S$PBB,,, | Performed by: INTERNAL MEDICINE

## 2019-07-31 PROCEDURE — 99490 PR CHRONIC CARE MGMT, 1ST 20 MIN: ICD-10-PCS | Mod: S$PBB,,, | Performed by: INTERNAL MEDICINE

## 2019-08-21 PROCEDURE — G0179 PR HOME HEALTH MD RECERTIFICATION: ICD-10-PCS | Mod: ,,, | Performed by: INTERNAL MEDICINE

## 2019-08-21 PROCEDURE — G0179 MD RECERTIFICATION HHA PT: HCPCS | Mod: ,,, | Performed by: INTERNAL MEDICINE

## 2019-08-26 ENCOUNTER — EXTERNAL HOME HEALTH (OUTPATIENT)
Dept: HOME HEALTH SERVICES | Facility: HOSPITAL | Age: 84
End: 2019-08-26
Payer: MEDICARE

## 2019-10-10 NOTE — PROGRESS NOTES
IN ORDER TO FIND TODAY'S PROGRESS NOTE IN WOUND EXPERT, LOOK IN THE MEDIA TAB.  
Much of the documentation for this visit was completed in the Wound Expert system. Please see the attached documentation for further details about this patient's care.     Robert Ramirez MD      
Breathing spontaneous and unlabored. Breath sounds clear and equal bilaterally with regular rhythm.

## 2019-10-20 PROCEDURE — G0179 MD RECERTIFICATION HHA PT: HCPCS | Mod: ,,, | Performed by: INTERNAL MEDICINE

## 2019-10-20 PROCEDURE — G0179 PR HOME HEALTH MD RECERTIFICATION: ICD-10-PCS | Mod: ,,, | Performed by: INTERNAL MEDICINE

## 2019-10-23 ENCOUNTER — EXTERNAL HOME HEALTH (OUTPATIENT)
Dept: HOME HEALTH SERVICES | Facility: HOSPITAL | Age: 84
End: 2019-10-23
Payer: MEDICARE

## 2019-11-13 ENCOUNTER — TELEPHONE (OUTPATIENT)
Dept: HOME HEALTH SERVICES | Facility: HOSPITAL | Age: 84
End: 2019-11-13

## 2019-11-14 ENCOUNTER — PATIENT OUTREACH (OUTPATIENT)
Dept: ADMINISTRATIVE | Facility: OTHER | Age: 84
End: 2019-11-14

## 2019-11-14 ENCOUNTER — TELEPHONE (OUTPATIENT)
Dept: INTERNAL MEDICINE | Facility: CLINIC | Age: 84
End: 2019-11-14

## 2019-11-14 NOTE — TELEPHONE ENCOUNTER
----- Message from Ingrid Mohr sent at 11/14/2019  1:14 PM CST -----  Contact: [facility]/Faye [phone]  Wants to know if you want to continue wound care orders on the patient's legs.    Please call and advise.    Thank You

## 2019-11-15 NOTE — TELEPHONE ENCOUNTER
Faye says it looks great she doesn't have any wounds they just go and wrap it.      Appointment scheduled.

## 2019-11-18 ENCOUNTER — OFFICE VISIT (OUTPATIENT)
Dept: OPHTHALMOLOGY | Facility: CLINIC | Age: 84
End: 2019-11-18
Payer: MEDICARE

## 2019-11-18 DIAGNOSIS — H21.542 POSTERIOR SYNECHIAE, LEFT: ICD-10-CM

## 2019-11-18 DIAGNOSIS — H25.11 SENILE NUCLEAR SCLEROSIS, RIGHT: ICD-10-CM

## 2019-11-18 DIAGNOSIS — H50.112 EXOTROPIA OF LEFT EYE: ICD-10-CM

## 2019-11-18 DIAGNOSIS — H40.2223 CHRONIC ANGLE-CLOSURE GLAUCOMA, SEVERE STAGE, LEFT: ICD-10-CM

## 2019-11-18 DIAGNOSIS — H18.49 CORNEAL DELLEN OF LEFT EYE: ICD-10-CM

## 2019-11-18 DIAGNOSIS — Z96.1 PSEUDOPHAKIA, LEFT EYE: ICD-10-CM

## 2019-11-18 DIAGNOSIS — H40.1112 PRIMARY OPEN-ANGLE GLAUCOMA, RIGHT EYE, MODERATE STAGE: Primary | ICD-10-CM

## 2019-11-18 DIAGNOSIS — H21.562 AFFERENT PUPILLARY DEFECT, LEFT: ICD-10-CM

## 2019-11-18 DIAGNOSIS — H40.043 STEROID RESPONDER, BILATERAL: ICD-10-CM

## 2019-11-18 DIAGNOSIS — Z96.89 HISTORY OF GLAUCOMA TUBE SHUNT PROCEDURE: ICD-10-CM

## 2019-11-18 PROCEDURE — 99999 PR PBB SHADOW E&M-EST. PATIENT-LVL II: ICD-10-PCS | Mod: PBBFAC,,, | Performed by: OPHTHALMOLOGY

## 2019-11-18 PROCEDURE — 92012 INTRM OPH EXAM EST PATIENT: CPT | Mod: S$PBB,,, | Performed by: OPHTHALMOLOGY

## 2019-11-18 PROCEDURE — 99212 OFFICE O/P EST SF 10 MIN: CPT | Mod: PBBFAC | Performed by: OPHTHALMOLOGY

## 2019-11-18 PROCEDURE — 99999 PR PBB SHADOW E&M-EST. PATIENT-LVL II: CPT | Mod: PBBFAC,,, | Performed by: OPHTHALMOLOGY

## 2019-11-18 PROCEDURE — 92012 PR EYE EXAM, EST PATIENT,INTERMED: ICD-10-PCS | Mod: S$PBB,,, | Performed by: OPHTHALMOLOGY

## 2019-11-18 NOTE — PROGRESS NOTES
HPI     DLS:7/15/19    Pt here for 4 month check with Gonio;  puts eye drops in for her   daily, right eye only.  States that her vision is blurry, wearing the   glasses sometime. Vision is improved with wear.  Complains that eyes still   are watery.        Meds: Blink Tears prn ou              Refresh PM qhs ou              Dorzolamide/Timolol tid od              Alphagan tid od              Lumigan qhs od     1. Primary open angle glaucoma, left eye, severe stage   2. Primary open angle glaucoma, right eye, moderate stage   3. Afferent pupillary defect, left eye   4. Posterior synechiae, left eye   5. Steroid responder, bilateral   6. Dermatitis of eyelid, contact or allergic, unspecified laterality   7. Exotropia of left eye   8. Dry eyes, bilateral   9. Severe stage glaucoma   10. Nuclear sclerosis, right   11. Choroidal detachment, leftf    Last edited by Radha Das on 11/18/2019  9:51 AM. (History)              Assessment /Plan     For exam results, see Encounter Report.    Primary open-angle glaucoma, right eye, moderate stage    Chronic angle-closure glaucoma, severe stage, left    Afferent pupillary defect, left    Posterior synechiae, left    Steroid responder, bilateral    Senile nuclear sclerosis, right    Exotropia of left eye - Left Eye    History of glaucoma tube shunt procedure    Pseudophakia, left eye    Corneal dellen of left eye        1. 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   S/p ALT OU  S/p repeat ALT OS (3/17/93)  S/p SLT OS (12/4/08)  First HVF 1993  First photos 1992  On gtts when started at [facility] in 1992          Family history    +father        Glaucoma meds    cosopt ou / bimonidine ou/lumigan ou // [place] os        H/O adverse rxn to glaucoma drops    Intolerant to diamox 2/2 kidney problems        LASERS    H/O ALT ou / repeat ALT os 3/1993 / SLT os 12/4/2008        GLAUCOMA  SURGERIES    Combined OS 8/13/2014 (early over filtration after LSL - had SF6 gas into AC)           Ahmed - [place] - os - 1/27/2016          OTHER EYE SURGERIES    none        CDR    0.7/0.9        Tbase    ?off gtt  (14-18/ 14-22 on T1/2 ou)        Tmax    ? Off gtts // on gtts 19/27            Ttarget  ?         HVF    8 test 1993 to 2008 - HVF //                   GVF - 11 test - 1997 to 2015 - full /  [place]        Gonio    +3 od/ PAS OS        CCT    505/497 - thin ou        OCT   6 test 2005 to 2017 - RNFL wnl od / border NI os        HRT   12  test 2004 to 2018 - MR -  Dec. S/T, bord I od // xx os /// CDR 0.71 od // xx os         Disc photos    1992, 2004 - slides // 2011,2018-, 2019  OIS    - Ttoday    20/13  - Test done today photos- IOP    2. APD os   3. Nuclear sclerosis - becoming visually sig. OS>OD   4. Dry eyes - ATs. D/W pt at length   5. Exotropia of left eye - longstanding. observe   6. Neuralgic migraines - stable. Longstanding    7. 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   S/P combined - complex phaco / iol / trabeculectomy with mitomycin OS  Date:8/13/2014 - SN60WF 23.0  Filter failed - developed angle closure with iris bombe  S/p ahmed IN  os - 1/27/2016      Eye drops   IOP just above target od - target is 18 // IOP 20 on 11/18/2019 od   cont brimonidine tid od   Cosopt TID  OD   Cont lumigan q hs od     Consider rhopressa in future   Consider surgery od in future     Not using glaucoma drops in her left eye (if IOP creeps up can add some back - but limited sight and presently off gtts post tube   AT's os and ointment at night - has a dellen anterior to the tube insertion IN        f/u in 4 months, (same day as  ) - IOP check   PT IS NO LONGER ABLE TO DO ANY VF'S - NOT EVEN OROZCO'S / and not photos or OCT's ect   No further ancillary  testing - can do dilated exams and fundus exams and IOP checks

## 2019-11-19 ENCOUNTER — TELEPHONE (OUTPATIENT)
Dept: HOME HEALTH SERVICES | Facility: HOSPITAL | Age: 84
End: 2019-11-19

## 2019-11-26 ENCOUNTER — TELEPHONE (OUTPATIENT)
Dept: HOME HEALTH SERVICES | Facility: HOSPITAL | Age: 84
End: 2019-11-26

## 2019-12-03 ENCOUNTER — OFFICE VISIT (OUTPATIENT)
Dept: INTERNAL MEDICINE | Facility: CLINIC | Age: 84
End: 2019-12-03
Payer: MEDICARE

## 2019-12-03 VITALS
HEART RATE: 80 BPM | WEIGHT: 123.69 LBS | SYSTOLIC BLOOD PRESSURE: 130 MMHG | DIASTOLIC BLOOD PRESSURE: 70 MMHG | BODY MASS INDEX: 21.23 KG/M2 | OXYGEN SATURATION: 94 %

## 2019-12-03 DIAGNOSIS — M67.911 ROTATOR CUFF DISORDER, RIGHT: ICD-10-CM

## 2019-12-03 DIAGNOSIS — J43.1 PANLOBULAR EMPHYSEMA: ICD-10-CM

## 2019-12-03 DIAGNOSIS — M19.90 OSTEOARTHRITIS, UNSPECIFIED OSTEOARTHRITIS TYPE, UNSPECIFIED SITE: ICD-10-CM

## 2019-12-03 DIAGNOSIS — R60.0 BILATERAL LEG EDEMA: Primary | ICD-10-CM

## 2019-12-03 DIAGNOSIS — E46 MALNUTRITION, UNSPECIFIED TYPE: ICD-10-CM

## 2019-12-03 DIAGNOSIS — Z72.0 TOBACCO ABUSE: ICD-10-CM

## 2019-12-03 DIAGNOSIS — I10 ESSENTIAL HYPERTENSION: ICD-10-CM

## 2019-12-03 PROCEDURE — 1125F AMNT PAIN NOTED PAIN PRSNT: CPT | Mod: ,,, | Performed by: INTERNAL MEDICINE

## 2019-12-03 PROCEDURE — 99999 PR PBB SHADOW E&M-EST. PATIENT-LVL IV: ICD-10-PCS | Mod: PBBFAC,,, | Performed by: INTERNAL MEDICINE

## 2019-12-03 PROCEDURE — 99214 OFFICE O/P EST MOD 30 MIN: CPT | Mod: S$PBB,,, | Performed by: INTERNAL MEDICINE

## 2019-12-03 PROCEDURE — 1159F MED LIST DOCD IN RCRD: CPT | Mod: ,,, | Performed by: INTERNAL MEDICINE

## 2019-12-03 PROCEDURE — 1125F PR PAIN SEVERITY QUANTIFIED, PAIN PRESENT: ICD-10-PCS | Mod: ,,, | Performed by: INTERNAL MEDICINE

## 2019-12-03 PROCEDURE — 1159F PR MEDICATION LIST DOCUMENTED IN MEDICAL RECORD: ICD-10-PCS | Mod: ,,, | Performed by: INTERNAL MEDICINE

## 2019-12-03 PROCEDURE — 99214 OFFICE O/P EST MOD 30 MIN: CPT | Mod: PBBFAC | Performed by: INTERNAL MEDICINE

## 2019-12-03 PROCEDURE — 99214 PR OFFICE/OUTPT VISIT, EST, LEVL IV, 30-39 MIN: ICD-10-PCS | Mod: S$PBB,,, | Performed by: INTERNAL MEDICINE

## 2019-12-03 PROCEDURE — 99999 PR PBB SHADOW E&M-EST. PATIENT-LVL IV: CPT | Mod: PBBFAC,,, | Performed by: INTERNAL MEDICINE

## 2019-12-03 NOTE — PROGRESS NOTES
"HISTORY OF PRESENT ILLNESS:  Ms. Pickens is here today with her   and last visit she was suffering from significant  LE edema-- she has been having her legs wrapped by Home health and has been taking HCTZ.  She is elderly as id her  and would not be able to get compression stockings on. The leg are dong well.       Last visit we discussed her 15 # WEIGHT LOSS.    She has been eating Regularly (per )    And has been  drinking ensure less regularly--  Her wt is down 2 # since June.  .  Insurance company would not pay for her Megace, so she did not get her   Megace.  Her  reports that she has been eating better.    She is weighing 123  pounds, BMI 21.23       She has historyof pulmonary nodules and abnormal chest CT.  She is having some abdominal   issues.  We got a CAT scan of the abdomen.  It showed some patchy infiltrates in   the right end up getting a PET scan of her lungs and then we had her see   Pulmonary.  Now, it is on the right side.  There is question about whether she   is having aspiration, which she says she is not aspirating.  Her previous CT   scan back in 2014 did not show any of this.  She did see Pulmonary  In October 2018.  That note was reviewed at last visit .  SHe was suppose to follow up again with a CT scan again ealier this year but never followed up.  She has a minimal cough.  She is not having any trouble aspirating after eating.         She does have COPD.  She is still an active smoker, not wanting to   quit smoking.  SHe does say "she is trying to cut back-- she hughes s nt want to start her tobacco cessation program.         She continues to have bilateral lower extremity edema.   She has continued wrapping her legs.  She does   sleep in a chair sometimes leaving her legs down.  Family is trying to make they   are putting her legs up as much as possible.       She also has had hypertension,   which she has had for multiple years.  Blood pressure today is 132/84. "  She is   on hydrochlorothiazide 12.5 for this.  Otherwise, no new complaints.          REVIEW OF SYSTEMS:  She denies any dyspnea on exertion or any shortness of   breath.  No PND or orthopnea.  She has had trouble with her shoulder in the   past, but this resolved.  She denies cough, wheezing.  She did continue to have   bilateral lower extremity edema. SHe denies coughing after eating .       PHYSICAL EXAMINATION: /70 (BP Location: Right arm, Patient Position: Sitting, BP Method: Medium (Manual))   Pulse 80   Wt 56.1 kg (123 lb 10.9 oz)   SpO2 (!) 94%   BMI 21.23 kg/m²        GENERAL:  She is a chronic ill appearing 84-year-old female.  At first, she appears to   Well.  Memory is poor, hearing is poor.  .  However, after raising my voice I find that   it is more of a hearing issue and she is alert and oriented x4.    She is alert to   person, place, time and situation.  NECK:  Supple.  She has no JVD.  LUNGS:  Sound clear bilaterally.  CARDIOVASCULAR:  S1 and S2, regular rate and rhythm without murmur, gallop or   rub.  ABDOMEN:  Soft, nontender, no hepatosplenomegaly, no guarding, rebound or   tenderness.  LOWER EXTREMITIES:  Both wrapped.  I got a report from Home Health that the   wounds on her legs have healed, but she has severe venous stasis changes of the   lower extremities. She gets legs wrapped twice weekly.    She has decrease ROM with right shoulder-  Both with anterior and lateral rasing f the shoulder.             ASSESSMENT:  Hypertension, bilateral lower extremity edema, weight loss, which   Has stablaized, COPD with pulmonary nodules and abnormal CT scan suggestive of a   possible aspiration    . Discussed  Following up with pulmonary-- I don't know how much benefit it will be  Since she is asymptomatic and transportation is taxing.  Discussed.  We discussed continue pushing her diet,driniking ensure and or ice cream with meals.  If she has any problems, she is going to give me a call.   Malnutrition will continue supplementing diet and follow up in 6 months- labs next visit.      WIll get and ortho to evaluate right shoulder.  Maybe and injection and PT- she is not a good candidate for surgery.

## 2019-12-19 PROCEDURE — G0179 MD RECERTIFICATION HHA PT: HCPCS | Mod: ,,, | Performed by: INTERNAL MEDICINE

## 2019-12-19 PROCEDURE — G0179 PR HOME HEALTH MD RECERTIFICATION: ICD-10-PCS | Mod: ,,, | Performed by: INTERNAL MEDICINE

## 2019-12-27 ENCOUNTER — EXTERNAL HOME HEALTH (OUTPATIENT)
Dept: HOME HEALTH SERVICES | Facility: HOSPITAL | Age: 84
End: 2019-12-27
Payer: MEDICARE

## 2020-01-07 ENCOUNTER — HOSPITAL ENCOUNTER (OUTPATIENT)
Dept: RADIOLOGY | Facility: HOSPITAL | Age: 85
Discharge: HOME | End: 2020-01-07
Attending: ORTHOPAEDIC SURGERY
Payer: MEDICARE

## 2020-01-07 ENCOUNTER — OFFICE VISIT (OUTPATIENT)
Dept: SPORTS MEDICINE | Facility: CLINIC | Age: 85
End: 2020-01-07
Payer: MEDICARE

## 2020-01-07 VITALS — HEIGHT: 64 IN | BODY MASS INDEX: 21 KG/M2 | WEIGHT: 123 LBS

## 2020-01-07 DIAGNOSIS — M12.811 RIGHT ROTATOR CUFF TEAR ARTHROPATHY: Primary | ICD-10-CM

## 2020-01-07 DIAGNOSIS — M67.911 ROTATOR CUFF DISORDER, RIGHT: ICD-10-CM

## 2020-01-07 DIAGNOSIS — M75.101 RIGHT ROTATOR CUFF TEAR ARTHROPATHY: Primary | ICD-10-CM

## 2020-01-07 PROCEDURE — 73030 X-RAY EXAM OF SHOULDER: CPT | Mod: TC,RT

## 2020-01-07 PROCEDURE — 99213 OFFICE O/P EST LOW 20 MIN: CPT | Mod: PBBFAC,25 | Performed by: ORTHOPAEDIC SURGERY

## 2020-01-07 PROCEDURE — 73030 XR SHOULDER COMPLETE 2 OR MORE VIEWS RIGHT: ICD-10-PCS | Mod: 26,RT,, | Performed by: RADIOLOGY

## 2020-01-07 PROCEDURE — 99204 PR OFFICE/OUTPT VISIT, NEW, LEVL IV, 45-59 MIN: ICD-10-PCS | Mod: 25,S$PBB,, | Performed by: ORTHOPAEDIC SURGERY

## 2020-01-07 PROCEDURE — 1125F AMNT PAIN NOTED PAIN PRSNT: CPT | Mod: ,,, | Performed by: ORTHOPAEDIC SURGERY

## 2020-01-07 PROCEDURE — 1125F PR PAIN SEVERITY QUANTIFIED, PAIN PRESENT: ICD-10-PCS | Mod: ,,, | Performed by: ORTHOPAEDIC SURGERY

## 2020-01-07 PROCEDURE — 20610 DRAIN/INJ JOINT/BURSA W/O US: CPT | Mod: PBBFAC | Performed by: ORTHOPAEDIC SURGERY

## 2020-01-07 PROCEDURE — 99204 OFFICE O/P NEW MOD 45 MIN: CPT | Mod: 25,S$PBB,, | Performed by: ORTHOPAEDIC SURGERY

## 2020-01-07 PROCEDURE — 99999 PR PBB SHADOW E&M-EST. PATIENT-LVL III: ICD-10-PCS | Mod: PBBFAC,,, | Performed by: ORTHOPAEDIC SURGERY

## 2020-01-07 PROCEDURE — 99999 PR PBB SHADOW E&M-EST. PATIENT-LVL III: CPT | Mod: PBBFAC,,, | Performed by: ORTHOPAEDIC SURGERY

## 2020-01-07 PROCEDURE — 1159F MED LIST DOCD IN RCRD: CPT | Mod: ,,, | Performed by: ORTHOPAEDIC SURGERY

## 2020-01-07 PROCEDURE — 1159F PR MEDICATION LIST DOCUMENTED IN MEDICAL RECORD: ICD-10-PCS | Mod: ,,, | Performed by: ORTHOPAEDIC SURGERY

## 2020-01-07 PROCEDURE — 73030 X-RAY EXAM OF SHOULDER: CPT | Mod: 26,RT,, | Performed by: RADIOLOGY

## 2020-01-07 PROCEDURE — 20610 LARGE JOINT ASPIRATION/INJECTION: R SUBACROMIAL BURSA: ICD-10-PCS | Mod: S$PBB,RT,, | Performed by: ORTHOPAEDIC SURGERY

## 2020-01-07 RX ADMIN — TRIAMCINOLONE ACETONIDE 40 MG: 40 INJECTION, SUSPENSION INTRA-ARTICULAR; INTRAMUSCULAR at 11:01

## 2020-01-07 NOTE — LETTER
January 11, 2020      Lenin Sandhu Jr., MD  [address]  [facility] [address]           [facility]  [address]  [facility] [address]  Phone: [phone]          Patient: Nelia Brizuela   MR Number: [id number]   YOB: 1935   Date of Visit: 1/7/2020       Dear Dr. Lenin Sandhu Jr.:    Thank you for referring Nelia Brizuela to me for evaluation. Attached you will find relevant portions of my assessment and plan of care.    If you have questions, please do not hesitate to call me. I look forward to following Nelia Brizuela along with you.    Sincerely,    ANA LAURA Delgadillo MD    Enclosure  CC:  No Recipients    If you would like to receive this communication electronically, please contact [email or web address] or [phone] to request more information on [employer] access.    For providers and/or their staff who would like to refer a patient to [facility], please contact us through our one-stop-shop provider referral line, [facility] , at [phone].    If you feel you have received this communication in error or would no longer like to receive these types of communications, please e-mail [email or web address]

## 2020-01-07 NOTE — PROGRESS NOTES
CC: Left shoulder pain     84 y.o. Female presents as a new patient to me. She is retired. LEFT shoulder pain x many years. Atraumatic onset.  Pain localizes globally. Worse with overhead movements. She reports that the pain and weakness are worse with overhead activity. It also bothers her at night. Better with rest. Denies neck pain or radicular symptoms. Treatment thus far has included activity modifications, rest, and oral medication.  No prior treatments.  No injections or physical therapy.  No prior surgeries.  Here today to discuss diagnosis and treatment options.     PMHx notable for no significant.   Negative for tobacco.   Negative for diabetes    Pain Score:   3    PAST MEDICAL HISTORY:   Past Medical History:   Diagnosis Date    Arthritis of knee     Cataract     COPD (chronic obstructive pulmonary disease)     CTS (carpal tunnel syndrome)     Glaucoma (increased eye pressure)     HTN (hypertension)     Joint pain     Psoriasis 2007    Smoker     Strabismus     XT OS       PAST SURGICAL HISTORY:  Past Surgical History:   Procedure Laterality Date    AHMED IMPLANT AND SURGICAL PI Left 1/27/16         CATARACT EXTRACTION W/  INTRAOCULAR LENS IMPLANT Left 8/13/2014    OS ()    CATARACT EXTRACTION W/ INTRAOCULAR LENS IMPLANTW/ TRABECULECTOMY Left 8/13/14    os ()    TUBAL LIGATION         FAMILY HISTORY:  Family History   Problem Relation Age of Onset    Glaucoma Father     Cancer Neg Hx     Psoriasis Neg Hx     Amblyopia Neg Hx     Blindness Neg Hx     Macular degeneration Neg Hx     Retinal detachment Neg Hx     Strabismus Neg Hx      MEDICATIONS:    Current Outpatient Medications:     aspirin 81 mg Tab, Take 81 mg by mouth once daily. , Disp: , Rfl:     brimonidine 0.2% (ALPHAGAN) 0.2 % Drop, Place 1 drop into the right eye 3 (three) times daily., Disp: 30 mL, Rfl: 3    diclofenac sodium 1 % Gel, Apply topically 3 (three) times daily. 4 grams  "topically tid to affected joints. (Patient taking differently: Apply 4 g topically 3 (three) times daily as needed (for joint pain). ), Disp: 200 g, Rfl: 1    dorzolamide-timolol 2-0.5% (COSOPT) 22.3-6.8 mg/mL ophthalmic solution, Place 1 drop into the right eye 3 (three) times daily., Disp: 3 Bottle, Rfl: 3    fluticasone (FLONASE) 50 mcg/actuation nasal spray, 1 spray (50 mcg total) by Each Nare route once daily., Disp: 1 Bottle, Rfl: 12    FOLIC ACID/MV,FE,OTHER MIN (CENTRUM ORAL), Take 1 capsule by mouth once daily. , Disp: , Rfl:     hydroCHLOROthiazide (MICROZIDE) 12.5 mg capsule, TAKE 1 CAPSULE DAILY, Disp: 90 capsule, Rfl: 3    LUMIGAN 0.01 % Drop, Place 1 drop into both eyes every evening., Disp: 3 Bottle, Rfl: 3    nystatin-triamcinolone (MYCOLOG II) cream, APPLY TOPICALLY TO AFFECTED AREA 4 TIMES DAILY, Disp: 15 g, Rfl: 4    pantoprazole (PROTONIX) 40 MG tablet, TAKE 1 TABLET DAILY, Disp: 90 tablet, Rfl: 3    senna-docusate 8.6-50 mg (PERICOLACE) 8.6-50 mg per tablet, Take 1 tablet by mouth 2 (two) times daily., Disp: , Rfl:     triamcinolone acetonide 0.1% (KENALOG) 0.1 % ointment, Apply topically 2 (two) times daily., Disp: 453.6 g, Rfl: 5    cetirizine (ZYRTEC) 10 MG tablet, Take 1 tablet (10 mg total) by mouth once daily., Disp: 30 tablet, Rfl: 12    ALLERGIES:  Review of patient's allergies indicates:   Allergen Reactions    Codeine      Other reaction(s): Unknown          REVIEW OF SYSTEMS:  Constitution: Negative. Negative for chills, fever and night sweats.    Hematologic/Lymphatic: Negative for bleeding problem. Does not bruise/bleed easily.   Skin: Negative for dry skin, itching and rash.   Musculoskeletal: Negative for falls. Positive for left shoulder pain and  muscle weakness.     All other review of symptoms were reviewed and found to be noncontributory.    PHYSICAL EXAMINATION:  Vitals:  Ht 5' 4" (1.626 m)   Wt 55.8 kg (123 lb)   BMI 21.11 kg/m²    General: Well-developed " well-nourished 84 y.o. femalein no acute distress   Cardiovascular: Regular rhythm by palpation of distal pulse, normal color and temperature, no concerning varicosities on symptomatic side   Lungs: No labored breathing or wheezing appreciated   Neuro: Alert and oriented ×3   Psychiatric: well oriented to person, place and time, demonstrates normal mood and affect   Skin: No rashes, lesions or ulcers, normal temperature, turgor, and texture on uninvolved extremity    Ortho/SPM Exam  Examination of the left shoulder demonstrates no swelling or effusion.  Active forward elevation to 40-50°.  Passive to 90° with significant pain and crepitation. 3/5 scaption and 4-/5 ER at side. +ER lag. Global TTP about the shoulder. Bell press weak.     IMAGING:  Xrays including AP, Outlet and Axillary Lateral of Left shoulder are ordered / images reviewed by me:   Severe CTA    ASSESSMENT:      ICD-10-CM ICD-9-CM   1. Right rotator cuff tear arthropathy M75.101 716.81    M12.811      PLAN:     Diagnosis is rotator cuff tear arthropathy.  The patient has several significant medical comorbidities.  Not a good candidate for operative intervention. Non operative care is recommended.  Steroid injection provided.  I think therapy would be of limited benefit but that is something to consider in the future.  Return to clinic as needed.    Large Joint Aspiration/Injection: R subacromial bursa  Date/Time: 1/7/2020 11:00 AM  Performed by: ANA LAURA Delgadillo MD  Authorized by: ANA LAURA Delgadillo MD     Consent Done?:  Yes (Verbal)  Indications:  Pain  Procedure site marked: Yes    Timeout: Prior to procedure the correct patient, procedure, and site was verified    Anesthesia  Local anesthesia used  Anesthesia: local infiltration  Anesthetic: lidocaine 1% without epinephrine and bupivacaine 0.25% without epinephrine  Anesthetic total: 4mL    Location:  Shoulder  Site:  R subacromial bursa  Prep: Patient was prepped and draped in usual sterile  fashion    Needle size:  22 G  Ultrasonic Guidance for needle placement: No  Approach:  Posterior  Medications:  40 mg triamcinolone acetonide 40 mg/mL  Patient tolerance:  Patient tolerated the procedure well with no immediate complications

## 2020-01-11 RX ORDER — TRIAMCINOLONE ACETONIDE 40 MG/ML
40 INJECTION, SUSPENSION INTRA-ARTICULAR; INTRAMUSCULAR
Status: DISCONTINUED | OUTPATIENT
Start: 2020-01-07 | End: 2020-01-11 | Stop reason: HOSPADM

## 2020-01-14 ENCOUNTER — PES CALL (OUTPATIENT)
Dept: ADMINISTRATIVE | Facility: CLINIC | Age: 85
End: 2020-01-14

## 2020-02-13 ENCOUNTER — TELEPHONE (OUTPATIENT)
Dept: INTERNAL MEDICINE | Facility: CLINIC | Age: 85
End: 2020-02-13

## 2020-02-13 NOTE — TELEPHONE ENCOUNTER
----- Message from Suze Paul sent at 2/13/2020  2:34 PM CST -----  Contact: Faye galvan [facility]   Faye is asking can the wrapping on the patient be reduce to once a week. Please call and  advise.

## 2020-02-17 PROCEDURE — G0179 MD RECERTIFICATION HHA PT: HCPCS | Mod: ,,, | Performed by: INTERNAL MEDICINE

## 2020-02-17 PROCEDURE — G0179 PR HOME HEALTH MD RECERTIFICATION: ICD-10-PCS | Mod: ,,, | Performed by: INTERNAL MEDICINE

## 2020-02-18 ENCOUNTER — EXTERNAL HOME HEALTH (OUTPATIENT)
Dept: HOME HEALTH SERVICES | Facility: HOSPITAL | Age: 85
End: 2020-02-18
Payer: MEDICARE

## 2020-04-20 ENCOUNTER — TELEPHONE (OUTPATIENT)
Dept: INTERNAL MEDICINE | Facility: CLINIC | Age: 85
End: 2020-04-20

## 2020-04-20 NOTE — TELEPHONE ENCOUNTER
----- Message from Evelyn Matias sent at 4/20/2020  9:39 AM CDT -----  Contact: Candance-- Home Health Nurse---[phone]  Needs Advice    Reason for call:      Candance is requesting that you do a audio appt with the pt for insurance purpose (pt is fine nothing is wrong). Pt does not have access to do a virtual appt.    Communication Preference: Candance-- Home Health Nurse---[phone]    Additional Information:    Candance is requesting a call back if any questions or concerns

## 2020-04-22 ENCOUNTER — TELEPHONE (OUTPATIENT)
Dept: INTERNAL MEDICINE | Facility: CLINIC | Age: 85
End: 2020-04-22

## 2020-04-22 NOTE — TELEPHONE ENCOUNTER
----- Message from Nelsy Reis sent at 4/21/2020  4:32 PM CDT -----  Contact: Murray dominguez [phone]  Patient will like to speak to the nurse in regards to Home Health Service for wound care, please advise

## 2020-04-23 ENCOUNTER — OFFICE VISIT (OUTPATIENT)
Dept: INTERNAL MEDICINE | Facility: CLINIC | Age: 85
End: 2020-04-23
Payer: MEDICARE

## 2020-04-23 DIAGNOSIS — I10 ESSENTIAL HYPERTENSION: ICD-10-CM

## 2020-04-23 DIAGNOSIS — I73.9 PERIPHERAL VASCULAR DISEASE, UNSPECIFIED: Primary | ICD-10-CM

## 2020-04-23 DIAGNOSIS — Z72.0 TOBACCO ABUSE: ICD-10-CM

## 2020-04-23 DIAGNOSIS — R60.0 BILATERAL LEG EDEMA: ICD-10-CM

## 2020-04-23 PROCEDURE — 99441 PR PHYSICIAN TELEPHONE EVALUATION 5-10 MIN: ICD-10-PCS | Mod: 95,,, | Performed by: INTERNAL MEDICINE

## 2020-04-23 PROCEDURE — 99441 PR PHYSICIAN TELEPHONE EVALUATION 5-10 MIN: CPT | Mod: 95,,, | Performed by: INTERNAL MEDICINE

## 2020-04-23 NOTE — PROGRESS NOTES
Established Patient - Audio Only Telehealth Visit     The patient location is: HOme  The chief complaint leading to consultation is: Leg swelling-- venous insuffiencey   Visit type: Virtual visit with audio only (telephone)     The reason for the audio only service rather than synchronous audio and video virtual visit was related to technical difficulties or patient preference/necessity.     Each patient to whom I provide medical services by telemedicine is:  (1) informed of the relationship between the physician and patient and the respective role of any other health care provider with respect to management of the patient; and (2) notified that they may decline to receive medical services by telemedicine and may withdraw from such care at any time. Patient verbally consented to receive this service via voice-only telephone call.       HPI:  Ms. Nelia Brizuela that 84-year-old female who lives with her elderly .  Both her  have a relatively poor medical IQ.  They are elderly and have difficulty hearing.  They have good family support but both of them are usually independent and want to handle the on matters.  Her main issue is chronic lower extremity edema.  She is unable to put on stockings such as compression stockings.  Her  who is also morbidly obese is unable to help her.  We have been having home health go out there and wrap the legs once a week and do some skin care to legs.  This is been working well but we just need to have another face-to-face visit or interaction to continue this.  I spoke with Adam Plummer. and Mr. Brizuela today.  She says she is doing well.  She denies any sick contacts or signs or symptoms of COVID.  Unfortunately she continues to smoke.  She does have COPD but says this is doing fine.  She has hypertension and she continues on her hydrochlorothiazide.  Her blood pressures at home have been well controlled her Mr. Brizuela.  She also has some peripheral vascular disease  and history of a pulmonary nodule.  Her weight was a issue last time and her weight was down to 123 lb her BMI was 21.23.  Her  says she is eating quite well now and that she appears a little bit thicker than she has in the past.  They do not have a scale however.  She says the legs are not hurting at all.  Her skin under the wrappings or legs are crusty at times but she does not feel like she has any open wounds.      Physical exam is limited by being of idea only call.  Both her and Mr. Brizuela sound stable.  Both have difficulty hearing.  Both seem to be in the pretty good mood.         Assessment and plan:  She has bilateral leg edema secondary to a bilateral venous insufficiency.  COPD.  Tobacco abuse continue was.  Hypertension which is well controlled.      Normally we would be able to teach some family members to wrap her legs or help her worsen venous compression stockings her family has been willing, but both her Mr. Brizuela appears to be independent and do not want their help.  I do not believe any elderly abuse or neglect is being done.  I would recommend to continue home health wrapping alerts weekly.  We tried showing him how to do it and we also tried using the impression stockings.  This resulted in sores being on her legs and significant edema and a negative change of her quality of life.    Please continue once weekly visits to do compression wrappings in legs and skin care.      Also recommended that Ms. delgadillo will avoid smoking- I realize this is not going to help her much either.  Also we discussed the COVID and Xanax and it seems that  and Mrs. Brizuela her doing well with self isolation and avoiding sick contacts.    Please fax this not to [phone]    We the phone conversation was about 9 min.                       This service was not originating from a related E/M service provided within the previous 7 days nor will  to an E/M service or procedure within the next 24 hours  or my soonest available appointment.  Prevailing standard of care was able to be met in this audio-only visit.

## 2020-04-24 PROCEDURE — G0180 PR HOME HEALTH MD CERTIFICATION: ICD-10-PCS | Mod: ,,, | Performed by: INTERNAL MEDICINE

## 2020-04-24 PROCEDURE — G0180 MD CERTIFICATION HHA PATIENT: HCPCS | Mod: ,,, | Performed by: INTERNAL MEDICINE

## 2020-04-30 ENCOUNTER — EXTERNAL HOME HEALTH (OUTPATIENT)
Dept: HOME HEALTH SERVICES | Facility: HOSPITAL | Age: 85
End: 2020-04-30
Payer: MEDICARE

## 2020-04-30 ENCOUNTER — DOCUMENT SCAN (OUTPATIENT)
Dept: HOME HEALTH SERVICES | Facility: HOSPITAL | Age: 85
End: 2020-04-30
Payer: MEDICARE

## 2020-06-23 PROCEDURE — G0179 MD RECERTIFICATION HHA PT: HCPCS | Mod: ,,, | Performed by: INTERNAL MEDICINE

## 2020-06-23 PROCEDURE — G0179 PR HOME HEALTH MD RECERTIFICATION: ICD-10-PCS | Mod: ,,, | Performed by: INTERNAL MEDICINE

## 2020-06-29 ENCOUNTER — EXTERNAL HOME HEALTH (OUTPATIENT)
Dept: HOME HEALTH SERVICES | Facility: HOSPITAL | Age: 85
End: 2020-06-29
Payer: MEDICARE

## 2020-07-15 DIAGNOSIS — Z71.89 COMPLEX CARE COORDINATION: ICD-10-CM

## 2020-08-21 DIAGNOSIS — H40.1112 PRIMARY OPEN-ANGLE GLAUCOMA, RIGHT EYE, MODERATE STAGE: ICD-10-CM

## 2020-08-22 PROCEDURE — G0179 PR HOME HEALTH MD RECERTIFICATION: ICD-10-PCS | Mod: ,,, | Performed by: INTERNAL MEDICINE

## 2020-08-22 PROCEDURE — G0179 MD RECERTIFICATION HHA PT: HCPCS | Mod: ,,, | Performed by: INTERNAL MEDICINE

## 2020-08-23 RX ORDER — DORZOLAMIDE HYDROCHLORIDE AND TIMOLOL MALEATE 20; 5 MG/ML; MG/ML
1 SOLUTION/ DROPS OPHTHALMIC 3 TIMES DAILY
Qty: 3 BOTTLE | Refills: 3 | Status: SHIPPED | OUTPATIENT
Start: 2020-08-23 | End: 2021-01-22 | Stop reason: SDUPTHER

## 2020-08-23 RX ORDER — BIMATOPROST 0.1 MG/ML
1 SOLUTION/ DROPS OPHTHALMIC NIGHTLY
Qty: 15 ML | Refills: 3 | Status: SHIPPED | OUTPATIENT
Start: 2020-08-23 | End: 2021-01-22 | Stop reason: SDUPTHER

## 2020-08-23 RX ORDER — BRIMONIDINE TARTRATE 2 MG/ML
1 SOLUTION/ DROPS OPHTHALMIC 3 TIMES DAILY
Qty: 30 ML | Refills: 3 | Status: SHIPPED | OUTPATIENT
Start: 2020-08-23 | End: 2021-01-22 | Stop reason: SDUPTHER

## 2020-08-27 ENCOUNTER — EXTERNAL HOME HEALTH (OUTPATIENT)
Dept: HOME HEALTH SERVICES | Facility: HOSPITAL | Age: 85
End: 2020-08-27
Payer: MEDICARE

## 2020-09-09 ENCOUNTER — DOCUMENT SCAN (OUTPATIENT)
Dept: HOME HEALTH SERVICES | Facility: HOSPITAL | Age: 85
End: 2020-09-09
Payer: MEDICARE

## 2020-09-09 PROCEDURE — 99499 UNLISTED E&M SERVICE: CPT | Mod: ,,, | Performed by: INTERNAL MEDICINE

## 2020-09-09 PROCEDURE — 99499 NO LOS: ICD-10-PCS | Mod: ,,, | Performed by: INTERNAL MEDICINE

## 2020-09-10 ENCOUNTER — TELEPHONE (OUTPATIENT)
Dept: INTERNAL MEDICINE | Facility: CLINIC | Age: 85
End: 2020-09-10

## 2020-09-10 NOTE — TELEPHONE ENCOUNTER
Spoke to pt---c/o right leg swelling. Pt stated that it's that same leg that it always swollen. Home Health nurse want her to get it check out. Scheduled appt with PCP for 9/14/20 at 9:40a. (pt declined sooner appt)

## 2020-09-23 ENCOUNTER — TELEPHONE (OUTPATIENT)
Dept: INTERNAL MEDICINE | Facility: CLINIC | Age: 85
End: 2020-09-23

## 2020-09-23 NOTE — TELEPHONE ENCOUNTER
----- Message from Aubree Quiroga sent at 9/23/2020 10:29 AM CDT -----  Contact: /[phone]    Murray would like a call back to discus  whether or not they should come into the clinic for their appointments on 9/25/20 due to the virus  and they have a nurse that come out and see them. Please call and advise.

## 2020-09-23 NOTE — TELEPHONE ENCOUNTER
Spoke with pt's , he stated that there is a nurse at the home now looking at his wife's legs and stated that everything looks fine. Pt decided to cancel the appt

## 2020-09-29 ENCOUNTER — PATIENT MESSAGE (OUTPATIENT)
Dept: OTHER | Facility: OTHER | Age: 85
End: 2020-09-29

## 2020-10-21 PROCEDURE — G0179 MD RECERTIFICATION HHA PT: HCPCS | Mod: ,,, | Performed by: INTERNAL MEDICINE

## 2020-10-21 PROCEDURE — G0179 PR HOME HEALTH MD RECERTIFICATION: ICD-10-PCS | Mod: ,,, | Performed by: INTERNAL MEDICINE

## 2020-10-27 ENCOUNTER — PATIENT OUTREACH (OUTPATIENT)
Dept: HOME HEALTH SERVICES | Facility: HOSPITAL | Age: 85
End: 2020-10-27

## 2020-10-28 ENCOUNTER — EXTERNAL HOME HEALTH (OUTPATIENT)
Dept: HOME HEALTH SERVICES | Facility: HOSPITAL | Age: 85
End: 2020-10-28
Payer: MEDICARE

## 2020-10-31 ENCOUNTER — EXTERNAL CHRONIC CARE MANAGEMENT (OUTPATIENT)
Dept: PRIMARY CARE CLINIC | Facility: CLINIC | Age: 85
End: 2020-10-31
Payer: MEDICARE

## 2020-10-31 PROCEDURE — 99490 PR CHRONIC CARE MGMT, 1ST 20 MIN: ICD-10-PCS | Mod: S$PBB,,, | Performed by: INTERNAL MEDICINE

## 2020-10-31 PROCEDURE — 99490 CHRNC CARE MGMT STAFF 1ST 20: CPT | Mod: PBBFAC | Performed by: INTERNAL MEDICINE

## 2020-10-31 PROCEDURE — 99490 CHRNC CARE MGMT STAFF 1ST 20: CPT | Mod: S$PBB,,, | Performed by: INTERNAL MEDICINE

## 2020-11-12 ENCOUNTER — PATIENT OUTREACH (OUTPATIENT)
Dept: ADMINISTRATIVE | Facility: OTHER | Age: 85
End: 2020-11-12

## 2020-11-30 ENCOUNTER — EXTERNAL CHRONIC CARE MANAGEMENT (OUTPATIENT)
Dept: PRIMARY CARE CLINIC | Facility: CLINIC | Age: 85
End: 2020-11-30
Payer: MEDICARE

## 2020-11-30 PROCEDURE — 99490 CHRNC CARE MGMT STAFF 1ST 20: CPT | Mod: S$PBB,,, | Performed by: INTERNAL MEDICINE

## 2020-11-30 PROCEDURE — 99490 PR CHRONIC CARE MGMT, 1ST 20 MIN: ICD-10-PCS | Mod: S$PBB,,, | Performed by: INTERNAL MEDICINE

## 2020-11-30 PROCEDURE — 99490 CHRNC CARE MGMT STAFF 1ST 20: CPT | Mod: PBBFAC | Performed by: INTERNAL MEDICINE

## 2020-12-10 ENCOUNTER — TELEPHONE (OUTPATIENT)
Dept: INTERNAL MEDICINE | Facility: CLINIC | Age: 85
End: 2020-12-10

## 2020-12-10 DIAGNOSIS — I10 ESSENTIAL HYPERTENSION: Primary | ICD-10-CM

## 2020-12-10 DIAGNOSIS — N18.31 STAGE 3A CHRONIC KIDNEY DISEASE: ICD-10-CM

## 2020-12-10 NOTE — TELEPHONE ENCOUNTER
----- Message from Lauryn Byrd sent at 12/10/2020  2:34 PM CST -----  Type: Orders Request    What orders/ testing are being requested? Annual    Is there a future appointment scheduled for the patient with PCP? Yes    When? 1/22/21    Comments:

## 2020-12-11 ENCOUNTER — PATIENT MESSAGE (OUTPATIENT)
Dept: OTHER | Facility: OTHER | Age: 85
End: 2020-12-11

## 2020-12-15 ENCOUNTER — PATIENT MESSAGE (OUTPATIENT)
Dept: OTHER | Facility: OTHER | Age: 85
End: 2020-12-15

## 2020-12-17 ENCOUNTER — TELEPHONE (OUTPATIENT)
Dept: INTERNAL MEDICINE | Facility: CLINIC | Age: 85
End: 2020-12-17

## 2020-12-17 NOTE — TELEPHONE ENCOUNTER
----- Message from Aubree Quiroga sent at 12/17/2020  8:41 AM CST -----  Contact: Soraida/Rell  / [phone]  Patient has [employer] insurance starting Jan. 1 , and patient would need a new home health agency. [facility] will discontinue service 12/17.

## 2020-12-28 ENCOUNTER — TELEPHONE (OUTPATIENT)
Dept: INTERNAL MEDICINE | Facility: CLINIC | Age: 85
End: 2020-12-28

## 2020-12-28 NOTE — TELEPHONE ENCOUNTER
Spoke with [facility] she said they couldn't go out to see pt because her time is up and if they do it'll only be for one week then they'll have to drop her instead of the whole 8 weeks. Spoke with staff from  they wont take the pt until January when HH order is being sent to them.

## 2020-12-28 NOTE — TELEPHONE ENCOUNTER
----- Message from Aubree Quiroga sent at 12/17/2020  8:41 AM CST -----  Contact: Soraida/Rell  / [phone]  Patient has [employer] insurance starting Jan. 1 , and patient would need a new home health agency. [facility] will discontinue service 12/17.

## 2020-12-28 NOTE — TELEPHONE ENCOUNTER
Okay.  Home at the see her before continue home health orders since the need to see her within 6 months of doing home health orders.  Please schedule appointment after the 1st.

## 2020-12-29 ENCOUNTER — TELEPHONE (OUTPATIENT)
Dept: INTERNAL MEDICINE | Facility: CLINIC | Age: 85
End: 2020-12-29

## 2020-12-29 NOTE — TELEPHONE ENCOUNTER
----- Message from Ingrid Mohr sent at 12/29/2020 10:49 AM CST -----  Contact: Murray/Spouse [phone]  Calling again regarding home health. Please contact to get this completed.    Please call and advise.    Thank You

## 2020-12-30 ENCOUNTER — TELEPHONE (OUTPATIENT)
Dept: INTERNAL MEDICINE | Facility: CLINIC | Age: 85
End: 2020-12-30

## 2020-12-30 NOTE — TELEPHONE ENCOUNTER
----- Message from Zari Sena sent at 12/30/2020  1:28 PM CST -----  Regarding: new order  Contact: Charleen Browning [phone]  Type: Home Health (orders, updates, clarifications, etc.)    Home Health Agency/Nurse: [facility]    Phone number: [phone]    Reason for call:    Comments: Patient was discharged from 12/17/2020. Patient has a new insurance that  does not accept. Was new referral sent to [facility]

## 2020-12-30 NOTE — TELEPHONE ENCOUNTER
----- Message from Sarah Lizama sent at 12/30/2020 12:49 PM CST -----  Contact: /des/[phone]  Pt  called in regards to talking to the dr about the pt having swelling in her leg. Pt  would like a call back ASAP.      Please advice

## 2020-12-30 NOTE — TELEPHONE ENCOUNTER
Spoke with Nallely and informed her new hh order can not be placed until the 1st.  Nallely understands says she has spoken with  more then once about what's going on and he's not understanding

## 2020-12-31 ENCOUNTER — EXTERNAL CHRONIC CARE MANAGEMENT (OUTPATIENT)
Dept: PRIMARY CARE CLINIC | Facility: CLINIC | Age: 85
End: 2020-12-31
Payer: MEDICARE

## 2020-12-31 PROCEDURE — G2058 PR CHRON CARE MGMT, EA ADDTL 20 MINS: ICD-10-PCS | Mod: S$PBB,,, | Performed by: INTERNAL MEDICINE

## 2020-12-31 PROCEDURE — G2058 CCM ADD 20MIN: HCPCS | Mod: S$PBB,,, | Performed by: INTERNAL MEDICINE

## 2020-12-31 PROCEDURE — 99490 PR CHRONIC CARE MGMT, 1ST 20 MIN: ICD-10-PCS | Mod: S$PBB,,, | Performed by: INTERNAL MEDICINE

## 2020-12-31 PROCEDURE — G2058 CCM ADD 20MIN: HCPCS | Mod: PBBFAC | Performed by: INTERNAL MEDICINE

## 2020-12-31 PROCEDURE — 99490 CHRNC CARE MGMT STAFF 1ST 20: CPT | Mod: PBBFAC | Performed by: INTERNAL MEDICINE

## 2020-12-31 PROCEDURE — 99490 CHRNC CARE MGMT STAFF 1ST 20: CPT | Mod: S$PBB,,, | Performed by: INTERNAL MEDICINE

## 2021-01-05 ENCOUNTER — TELEPHONE (OUTPATIENT)
Dept: INTERNAL MEDICINE | Facility: CLINIC | Age: 86
End: 2021-01-05

## 2021-01-05 DIAGNOSIS — I73.9 PERIPHERAL VASCULAR DISEASE, UNSPECIFIED: Primary | ICD-10-CM

## 2021-01-05 DIAGNOSIS — R60.0 BILATERAL LEG EDEMA: ICD-10-CM

## 2021-01-06 ENCOUNTER — TELEPHONE (OUTPATIENT)
Dept: INTERNAL MEDICINE | Facility: CLINIC | Age: 86
End: 2021-01-06

## 2021-01-08 ENCOUNTER — PATIENT OUTREACH (OUTPATIENT)
Dept: ADMINISTRATIVE | Facility: HOSPITAL | Age: 86
End: 2021-01-08

## 2021-01-08 ENCOUNTER — PATIENT MESSAGE (OUTPATIENT)
Dept: ADMINISTRATIVE | Facility: HOSPITAL | Age: 86
End: 2021-01-08

## 2021-01-08 DIAGNOSIS — Z13.820 ENCOUNTER FOR OSTEOPOROSIS SCREENING IN ASYMPTOMATIC POSTMENOPAUSAL PATIENT: Primary | ICD-10-CM

## 2021-01-08 DIAGNOSIS — Z78.0 ENCOUNTER FOR OSTEOPOROSIS SCREENING IN ASYMPTOMATIC POSTMENOPAUSAL PATIENT: Primary | ICD-10-CM

## 2021-01-15 ENCOUNTER — TELEPHONE (OUTPATIENT)
Dept: INTERNAL MEDICINE | Facility: CLINIC | Age: 86
End: 2021-01-15

## 2021-01-21 ENCOUNTER — PATIENT OUTREACH (OUTPATIENT)
Dept: ADMINISTRATIVE | Facility: OTHER | Age: 86
End: 2021-01-21

## 2021-01-22 ENCOUNTER — TELEPHONE (OUTPATIENT)
Dept: INTERNAL MEDICINE | Facility: CLINIC | Age: 86
End: 2021-01-22

## 2021-01-22 ENCOUNTER — OFFICE VISIT (OUTPATIENT)
Dept: INTERNAL MEDICINE | Facility: CLINIC | Age: 86
End: 2021-01-22
Payer: MEDICARE

## 2021-01-22 ENCOUNTER — LAB VISIT (OUTPATIENT)
Dept: LAB | Facility: HOSPITAL | Age: 86
End: 2021-01-22
Attending: INTERNAL MEDICINE
Payer: MEDICARE

## 2021-01-22 ENCOUNTER — OFFICE VISIT (OUTPATIENT)
Dept: OPHTHALMOLOGY | Facility: CLINIC | Age: 86
End: 2021-01-22
Payer: MEDICARE

## 2021-01-22 VITALS
OXYGEN SATURATION: 100 % | DIASTOLIC BLOOD PRESSURE: 74 MMHG | BODY MASS INDEX: 20.63 KG/M2 | WEIGHT: 120.81 LBS | HEART RATE: 62 BPM | SYSTOLIC BLOOD PRESSURE: 124 MMHG | HEIGHT: 64 IN

## 2021-01-22 DIAGNOSIS — H40.2223 CHRONIC ANGLE-CLOSURE GLAUCOMA, SEVERE STAGE, LEFT: ICD-10-CM

## 2021-01-22 DIAGNOSIS — I10 ESSENTIAL HYPERTENSION: ICD-10-CM

## 2021-01-22 DIAGNOSIS — R26.9 GAIT ABNORMALITY: ICD-10-CM

## 2021-01-22 DIAGNOSIS — J44.9 CHRONIC OBSTRUCTIVE PULMONARY DISEASE, UNSPECIFIED COPD TYPE: ICD-10-CM

## 2021-01-22 DIAGNOSIS — Z72.0 TOBACCO ABUSE: Primary | ICD-10-CM

## 2021-01-22 DIAGNOSIS — H40.1112 PRIMARY OPEN-ANGLE GLAUCOMA, RIGHT EYE, MODERATE STAGE: Primary | ICD-10-CM

## 2021-01-22 DIAGNOSIS — E46 MALNUTRITION, UNSPECIFIED TYPE: ICD-10-CM

## 2021-01-22 DIAGNOSIS — R60.0 BILATERAL LEG EDEMA: ICD-10-CM

## 2021-01-22 DIAGNOSIS — H50.112 EXOTROPIA OF LEFT EYE: ICD-10-CM

## 2021-01-22 DIAGNOSIS — H40.043 STEROID RESPONDER, BILATERAL: ICD-10-CM

## 2021-01-22 DIAGNOSIS — Z96.89 HISTORY OF GLAUCOMA TUBE SHUNT PROCEDURE: ICD-10-CM

## 2021-01-22 DIAGNOSIS — H25.11 SENILE NUCLEAR SCLEROSIS, RIGHT: ICD-10-CM

## 2021-01-22 DIAGNOSIS — H40.89 GLAUCOMA OF LEFT EYE DUE TO COMBINATION OF MECHANISMS: ICD-10-CM

## 2021-01-22 DIAGNOSIS — H21.562 AFFERENT PUPILLARY DEFECT, LEFT: ICD-10-CM

## 2021-01-22 DIAGNOSIS — H21.542 POSTERIOR SYNECHIAE, LEFT: ICD-10-CM

## 2021-01-22 DIAGNOSIS — Z96.1 PSEUDOPHAKIA, LEFT EYE: ICD-10-CM

## 2021-01-22 DIAGNOSIS — I73.9 PERIPHERAL VASCULAR DISEASE, UNSPECIFIED: ICD-10-CM

## 2021-01-22 LAB
ALBUMIN SERPL BCP-MCNC: 2.5 G/DL (ref 3.5–5.2)
ALP SERPL-CCNC: 79 U/L (ref 55–135)
ALT SERPL W/O P-5'-P-CCNC: 14 U/L (ref 10–44)
ANION GAP SERPL CALC-SCNC: 8 MMOL/L (ref 8–16)
AST SERPL-CCNC: 24 U/L (ref 10–40)
BASOPHILS # BLD AUTO: 0.04 K/UL (ref 0–0.2)
BASOPHILS NFR BLD: 0.5 % (ref 0–1.9)
BILIRUB SERPL-MCNC: 0.1 MG/DL (ref 0.1–1)
BUN SERPL-MCNC: 22 MG/DL (ref 8–23)
CALCIUM SERPL-MCNC: 8.8 MG/DL (ref 8.7–10.5)
CHLORIDE SERPL-SCNC: 102 MMOL/L (ref 95–110)
CO2 SERPL-SCNC: 32 MMOL/L (ref 23–29)
CREAT SERPL-MCNC: 0.9 MG/DL (ref 0.5–1.4)
DIFFERENTIAL METHOD: ABNORMAL
EOSINOPHIL # BLD AUTO: 0.1 K/UL (ref 0–0.5)
EOSINOPHIL NFR BLD: 1.9 % (ref 0–8)
ERYTHROCYTE [DISTWIDTH] IN BLOOD BY AUTOMATED COUNT: 15.2 % (ref 11.5–14.5)
EST. GFR  (AFRICAN AMERICAN): >60 ML/MIN/1.73 M^2
EST. GFR  (NON AFRICAN AMERICAN): 58.9 ML/MIN/1.73 M^2
GLUCOSE SERPL-MCNC: 81 MG/DL (ref 70–110)
HCT VFR BLD AUTO: 37.7 % (ref 37–48.5)
HGB BLD-MCNC: 11.5 G/DL (ref 12–16)
IMM GRANULOCYTES # BLD AUTO: 0.02 K/UL (ref 0–0.04)
IMM GRANULOCYTES NFR BLD AUTO: 0.3 % (ref 0–0.5)
LYMPHOCYTES # BLD AUTO: 1.6 K/UL (ref 1–4.8)
LYMPHOCYTES NFR BLD: 21.6 % (ref 18–48)
MCH RBC QN AUTO: 31 PG (ref 27–31)
MCHC RBC AUTO-ENTMCNC: 30.5 G/DL (ref 32–36)
MCV RBC AUTO: 102 FL (ref 82–98)
MONOCYTES # BLD AUTO: 0.7 K/UL (ref 0.3–1)
MONOCYTES NFR BLD: 9.5 % (ref 4–15)
NEUTROPHILS # BLD AUTO: 4.9 K/UL (ref 1.8–7.7)
NEUTROPHILS NFR BLD: 66.2 % (ref 38–73)
NRBC BLD-RTO: 0 /100 WBC
PLATELET # BLD AUTO: 278 K/UL (ref 150–350)
PMV BLD AUTO: 10.5 FL (ref 9.2–12.9)
POTASSIUM SERPL-SCNC: 4 MMOL/L (ref 3.5–5.1)
PROT SERPL-MCNC: 8 G/DL (ref 6–8.4)
RBC # BLD AUTO: 3.71 M/UL (ref 4–5.4)
SODIUM SERPL-SCNC: 142 MMOL/L (ref 136–145)
WBC # BLD AUTO: 7.46 K/UL (ref 3.9–12.7)

## 2021-01-22 PROCEDURE — 1101F PR PT FALLS ASSESS DOC 0-1 FALLS W/OUT INJ PAST YR: ICD-10-PCS | Mod: CPTII,S$GLB,, | Performed by: OPHTHALMOLOGY

## 2021-01-22 PROCEDURE — 1159F PR MEDICATION LIST DOCUMENTED IN MEDICAL RECORD: ICD-10-PCS | Mod: S$GLB,,, | Performed by: INTERNAL MEDICINE

## 2021-01-22 PROCEDURE — 1101F PT FALLS ASSESS-DOCD LE1/YR: CPT | Mod: CPTII,S$GLB,, | Performed by: INTERNAL MEDICINE

## 2021-01-22 PROCEDURE — 3288F PR FALLS RISK ASSESSMENT DOCUMENTED: ICD-10-PCS | Mod: CPTII,S$GLB,, | Performed by: OPHTHALMOLOGY

## 2021-01-22 PROCEDURE — 3074F SYST BP LT 130 MM HG: CPT | Mod: CPTII,S$GLB,, | Performed by: INTERNAL MEDICINE

## 2021-01-22 PROCEDURE — 99999 PR PBB SHADOW E&M-EST. PATIENT-LVL IV: CPT | Mod: PBBFAC,,, | Performed by: INTERNAL MEDICINE

## 2021-01-22 PROCEDURE — 99999 PR PBB SHADOW E&M-EST. PATIENT-LVL III: ICD-10-PCS | Mod: PBBFAC,,, | Performed by: OPHTHALMOLOGY

## 2021-01-22 PROCEDURE — 1126F PR PAIN SEVERITY QUANTIFIED, NO PAIN PRESENT: ICD-10-PCS | Mod: S$GLB,,, | Performed by: OPHTHALMOLOGY

## 2021-01-22 PROCEDURE — 3288F FALL RISK ASSESSMENT DOCD: CPT | Mod: CPTII,S$GLB,, | Performed by: OPHTHALMOLOGY

## 2021-01-22 PROCEDURE — 92012 INTRM OPH EXAM EST PATIENT: CPT | Mod: S$GLB,,, | Performed by: OPHTHALMOLOGY

## 2021-01-22 PROCEDURE — 99214 OFFICE O/P EST MOD 30 MIN: CPT | Mod: S$GLB,,, | Performed by: INTERNAL MEDICINE

## 2021-01-22 PROCEDURE — 1101F PR PT FALLS ASSESS DOC 0-1 FALLS W/OUT INJ PAST YR: ICD-10-PCS | Mod: CPTII,S$GLB,, | Performed by: INTERNAL MEDICINE

## 2021-01-22 PROCEDURE — 99999 PR PBB SHADOW E&M-EST. PATIENT-LVL III: CPT | Mod: PBBFAC,,, | Performed by: OPHTHALMOLOGY

## 2021-01-22 PROCEDURE — 3288F FALL RISK ASSESSMENT DOCD: CPT | Mod: CPTII,S$GLB,, | Performed by: INTERNAL MEDICINE

## 2021-01-22 PROCEDURE — 3078F DIAST BP <80 MM HG: CPT | Mod: CPTII,S$GLB,, | Performed by: INTERNAL MEDICINE

## 2021-01-22 PROCEDURE — 1126F PR PAIN SEVERITY QUANTIFIED, NO PAIN PRESENT: ICD-10-PCS | Mod: S$GLB,,, | Performed by: INTERNAL MEDICINE

## 2021-01-22 PROCEDURE — 1126F AMNT PAIN NOTED NONE PRSNT: CPT | Mod: S$GLB,,, | Performed by: INTERNAL MEDICINE

## 2021-01-22 PROCEDURE — 1159F MED LIST DOCD IN RCRD: CPT | Mod: S$GLB,,, | Performed by: INTERNAL MEDICINE

## 2021-01-22 PROCEDURE — 80053 COMPREHEN METABOLIC PANEL: CPT

## 2021-01-22 PROCEDURE — 3078F PR MOST RECENT DIASTOLIC BLOOD PRESSURE < 80 MM HG: ICD-10-PCS | Mod: CPTII,S$GLB,, | Performed by: INTERNAL MEDICINE

## 2021-01-22 PROCEDURE — 99999 PR PBB SHADOW E&M-EST. PATIENT-LVL IV: ICD-10-PCS | Mod: PBBFAC,,, | Performed by: INTERNAL MEDICINE

## 2021-01-22 PROCEDURE — 92012 PR EYE EXAM, EST PATIENT,INTERMED: ICD-10-PCS | Mod: S$GLB,,, | Performed by: OPHTHALMOLOGY

## 2021-01-22 PROCEDURE — 3288F PR FALLS RISK ASSESSMENT DOCUMENTED: ICD-10-PCS | Mod: CPTII,S$GLB,, | Performed by: INTERNAL MEDICINE

## 2021-01-22 PROCEDURE — 36415 COLL VENOUS BLD VENIPUNCTURE: CPT

## 2021-01-22 PROCEDURE — 85025 COMPLETE CBC W/AUTO DIFF WBC: CPT

## 2021-01-22 PROCEDURE — 99214 PR OFFICE/OUTPT VISIT, EST, LEVL IV, 30-39 MIN: ICD-10-PCS | Mod: S$GLB,,, | Performed by: INTERNAL MEDICINE

## 2021-01-22 PROCEDURE — 1101F PT FALLS ASSESS-DOCD LE1/YR: CPT | Mod: CPTII,S$GLB,, | Performed by: OPHTHALMOLOGY

## 2021-01-22 PROCEDURE — 1126F AMNT PAIN NOTED NONE PRSNT: CPT | Mod: S$GLB,,, | Performed by: OPHTHALMOLOGY

## 2021-01-22 PROCEDURE — 3074F PR MOST RECENT SYSTOLIC BLOOD PRESSURE < 130 MM HG: ICD-10-PCS | Mod: CPTII,S$GLB,, | Performed by: INTERNAL MEDICINE

## 2021-01-22 RX ORDER — BRIMONIDINE TARTRATE 2 MG/ML
1 SOLUTION/ DROPS OPHTHALMIC 3 TIMES DAILY
Qty: 30 ML | Refills: 3 | Status: SHIPPED | OUTPATIENT
Start: 2021-01-22 | End: 2021-10-20

## 2021-01-22 RX ORDER — DORZOLAMIDE HYDROCHLORIDE AND TIMOLOL MALEATE 20; 5 MG/ML; MG/ML
1 SOLUTION/ DROPS OPHTHALMIC 3 TIMES DAILY
Qty: 3 BOTTLE | Refills: 3 | Status: SHIPPED | OUTPATIENT
Start: 2021-01-22 | End: 2022-01-24 | Stop reason: SDUPTHER

## 2021-01-22 RX ORDER — BIMATOPROST 0.1 MG/ML
1 SOLUTION/ DROPS OPHTHALMIC NIGHTLY
Qty: 15 ML | Refills: 3 | Status: SHIPPED | OUTPATIENT
Start: 2021-01-22 | End: 2021-06-08

## 2021-01-22 RX ORDER — PANTOPRAZOLE SODIUM 40 MG/1
40 TABLET, DELAYED RELEASE ORAL DAILY
Qty: 90 TABLET | Refills: 3 | Status: SHIPPED | OUTPATIENT
Start: 2021-01-22 | End: 2021-09-28

## 2021-01-22 RX ORDER — HYDROCHLOROTHIAZIDE 12.5 MG/1
12.5 CAPSULE ORAL DAILY
Qty: 90 CAPSULE | Refills: 3 | Status: SHIPPED | OUTPATIENT
Start: 2021-01-22 | End: 2021-09-28

## 2021-01-28 ENCOUNTER — TELEPHONE (OUTPATIENT)
Dept: INTERNAL MEDICINE | Facility: CLINIC | Age: 86
End: 2021-01-28

## 2021-01-29 ENCOUNTER — TELEPHONE (OUTPATIENT)
Dept: INTERNAL MEDICINE | Facility: CLINIC | Age: 86
End: 2021-01-29

## 2021-01-29 DIAGNOSIS — R60.0 BILATERAL LEG EDEMA: Primary | ICD-10-CM

## 2021-01-31 ENCOUNTER — EXTERNAL CHRONIC CARE MANAGEMENT (OUTPATIENT)
Dept: PRIMARY CARE CLINIC | Facility: CLINIC | Age: 86
End: 2021-01-31
Payer: MEDICARE

## 2021-01-31 PROCEDURE — 99490 CHRNC CARE MGMT STAFF 1ST 20: CPT | Mod: S$GLB,,, | Performed by: INTERNAL MEDICINE

## 2021-01-31 PROCEDURE — 99490 PR CHRONIC CARE MGMT, 1ST 20 MIN: ICD-10-PCS | Mod: S$GLB,,, | Performed by: INTERNAL MEDICINE

## 2021-02-01 ENCOUNTER — TELEPHONE (OUTPATIENT)
Dept: INTERNAL MEDICINE | Facility: CLINIC | Age: 86
End: 2021-02-01

## 2021-02-01 PROCEDURE — G0180 MD CERTIFICATION HHA PATIENT: HCPCS | Mod: ,,, | Performed by: INTERNAL MEDICINE

## 2021-02-01 PROCEDURE — G0180 PR HOME HEALTH MD CERTIFICATION: ICD-10-PCS | Mod: ,,, | Performed by: INTERNAL MEDICINE

## 2021-02-15 ENCOUNTER — DOCUMENT SCAN (OUTPATIENT)
Dept: HOME HEALTH SERVICES | Facility: HOSPITAL | Age: 86
End: 2021-02-15
Payer: MEDICARE

## 2021-02-22 ENCOUNTER — TELEPHONE (OUTPATIENT)
Dept: INTERNAL MEDICINE | Facility: CLINIC | Age: 86
End: 2021-02-22

## 2021-02-24 ENCOUNTER — PES CALL (OUTPATIENT)
Dept: ADMINISTRATIVE | Facility: CLINIC | Age: 86
End: 2021-02-24

## 2021-02-28 ENCOUNTER — EXTERNAL CHRONIC CARE MANAGEMENT (OUTPATIENT)
Dept: PRIMARY CARE CLINIC | Facility: CLINIC | Age: 86
End: 2021-02-28
Payer: MEDICARE

## 2021-02-28 PROCEDURE — 99490 CHRNC CARE MGMT STAFF 1ST 20: CPT | Mod: S$GLB,,, | Performed by: INTERNAL MEDICINE

## 2021-02-28 PROCEDURE — 99490 PR CHRONIC CARE MGMT, 1ST 20 MIN: ICD-10-PCS | Mod: S$GLB,,, | Performed by: INTERNAL MEDICINE

## 2021-03-02 ENCOUNTER — EXTERNAL HOME HEALTH (OUTPATIENT)
Dept: HOME HEALTH SERVICES | Facility: HOSPITAL | Age: 86
End: 2021-03-02
Payer: MEDICARE

## 2021-03-08 ENCOUNTER — DOCUMENT SCAN (OUTPATIENT)
Dept: HOME HEALTH SERVICES | Facility: HOSPITAL | Age: 86
End: 2021-03-08
Payer: MEDICARE

## 2021-03-12 ENCOUNTER — DOCUMENT SCAN (OUTPATIENT)
Dept: HOME HEALTH SERVICES | Facility: HOSPITAL | Age: 86
End: 2021-03-12
Payer: MEDICARE

## 2021-03-22 ENCOUNTER — OFFICE VISIT (OUTPATIENT)
Dept: HOME HEALTH SERVICES | Facility: CLINIC | Age: 86
End: 2021-03-22
Payer: MEDICARE

## 2021-03-22 VITALS
DIASTOLIC BLOOD PRESSURE: 62 MMHG | BODY MASS INDEX: 20.49 KG/M2 | WEIGHT: 120 LBS | TEMPERATURE: 99 F | HEART RATE: 70 BPM | HEIGHT: 64 IN | SYSTOLIC BLOOD PRESSURE: 129 MMHG

## 2021-03-22 DIAGNOSIS — I73.9 PERIPHERAL VASCULAR DISEASE, UNSPECIFIED: ICD-10-CM

## 2021-03-22 DIAGNOSIS — K21.9 GASTROESOPHAGEAL REFLUX DISEASE, UNSPECIFIED WHETHER ESOPHAGITIS PRESENT: ICD-10-CM

## 2021-03-22 DIAGNOSIS — H40.1192 PRIMARY OPEN-ANGLE GLAUCOMA, MODERATE STAGE, UNSPECIFIED LATERALITY: ICD-10-CM

## 2021-03-22 DIAGNOSIS — R26.9 ABNORMALITY OF GAIT AND MOBILITY: ICD-10-CM

## 2021-03-22 DIAGNOSIS — E46 MALNUTRITION, UNSPECIFIED TYPE: ICD-10-CM

## 2021-03-22 DIAGNOSIS — Z00.00 ENCOUNTER FOR PREVENTIVE HEALTH EXAMINATION: Primary | ICD-10-CM

## 2021-03-22 DIAGNOSIS — I70.0 ATHEROSCLEROSIS OF AORTA: ICD-10-CM

## 2021-03-22 DIAGNOSIS — Z99.89 DEPENDENCE ON OTHER ENABLING MACHINES AND DEVICES: ICD-10-CM

## 2021-03-22 DIAGNOSIS — I10 ESSENTIAL HYPERTENSION: ICD-10-CM

## 2021-03-22 DIAGNOSIS — J43.1 PANLOBULAR EMPHYSEMA: ICD-10-CM

## 2021-03-22 DIAGNOSIS — Z74.09 OTHER REDUCED MOBILITY: ICD-10-CM

## 2021-03-22 DIAGNOSIS — Z72.0 TOBACCO ABUSE: ICD-10-CM

## 2021-03-22 DIAGNOSIS — R60.0 BILATERAL LEG EDEMA: ICD-10-CM

## 2021-03-22 PROCEDURE — G0439 PR MEDICARE ANNUAL WELLNESS SUBSEQUENT VISIT: ICD-10-PCS | Mod: S$GLB,,, | Performed by: NURSE PRACTITIONER

## 2021-03-22 PROCEDURE — 1158F ADVNC CARE PLAN TLK DOCD: CPT | Mod: S$GLB,,, | Performed by: NURSE PRACTITIONER

## 2021-03-22 PROCEDURE — 3288F PR FALLS RISK ASSESSMENT DOCUMENTED: ICD-10-PCS | Mod: CPTII,S$GLB,, | Performed by: NURSE PRACTITIONER

## 2021-03-22 PROCEDURE — 99499 UNLISTED E&M SERVICE: CPT | Mod: S$GLB,,, | Performed by: NURSE PRACTITIONER

## 2021-03-22 PROCEDURE — 3078F PR MOST RECENT DIASTOLIC BLOOD PRESSURE < 80 MM HG: ICD-10-PCS | Mod: CPTII,S$GLB,, | Performed by: NURSE PRACTITIONER

## 2021-03-22 PROCEDURE — G0439 PPPS, SUBSEQ VISIT: HCPCS | Mod: S$GLB,,, | Performed by: NURSE PRACTITIONER

## 2021-03-22 PROCEDURE — 3078F DIAST BP <80 MM HG: CPT | Mod: CPTII,S$GLB,, | Performed by: NURSE PRACTITIONER

## 2021-03-22 PROCEDURE — 1158F PR ADVANCE CARE PLANNING DISCUSS DOCUMENTED IN MEDICAL RECORD: ICD-10-PCS | Mod: S$GLB,,, | Performed by: NURSE PRACTITIONER

## 2021-03-22 PROCEDURE — 3074F PR MOST RECENT SYSTOLIC BLOOD PRESSURE < 130 MM HG: ICD-10-PCS | Mod: CPTII,S$GLB,, | Performed by: NURSE PRACTITIONER

## 2021-03-22 PROCEDURE — 3074F SYST BP LT 130 MM HG: CPT | Mod: CPTII,S$GLB,, | Performed by: NURSE PRACTITIONER

## 2021-03-22 PROCEDURE — 1101F PT FALLS ASSESS-DOCD LE1/YR: CPT | Mod: CPTII,S$GLB,, | Performed by: NURSE PRACTITIONER

## 2021-03-22 PROCEDURE — 1101F PR PT FALLS ASSESS DOC 0-1 FALLS W/OUT INJ PAST YR: ICD-10-PCS | Mod: CPTII,S$GLB,, | Performed by: NURSE PRACTITIONER

## 2021-03-22 PROCEDURE — 99499 RISK ADDL DX/OHS AUDIT: ICD-10-PCS | Mod: S$GLB,,, | Performed by: NURSE PRACTITIONER

## 2021-03-22 PROCEDURE — 3288F FALL RISK ASSESSMENT DOCD: CPT | Mod: CPTII,S$GLB,, | Performed by: NURSE PRACTITIONER

## 2021-03-31 ENCOUNTER — EXTERNAL CHRONIC CARE MANAGEMENT (OUTPATIENT)
Dept: PRIMARY CARE CLINIC | Facility: CLINIC | Age: 86
End: 2021-03-31
Payer: MEDICARE

## 2021-03-31 PROCEDURE — 99490 PR CHRONIC CARE MGMT, 1ST 20 MIN: ICD-10-PCS | Mod: S$GLB,,, | Performed by: INTERNAL MEDICINE

## 2021-03-31 PROCEDURE — 99490 CHRNC CARE MGMT STAFF 1ST 20: CPT | Mod: S$GLB,,, | Performed by: INTERNAL MEDICINE

## 2021-04-30 ENCOUNTER — EXTERNAL CHRONIC CARE MANAGEMENT (OUTPATIENT)
Dept: PRIMARY CARE CLINIC | Facility: CLINIC | Age: 86
End: 2021-04-30
Payer: MEDICARE

## 2021-04-30 PROCEDURE — 99490 CHRNC CARE MGMT STAFF 1ST 20: CPT | Mod: S$GLB,,, | Performed by: INTERNAL MEDICINE

## 2021-04-30 PROCEDURE — 99490 PR CHRONIC CARE MGMT, 1ST 20 MIN: ICD-10-PCS | Mod: S$GLB,,, | Performed by: INTERNAL MEDICINE

## 2021-05-17 ENCOUNTER — OFFICE VISIT (OUTPATIENT)
Dept: OPHTHALMOLOGY | Facility: CLINIC | Age: 86
End: 2021-05-17
Payer: MEDICARE

## 2021-05-17 DIAGNOSIS — H40.2223 CHRONIC ANGLE-CLOSURE GLAUCOMA, SEVERE STAGE, LEFT: ICD-10-CM

## 2021-05-17 DIAGNOSIS — H21.542 POSTERIOR SYNECHIAE, LEFT: ICD-10-CM

## 2021-05-17 DIAGNOSIS — H25.11 SENILE NUCLEAR SCLEROSIS, RIGHT: ICD-10-CM

## 2021-05-17 DIAGNOSIS — Z96.89 HISTORY OF GLAUCOMA TUBE SHUNT PROCEDURE: ICD-10-CM

## 2021-05-17 DIAGNOSIS — H50.112 EXOTROPIA OF LEFT EYE: ICD-10-CM

## 2021-05-17 DIAGNOSIS — H40.1112 PRIMARY OPEN-ANGLE GLAUCOMA, RIGHT EYE, MODERATE STAGE: Primary | ICD-10-CM

## 2021-05-17 DIAGNOSIS — H21.562 AFFERENT PUPILLARY DEFECT, LEFT: ICD-10-CM

## 2021-05-17 DIAGNOSIS — Z96.1 PSEUDOPHAKIA, LEFT EYE: ICD-10-CM

## 2021-05-17 DIAGNOSIS — H40.043 STEROID RESPONDER, BILATERAL: ICD-10-CM

## 2021-05-17 PROCEDURE — 99999 PR PBB SHADOW E&M-EST. PATIENT-LVL III: ICD-10-PCS | Mod: PBBFAC,,, | Performed by: OPHTHALMOLOGY

## 2021-05-17 PROCEDURE — 1101F PT FALLS ASSESS-DOCD LE1/YR: CPT | Mod: CPTII,S$GLB,, | Performed by: OPHTHALMOLOGY

## 2021-05-17 PROCEDURE — 3288F PR FALLS RISK ASSESSMENT DOCUMENTED: ICD-10-PCS | Mod: CPTII,S$GLB,, | Performed by: OPHTHALMOLOGY

## 2021-05-17 PROCEDURE — 92012 PR EYE EXAM, EST PATIENT,INTERMED: ICD-10-PCS | Mod: S$GLB,,, | Performed by: OPHTHALMOLOGY

## 2021-05-17 PROCEDURE — 1126F AMNT PAIN NOTED NONE PRSNT: CPT | Mod: S$GLB,,, | Performed by: OPHTHALMOLOGY

## 2021-05-17 PROCEDURE — 1101F PR PT FALLS ASSESS DOC 0-1 FALLS W/OUT INJ PAST YR: ICD-10-PCS | Mod: CPTII,S$GLB,, | Performed by: OPHTHALMOLOGY

## 2021-05-17 PROCEDURE — 1126F PR PAIN SEVERITY QUANTIFIED, NO PAIN PRESENT: ICD-10-PCS | Mod: S$GLB,,, | Performed by: OPHTHALMOLOGY

## 2021-05-17 PROCEDURE — 3288F FALL RISK ASSESSMENT DOCD: CPT | Mod: CPTII,S$GLB,, | Performed by: OPHTHALMOLOGY

## 2021-05-17 PROCEDURE — 99999 PR PBB SHADOW E&M-EST. PATIENT-LVL III: CPT | Mod: PBBFAC,,, | Performed by: OPHTHALMOLOGY

## 2021-05-17 PROCEDURE — 92012 INTRM OPH EXAM EST PATIENT: CPT | Mod: S$GLB,,, | Performed by: OPHTHALMOLOGY

## 2021-05-28 ENCOUNTER — OFFICE VISIT (OUTPATIENT)
Dept: INTERNAL MEDICINE | Facility: CLINIC | Age: 86
End: 2021-05-28
Payer: MEDICARE

## 2021-05-28 VITALS
BODY MASS INDEX: 19.15 KG/M2 | DIASTOLIC BLOOD PRESSURE: 84 MMHG | HEART RATE: 87 BPM | OXYGEN SATURATION: 96 % | SYSTOLIC BLOOD PRESSURE: 104 MMHG | WEIGHT: 112.19 LBS | HEIGHT: 64 IN

## 2021-05-28 DIAGNOSIS — S81.802A OPEN LEG WOUND, LEFT, INITIAL ENCOUNTER: ICD-10-CM

## 2021-05-28 DIAGNOSIS — Z72.0 TOBACCO ABUSE: ICD-10-CM

## 2021-05-28 DIAGNOSIS — I10 ESSENTIAL HYPERTENSION: ICD-10-CM

## 2021-05-28 DIAGNOSIS — R63.4 WEIGHT LOSS: ICD-10-CM

## 2021-05-28 DIAGNOSIS — R60.0 BILATERAL LEG EDEMA: Primary | ICD-10-CM

## 2021-05-28 PROCEDURE — 1159F MED LIST DOCD IN RCRD: CPT | Mod: S$GLB,,, | Performed by: INTERNAL MEDICINE

## 2021-05-28 PROCEDURE — 99214 OFFICE O/P EST MOD 30 MIN: CPT | Mod: S$GLB,,, | Performed by: INTERNAL MEDICINE

## 2021-05-28 PROCEDURE — 99214 PR OFFICE/OUTPT VISIT, EST, LEVL IV, 30-39 MIN: ICD-10-PCS | Mod: S$GLB,,, | Performed by: INTERNAL MEDICINE

## 2021-05-28 PROCEDURE — 1126F PR PAIN SEVERITY QUANTIFIED, NO PAIN PRESENT: ICD-10-PCS | Mod: S$GLB,,, | Performed by: INTERNAL MEDICINE

## 2021-05-28 PROCEDURE — 1101F PT FALLS ASSESS-DOCD LE1/YR: CPT | Mod: CPTII,S$GLB,, | Performed by: INTERNAL MEDICINE

## 2021-05-28 PROCEDURE — 3288F FALL RISK ASSESSMENT DOCD: CPT | Mod: CPTII,S$GLB,, | Performed by: INTERNAL MEDICINE

## 2021-05-28 PROCEDURE — 1159F PR MEDICATION LIST DOCUMENTED IN MEDICAL RECORD: ICD-10-PCS | Mod: S$GLB,,, | Performed by: INTERNAL MEDICINE

## 2021-05-28 PROCEDURE — 1126F AMNT PAIN NOTED NONE PRSNT: CPT | Mod: S$GLB,,, | Performed by: INTERNAL MEDICINE

## 2021-05-28 PROCEDURE — 1101F PR PT FALLS ASSESS DOC 0-1 FALLS W/OUT INJ PAST YR: ICD-10-PCS | Mod: CPTII,S$GLB,, | Performed by: INTERNAL MEDICINE

## 2021-05-28 PROCEDURE — 99999 PR PBB SHADOW E&M-EST. PATIENT-LVL IV: ICD-10-PCS | Mod: PBBFAC,,, | Performed by: INTERNAL MEDICINE

## 2021-05-28 PROCEDURE — 3288F PR FALLS RISK ASSESSMENT DOCUMENTED: ICD-10-PCS | Mod: CPTII,S$GLB,, | Performed by: INTERNAL MEDICINE

## 2021-05-28 PROCEDURE — 99999 PR PBB SHADOW E&M-EST. PATIENT-LVL IV: CPT | Mod: PBBFAC,,, | Performed by: INTERNAL MEDICINE

## 2021-05-28 RX ORDER — NYSTATIN 100000 U/G
CREAM TOPICAL 2 TIMES DAILY
Qty: 30 G | Refills: 2 | Status: ON HOLD | OUTPATIENT
Start: 2021-05-28 | End: 2023-01-01

## 2021-05-31 ENCOUNTER — EXTERNAL CHRONIC CARE MANAGEMENT (OUTPATIENT)
Dept: PRIMARY CARE CLINIC | Facility: CLINIC | Age: 86
End: 2021-05-31
Payer: MEDICARE

## 2021-05-31 PROCEDURE — 99490 CHRNC CARE MGMT STAFF 1ST 20: CPT | Mod: S$GLB,,, | Performed by: INTERNAL MEDICINE

## 2021-05-31 PROCEDURE — 99490 PR CHRONIC CARE MGMT, 1ST 20 MIN: ICD-10-PCS | Mod: S$GLB,,, | Performed by: INTERNAL MEDICINE

## 2021-06-03 PROCEDURE — G0180 PR HOME HEALTH MD CERTIFICATION: ICD-10-PCS | Mod: ,,, | Performed by: INTERNAL MEDICINE

## 2021-06-03 PROCEDURE — G0180 MD CERTIFICATION HHA PATIENT: HCPCS | Mod: ,,, | Performed by: INTERNAL MEDICINE

## 2021-06-30 ENCOUNTER — HOSPITAL ENCOUNTER (OUTPATIENT)
Facility: HOSPITAL | Age: 86
Discharge: HOME | End: 2021-07-02
Attending: EMERGENCY MEDICINE | Admitting: HOSPITALIST
Payer: MEDICARE

## 2021-06-30 ENCOUNTER — EXTERNAL CHRONIC CARE MANAGEMENT (OUTPATIENT)
Dept: PRIMARY CARE CLINIC | Facility: CLINIC | Age: 86
End: 2021-06-30

## 2021-06-30 ENCOUNTER — OFFICE VISIT (OUTPATIENT)
Dept: INTERNAL MEDICINE | Facility: CLINIC | Age: 86
End: 2021-06-30
Payer: MEDICARE

## 2021-06-30 VITALS
SYSTOLIC BLOOD PRESSURE: 112 MMHG | HEART RATE: 79 BPM | OXYGEN SATURATION: 95 % | HEIGHT: 64 IN | DIASTOLIC BLOOD PRESSURE: 68 MMHG | BODY MASS INDEX: 19.26 KG/M2

## 2021-06-30 DIAGNOSIS — L98.499 INFECTED ULCER OF SKIN, UNSPECIFIED ULCER STAGE: Primary | ICD-10-CM

## 2021-06-30 DIAGNOSIS — S81.809D OPEN LEG WOUND, UNSPECIFIED LATERALITY, SUBSEQUENT ENCOUNTER: ICD-10-CM

## 2021-06-30 DIAGNOSIS — I73.9 PERIPHERAL VASCULAR DISEASE, UNSPECIFIED: Chronic | ICD-10-CM

## 2021-06-30 DIAGNOSIS — L97.919 LEG ULCER, RIGHT, WITH UNSPECIFIED SEVERITY: Primary | ICD-10-CM

## 2021-06-30 DIAGNOSIS — R07.9 CHEST PAIN: ICD-10-CM

## 2021-06-30 DIAGNOSIS — L08.9 INFECTED ULCER OF SKIN, UNSPECIFIED ULCER STAGE: Primary | ICD-10-CM

## 2021-06-30 DIAGNOSIS — L03.115 CELLULITIS OF RIGHT ANKLE: ICD-10-CM

## 2021-06-30 DIAGNOSIS — I87.2 CHRONIC VENOUS INSUFFICIENCY: ICD-10-CM

## 2021-06-30 PROBLEM — I70.0 ATHEROSCLEROSIS OF AORTA: Chronic | Status: ACTIVE | Noted: 2021-03-22

## 2021-06-30 PROBLEM — L97.829: Status: ACTIVE | Noted: 2021-05-28

## 2021-06-30 PROBLEM — L97.829: Status: ACTIVE | Noted: 2021-06-30

## 2021-06-30 LAB
ALBUMIN SERPL BCP-MCNC: 2.1 G/DL (ref 3.5–5.2)
ALP SERPL-CCNC: 72 U/L (ref 55–135)
ALT SERPL W/O P-5'-P-CCNC: 10 U/L (ref 10–44)
ANION GAP SERPL CALC-SCNC: 10 MMOL/L (ref 8–16)
AST SERPL-CCNC: 21 U/L (ref 10–40)
BASOPHILS # BLD AUTO: 0.05 K/UL (ref 0–0.2)
BASOPHILS NFR BLD: 0.6 % (ref 0–1.9)
BILIRUB SERPL-MCNC: 0.2 MG/DL (ref 0.1–1)
BUN SERPL-MCNC: 19 MG/DL (ref 8–23)
CALCIUM SERPL-MCNC: 9.4 MG/DL (ref 8.7–10.5)
CHLORIDE SERPL-SCNC: 99 MMOL/L (ref 95–110)
CO2 SERPL-SCNC: 30 MMOL/L (ref 23–29)
CREAT SERPL-MCNC: 0.9 MG/DL (ref 0.5–1.4)
CRP SERPL-MCNC: 121.2 MG/L (ref 0–8.2)
DIFFERENTIAL METHOD: ABNORMAL
EOSINOPHIL # BLD AUTO: 0.1 K/UL (ref 0–0.5)
EOSINOPHIL NFR BLD: 1.6 % (ref 0–8)
ERYTHROCYTE [DISTWIDTH] IN BLOOD BY AUTOMATED COUNT: 15.5 % (ref 11.5–14.5)
ERYTHROCYTE [SEDIMENTATION RATE] IN BLOOD BY WESTERGREN METHOD: >120 MM/HR (ref 0–36)
EST. GFR  (AFRICAN AMERICAN): >60 ML/MIN/1.73 M^2
EST. GFR  (NON AFRICAN AMERICAN): 58.9 ML/MIN/1.73 M^2
GLUCOSE SERPL-MCNC: 98 MG/DL (ref 70–110)
HCT VFR BLD AUTO: 34.3 % (ref 37–48.5)
HGB BLD-MCNC: 11 G/DL (ref 12–16)
IMM GRANULOCYTES # BLD AUTO: 0.03 K/UL (ref 0–0.04)
IMM GRANULOCYTES NFR BLD AUTO: 0.4 % (ref 0–0.5)
LYMPHOCYTES # BLD AUTO: 1.9 K/UL (ref 1–4.8)
LYMPHOCYTES NFR BLD: 22.5 % (ref 18–48)
MCH RBC QN AUTO: 30.1 PG (ref 27–31)
MCHC RBC AUTO-ENTMCNC: 32.1 G/DL (ref 32–36)
MCV RBC AUTO: 94 FL (ref 82–98)
MONOCYTES # BLD AUTO: 0.8 K/UL (ref 0.3–1)
MONOCYTES NFR BLD: 9.7 % (ref 4–15)
NEUTROPHILS # BLD AUTO: 5.6 K/UL (ref 1.8–7.7)
NEUTROPHILS NFR BLD: 65.2 % (ref 38–73)
NRBC BLD-RTO: 0 /100 WBC
PLATELET # BLD AUTO: 307 K/UL (ref 150–450)
PMV BLD AUTO: 9.5 FL (ref 9.2–12.9)
POCT GLUCOSE: 86 MG/DL (ref 70–110)
POTASSIUM SERPL-SCNC: 4.6 MMOL/L (ref 3.5–5.1)
PROT SERPL-MCNC: 8.3 G/DL (ref 6–8.4)
RBC # BLD AUTO: 3.66 M/UL (ref 4–5.4)
SODIUM SERPL-SCNC: 139 MMOL/L (ref 136–145)
WBC # BLD AUTO: 8.57 K/UL (ref 3.9–12.7)

## 2021-06-30 PROCEDURE — 99285 PR EMERGENCY DEPT VISIT,LEVEL V: ICD-10-PCS | Mod: ,,, | Performed by: EMERGENCY MEDICINE

## 2021-06-30 PROCEDURE — 99499 UNLISTED E&M SERVICE: CPT | Mod: S$GLB,,, | Performed by: NURSE PRACTITIONER

## 2021-06-30 PROCEDURE — 1159F PR MEDICATION LIST DOCUMENTED IN MEDICAL RECORD: ICD-10-PCS | Mod: S$GLB,,, | Performed by: NURSE PRACTITIONER

## 2021-06-30 PROCEDURE — 3288F PR FALLS RISK ASSESSMENT DOCUMENTED: ICD-10-PCS | Mod: CPTII,S$GLB,, | Performed by: NURSE PRACTITIONER

## 2021-06-30 PROCEDURE — 1126F AMNT PAIN NOTED NONE PRSNT: CPT | Mod: S$GLB,,, | Performed by: NURSE PRACTITIONER

## 2021-06-30 PROCEDURE — G0378 HOSPITAL OBSERVATION PER HR: HCPCS

## 2021-06-30 PROCEDURE — 99214 OFFICE O/P EST MOD 30 MIN: CPT | Mod: S$GLB,,, | Performed by: NURSE PRACTITIONER

## 2021-06-30 PROCEDURE — 1101F PR PT FALLS ASSESS DOC 0-1 FALLS W/OUT INJ PAST YR: ICD-10-PCS | Mod: CPTII,S$GLB,, | Performed by: NURSE PRACTITIONER

## 2021-06-30 PROCEDURE — 85652 RBC SED RATE AUTOMATED: CPT | Performed by: EMERGENCY MEDICINE

## 2021-06-30 PROCEDURE — 85025 COMPLETE CBC W/AUTO DIFF WBC: CPT | Performed by: EMERGENCY MEDICINE

## 2021-06-30 PROCEDURE — 96376 TX/PRO/DX INJ SAME DRUG ADON: CPT

## 2021-06-30 PROCEDURE — 3288F FALL RISK ASSESSMENT DOCD: CPT | Mod: CPTII,S$GLB,, | Performed by: NURSE PRACTITIONER

## 2021-06-30 PROCEDURE — 25000003 PHARM REV CODE 250: Performed by: EMERGENCY MEDICINE

## 2021-06-30 PROCEDURE — 99285 EMERGENCY DEPT VISIT HI MDM: CPT | Mod: ,,, | Performed by: EMERGENCY MEDICINE

## 2021-06-30 PROCEDURE — 99999 PR PBB SHADOW E&M-EST. PATIENT-LVL IV: CPT | Mod: PBBFAC,,, | Performed by: NURSE PRACTITIONER

## 2021-06-30 PROCEDURE — 1101F PT FALLS ASSESS-DOCD LE1/YR: CPT | Mod: CPTII,S$GLB,, | Performed by: NURSE PRACTITIONER

## 2021-06-30 PROCEDURE — 1159F MED LIST DOCD IN RCRD: CPT | Mod: S$GLB,,, | Performed by: NURSE PRACTITIONER

## 2021-06-30 PROCEDURE — 99220 PR INITIAL OBSERVATION CARE,LEVL III: CPT | Mod: ,,, | Performed by: HOSPITALIST

## 2021-06-30 PROCEDURE — 99203 OFFICE O/P NEW LOW 30 MIN: CPT | Mod: ,,, | Performed by: SURGERY

## 2021-06-30 PROCEDURE — 99499 RISK ADDL DX/OHS AUDIT: ICD-10-PCS | Mod: S$GLB,,, | Performed by: NURSE PRACTITIONER

## 2021-06-30 PROCEDURE — 63600175 PHARM REV CODE 636 W HCPCS: Performed by: EMERGENCY MEDICINE

## 2021-06-30 PROCEDURE — 99490 CHRNC CARE MGMT STAFF 1ST 20: CPT | Mod: S$GLB,,, | Performed by: INTERNAL MEDICINE

## 2021-06-30 PROCEDURE — 96365 THER/PROPH/DIAG IV INF INIT: CPT

## 2021-06-30 PROCEDURE — 99999 PR PBB SHADOW E&M-EST. PATIENT-LVL IV: ICD-10-PCS | Mod: PBBFAC,,, | Performed by: NURSE PRACTITIONER

## 2021-06-30 PROCEDURE — 1126F PR PAIN SEVERITY QUANTIFIED, NO PAIN PRESENT: ICD-10-PCS | Mod: S$GLB,,, | Performed by: NURSE PRACTITIONER

## 2021-06-30 PROCEDURE — 99220 PR INITIAL OBSERVATION CARE,LEVL III: ICD-10-PCS | Mod: ,,, | Performed by: HOSPITALIST

## 2021-06-30 PROCEDURE — 86140 C-REACTIVE PROTEIN: CPT | Performed by: EMERGENCY MEDICINE

## 2021-06-30 PROCEDURE — 99203 PR OFFICE/OUTPT VISIT, NEW, LEVL III, 30-44 MIN: ICD-10-PCS | Mod: ,,, | Performed by: SURGERY

## 2021-06-30 PROCEDURE — 99214 PR OFFICE/OUTPT VISIT, EST, LEVL IV, 30-39 MIN: ICD-10-PCS | Mod: S$GLB,,, | Performed by: NURSE PRACTITIONER

## 2021-06-30 PROCEDURE — 80053 COMPREHEN METABOLIC PANEL: CPT | Performed by: EMERGENCY MEDICINE

## 2021-06-30 PROCEDURE — 25000003 PHARM REV CODE 250: Performed by: HOSPITALIST

## 2021-06-30 PROCEDURE — 99285 EMERGENCY DEPT VISIT HI MDM: CPT | Mod: 25

## 2021-06-30 PROCEDURE — 99490 PR CHRONIC CARE MGMT, 1ST 20 MIN: ICD-10-PCS | Mod: S$GLB,,, | Performed by: INTERNAL MEDICINE

## 2021-06-30 RX ORDER — AMOXICILLIN 250 MG
1 CAPSULE ORAL 2 TIMES DAILY
Status: DISCONTINUED | OUTPATIENT
Start: 2021-06-30 | End: 2021-07-02 | Stop reason: HOSPADM

## 2021-06-30 RX ORDER — BRIMONIDINE TARTRATE 2 MG/ML
1 SOLUTION/ DROPS OPHTHALMIC 3 TIMES DAILY
Status: DISCONTINUED | OUTPATIENT
Start: 2021-06-30 | End: 2021-07-02 | Stop reason: HOSPADM

## 2021-06-30 RX ORDER — ONDANSETRON 2 MG/ML
4 INJECTION INTRAMUSCULAR; INTRAVENOUS EVERY 8 HOURS PRN
Status: DISCONTINUED | OUTPATIENT
Start: 2021-06-30 | End: 2021-07-02 | Stop reason: HOSPADM

## 2021-06-30 RX ORDER — ACETAMINOPHEN 325 MG/1
650 TABLET ORAL EVERY 6 HOURS PRN
Status: DISCONTINUED | OUTPATIENT
Start: 2021-06-30 | End: 2021-07-02 | Stop reason: HOSPADM

## 2021-06-30 RX ORDER — NAPROXEN SODIUM 220 MG/1
81 TABLET, FILM COATED ORAL DAILY
Status: DISCONTINUED | OUTPATIENT
Start: 2021-07-01 | End: 2021-07-02 | Stop reason: HOSPADM

## 2021-06-30 RX ORDER — ACETAMINOPHEN 500 MG
1000 TABLET ORAL EVERY 8 HOURS PRN
Status: DISCONTINUED | OUTPATIENT
Start: 2021-06-30 | End: 2021-07-02 | Stop reason: HOSPADM

## 2021-06-30 RX ORDER — TIMOLOL MALEATE 5 MG/ML
1 SOLUTION/ DROPS OPHTHALMIC 2 TIMES DAILY
Status: DISCONTINUED | OUTPATIENT
Start: 2021-06-30 | End: 2021-07-02 | Stop reason: HOSPADM

## 2021-06-30 RX ORDER — TALC
6 POWDER (GRAM) TOPICAL NIGHTLY PRN
Status: DISCONTINUED | OUTPATIENT
Start: 2021-06-30 | End: 2021-07-02 | Stop reason: HOSPADM

## 2021-06-30 RX ORDER — PANTOPRAZOLE SODIUM 40 MG/1
40 TABLET, DELAYED RELEASE ORAL DAILY
Status: DISCONTINUED | OUTPATIENT
Start: 2021-07-01 | End: 2021-07-02 | Stop reason: HOSPADM

## 2021-06-30 RX ORDER — POLYETHYLENE GLYCOL 3350 17 G/17G
17 POWDER, FOR SOLUTION ORAL 2 TIMES DAILY PRN
Status: DISCONTINUED | OUTPATIENT
Start: 2021-06-30 | End: 2021-07-02 | Stop reason: HOSPADM

## 2021-06-30 RX ORDER — SODIUM CHLORIDE 0.9 % (FLUSH) 0.9 %
10 SYRINGE (ML) INJECTION
Status: DISCONTINUED | OUTPATIENT
Start: 2021-06-30 | End: 2021-07-02 | Stop reason: HOSPADM

## 2021-06-30 RX ORDER — DORZOLAMIDE HCL 20 MG/ML
1 SOLUTION/ DROPS OPHTHALMIC 3 TIMES DAILY
Status: DISCONTINUED | OUTPATIENT
Start: 2021-06-30 | End: 2021-07-02 | Stop reason: HOSPADM

## 2021-06-30 RX ORDER — VANCOMYCIN HCL IN 5 % DEXTROSE 1G/250ML
20 PLASTIC BAG, INJECTION (ML) INTRAVENOUS
Status: COMPLETED | OUTPATIENT
Start: 2021-06-30 | End: 2021-06-30

## 2021-06-30 RX ORDER — HYDROCHLOROTHIAZIDE 12.5 MG/1
12.5 TABLET ORAL DAILY
Status: DISCONTINUED | OUTPATIENT
Start: 2021-07-01 | End: 2021-07-02 | Stop reason: HOSPADM

## 2021-06-30 RX ADMIN — VANCOMYCIN HYDROCHLORIDE 1000 MG: 1 INJECTION, POWDER, LYOPHILIZED, FOR SOLUTION INTRAVENOUS at 03:06

## 2021-06-30 RX ADMIN — ACETAMINOPHEN 1000 MG: 500 TABLET ORAL at 09:06

## 2021-06-30 RX ADMIN — BRIMONIDINE TARTRATE 1 DROP: 2 SOLUTION OPHTHALMIC at 09:06

## 2021-06-30 RX ADMIN — PIPERACILLIN SODIUM AND TAZOBACTAM SODIUM 4.5 G: 4; .5 INJECTION, POWDER, FOR SOLUTION INTRAVENOUS at 02:06

## 2021-06-30 RX ADMIN — PIPERACILLIN SODIUM AND TAZOBACTAM SODIUM 4.5 G: 4; .5 INJECTION, POWDER, FOR SOLUTION INTRAVENOUS at 11:06

## 2021-06-30 RX ADMIN — TIMOLOL MALEATE 1 DROP: 5 SOLUTION OPHTHALMIC at 09:06

## 2021-06-30 RX ADMIN — BIMATOPROST 1 DROP: 0.1 SOLUTION/ DROPS OPHTHALMIC at 09:06

## 2021-06-30 RX ADMIN — DORZOLAMIDE HYDROCHLORIDE 1 DROP: 20 SOLUTION/ DROPS OPHTHALMIC at 09:06

## 2021-06-30 RX ADMIN — DOCUSATE SODIUM 50MG AND SENNOSIDES 8.6MG 1 TABLET: 8.6; 5 TABLET, FILM COATED ORAL at 09:06

## 2021-07-01 ENCOUNTER — EXTERNAL HOME HEALTH (OUTPATIENT)
Dept: HOME HEALTH SERVICES | Facility: HOSPITAL | Age: 86
End: 2021-07-01
Payer: MEDICARE

## 2021-07-01 PROCEDURE — 99205 OFFICE O/P NEW HI 60 MIN: CPT | Mod: ,,, | Performed by: INTERNAL MEDICINE

## 2021-07-01 PROCEDURE — 63600175 PHARM REV CODE 636 W HCPCS: Performed by: HOSPITALIST

## 2021-07-01 PROCEDURE — 99205 PR OFFICE/OUTPT VISIT, NEW, LEVL V, 60-74 MIN: ICD-10-PCS | Mod: ,,, | Performed by: INTERNAL MEDICINE

## 2021-07-01 PROCEDURE — 99226 PR SUBSEQUENT OBSERVATION CARE,LEVEL III: ICD-10-PCS | Mod: ,,, | Performed by: HOSPITALIST

## 2021-07-01 PROCEDURE — 25000003 PHARM REV CODE 250: Performed by: HOSPITALIST

## 2021-07-01 PROCEDURE — G0378 HOSPITAL OBSERVATION PER HR: HCPCS

## 2021-07-01 PROCEDURE — 63600175 PHARM REV CODE 636 W HCPCS: Performed by: PHYSICIAN ASSISTANT

## 2021-07-01 PROCEDURE — 96367 TX/PROPH/DG ADDL SEQ IV INF: CPT

## 2021-07-01 PROCEDURE — 96376 TX/PRO/DX INJ SAME DRUG ADON: CPT

## 2021-07-01 PROCEDURE — 99226 PR SUBSEQUENT OBSERVATION CARE,LEVEL III: CPT | Mod: ,,, | Performed by: HOSPITALIST

## 2021-07-01 RX ADMIN — PIPERACILLIN SODIUM AND TAZOBACTAM SODIUM 4.5 G: 4; .5 INJECTION, POWDER, FOR SOLUTION INTRAVENOUS at 08:07

## 2021-07-01 RX ADMIN — DORZOLAMIDE HYDROCHLORIDE 1 DROP: 20 SOLUTION/ DROPS OPHTHALMIC at 10:07

## 2021-07-01 RX ADMIN — PANTOPRAZOLE SODIUM 40 MG: 40 TABLET, DELAYED RELEASE ORAL at 08:07

## 2021-07-01 RX ADMIN — DORZOLAMIDE HYDROCHLORIDE 1 DROP: 20 SOLUTION/ DROPS OPHTHALMIC at 08:07

## 2021-07-01 RX ADMIN — BRIMONIDINE TARTRATE 1 DROP: 2 SOLUTION OPHTHALMIC at 10:07

## 2021-07-01 RX ADMIN — BIMATOPROST 1 DROP: 0.1 SOLUTION/ DROPS OPHTHALMIC at 08:07

## 2021-07-01 RX ADMIN — CEFTRIAXONE 2 G: 2 INJECTION, SOLUTION INTRAVENOUS at 03:07

## 2021-07-01 RX ADMIN — Medication 1 CAPSULE: at 08:07

## 2021-07-01 RX ADMIN — DORZOLAMIDE HYDROCHLORIDE 1 DROP: 20 SOLUTION/ DROPS OPHTHALMIC at 03:07

## 2021-07-01 RX ADMIN — ASPIRIN 81 MG CHEWABLE TABLET 81 MG: 81 TABLET CHEWABLE at 08:07

## 2021-07-01 RX ADMIN — HYDROCHLOROTHIAZIDE 12.5 MG: 12.5 TABLET ORAL at 01:07

## 2021-07-01 RX ADMIN — BRIMONIDINE TARTRATE 1 DROP: 2 SOLUTION OPHTHALMIC at 08:07

## 2021-07-01 RX ADMIN — TIMOLOL MALEATE 1 DROP: 5 SOLUTION OPHTHALMIC at 08:07

## 2021-07-01 RX ADMIN — TIMOLOL MALEATE 1 DROP: 5 SOLUTION OPHTHALMIC at 10:07

## 2021-07-01 RX ADMIN — DOCUSATE SODIUM 50MG AND SENNOSIDES 8.6MG 1 TABLET: 8.6; 5 TABLET, FILM COATED ORAL at 08:07

## 2021-07-01 RX ADMIN — VANCOMYCIN HYDROCHLORIDE 500 MG: 500 INJECTION, POWDER, LYOPHILIZED, FOR SOLUTION INTRAVENOUS at 05:07

## 2021-07-01 RX ADMIN — BRIMONIDINE TARTRATE 1 DROP: 2 SOLUTION OPHTHALMIC at 03:07

## 2021-07-02 VITALS
HEIGHT: 64 IN | TEMPERATURE: 99 F | OXYGEN SATURATION: 96 % | SYSTOLIC BLOOD PRESSURE: 111 MMHG | DIASTOLIC BLOOD PRESSURE: 57 MMHG | BODY MASS INDEX: 18.78 KG/M2 | WEIGHT: 110 LBS | RESPIRATION RATE: 18 BRPM | HEART RATE: 71 BPM

## 2021-07-02 PROBLEM — E43 SEVERE MALNUTRITION: Status: ACTIVE | Noted: 2021-07-02

## 2021-07-02 LAB
ANION GAP SERPL CALC-SCNC: 16 MMOL/L (ref 8–16)
BUN SERPL-MCNC: 16 MG/DL (ref 8–23)
CALCIUM SERPL-MCNC: 9.3 MG/DL (ref 8.7–10.5)
CHLORIDE SERPL-SCNC: 103 MMOL/L (ref 95–110)
CO2 SERPL-SCNC: 24 MMOL/L (ref 23–29)
CREAT SERPL-MCNC: 0.7 MG/DL (ref 0.5–1.4)
CRP SERPL-MCNC: 69.9 MG/L (ref 0–8.2)
ERYTHROCYTE [DISTWIDTH] IN BLOOD BY AUTOMATED COUNT: 15.3 % (ref 11.5–14.5)
EST. GFR  (AFRICAN AMERICAN): >60 ML/MIN/1.73 M^2
EST. GFR  (NON AFRICAN AMERICAN): >60 ML/MIN/1.73 M^2
GLUCOSE SERPL-MCNC: 57 MG/DL (ref 70–110)
HCT VFR BLD AUTO: 40.9 % (ref 37–48.5)
HGB BLD-MCNC: 12.9 G/DL (ref 12–16)
MCH RBC QN AUTO: 29.1 PG (ref 27–31)
MCHC RBC AUTO-ENTMCNC: 31.5 G/DL (ref 32–36)
MCV RBC AUTO: 92 FL (ref 82–98)
PLATELET # BLD AUTO: 243 K/UL (ref 150–450)
PMV BLD AUTO: 10.5 FL (ref 9.2–12.9)
POTASSIUM SERPL-SCNC: 3.9 MMOL/L (ref 3.5–5.1)
RBC # BLD AUTO: 4.44 M/UL (ref 4–5.4)
SODIUM SERPL-SCNC: 143 MMOL/L (ref 136–145)
VANCOMYCIN TROUGH SERPL-MCNC: 2.7 UG/ML (ref 10–22)
WBC # BLD AUTO: 8.72 K/UL (ref 3.9–12.7)

## 2021-07-02 PROCEDURE — 99214 OFFICE O/P EST MOD 30 MIN: CPT | Mod: ,,, | Performed by: PHYSICIAN ASSISTANT

## 2021-07-02 PROCEDURE — 99217 PR OBSERVATION CARE DISCHARGE: ICD-10-PCS | Mod: ,,, | Performed by: HOSPITALIST

## 2021-07-02 PROCEDURE — 93922 UPR/L XTREMITY ART 2 LEVELS: CPT | Performed by: SURGERY

## 2021-07-02 PROCEDURE — 36415 COLL VENOUS BLD VENIPUNCTURE: CPT | Performed by: HOSPITALIST

## 2021-07-02 PROCEDURE — 63600175 PHARM REV CODE 636 W HCPCS: Performed by: PHYSICIAN ASSISTANT

## 2021-07-02 PROCEDURE — 25000003 PHARM REV CODE 250: Performed by: HOSPITALIST

## 2021-07-02 PROCEDURE — 80202 ASSAY OF VANCOMYCIN: CPT | Performed by: HOSPITALIST

## 2021-07-02 PROCEDURE — 99214 PR OFFICE/OUTPT VISIT, EST, LEVL IV, 30-39 MIN: ICD-10-PCS | Mod: ,,, | Performed by: PHYSICIAN ASSISTANT

## 2021-07-02 PROCEDURE — 85027 COMPLETE CBC AUTOMATED: CPT | Performed by: HOSPITALIST

## 2021-07-02 PROCEDURE — G0378 HOSPITAL OBSERVATION PER HR: HCPCS

## 2021-07-02 PROCEDURE — 99217 PR OBSERVATION CARE DISCHARGE: CPT | Mod: ,,, | Performed by: HOSPITALIST

## 2021-07-02 PROCEDURE — 96366 THER/PROPH/DIAG IV INF ADDON: CPT

## 2021-07-02 PROCEDURE — 86140 C-REACTIVE PROTEIN: CPT | Performed by: HOSPITALIST

## 2021-07-02 PROCEDURE — 63600175 PHARM REV CODE 636 W HCPCS: Performed by: HOSPITALIST

## 2021-07-02 PROCEDURE — 80048 BASIC METABOLIC PNL TOTAL CA: CPT | Performed by: HOSPITALIST

## 2021-07-02 RX ORDER — ACETAMINOPHEN 500 MG
1000 TABLET ORAL EVERY 8 HOURS PRN
Refills: 0 | Status: ON HOLD
Start: 2021-07-02 | End: 2023-01-01

## 2021-07-02 RX ADMIN — CEFTRIAXONE 2 G: 2 INJECTION, SOLUTION INTRAVENOUS at 02:07

## 2021-07-02 RX ADMIN — VANCOMYCIN HYDROCHLORIDE 750 MG: 750 INJECTION, POWDER, LYOPHILIZED, FOR SOLUTION INTRAVENOUS at 04:07

## 2021-07-02 RX ADMIN — PANTOPRAZOLE SODIUM 40 MG: 40 TABLET, DELAYED RELEASE ORAL at 10:07

## 2021-07-02 RX ADMIN — DOCUSATE SODIUM 50MG AND SENNOSIDES 8.6MG 1 TABLET: 8.6; 5 TABLET, FILM COATED ORAL at 10:07

## 2021-07-02 RX ADMIN — Medication 1 CAPSULE: at 10:07

## 2021-07-02 RX ADMIN — HYDROCHLOROTHIAZIDE 12.5 MG: 12.5 TABLET ORAL at 12:07

## 2021-07-02 RX ADMIN — BRIMONIDINE TARTRATE 1 DROP: 2 SOLUTION OPHTHALMIC at 04:07

## 2021-07-02 RX ADMIN — ASPIRIN 81 MG CHEWABLE TABLET 81 MG: 81 TABLET CHEWABLE at 10:07

## 2021-07-02 RX ADMIN — DORZOLAMIDE HYDROCHLORIDE 1 DROP: 20 SOLUTION/ DROPS OPHTHALMIC at 04:07

## 2021-07-02 RX ADMIN — ACETAMINOPHEN 1000 MG: 500 TABLET ORAL at 07:07

## 2021-07-02 RX ADMIN — BRIMONIDINE TARTRATE 1 DROP: 2 SOLUTION OPHTHALMIC at 11:07

## 2021-07-02 RX ADMIN — DORZOLAMIDE HYDROCHLORIDE 1 DROP: 20 SOLUTION/ DROPS OPHTHALMIC at 11:07

## 2021-07-02 RX ADMIN — TIMOLOL MALEATE 1 DROP: 5 SOLUTION OPHTHALMIC at 11:07

## 2021-07-08 ENCOUNTER — TELEPHONE (OUTPATIENT)
Dept: INTERNAL MEDICINE | Facility: CLINIC | Age: 86
End: 2021-07-08

## 2021-07-09 ENCOUNTER — OFFICE VISIT (OUTPATIENT)
Dept: INTERNAL MEDICINE | Facility: CLINIC | Age: 86
End: 2021-07-09
Payer: MEDICARE

## 2021-07-09 VITALS — DIASTOLIC BLOOD PRESSURE: 60 MMHG | SYSTOLIC BLOOD PRESSURE: 110 MMHG

## 2021-07-09 DIAGNOSIS — L97.821: ICD-10-CM

## 2021-07-09 DIAGNOSIS — L97.919 LEG ULCER, RIGHT, WITH UNSPECIFIED SEVERITY: Primary | ICD-10-CM

## 2021-07-09 DIAGNOSIS — I87.8 VENOUS STASIS OF BOTH LOWER EXTREMITIES: Chronic | ICD-10-CM

## 2021-07-09 DIAGNOSIS — Z72.0 TOBACCO ABUSE: Chronic | ICD-10-CM

## 2021-07-09 DIAGNOSIS — E43 SEVERE MALNUTRITION: ICD-10-CM

## 2021-07-09 DIAGNOSIS — I10 ESSENTIAL HYPERTENSION: Chronic | ICD-10-CM

## 2021-07-09 PROBLEM — E46 MALNUTRITION: Status: RESOLVED | Noted: 2021-03-22 | Resolved: 2021-07-09

## 2021-07-09 PROBLEM — L03.115 CELLULITIS OF RIGHT ANKLE: Status: RESOLVED | Noted: 2021-06-30 | Resolved: 2021-07-09

## 2021-07-09 PROCEDURE — 3288F PR FALLS RISK ASSESSMENT DOCUMENTED: ICD-10-PCS | Mod: CPTII,S$GLB,, | Performed by: INTERNAL MEDICINE

## 2021-07-09 PROCEDURE — 99499 RISK ADDL DX/OHS AUDIT: ICD-10-PCS | Mod: S$GLB,,, | Performed by: INTERNAL MEDICINE

## 2021-07-09 PROCEDURE — 1125F PR PAIN SEVERITY QUANTIFIED, PAIN PRESENT: ICD-10-PCS | Mod: S$GLB,,, | Performed by: INTERNAL MEDICINE

## 2021-07-09 PROCEDURE — 1125F AMNT PAIN NOTED PAIN PRSNT: CPT | Mod: S$GLB,,, | Performed by: INTERNAL MEDICINE

## 2021-07-09 PROCEDURE — 99999 PR PBB SHADOW E&M-EST. PATIENT-LVL IV: CPT | Mod: PBBFAC,,, | Performed by: INTERNAL MEDICINE

## 2021-07-09 PROCEDURE — 1101F PT FALLS ASSESS-DOCD LE1/YR: CPT | Mod: CPTII,S$GLB,, | Performed by: INTERNAL MEDICINE

## 2021-07-09 PROCEDURE — 1101F PR PT FALLS ASSESS DOC 0-1 FALLS W/OUT INJ PAST YR: ICD-10-PCS | Mod: CPTII,S$GLB,, | Performed by: INTERNAL MEDICINE

## 2021-07-09 PROCEDURE — 99215 OFFICE O/P EST HI 40 MIN: CPT | Mod: S$GLB,,, | Performed by: INTERNAL MEDICINE

## 2021-07-09 PROCEDURE — 99999 PR PBB SHADOW E&M-EST. PATIENT-LVL IV: ICD-10-PCS | Mod: PBBFAC,,, | Performed by: INTERNAL MEDICINE

## 2021-07-09 PROCEDURE — 99215 PR OFFICE/OUTPT VISIT, EST, LEVL V, 40-54 MIN: ICD-10-PCS | Mod: S$GLB,,, | Performed by: INTERNAL MEDICINE

## 2021-07-09 PROCEDURE — 3288F FALL RISK ASSESSMENT DOCD: CPT | Mod: CPTII,S$GLB,, | Performed by: INTERNAL MEDICINE

## 2021-07-09 PROCEDURE — 99499 UNLISTED E&M SERVICE: CPT | Mod: S$GLB,,, | Performed by: INTERNAL MEDICINE

## 2021-07-12 ENCOUNTER — HOSPITAL ENCOUNTER (OUTPATIENT)
Dept: WOUND CARE | Facility: HOSPITAL | Age: 86
Discharge: HOME | End: 2021-07-12
Attending: HOSPITALIST
Payer: MEDICARE

## 2021-07-12 DIAGNOSIS — L97.919 LEG ULCER, RIGHT, WITH UNSPECIFIED SEVERITY: ICD-10-CM

## 2021-07-12 DIAGNOSIS — I87.393 CHRONIC VENOUS HYPERTENSION (IDIOPATHIC) WITH OTHER COMPLICATIONS OF BILATERAL LOWER EXTREMITY: Primary | ICD-10-CM

## 2021-07-12 DIAGNOSIS — I87.8 VENOUS STASIS OF BOTH LOWER EXTREMITIES: ICD-10-CM

## 2021-07-12 DIAGNOSIS — R60.0 BILATERAL LEG EDEMA: ICD-10-CM

## 2021-07-12 PROCEDURE — 99205 OFFICE O/P NEW HI 60 MIN: CPT

## 2021-07-12 PROCEDURE — 29581 APPL MULTLAYER CMPRN SYS LEG: CPT | Mod: 50

## 2021-07-14 ENCOUNTER — OFFICE VISIT (OUTPATIENT)
Dept: INTERNAL MEDICINE | Facility: CLINIC | Age: 86
End: 2021-07-14
Payer: MEDICARE

## 2021-07-14 VITALS — OXYGEN SATURATION: 95 % | HEART RATE: 87 BPM | SYSTOLIC BLOOD PRESSURE: 118 MMHG | DIASTOLIC BLOOD PRESSURE: 66 MMHG

## 2021-07-14 DIAGNOSIS — R60.0 BILATERAL LEG EDEMA: Primary | ICD-10-CM

## 2021-07-14 DIAGNOSIS — L97.919 LEG ULCER, RIGHT, WITH UNSPECIFIED SEVERITY: ICD-10-CM

## 2021-07-14 DIAGNOSIS — L97.821: ICD-10-CM

## 2021-07-14 DIAGNOSIS — Z72.0 TOBACCO ABUSE: ICD-10-CM

## 2021-07-14 PROCEDURE — 3288F FALL RISK ASSESSMENT DOCD: CPT | Mod: CPTII,S$GLB,, | Performed by: INTERNAL MEDICINE

## 2021-07-14 PROCEDURE — 1101F PR PT FALLS ASSESS DOC 0-1 FALLS W/OUT INJ PAST YR: ICD-10-PCS | Mod: CPTII,S$GLB,, | Performed by: INTERNAL MEDICINE

## 2021-07-14 PROCEDURE — 99214 PR OFFICE/OUTPT VISIT, EST, LEVL IV, 30-39 MIN: ICD-10-PCS | Mod: S$GLB,,, | Performed by: INTERNAL MEDICINE

## 2021-07-14 PROCEDURE — 3074F SYST BP LT 130 MM HG: CPT | Mod: CPTII,S$GLB,, | Performed by: INTERNAL MEDICINE

## 2021-07-14 PROCEDURE — 1101F PT FALLS ASSESS-DOCD LE1/YR: CPT | Mod: CPTII,S$GLB,, | Performed by: INTERNAL MEDICINE

## 2021-07-14 PROCEDURE — 99999 PR PBB SHADOW E&M-EST. PATIENT-LVL II: CPT | Mod: PBBFAC,,, | Performed by: INTERNAL MEDICINE

## 2021-07-14 PROCEDURE — 99999 PR PBB SHADOW E&M-EST. PATIENT-LVL II: ICD-10-PCS | Mod: PBBFAC,,, | Performed by: INTERNAL MEDICINE

## 2021-07-14 PROCEDURE — 3078F PR MOST RECENT DIASTOLIC BLOOD PRESSURE < 80 MM HG: ICD-10-PCS | Mod: CPTII,S$GLB,, | Performed by: INTERNAL MEDICINE

## 2021-07-14 PROCEDURE — 3078F DIAST BP <80 MM HG: CPT | Mod: CPTII,S$GLB,, | Performed by: INTERNAL MEDICINE

## 2021-07-14 PROCEDURE — 3074F PR MOST RECENT SYSTOLIC BLOOD PRESSURE < 130 MM HG: ICD-10-PCS | Mod: CPTII,S$GLB,, | Performed by: INTERNAL MEDICINE

## 2021-07-14 PROCEDURE — 99214 OFFICE O/P EST MOD 30 MIN: CPT | Mod: S$GLB,,, | Performed by: INTERNAL MEDICINE

## 2021-07-14 PROCEDURE — 3288F PR FALLS RISK ASSESSMENT DOCUMENTED: ICD-10-PCS | Mod: CPTII,S$GLB,, | Performed by: INTERNAL MEDICINE

## 2021-07-19 ENCOUNTER — TELEPHONE (OUTPATIENT)
Dept: INTERNAL MEDICINE | Facility: CLINIC | Age: 86
End: 2021-07-19

## 2021-07-19 ENCOUNTER — HOSPITAL ENCOUNTER (OUTPATIENT)
Dept: WOUND CARE | Facility: HOSPITAL | Age: 86
Discharge: HOME | End: 2021-07-19
Attending: PREVENTIVE MEDICINE
Payer: MEDICARE

## 2021-07-19 VITALS
TEMPERATURE: 97 F | BODY MASS INDEX: 18.78 KG/M2 | WEIGHT: 110 LBS | DIASTOLIC BLOOD PRESSURE: 65 MMHG | HEIGHT: 64 IN | HEART RATE: 82 BPM | SYSTOLIC BLOOD PRESSURE: 132 MMHG

## 2021-07-19 DIAGNOSIS — L97.829: ICD-10-CM

## 2021-07-19 DIAGNOSIS — I87.393 CHRONIC VENOUS HYPERTENSION (IDIOPATHIC) WITH OTHER COMPLICATIONS OF BILATERAL LOWER EXTREMITY: Primary | ICD-10-CM

## 2021-07-19 DIAGNOSIS — L97.919 LEG ULCER, RIGHT, WITH UNSPECIFIED SEVERITY: ICD-10-CM

## 2021-07-19 DIAGNOSIS — I87.8 VENOUS STASIS OF BOTH LOWER EXTREMITIES: Chronic | ICD-10-CM

## 2021-07-19 DIAGNOSIS — R60.0 BILATERAL LEG EDEMA: ICD-10-CM

## 2021-07-19 PROCEDURE — 29581 APPL MULTLAYER CMPRN SYS LEG: CPT | Mod: 50

## 2021-07-21 ENCOUNTER — TELEPHONE (OUTPATIENT)
Dept: INTERNAL MEDICINE | Facility: CLINIC | Age: 86
End: 2021-07-21

## 2021-07-22 ENCOUNTER — HOSPITAL ENCOUNTER (OUTPATIENT)
Dept: WOUND CARE | Facility: HOSPITAL | Age: 86
Discharge: HOME | End: 2021-07-22
Attending: HOSPITALIST
Payer: MEDICARE

## 2021-07-22 DIAGNOSIS — I87.8 PHLEBOLITHIASIS: ICD-10-CM

## 2021-07-22 PROCEDURE — 29581 APPL MULTLAYER CMPRN SYS LEG: CPT

## 2021-07-26 ENCOUNTER — HOSPITAL ENCOUNTER (OUTPATIENT)
Dept: WOUND CARE | Facility: HOSPITAL | Age: 86
Discharge: HOME | End: 2021-07-26
Attending: PREVENTIVE MEDICINE
Payer: MEDICARE

## 2021-07-26 VITALS
WEIGHT: 110 LBS | HEIGHT: 64 IN | SYSTOLIC BLOOD PRESSURE: 130 MMHG | BODY MASS INDEX: 18.78 KG/M2 | DIASTOLIC BLOOD PRESSURE: 58 MMHG | TEMPERATURE: 98 F | HEART RATE: 94 BPM

## 2021-07-26 DIAGNOSIS — L97.821: ICD-10-CM

## 2021-07-26 DIAGNOSIS — I87.8 VENOUS STASIS OF BOTH LOWER EXTREMITIES: Chronic | ICD-10-CM

## 2021-07-26 DIAGNOSIS — R60.0 BILATERAL LEG EDEMA: ICD-10-CM

## 2021-07-26 DIAGNOSIS — I87.393 CHRONIC VENOUS HYPERTENSION (IDIOPATHIC) WITH OTHER COMPLICATIONS OF BILATERAL LOWER EXTREMITY: Primary | ICD-10-CM

## 2021-07-26 PROCEDURE — 29581 APPL MULTLAYER CMPRN SYS LEG: CPT

## 2021-08-02 ENCOUNTER — HOSPITAL ENCOUNTER (OUTPATIENT)
Dept: WOUND CARE | Facility: HOSPITAL | Age: 86
Discharge: HOME | End: 2021-08-02
Attending: PREVENTIVE MEDICINE
Payer: MEDICARE

## 2021-08-02 VITALS
WEIGHT: 110 LBS | BODY MASS INDEX: 18.78 KG/M2 | HEIGHT: 64 IN | TEMPERATURE: 97 F | HEART RATE: 100 BPM | DIASTOLIC BLOOD PRESSURE: 60 MMHG | SYSTOLIC BLOOD PRESSURE: 128 MMHG

## 2021-08-02 DIAGNOSIS — I87.8 VENOUS STASIS OF BOTH LOWER EXTREMITIES: ICD-10-CM

## 2021-08-02 DIAGNOSIS — R60.0 BILATERAL LEG EDEMA: ICD-10-CM

## 2021-08-02 DIAGNOSIS — I87.393 CHRONIC VENOUS HYPERTENSION (IDIOPATHIC) WITH OTHER COMPLICATIONS OF BILATERAL LOWER EXTREMITY: Primary | ICD-10-CM

## 2021-08-02 DIAGNOSIS — L97.821: ICD-10-CM

## 2021-08-02 PROCEDURE — 11042 DBRDMT SUBQ TIS 1ST 20SQCM/<: CPT

## 2021-08-02 PROCEDURE — 11045 DBRDMT SUBQ TISS EACH ADDL: CPT

## 2021-08-02 PROCEDURE — 29581 APPL MULTLAYER CMPRN SYS LEG: CPT

## 2021-08-02 PROCEDURE — 27201912 HC WOUND CARE DEBRIDEMENT SUPPLIES

## 2021-08-10 ENCOUNTER — TELEPHONE (OUTPATIENT)
Dept: INTERNAL MEDICINE | Facility: CLINIC | Age: 86
End: 2021-08-10

## 2021-08-16 ENCOUNTER — HOSPITAL ENCOUNTER (OUTPATIENT)
Dept: WOUND CARE | Facility: HOSPITAL | Age: 86
Discharge: HOME | End: 2021-08-16
Attending: PREVENTIVE MEDICINE
Payer: MEDICARE

## 2021-08-16 VITALS
WEIGHT: 110 LBS | HEIGHT: 64 IN | DIASTOLIC BLOOD PRESSURE: 68 MMHG | BODY MASS INDEX: 18.78 KG/M2 | TEMPERATURE: 97 F | HEART RATE: 68 BPM | SYSTOLIC BLOOD PRESSURE: 135 MMHG

## 2021-08-16 DIAGNOSIS — L97.821: ICD-10-CM

## 2021-08-16 DIAGNOSIS — I87.393 CHRONIC VENOUS HYPERTENSION (IDIOPATHIC) WITH OTHER COMPLICATIONS OF BILATERAL LOWER EXTREMITY: Primary | ICD-10-CM

## 2021-08-16 DIAGNOSIS — L97.919 LEG ULCER, RIGHT, WITH UNSPECIFIED SEVERITY: ICD-10-CM

## 2021-08-16 PROCEDURE — 11045 DBRDMT SUBQ TISS EACH ADDL: CPT

## 2021-08-16 PROCEDURE — 27201912 HC WOUND CARE DEBRIDEMENT SUPPLIES

## 2021-08-16 PROCEDURE — 11042 DBRDMT SUBQ TIS 1ST 20SQCM/<: CPT

## 2021-08-16 RX ORDER — GENTAMICIN SULFATE 1 MG/G
CREAM TOPICAL
Qty: 30 G | Refills: 3 | Status: SHIPPED | OUTPATIENT
Start: 2021-08-16 | End: 2021-09-28

## 2021-08-16 RX ORDER — COLLAGENASE SANTYL 250 [ARB'U]/G
OINTMENT TOPICAL
Qty: 30 G | Refills: 3 | Status: SHIPPED | OUTPATIENT
Start: 2021-08-16 | End: 2022-01-01

## 2021-09-10 ENCOUNTER — HOSPITAL ENCOUNTER (OUTPATIENT)
Dept: WOUND CARE | Facility: HOSPITAL | Age: 86
Discharge: HOME | End: 2021-09-10
Attending: PREVENTIVE MEDICINE
Payer: MEDICARE

## 2021-09-10 VITALS
DIASTOLIC BLOOD PRESSURE: 64 MMHG | WEIGHT: 110 LBS | BODY MASS INDEX: 18.78 KG/M2 | SYSTOLIC BLOOD PRESSURE: 127 MMHG | HEIGHT: 64 IN | HEART RATE: 80 BPM | TEMPERATURE: 98 F

## 2021-09-10 DIAGNOSIS — L97.821: ICD-10-CM

## 2021-09-10 DIAGNOSIS — R60.0 BILATERAL LEG EDEMA: ICD-10-CM

## 2021-09-10 DIAGNOSIS — I87.393 CHRONIC VENOUS HYPERTENSION (IDIOPATHIC) WITH OTHER COMPLICATIONS OF BILATERAL LOWER EXTREMITY: Primary | ICD-10-CM

## 2021-09-10 DIAGNOSIS — L97.919 LEG ULCER, RIGHT, WITH UNSPECIFIED SEVERITY: ICD-10-CM

## 2021-09-10 PROCEDURE — 11045 DBRDMT SUBQ TISS EACH ADDL: CPT

## 2021-09-10 PROCEDURE — 27201912 HC WOUND CARE DEBRIDEMENT SUPPLIES

## 2021-09-10 PROCEDURE — 11042 DBRDMT SUBQ TIS 1ST 20SQCM/<: CPT

## 2021-09-10 RX ORDER — DOXYCYCLINE HYCLATE 100 MG
100 TABLET ORAL EVERY 12 HOURS
Qty: 28 TABLET | Refills: 0 | Status: SHIPPED | OUTPATIENT
Start: 2021-09-10 | End: 2021-09-24

## 2021-09-14 ENCOUNTER — HOSPITAL ENCOUNTER (OUTPATIENT)
Dept: WOUND CARE | Facility: HOSPITAL | Age: 86
Discharge: HOME | End: 2021-09-14
Attending: PREVENTIVE MEDICINE
Payer: MEDICARE

## 2021-09-14 DIAGNOSIS — I87.8 PHLEBOLITHIASIS: ICD-10-CM

## 2021-09-14 PROCEDURE — 29581 APPL MULTLAYER CMPRN SYS LEG: CPT

## 2021-09-17 ENCOUNTER — HOSPITAL ENCOUNTER (OUTPATIENT)
Dept: WOUND CARE | Facility: HOSPITAL | Age: 86
Discharge: HOME | End: 2021-09-17
Attending: PREVENTIVE MEDICINE
Payer: MEDICARE

## 2021-09-17 VITALS
HEART RATE: 94 BPM | SYSTOLIC BLOOD PRESSURE: 147 MMHG | DIASTOLIC BLOOD PRESSURE: 77 MMHG | TEMPERATURE: 99 F | HEIGHT: 64 IN | WEIGHT: 110 LBS | BODY MASS INDEX: 18.78 KG/M2

## 2021-09-17 DIAGNOSIS — I87.393 CHRONIC VENOUS HYPERTENSION (IDIOPATHIC) WITH OTHER COMPLICATIONS OF BILATERAL LOWER EXTREMITY: Primary | ICD-10-CM

## 2021-09-17 DIAGNOSIS — R60.0 BILATERAL LEG EDEMA: ICD-10-CM

## 2021-09-17 DIAGNOSIS — L97.919 LEG ULCER, RIGHT, WITH UNSPECIFIED SEVERITY: ICD-10-CM

## 2021-09-17 PROCEDURE — 27201912 HC WOUND CARE DEBRIDEMENT SUPPLIES

## 2021-09-17 PROCEDURE — 11045 DBRDMT SUBQ TISS EACH ADDL: CPT

## 2021-09-17 PROCEDURE — 11042 DBRDMT SUBQ TIS 1ST 20SQCM/<: CPT

## 2021-09-22 ENCOUNTER — HOSPITAL ENCOUNTER (OUTPATIENT)
Dept: WOUND CARE | Facility: HOSPITAL | Age: 86
Discharge: HOME | End: 2021-09-22
Attending: PREVENTIVE MEDICINE
Payer: MEDICARE

## 2021-09-22 DIAGNOSIS — I87.393 CHRONIC VENOUS HYPERTENSION (IDIOPATHIC) WITH OTHER COMPLICATIONS OF BILATERAL LOWER EXTREMITY: Primary | ICD-10-CM

## 2021-09-22 PROCEDURE — 29581 APPL MULTLAYER CMPRN SYS LEG: CPT

## 2021-09-24 ENCOUNTER — HOSPITAL ENCOUNTER (OUTPATIENT)
Dept: WOUND CARE | Facility: HOSPITAL | Age: 86
Discharge: HOME | End: 2021-09-24
Attending: PREVENTIVE MEDICINE
Payer: MEDICARE

## 2021-09-24 VITALS
HEIGHT: 64 IN | WEIGHT: 110 LBS | BODY MASS INDEX: 18.78 KG/M2 | HEART RATE: 80 BPM | SYSTOLIC BLOOD PRESSURE: 150 MMHG | DIASTOLIC BLOOD PRESSURE: 79 MMHG | TEMPERATURE: 97 F

## 2021-09-24 DIAGNOSIS — L97.512 ULCER OF RIGHT FOOT WITH FAT LAYER EXPOSED: ICD-10-CM

## 2021-09-24 DIAGNOSIS — I87.393 CHRONIC VENOUS HYPERTENSION (IDIOPATHIC) WITH OTHER COMPLICATIONS OF BILATERAL LOWER EXTREMITY: Primary | ICD-10-CM

## 2021-09-24 DIAGNOSIS — R60.0 BILATERAL LEG EDEMA: ICD-10-CM

## 2021-09-24 DIAGNOSIS — S90.425A TOE BLISTER WITHOUT INFECTION, LEFT, INITIAL ENCOUNTER: ICD-10-CM

## 2021-09-24 DIAGNOSIS — L89.152 PRESSURE INJURY OF SACRAL REGION, STAGE 2: ICD-10-CM

## 2021-09-24 PROCEDURE — 87076 CULTURE ANAEROBE IDENT EACH: CPT | Performed by: PREVENTIVE MEDICINE

## 2021-09-24 PROCEDURE — 11042 DBRDMT SUBQ TIS 1ST 20SQCM/<: CPT

## 2021-09-24 PROCEDURE — 27201912 HC WOUND CARE DEBRIDEMENT SUPPLIES

## 2021-09-24 PROCEDURE — 87186 SC STD MICRODIL/AGAR DIL: CPT | Performed by: PREVENTIVE MEDICINE

## 2021-09-24 PROCEDURE — 87075 CULTR BACTERIA EXCEPT BLOOD: CPT | Performed by: PREVENTIVE MEDICINE

## 2021-09-24 PROCEDURE — 87077 CULTURE AEROBIC IDENTIFY: CPT | Performed by: PREVENTIVE MEDICINE

## 2021-09-24 PROCEDURE — 87070 CULTURE OTHR SPECIMN AEROBIC: CPT | Performed by: PREVENTIVE MEDICINE

## 2021-09-24 PROCEDURE — 11045 DBRDMT SUBQ TISS EACH ADDL: CPT

## 2021-09-27 LAB — BACTERIA SPEC AEROBE CULT: ABNORMAL

## 2021-09-28 ENCOUNTER — HOSPITAL ENCOUNTER (OUTPATIENT)
Dept: WOUND CARE | Facility: HOSPITAL | Age: 86
Discharge: HOME | End: 2021-09-28
Attending: PREVENTIVE MEDICINE
Payer: MEDICARE

## 2021-09-28 ENCOUNTER — OFFICE VISIT (OUTPATIENT)
Dept: INTERNAL MEDICINE | Facility: CLINIC | Age: 86
End: 2021-09-28
Payer: MEDICARE

## 2021-09-28 ENCOUNTER — IMMUNIZATION (OUTPATIENT)
Dept: INTERNAL MEDICINE | Facility: CLINIC | Age: 86
End: 2021-09-28
Payer: MEDICARE

## 2021-09-28 ENCOUNTER — OFFICE VISIT (OUTPATIENT)
Dept: OPHTHALMOLOGY | Facility: CLINIC | Age: 86
End: 2021-09-28
Payer: MEDICARE

## 2021-09-28 VITALS
SYSTOLIC BLOOD PRESSURE: 123 MMHG | DIASTOLIC BLOOD PRESSURE: 68 MMHG | HEIGHT: 64 IN | BODY MASS INDEX: 18.88 KG/M2 | HEART RATE: 73 BPM | OXYGEN SATURATION: 97 %

## 2021-09-28 DIAGNOSIS — I87.8 VENOUS STASIS OF BOTH LOWER EXTREMITIES: Chronic | ICD-10-CM

## 2021-09-28 DIAGNOSIS — Z96.1 PSEUDOPHAKIA, LEFT EYE: ICD-10-CM

## 2021-09-28 DIAGNOSIS — H40.2223 CHRONIC ANGLE-CLOSURE GLAUCOMA, SEVERE STAGE, LEFT: ICD-10-CM

## 2021-09-28 DIAGNOSIS — Z72.0 TOBACCO ABUSE: Chronic | ICD-10-CM

## 2021-09-28 DIAGNOSIS — I70.0 ATHEROSCLEROSIS OF AORTA: Chronic | ICD-10-CM

## 2021-09-28 DIAGNOSIS — H50.112 EXOTROPIA OF LEFT EYE: ICD-10-CM

## 2021-09-28 DIAGNOSIS — I87.393 CHRONIC VENOUS HYPERTENSION (IDIOPATHIC) WITH OTHER COMPLICATIONS OF BILATERAL LOWER EXTREMITY: Primary | ICD-10-CM

## 2021-09-28 DIAGNOSIS — H21.542 POSTERIOR SYNECHIAE, LEFT: ICD-10-CM

## 2021-09-28 DIAGNOSIS — R91.1 PULMONARY NODULE: ICD-10-CM

## 2021-09-28 DIAGNOSIS — R60.0 BILATERAL LEG EDEMA: ICD-10-CM

## 2021-09-28 DIAGNOSIS — H25.11 SENILE NUCLEAR SCLEROSIS, RIGHT: ICD-10-CM

## 2021-09-28 DIAGNOSIS — E43 SEVERE MALNUTRITION: ICD-10-CM

## 2021-09-28 DIAGNOSIS — H40.1112 PRIMARY OPEN-ANGLE GLAUCOMA, RIGHT EYE, MODERATE STAGE: Primary | ICD-10-CM

## 2021-09-28 DIAGNOSIS — H40.043 STEROID RESPONDER, BILATERAL: ICD-10-CM

## 2021-09-28 DIAGNOSIS — J43.1 PANLOBULAR EMPHYSEMA: Primary | Chronic | ICD-10-CM

## 2021-09-28 PROBLEM — L97.919 LEG ULCER, RIGHT, WITH UNSPECIFIED SEVERITY: Status: RESOLVED | Noted: 2021-06-30 | Resolved: 2021-09-28

## 2021-09-28 PROCEDURE — 1160F PR REVIEW ALL MEDS BY PRESCRIBER/CLIN PHARMACIST DOCUMENTED: ICD-10-PCS | Mod: CPTII,S$GLB,, | Performed by: OPHTHALMOLOGY

## 2021-09-28 PROCEDURE — G0008 FLU VACCINE - QUADRIVALENT - ADJUVANTED: ICD-10-PCS | Mod: S$GLB,,, | Performed by: INTERNAL MEDICINE

## 2021-09-28 PROCEDURE — 3078F PR MOST RECENT DIASTOLIC BLOOD PRESSURE < 80 MM HG: ICD-10-PCS | Mod: CPTII,S$GLB,, | Performed by: INTERNAL MEDICINE

## 2021-09-28 PROCEDURE — 3074F SYST BP LT 130 MM HG: CPT | Mod: CPTII,S$GLB,, | Performed by: INTERNAL MEDICINE

## 2021-09-28 PROCEDURE — 3288F PR FALLS RISK ASSESSMENT DOCUMENTED: ICD-10-PCS | Mod: CPTII,S$GLB,, | Performed by: OPHTHALMOLOGY

## 2021-09-28 PROCEDURE — 99214 OFFICE O/P EST MOD 30 MIN: CPT | Mod: S$GLB,,, | Performed by: INTERNAL MEDICINE

## 2021-09-28 PROCEDURE — 90694 VACC AIIV4 NO PRSRV 0.5ML IM: CPT | Mod: S$GLB,,, | Performed by: INTERNAL MEDICINE

## 2021-09-28 PROCEDURE — 1159F PR MEDICATION LIST DOCUMENTED IN MEDICAL RECORD: ICD-10-PCS | Mod: CPTII,S$GLB,, | Performed by: INTERNAL MEDICINE

## 2021-09-28 PROCEDURE — 1159F MED LIST DOCD IN RCRD: CPT | Mod: CPTII,S$GLB,, | Performed by: INTERNAL MEDICINE

## 2021-09-28 PROCEDURE — 99999 PR PBB SHADOW E&M-EST. PATIENT-LVL III: ICD-10-PCS | Mod: PBBFAC,,, | Performed by: OPHTHALMOLOGY

## 2021-09-28 PROCEDURE — 92012 INTRM OPH EXAM EST PATIENT: CPT | Mod: S$GLB,,, | Performed by: OPHTHALMOLOGY

## 2021-09-28 PROCEDURE — 3074F PR MOST RECENT SYSTOLIC BLOOD PRESSURE < 130 MM HG: ICD-10-PCS | Mod: CPTII,S$GLB,, | Performed by: INTERNAL MEDICINE

## 2021-09-28 PROCEDURE — 99999 PR PBB SHADOW E&M-EST. PATIENT-LVL III: CPT | Mod: PBBFAC,,, | Performed by: OPHTHALMOLOGY

## 2021-09-28 PROCEDURE — 92012 PR EYE EXAM, EST PATIENT,INTERMED: ICD-10-PCS | Mod: S$GLB,,, | Performed by: OPHTHALMOLOGY

## 2021-09-28 PROCEDURE — 99214 PR OFFICE/OUTPT VISIT, EST, LEVL IV, 30-39 MIN: ICD-10-PCS | Mod: S$GLB,,, | Performed by: INTERNAL MEDICINE

## 2021-09-28 PROCEDURE — 29581 APPL MULTLAYER CMPRN SYS LEG: CPT

## 2021-09-28 PROCEDURE — 1126F PR PAIN SEVERITY QUANTIFIED, NO PAIN PRESENT: ICD-10-PCS | Mod: CPTII,S$GLB,, | Performed by: INTERNAL MEDICINE

## 2021-09-28 PROCEDURE — 3288F FALL RISK ASSESSMENT DOCD: CPT | Mod: CPTII,S$GLB,, | Performed by: INTERNAL MEDICINE

## 2021-09-28 PROCEDURE — 1101F PT FALLS ASSESS-DOCD LE1/YR: CPT | Mod: CPTII,S$GLB,, | Performed by: INTERNAL MEDICINE

## 2021-09-28 PROCEDURE — 1126F PR PAIN SEVERITY QUANTIFIED, NO PAIN PRESENT: ICD-10-PCS | Mod: CPTII,S$GLB,, | Performed by: OPHTHALMOLOGY

## 2021-09-28 PROCEDURE — G0008 ADMIN INFLUENZA VIRUS VAC: HCPCS | Mod: S$GLB,,, | Performed by: INTERNAL MEDICINE

## 2021-09-28 PROCEDURE — 1159F PR MEDICATION LIST DOCUMENTED IN MEDICAL RECORD: ICD-10-PCS | Mod: CPTII,S$GLB,, | Performed by: OPHTHALMOLOGY

## 2021-09-28 PROCEDURE — 3288F FALL RISK ASSESSMENT DOCD: CPT | Mod: CPTII,S$GLB,, | Performed by: OPHTHALMOLOGY

## 2021-09-28 PROCEDURE — 3078F DIAST BP <80 MM HG: CPT | Mod: CPTII,S$GLB,, | Performed by: INTERNAL MEDICINE

## 2021-09-28 PROCEDURE — 1101F PT FALLS ASSESS-DOCD LE1/YR: CPT | Mod: CPTII,S$GLB,, | Performed by: OPHTHALMOLOGY

## 2021-09-28 PROCEDURE — 99999 PR PBB SHADOW E&M-EST. PATIENT-LVL III: CPT | Mod: PBBFAC,,, | Performed by: INTERNAL MEDICINE

## 2021-09-28 PROCEDURE — 1159F MED LIST DOCD IN RCRD: CPT | Mod: CPTII,S$GLB,, | Performed by: OPHTHALMOLOGY

## 2021-09-28 PROCEDURE — 1160F RVW MEDS BY RX/DR IN RCRD: CPT | Mod: CPTII,S$GLB,, | Performed by: OPHTHALMOLOGY

## 2021-09-28 PROCEDURE — 1101F PR PT FALLS ASSESS DOC 0-1 FALLS W/OUT INJ PAST YR: ICD-10-PCS | Mod: CPTII,S$GLB,, | Performed by: INTERNAL MEDICINE

## 2021-09-28 PROCEDURE — 3288F PR FALLS RISK ASSESSMENT DOCUMENTED: ICD-10-PCS | Mod: CPTII,S$GLB,, | Performed by: INTERNAL MEDICINE

## 2021-09-28 PROCEDURE — 1126F AMNT PAIN NOTED NONE PRSNT: CPT | Mod: CPTII,S$GLB,, | Performed by: INTERNAL MEDICINE

## 2021-09-28 PROCEDURE — 90694 FLU VACCINE - QUADRIVALENT - ADJUVANTED: ICD-10-PCS | Mod: S$GLB,,, | Performed by: INTERNAL MEDICINE

## 2021-09-28 PROCEDURE — 1101F PR PT FALLS ASSESS DOC 0-1 FALLS W/OUT INJ PAST YR: ICD-10-PCS | Mod: CPTII,S$GLB,, | Performed by: OPHTHALMOLOGY

## 2021-09-28 PROCEDURE — 99999 PR PBB SHADOW E&M-EST. PATIENT-LVL III: ICD-10-PCS | Mod: PBBFAC,,, | Performed by: INTERNAL MEDICINE

## 2021-09-28 PROCEDURE — 1126F AMNT PAIN NOTED NONE PRSNT: CPT | Mod: CPTII,S$GLB,, | Performed by: OPHTHALMOLOGY

## 2021-09-30 LAB — BACTERIA SPEC ANAEROBE CULT: NORMAL

## 2021-10-01 ENCOUNTER — HOSPITAL ENCOUNTER (OUTPATIENT)
Dept: WOUND CARE | Facility: HOSPITAL | Age: 86
Discharge: HOME | End: 2021-10-01
Attending: PREVENTIVE MEDICINE
Payer: MEDICARE

## 2021-10-01 VITALS — HEART RATE: 80 BPM | DIASTOLIC BLOOD PRESSURE: 73 MMHG | SYSTOLIC BLOOD PRESSURE: 139 MMHG | TEMPERATURE: 98 F

## 2021-10-01 DIAGNOSIS — I87.393 CHRONIC VENOUS HYPERTENSION (IDIOPATHIC) WITH OTHER COMPLICATIONS OF BILATERAL LOWER EXTREMITY: Primary | ICD-10-CM

## 2021-10-01 DIAGNOSIS — L97.512 ULCER OF RIGHT FOOT WITH FAT LAYER EXPOSED: ICD-10-CM

## 2021-10-01 DIAGNOSIS — R60.0 BILATERAL LEG EDEMA: ICD-10-CM

## 2021-10-01 PROCEDURE — 29581 APPL MULTLAYER CMPRN SYS LEG: CPT

## 2021-10-01 RX ORDER — CEPHALEXIN 500 MG/1
500 CAPSULE ORAL EVERY 6 HOURS
Qty: 56 CAPSULE | Refills: 0 | Status: SHIPPED | OUTPATIENT
Start: 2021-10-01 | End: 2021-10-15

## 2021-10-05 ENCOUNTER — HOSPITAL ENCOUNTER (OUTPATIENT)
Dept: WOUND CARE | Facility: HOSPITAL | Age: 86
Discharge: HOME | End: 2021-10-05
Attending: PREVENTIVE MEDICINE
Payer: MEDICARE

## 2021-10-05 DIAGNOSIS — L97.512 ULCER OF RIGHT FOOT WITH FAT LAYER EXPOSED: Primary | ICD-10-CM

## 2021-10-05 PROCEDURE — 29581 APPL MULTLAYER CMPRN SYS LEG: CPT | Mod: RT

## 2021-10-08 ENCOUNTER — HOSPITAL ENCOUNTER (OUTPATIENT)
Dept: WOUND CARE | Facility: HOSPITAL | Age: 86
Discharge: HOME | End: 2021-10-08
Attending: PREVENTIVE MEDICINE
Payer: MEDICARE

## 2021-10-08 VITALS
DIASTOLIC BLOOD PRESSURE: 62 MMHG | TEMPERATURE: 98 F | BODY MASS INDEX: 18.78 KG/M2 | HEIGHT: 64 IN | SYSTOLIC BLOOD PRESSURE: 140 MMHG | HEART RATE: 86 BPM | WEIGHT: 110 LBS

## 2021-10-08 DIAGNOSIS — Q84.5 ENLARGED AND HYPERTROPHIC NAILS: ICD-10-CM

## 2021-10-08 DIAGNOSIS — S90.425A TOE BLISTER WITHOUT INFECTION, LEFT, INITIAL ENCOUNTER: ICD-10-CM

## 2021-10-08 DIAGNOSIS — L89.152 PRESSURE INJURY OF SACRAL REGION, STAGE 2: ICD-10-CM

## 2021-10-08 DIAGNOSIS — L97.512 ULCER OF RIGHT FOOT WITH FAT LAYER EXPOSED: Primary | ICD-10-CM

## 2021-10-08 DIAGNOSIS — I87.393 CHRONIC VENOUS HYPERTENSION (IDIOPATHIC) WITH OTHER COMPLICATIONS OF BILATERAL LOWER EXTREMITY: ICD-10-CM

## 2021-10-08 PROCEDURE — 11719 TRIM NAIL(S) ANY NUMBER: CPT

## 2021-10-08 PROCEDURE — 11042 DBRDMT SUBQ TIS 1ST 20SQCM/<: CPT

## 2021-10-08 PROCEDURE — 11721 DEBRIDE NAIL 6 OR MORE: CPT

## 2021-10-08 PROCEDURE — 27201912 HC WOUND CARE DEBRIDEMENT SUPPLIES

## 2021-10-08 PROCEDURE — 29581 APPL MULTLAYER CMPRN SYS LEG: CPT | Mod: RT

## 2021-10-12 ENCOUNTER — HOSPITAL ENCOUNTER (OUTPATIENT)
Dept: WOUND CARE | Facility: HOSPITAL | Age: 86
Discharge: HOME | End: 2021-10-12
Attending: PREVENTIVE MEDICINE
Payer: MEDICARE

## 2021-10-12 DIAGNOSIS — I87.393 CHRONIC VENOUS HYPERTENSION (IDIOPATHIC) WITH OTHER COMPLICATIONS OF BILATERAL LOWER EXTREMITY: Primary | ICD-10-CM

## 2021-10-12 DIAGNOSIS — L97.512 ULCER OF RIGHT FOOT WITH FAT LAYER EXPOSED: ICD-10-CM

## 2021-10-12 DIAGNOSIS — R60.0 BILATERAL LEG EDEMA: ICD-10-CM

## 2021-10-12 PROCEDURE — 29581 APPL MULTLAYER CMPRN SYS LEG: CPT

## 2021-10-15 ENCOUNTER — HOSPITAL ENCOUNTER (OUTPATIENT)
Dept: WOUND CARE | Facility: HOSPITAL | Age: 86
Discharge: HOME | End: 2021-10-15
Attending: PREVENTIVE MEDICINE
Payer: MEDICARE

## 2021-10-15 DIAGNOSIS — L89.152 PRESSURE INJURY OF SACRAL REGION, STAGE 2: ICD-10-CM

## 2021-10-15 DIAGNOSIS — R60.0 BILATERAL LEG EDEMA: ICD-10-CM

## 2021-10-15 DIAGNOSIS — I87.393 CHRONIC VENOUS HYPERTENSION (IDIOPATHIC) WITH OTHER COMPLICATIONS OF BILATERAL LOWER EXTREMITY: Primary | ICD-10-CM

## 2021-10-15 DIAGNOSIS — L97.512 ULCER OF RIGHT FOOT WITH FAT LAYER EXPOSED: ICD-10-CM

## 2021-10-15 PROCEDURE — 29581 APPL MULTLAYER CMPRN SYS LEG: CPT

## 2021-10-19 ENCOUNTER — HOSPITAL ENCOUNTER (OUTPATIENT)
Dept: WOUND CARE | Facility: HOSPITAL | Age: 86
Discharge: HOME | End: 2021-10-19
Attending: PREVENTIVE MEDICINE
Payer: MEDICARE

## 2021-10-19 DIAGNOSIS — L89.152 PRESSURE INJURY OF SACRAL REGION, STAGE 2: ICD-10-CM

## 2021-10-19 DIAGNOSIS — I87.393 CHRONIC VENOUS HYPERTENSION (IDIOPATHIC) WITH OTHER COMPLICATIONS OF BILATERAL LOWER EXTREMITY: Primary | ICD-10-CM

## 2021-10-19 DIAGNOSIS — R60.0 BILATERAL LEG EDEMA: ICD-10-CM

## 2021-10-19 DIAGNOSIS — L97.512 ULCER OF RIGHT FOOT WITH FAT LAYER EXPOSED: ICD-10-CM

## 2021-10-19 PROCEDURE — 29581 APPL MULTLAYER CMPRN SYS LEG: CPT | Mod: 50

## 2021-10-19 PROCEDURE — 29580 STRAPPING UNNA BOOT: CPT

## 2021-10-22 ENCOUNTER — HOSPITAL ENCOUNTER (OUTPATIENT)
Dept: WOUND CARE | Facility: HOSPITAL | Age: 86
Discharge: HOME | End: 2021-10-22
Attending: PREVENTIVE MEDICINE
Payer: MEDICARE

## 2021-10-22 VITALS
TEMPERATURE: 99 F | HEART RATE: 85 BPM | HEIGHT: 64 IN | BODY MASS INDEX: 18.78 KG/M2 | SYSTOLIC BLOOD PRESSURE: 125 MMHG | DIASTOLIC BLOOD PRESSURE: 74 MMHG | WEIGHT: 110 LBS

## 2021-10-22 DIAGNOSIS — L97.512 ULCER OF RIGHT FOOT WITH FAT LAYER EXPOSED: ICD-10-CM

## 2021-10-22 DIAGNOSIS — I87.8 VENOUS STASIS OF BOTH LOWER EXTREMITIES: ICD-10-CM

## 2021-10-22 DIAGNOSIS — R60.0 BILATERAL LEG EDEMA: ICD-10-CM

## 2021-10-22 DIAGNOSIS — L89.152 PRESSURE INJURY OF SACRAL REGION, STAGE 2: ICD-10-CM

## 2021-10-22 DIAGNOSIS — I87.393 CHRONIC VENOUS HYPERTENSION (IDIOPATHIC) WITH OTHER COMPLICATIONS OF BILATERAL LOWER EXTREMITY: Primary | ICD-10-CM

## 2021-10-22 PROCEDURE — 29581 APPL MULTLAYER CMPRN SYS LEG: CPT | Mod: 50

## 2021-10-25 ENCOUNTER — HOSPITAL ENCOUNTER (OUTPATIENT)
Dept: WOUND CARE | Facility: HOSPITAL | Age: 86
Discharge: HOME | End: 2021-10-25
Attending: PREVENTIVE MEDICINE
Payer: MEDICARE

## 2021-10-25 DIAGNOSIS — I87.393 CHRONIC VENOUS HYPERTENSION (IDIOPATHIC) WITH OTHER COMPLICATIONS OF BILATERAL LOWER EXTREMITY: ICD-10-CM

## 2021-10-25 DIAGNOSIS — I87.8 VENOUS STASIS OF BOTH LOWER EXTREMITIES: Primary | Chronic | ICD-10-CM

## 2021-10-25 DIAGNOSIS — L97.512 ULCER OF RIGHT FOOT WITH FAT LAYER EXPOSED: ICD-10-CM

## 2021-10-25 PROCEDURE — 29581 APPL MULTLAYER CMPRN SYS LEG: CPT | Mod: 50

## 2021-11-01 ENCOUNTER — HOSPITAL ENCOUNTER (OUTPATIENT)
Dept: WOUND CARE | Facility: HOSPITAL | Age: 86
Discharge: HOME | End: 2021-11-01
Attending: PREVENTIVE MEDICINE
Payer: MEDICARE

## 2021-11-01 VITALS
SYSTOLIC BLOOD PRESSURE: 154 MMHG | BODY MASS INDEX: 18.78 KG/M2 | HEIGHT: 64 IN | WEIGHT: 110 LBS | HEART RATE: 75 BPM | TEMPERATURE: 98 F | DIASTOLIC BLOOD PRESSURE: 69 MMHG

## 2021-11-01 DIAGNOSIS — I87.393 CHRONIC VENOUS HYPERTENSION (IDIOPATHIC) WITH OTHER COMPLICATIONS OF BILATERAL LOWER EXTREMITY: Primary | ICD-10-CM

## 2021-11-01 DIAGNOSIS — I87.8 VENOUS STASIS OF BOTH LOWER EXTREMITIES: ICD-10-CM

## 2021-11-01 DIAGNOSIS — L97.512 ULCER OF RIGHT FOOT WITH FAT LAYER EXPOSED: ICD-10-CM

## 2021-11-01 PROCEDURE — 27201912 HC WOUND CARE DEBRIDEMENT SUPPLIES

## 2021-11-01 PROCEDURE — 29581 APPL MULTLAYER CMPRN SYS LEG: CPT

## 2021-11-01 PROCEDURE — 11042 DBRDMT SUBQ TIS 1ST 20SQCM/<: CPT

## 2021-11-01 PROCEDURE — 11045 DBRDMT SUBQ TISS EACH ADDL: CPT

## 2021-11-15 ENCOUNTER — HOSPITAL ENCOUNTER (OUTPATIENT)
Dept: WOUND CARE | Facility: HOSPITAL | Age: 86
Discharge: HOME | End: 2021-11-15
Attending: PREVENTIVE MEDICINE
Payer: MEDICARE

## 2021-11-15 VITALS
WEIGHT: 119 LBS | TEMPERATURE: 98 F | SYSTOLIC BLOOD PRESSURE: 164 MMHG | DIASTOLIC BLOOD PRESSURE: 72 MMHG | HEART RATE: 79 BPM | HEIGHT: 64 IN | BODY MASS INDEX: 20.32 KG/M2

## 2021-11-15 DIAGNOSIS — I87.393 CHRONIC VENOUS HYPERTENSION (IDIOPATHIC) WITH OTHER COMPLICATIONS OF BILATERAL LOWER EXTREMITY: Primary | ICD-10-CM

## 2021-11-15 DIAGNOSIS — R60.0 BILATERAL LEG EDEMA: ICD-10-CM

## 2021-11-15 DIAGNOSIS — I87.8 VENOUS STASIS OF BOTH LOWER EXTREMITIES: ICD-10-CM

## 2021-11-15 DIAGNOSIS — L97.512 ULCER OF RIGHT FOOT WITH FAT LAYER EXPOSED: ICD-10-CM

## 2021-11-15 PROCEDURE — 27201912 HC WOUND CARE DEBRIDEMENT SUPPLIES

## 2021-11-15 PROCEDURE — 11045 DBRDMT SUBQ TISS EACH ADDL: CPT

## 2021-11-15 PROCEDURE — 11042 DBRDMT SUBQ TIS 1ST 20SQCM/<: CPT

## 2021-11-16 DIAGNOSIS — I87.8 VENOUS STASIS OF BOTH LOWER EXTREMITIES: ICD-10-CM

## 2021-11-16 DIAGNOSIS — L97.512 ULCER OF RIGHT FOOT WITH FAT LAYER EXPOSED: Primary | ICD-10-CM

## 2021-11-17 ENCOUNTER — EXTERNAL HOME HEALTH (OUTPATIENT)
Dept: HOME HEALTH SERVICES | Facility: HOSPITAL | Age: 86
End: 2021-11-17
Payer: MEDICARE

## 2021-11-18 ENCOUNTER — DOCUMENT SCAN (OUTPATIENT)
Dept: HOME HEALTH SERVICES | Facility: HOSPITAL | Age: 86
End: 2021-11-18
Payer: MEDICARE

## 2021-12-06 ENCOUNTER — HOSPITAL ENCOUNTER (OUTPATIENT)
Dept: WOUND CARE | Facility: HOSPITAL | Age: 86
Discharge: HOME | End: 2021-12-06
Attending: PREVENTIVE MEDICINE
Payer: MEDICARE

## 2021-12-06 VITALS
BODY MASS INDEX: 20.32 KG/M2 | WEIGHT: 119 LBS | DIASTOLIC BLOOD PRESSURE: 72 MMHG | SYSTOLIC BLOOD PRESSURE: 158 MMHG | HEIGHT: 64 IN | TEMPERATURE: 98 F | HEART RATE: 74 BPM

## 2021-12-06 DIAGNOSIS — I87.8 VENOUS STASIS OF BOTH LOWER EXTREMITIES: ICD-10-CM

## 2021-12-06 DIAGNOSIS — L97.512 ULCER OF RIGHT FOOT WITH FAT LAYER EXPOSED: ICD-10-CM

## 2021-12-06 DIAGNOSIS — L89.152 PRESSURE INJURY OF SACRAL REGION, STAGE 2: ICD-10-CM

## 2021-12-06 DIAGNOSIS — I87.393 CHRONIC VENOUS HYPERTENSION (IDIOPATHIC) WITH OTHER COMPLICATIONS OF BILATERAL LOWER EXTREMITY: Primary | ICD-10-CM

## 2021-12-06 DIAGNOSIS — R60.0 BILATERAL LEG EDEMA: ICD-10-CM

## 2021-12-06 PROCEDURE — 27201912 HC WOUND CARE DEBRIDEMENT SUPPLIES

## 2021-12-06 PROCEDURE — 11045 DBRDMT SUBQ TISS EACH ADDL: CPT

## 2021-12-06 PROCEDURE — 11042 DBRDMT SUBQ TIS 1ST 20SQCM/<: CPT

## 2022-01-01 ENCOUNTER — TELEPHONE (OUTPATIENT)
Dept: INTERNAL MEDICINE | Facility: CLINIC | Age: 87
End: 2022-01-01
Payer: MEDICARE

## 2022-01-01 ENCOUNTER — DOCUMENT SCAN (OUTPATIENT)
Dept: HOME HEALTH SERVICES | Facility: HOSPITAL | Age: 87
End: 2022-01-01
Payer: MEDICARE

## 2022-01-01 ENCOUNTER — HOSPITAL ENCOUNTER (OUTPATIENT)
Dept: WOUND CARE | Facility: HOSPITAL | Age: 87
Discharge: HOME | End: 2022-09-30
Attending: PREVENTIVE MEDICINE
Payer: MEDICARE

## 2022-01-01 ENCOUNTER — OFFICE VISIT (OUTPATIENT)
Dept: INTERNAL MEDICINE | Facility: CLINIC | Age: 87
End: 2022-01-01
Payer: MEDICARE

## 2022-01-01 ENCOUNTER — EXTERNAL HOME HEALTH (OUTPATIENT)
Dept: HOME HEALTH SERVICES | Facility: HOSPITAL | Age: 87
End: 2022-01-01
Payer: MEDICARE

## 2022-01-01 ENCOUNTER — HOSPITAL ENCOUNTER (OUTPATIENT)
Dept: WOUND CARE | Facility: HOSPITAL | Age: 87
Discharge: HOME | End: 2022-11-18
Attending: PREVENTIVE MEDICINE
Payer: MEDICARE

## 2022-01-01 ENCOUNTER — TELEPHONE (OUTPATIENT)
Dept: WOUND CARE | Facility: HOSPITAL | Age: 87
End: 2022-01-01
Payer: MEDICARE

## 2022-01-01 ENCOUNTER — LAB VISIT (OUTPATIENT)
Dept: LAB | Facility: HOSPITAL | Age: 87
End: 2022-01-01
Attending: INTERNAL MEDICINE
Payer: MEDICARE

## 2022-01-01 ENCOUNTER — HOSPITAL ENCOUNTER (OUTPATIENT)
Dept: RADIOLOGY | Facility: HOSPITAL | Age: 87
Discharge: HOME | End: 2022-04-22
Attending: PHYSICIAN ASSISTANT
Payer: MEDICARE

## 2022-01-01 ENCOUNTER — PES CALL (OUTPATIENT)
Dept: ADMINISTRATIVE | Facility: CLINIC | Age: 87
End: 2022-01-01
Payer: MEDICARE

## 2022-01-01 ENCOUNTER — HOSPITAL ENCOUNTER (OUTPATIENT)
Dept: WOUND CARE | Facility: HOSPITAL | Age: 87
Discharge: HOME | End: 2022-04-08
Attending: PREVENTIVE MEDICINE
Payer: MEDICARE

## 2022-01-01 ENCOUNTER — HOSPITAL ENCOUNTER (OUTPATIENT)
Dept: WOUND CARE | Facility: HOSPITAL | Age: 87
Discharge: HOME | End: 2022-08-26
Attending: PREVENTIVE MEDICINE
Payer: MEDICARE

## 2022-01-01 ENCOUNTER — OFFICE VISIT (OUTPATIENT)
Dept: OPHTHALMOLOGY | Facility: CLINIC | Age: 87
End: 2022-01-01
Payer: MEDICARE

## 2022-01-01 ENCOUNTER — TELEPHONE (OUTPATIENT)
Dept: FAMILY MEDICINE | Facility: CLINIC | Age: 87
End: 2022-01-01
Payer: MEDICARE

## 2022-01-01 ENCOUNTER — TELEPHONE (OUTPATIENT)
Dept: PULMONOLOGY | Facility: CLINIC | Age: 87
End: 2022-01-01
Payer: MEDICARE

## 2022-01-01 ENCOUNTER — HOSPITAL ENCOUNTER (OUTPATIENT)
Dept: WOUND CARE | Facility: HOSPITAL | Age: 87
Discharge: HOME | End: 2022-07-01
Attending: PREVENTIVE MEDICINE
Payer: MEDICARE

## 2022-01-01 ENCOUNTER — PATIENT MESSAGE (OUTPATIENT)
Dept: PHARMACY | Facility: CLINIC | Age: 87
End: 2022-01-01
Payer: MEDICARE

## 2022-01-01 ENCOUNTER — HOSPITAL ENCOUNTER (OUTPATIENT)
Dept: WOUND CARE | Facility: HOSPITAL | Age: 87
Discharge: HOME | End: 2022-05-06
Attending: PREVENTIVE MEDICINE
Payer: MEDICARE

## 2022-01-01 ENCOUNTER — HOSPITAL ENCOUNTER (OUTPATIENT)
Dept: RADIOLOGY | Facility: HOSPITAL | Age: 87
Discharge: HOME | End: 2022-05-06
Attending: PHYSICIAN ASSISTANT
Payer: MEDICARE

## 2022-01-01 ENCOUNTER — OFFICE VISIT (OUTPATIENT)
Dept: HOME HEALTH SERVICES | Facility: CLINIC | Age: 87
End: 2022-01-01
Payer: MEDICARE

## 2022-01-01 ENCOUNTER — IMMUNIZATION (OUTPATIENT)
Dept: INTERNAL MEDICINE | Facility: CLINIC | Age: 87
End: 2022-01-01
Payer: MEDICARE

## 2022-01-01 ENCOUNTER — TELEPHONE (OUTPATIENT)
Dept: ADMINISTRATIVE | Facility: CLINIC | Age: 87
End: 2022-01-01
Payer: MEDICARE

## 2022-01-01 ENCOUNTER — HOSPITAL ENCOUNTER (OUTPATIENT)
Dept: RADIOLOGY | Facility: HOSPITAL | Age: 87
Discharge: HOME | End: 2022-11-15
Attending: INTERNAL MEDICINE
Payer: MEDICARE

## 2022-01-01 VITALS
SYSTOLIC BLOOD PRESSURE: 139 MMHG | DIASTOLIC BLOOD PRESSURE: 91 MMHG | BODY MASS INDEX: 21.51 KG/M2 | HEART RATE: 72 BPM | TEMPERATURE: 98 F | WEIGHT: 126 LBS | HEIGHT: 64 IN

## 2022-01-01 VITALS
DIASTOLIC BLOOD PRESSURE: 66 MMHG | WEIGHT: 127 LBS | HEART RATE: 80 BPM | TEMPERATURE: 98 F | BODY MASS INDEX: 21.68 KG/M2 | HEIGHT: 64 IN | SYSTOLIC BLOOD PRESSURE: 125 MMHG

## 2022-01-01 VITALS
HEART RATE: 76 BPM | WEIGHT: 127 LBS | BODY MASS INDEX: 21.68 KG/M2 | OXYGEN SATURATION: 98 % | SYSTOLIC BLOOD PRESSURE: 122 MMHG | HEIGHT: 64 IN | DIASTOLIC BLOOD PRESSURE: 68 MMHG

## 2022-01-01 VITALS
BODY MASS INDEX: 21.68 KG/M2 | HEIGHT: 64 IN | TEMPERATURE: 98 F | DIASTOLIC BLOOD PRESSURE: 56 MMHG | HEART RATE: 74 BPM | WEIGHT: 127 LBS | SYSTOLIC BLOOD PRESSURE: 123 MMHG

## 2022-01-01 VITALS
SYSTOLIC BLOOD PRESSURE: 124 MMHG | RESPIRATION RATE: 16 BRPM | OXYGEN SATURATION: 98 % | WEIGHT: 127 LBS | BODY MASS INDEX: 21.68 KG/M2 | HEIGHT: 64 IN | HEART RATE: 77 BPM | DIASTOLIC BLOOD PRESSURE: 80 MMHG

## 2022-01-01 VITALS
SYSTOLIC BLOOD PRESSURE: 127 MMHG | BODY MASS INDEX: 21.68 KG/M2 | HEIGHT: 64 IN | TEMPERATURE: 98 F | DIASTOLIC BLOOD PRESSURE: 63 MMHG | WEIGHT: 127 LBS | HEART RATE: 70 BPM

## 2022-01-01 VITALS
BODY MASS INDEX: 21.68 KG/M2 | HEART RATE: 71 BPM | TEMPERATURE: 98 F | WEIGHT: 127 LBS | HEIGHT: 64 IN | DIASTOLIC BLOOD PRESSURE: 64 MMHG | SYSTOLIC BLOOD PRESSURE: 139 MMHG

## 2022-01-01 VITALS
SYSTOLIC BLOOD PRESSURE: 110 MMHG | BODY MASS INDEX: 21.71 KG/M2 | HEART RATE: 75 BPM | HEIGHT: 64 IN | OXYGEN SATURATION: 97 % | WEIGHT: 127.19 LBS | DIASTOLIC BLOOD PRESSURE: 66 MMHG

## 2022-01-01 VITALS
DIASTOLIC BLOOD PRESSURE: 60 MMHG | HEIGHT: 64 IN | SYSTOLIC BLOOD PRESSURE: 139 MMHG | WEIGHT: 127 LBS | HEART RATE: 65 BPM | TEMPERATURE: 98 F | BODY MASS INDEX: 21.68 KG/M2

## 2022-01-01 VITALS
HEART RATE: 62 BPM | DIASTOLIC BLOOD PRESSURE: 86 MMHG | HEIGHT: 64 IN | SYSTOLIC BLOOD PRESSURE: 128 MMHG | OXYGEN SATURATION: 94 % | BODY MASS INDEX: 21.8 KG/M2

## 2022-01-01 VITALS
HEART RATE: 71 BPM | OXYGEN SATURATION: 95 % | SYSTOLIC BLOOD PRESSURE: 122 MMHG | DIASTOLIC BLOOD PRESSURE: 78 MMHG | TEMPERATURE: 97 F

## 2022-01-01 DIAGNOSIS — R05.9 COUGH: ICD-10-CM

## 2022-01-01 DIAGNOSIS — I87.393 CHRONIC VENOUS HYPERTENSION (IDIOPATHIC) WITH OTHER COMPLICATIONS OF BILATERAL LOWER EXTREMITY: Primary | ICD-10-CM

## 2022-01-01 DIAGNOSIS — J43.1 PANLOBULAR EMPHYSEMA: Chronic | ICD-10-CM

## 2022-01-01 DIAGNOSIS — H40.1112 PRIMARY OPEN-ANGLE GLAUCOMA, RIGHT EYE, MODERATE STAGE: Primary | ICD-10-CM

## 2022-01-01 DIAGNOSIS — R60.0 BILATERAL LEG EDEMA: ICD-10-CM

## 2022-01-01 DIAGNOSIS — H40.043 STEROID RESPONDER, BILATERAL: ICD-10-CM

## 2022-01-01 DIAGNOSIS — L97.512 ULCER OF RIGHT FOOT WITH FAT LAYER EXPOSED: ICD-10-CM

## 2022-01-01 DIAGNOSIS — J44.9 CHRONIC OBSTRUCTIVE PULMONARY DISEASE, UNSPECIFIED COPD TYPE: ICD-10-CM

## 2022-01-01 DIAGNOSIS — Z96.1 PSEUDOPHAKIA, LEFT EYE: ICD-10-CM

## 2022-01-01 DIAGNOSIS — L89.153 PRESSURE INJURY OF SACRAL REGION, STAGE 3: Primary | ICD-10-CM

## 2022-01-01 DIAGNOSIS — Z00.00 ENCOUNTER FOR PREVENTIVE HEALTH EXAMINATION: Primary | ICD-10-CM

## 2022-01-01 DIAGNOSIS — Z96.89 HISTORY OF GLAUCOMA TUBE SHUNT PROCEDURE: ICD-10-CM

## 2022-01-01 DIAGNOSIS — R05.8 PRODUCTIVE COUGH: ICD-10-CM

## 2022-01-01 DIAGNOSIS — J47.0 BRONCHIECTASIS WITH ACUTE LOWER RESPIRATORY INFECTION: ICD-10-CM

## 2022-01-01 DIAGNOSIS — E43 SEVERE MALNUTRITION: ICD-10-CM

## 2022-01-01 DIAGNOSIS — M79.89 SWELLING OF LEFT HAND: Primary | ICD-10-CM

## 2022-01-01 DIAGNOSIS — H50.112 EXOTROPIA OF LEFT EYE: ICD-10-CM

## 2022-01-01 DIAGNOSIS — R63.4 WEIGHT LOSS: ICD-10-CM

## 2022-01-01 DIAGNOSIS — R91.1 PULMONARY NODULE: ICD-10-CM

## 2022-01-01 DIAGNOSIS — H21.562 AFFERENT PUPILLARY DEFECT, LEFT: ICD-10-CM

## 2022-01-01 DIAGNOSIS — H40.2223 CHRONIC ANGLE-CLOSURE GLAUCOMA, SEVERE STAGE, LEFT: ICD-10-CM

## 2022-01-01 DIAGNOSIS — I73.9 PERIPHERAL VASCULAR DISEASE, UNSPECIFIED: Chronic | ICD-10-CM

## 2022-01-01 DIAGNOSIS — H21.542 POSTERIOR SYNECHIAE, LEFT: ICD-10-CM

## 2022-01-01 DIAGNOSIS — Z72.0 TOBACCO ABUSE: Chronic | ICD-10-CM

## 2022-01-01 DIAGNOSIS — I87.8 VENOUS STASIS OF BOTH LOWER EXTREMITIES: Chronic | ICD-10-CM

## 2022-01-01 DIAGNOSIS — L97.512 ULCER OF RIGHT FOOT WITH FAT LAYER EXPOSED: Primary | ICD-10-CM

## 2022-01-01 DIAGNOSIS — R93.89 ABNORMAL CT OF THE CHEST: Primary | ICD-10-CM

## 2022-01-01 DIAGNOSIS — R54 AGE-RELATED PHYSICAL DEBILITY: ICD-10-CM

## 2022-01-01 DIAGNOSIS — H61.20 IMPACTED CERUMEN, UNSPECIFIED LATERALITY: Primary | ICD-10-CM

## 2022-01-01 DIAGNOSIS — R05.9 COUGH: Primary | ICD-10-CM

## 2022-01-01 DIAGNOSIS — H40.89 GLAUCOMA OF LEFT EYE DUE TO COMBINATION OF MECHANISMS: Chronic | ICD-10-CM

## 2022-01-01 DIAGNOSIS — I87.8 VENOUS STASIS OF BOTH LOWER EXTREMITIES: ICD-10-CM

## 2022-01-01 DIAGNOSIS — Q84.5 ENLARGED AND HYPERTROPHIC NAILS: ICD-10-CM

## 2022-01-01 DIAGNOSIS — Z23 NEEDS FLU SHOT: Primary | ICD-10-CM

## 2022-01-01 DIAGNOSIS — R93.89 ABNORMAL CXR: ICD-10-CM

## 2022-01-01 DIAGNOSIS — L89.153 PRESSURE INJURY OF SACRAL REGION, STAGE 3: ICD-10-CM

## 2022-01-01 DIAGNOSIS — H25.11 SENILE NUCLEAR SCLEROSIS, RIGHT: ICD-10-CM

## 2022-01-01 DIAGNOSIS — L89.152 PRESSURE INJURY OF SACRAL REGION, STAGE 2: ICD-10-CM

## 2022-01-01 DIAGNOSIS — L97.821: ICD-10-CM

## 2022-01-01 DIAGNOSIS — J43.1 PANLOBULAR EMPHYSEMA: ICD-10-CM

## 2022-01-01 DIAGNOSIS — R05.8 PRODUCTIVE COUGH: Primary | ICD-10-CM

## 2022-01-01 DIAGNOSIS — R93.89 ABNORMAL CHEST CT: ICD-10-CM

## 2022-01-01 DIAGNOSIS — I73.9 PERIPHERAL VASCULAR DISEASE, UNSPECIFIED: ICD-10-CM

## 2022-01-01 DIAGNOSIS — Z72.0 TOBACCO ABUSE: ICD-10-CM

## 2022-01-01 DIAGNOSIS — J43.1 PANLOBULAR EMPHYSEMA: Primary | Chronic | ICD-10-CM

## 2022-01-01 DIAGNOSIS — M79.89 SWELLING OF LEFT HAND: ICD-10-CM

## 2022-01-01 DIAGNOSIS — I87.393 CHRONIC VENOUS HYPERTENSION (IDIOPATHIC) WITH OTHER COMPLICATIONS OF BILATERAL LOWER EXTREMITY: ICD-10-CM

## 2022-01-01 DIAGNOSIS — I70.0 ATHEROSCLEROSIS OF AORTA: Chronic | ICD-10-CM

## 2022-01-01 DIAGNOSIS — H40.1192 PRIMARY OPEN-ANGLE GLAUCOMA, MODERATE STAGE, UNSPECIFIED LATERALITY: Chronic | ICD-10-CM

## 2022-01-01 LAB
ACID FAST MOD KINY STN SPEC: NORMAL
ALBUMIN SERPL BCP-MCNC: 1.9 G/DL (ref 3.5–5.2)
ALBUMIN SERPL BCP-MCNC: 2.6 G/DL (ref 3.5–5.2)
ALP SERPL-CCNC: 56 U/L (ref 55–135)
ALP SERPL-CCNC: 65 U/L (ref 55–135)
ALT SERPL W/O P-5'-P-CCNC: 12 U/L (ref 10–44)
ALT SERPL W/O P-5'-P-CCNC: 5 U/L (ref 10–44)
ANION GAP SERPL CALC-SCNC: 10 MMOL/L (ref 8–16)
ANION GAP SERPL CALC-SCNC: 10 MMOL/L (ref 8–16)
AST SERPL-CCNC: 14 U/L (ref 10–40)
AST SERPL-CCNC: 20 U/L (ref 10–40)
BACTERIA SPEC AEROBE CULT: NORMAL
BACTERIA SPEC AEROBE CULT: NORMAL
BASOPHILS # BLD AUTO: 0.03 K/UL (ref 0–0.2)
BASOPHILS # BLD AUTO: 0.05 K/UL (ref 0–0.2)
BASOPHILS NFR BLD: 0.5 % (ref 0–1.9)
BASOPHILS NFR BLD: 0.7 % (ref 0–1.9)
BILIRUB SERPL-MCNC: 0.2 MG/DL (ref 0.1–1)
BILIRUB SERPL-MCNC: 0.3 MG/DL (ref 0.1–1)
BUN SERPL-MCNC: 15 MG/DL (ref 8–23)
BUN SERPL-MCNC: 21 MG/DL (ref 8–23)
CALCIUM SERPL-MCNC: 8.9 MG/DL (ref 8.7–10.5)
CALCIUM SERPL-MCNC: 9.2 MG/DL (ref 8.7–10.5)
CHLORIDE SERPL-SCNC: 104 MMOL/L (ref 95–110)
CHLORIDE SERPL-SCNC: 96 MMOL/L (ref 95–110)
CO2 SERPL-SCNC: 30 MMOL/L (ref 23–29)
CO2 SERPL-SCNC: 31 MMOL/L (ref 23–29)
CREAT SERPL-MCNC: 0.7 MG/DL (ref 0.5–1.4)
CREAT SERPL-MCNC: 0.9 MG/DL (ref 0.5–1.4)
DIFFERENTIAL METHOD: ABNORMAL
DIFFERENTIAL METHOD: ABNORMAL
EOSINOPHIL # BLD AUTO: 0 K/UL (ref 0–0.5)
EOSINOPHIL # BLD AUTO: 0.1 K/UL (ref 0–0.5)
EOSINOPHIL NFR BLD: 0.4 % (ref 0–8)
EOSINOPHIL NFR BLD: 1 % (ref 0–8)
ERYTHROCYTE [DISTWIDTH] IN BLOOD BY AUTOMATED COUNT: 15.8 % (ref 11.5–14.5)
ERYTHROCYTE [DISTWIDTH] IN BLOOD BY AUTOMATED COUNT: 16.3 % (ref 11.5–14.5)
EST. GFR  (AFRICAN AMERICAN): >60 ML/MIN/1.73 M^2
EST. GFR  (NO RACE VARIABLE): >60 ML/MIN/1.73 M^2
EST. GFR  (NON AFRICAN AMERICAN): 58.5 ML/MIN/1.73 M^2
GLUCOSE SERPL-MCNC: 78 MG/DL (ref 70–110)
GLUCOSE SERPL-MCNC: 80 MG/DL (ref 70–110)
GRAM STN SPEC: NORMAL
HCT VFR BLD AUTO: 37.1 % (ref 37–48.5)
HCT VFR BLD AUTO: 37.5 % (ref 37–48.5)
HGB BLD-MCNC: 11.3 G/DL (ref 12–16)
HGB BLD-MCNC: 11.7 G/DL (ref 12–16)
IMM GRANULOCYTES # BLD AUTO: 0.02 K/UL (ref 0–0.04)
IMM GRANULOCYTES # BLD AUTO: 0.04 K/UL (ref 0–0.04)
IMM GRANULOCYTES NFR BLD AUTO: 0.3 % (ref 0–0.5)
IMM GRANULOCYTES NFR BLD AUTO: 0.5 % (ref 0–0.5)
LYMPHOCYTES # BLD AUTO: 1.6 K/UL (ref 1–4.8)
LYMPHOCYTES # BLD AUTO: 1.8 K/UL (ref 1–4.8)
LYMPHOCYTES NFR BLD: 23.5 % (ref 18–48)
LYMPHOCYTES NFR BLD: 28.4 % (ref 18–48)
MCH RBC QN AUTO: 30.5 PG (ref 27–31)
MCH RBC QN AUTO: 32.1 PG (ref 27–31)
MCHC RBC AUTO-ENTMCNC: 30.5 G/DL (ref 32–36)
MCHC RBC AUTO-ENTMCNC: 31.2 G/DL (ref 32–36)
MCV RBC AUTO: 100 FL (ref 82–98)
MCV RBC AUTO: 103 FL (ref 82–98)
MONOCYTES # BLD AUTO: 0.5 K/UL (ref 0.3–1)
MONOCYTES # BLD AUTO: 0.9 K/UL (ref 0.3–1)
MONOCYTES NFR BLD: 11.2 % (ref 4–15)
MONOCYTES NFR BLD: 8.9 % (ref 4–15)
MYCOBACTERIUM SPEC QL CULT: NORMAL
NEUTROPHILS # BLD AUTO: 3.5 K/UL (ref 1.8–7.7)
NEUTROPHILS # BLD AUTO: 4.8 K/UL (ref 1.8–7.7)
NEUTROPHILS NFR BLD: 60.9 % (ref 38–73)
NEUTROPHILS NFR BLD: 63.7 % (ref 38–73)
NRBC BLD-RTO: 0 /100 WBC
NRBC BLD-RTO: 0 /100 WBC
PLATELET # BLD AUTO: 220 K/UL (ref 150–450)
PLATELET # BLD AUTO: 284 K/UL (ref 150–450)
PMV BLD AUTO: 10.3 FL (ref 9.2–12.9)
PMV BLD AUTO: 10.5 FL (ref 9.2–12.9)
POTASSIUM SERPL-SCNC: 3.7 MMOL/L (ref 3.5–5.1)
POTASSIUM SERPL-SCNC: 3.8 MMOL/L (ref 3.5–5.1)
PROT SERPL-MCNC: 7.9 G/DL (ref 6–8.4)
PROT SERPL-MCNC: 8.2 G/DL (ref 6–8.4)
RBC # BLD AUTO: 3.65 M/UL (ref 4–5.4)
RBC # BLD AUTO: 3.7 M/UL (ref 4–5.4)
SODIUM SERPL-SCNC: 137 MMOL/L (ref 136–145)
SODIUM SERPL-SCNC: 144 MMOL/L (ref 136–145)
WBC # BLD AUTO: 5.74 K/UL (ref 3.9–12.7)
WBC # BLD AUTO: 7.59 K/UL (ref 3.9–12.7)

## 2022-01-01 PROCEDURE — 3288F FALL RISK ASSESSMENT DOCD: CPT | Mod: CPTII,S$GLB,, | Performed by: OPHTHALMOLOGY

## 2022-01-01 PROCEDURE — 29581 APPL MULTLAYER CMPRN SYS LEG: CPT

## 2022-01-01 PROCEDURE — 3288F PR FALLS RISK ASSESSMENT DOCUMENTED: ICD-10-PCS | Mod: CPTII,S$GLB,, | Performed by: OPHTHALMOLOGY

## 2022-01-01 PROCEDURE — 3074F SYST BP LT 130 MM HG: CPT | Mod: CPTII,S$GLB,, | Performed by: PHYSICIAN ASSISTANT

## 2022-01-01 PROCEDURE — 99213 OFFICE O/P EST LOW 20 MIN: CPT | Mod: S$GLB,,, | Performed by: INTERNAL MEDICINE

## 2022-01-01 PROCEDURE — 1126F AMNT PAIN NOTED NONE PRSNT: CPT | Mod: CPTII,S$GLB,, | Performed by: INTERNAL MEDICINE

## 2022-01-01 PROCEDURE — 73130 X-RAY EXAM OF HAND: CPT | Mod: TC,LT

## 2022-01-01 PROCEDURE — 99999 PR PBB SHADOW E&M-EST. PATIENT-LVL V: ICD-10-PCS | Mod: PBBFAC,,, | Performed by: PHYSICIAN ASSISTANT

## 2022-01-01 PROCEDURE — 1160F PR REVIEW ALL MEDS BY PRESCRIBER/CLIN PHARMACIST DOCUMENTED: ICD-10-PCS | Mod: CPTII,S$GLB,, | Performed by: INTERNAL MEDICINE

## 2022-01-01 PROCEDURE — 1101F PT FALLS ASSESS-DOCD LE1/YR: CPT | Mod: CPTII,S$GLB,, | Performed by: OPHTHALMOLOGY

## 2022-01-01 PROCEDURE — 92012 INTRM OPH EXAM EST PATIENT: CPT | Mod: S$GLB,,, | Performed by: OPHTHALMOLOGY

## 2022-01-01 PROCEDURE — 3074F PR MOST RECENT SYSTOLIC BLOOD PRESSURE < 130 MM HG: ICD-10-PCS | Mod: CPTII,S$GLB,, | Performed by: INTERNAL MEDICINE

## 2022-01-01 PROCEDURE — 99214 PR OFFICE/OUTPT VISIT, EST, LEVL IV, 30-39 MIN: ICD-10-PCS | Mod: 25,S$GLB,, | Performed by: INTERNAL MEDICINE

## 2022-01-01 PROCEDURE — 80053 COMPREHEN METABOLIC PANEL: CPT | Performed by: INTERNAL MEDICINE

## 2022-01-01 PROCEDURE — 1159F PR MEDICATION LIST DOCUMENTED IN MEDICAL RECORD: ICD-10-PCS | Mod: CPTII,S$GLB,, | Performed by: OPHTHALMOLOGY

## 2022-01-01 PROCEDURE — 87205 SMEAR GRAM STAIN: CPT | Performed by: INTERNAL MEDICINE

## 2022-01-01 PROCEDURE — 1159F PR MEDICATION LIST DOCUMENTED IN MEDICAL RECORD: ICD-10-PCS | Mod: CPTII,S$GLB,, | Performed by: NURSE PRACTITIONER

## 2022-01-01 PROCEDURE — 90694 FLU VACCINE - QUADRIVALENT - ADJUVANTED: ICD-10-PCS | Mod: S$GLB,,, | Performed by: INTERNAL MEDICINE

## 2022-01-01 PROCEDURE — 99999 PR PBB SHADOW E&M-EST. PATIENT-LVL IV: CPT | Mod: PBBFAC,,, | Performed by: INTERNAL MEDICINE

## 2022-01-01 PROCEDURE — 99214 OFFICE O/P EST MOD 30 MIN: CPT | Mod: S$GLB,,, | Performed by: INTERNAL MEDICINE

## 2022-01-01 PROCEDURE — 73130 XR HAND COMPLETE 3 VIEW LEFT: ICD-10-PCS | Mod: 26,LT,, | Performed by: RADIOLOGY

## 2022-01-01 PROCEDURE — 3288F PR FALLS RISK ASSESSMENT DOCUMENTED: ICD-10-PCS | Mod: CPTII,S$GLB,, | Performed by: INTERNAL MEDICINE

## 2022-01-01 PROCEDURE — 1101F PT FALLS ASSESS-DOCD LE1/YR: CPT | Mod: CPTII,S$GLB,, | Performed by: PHYSICIAN ASSISTANT

## 2022-01-01 PROCEDURE — 11042 DBRDMT SUBQ TIS 1ST 20SQCM/<: CPT

## 2022-01-01 PROCEDURE — 3074F PR MOST RECENT SYSTOLIC BLOOD PRESSURE < 130 MM HG: ICD-10-PCS | Mod: CPTII,S$GLB,, | Performed by: PHYSICIAN ASSISTANT

## 2022-01-01 PROCEDURE — 11721 DEBRIDE NAIL 6 OR MORE: CPT

## 2022-01-01 PROCEDURE — 3078F DIAST BP <80 MM HG: CPT | Mod: CPTII,S$GLB,, | Performed by: NURSE PRACTITIONER

## 2022-01-01 PROCEDURE — 1159F MED LIST DOCD IN RCRD: CPT | Mod: CPTII,S$GLB,, | Performed by: NURSE PRACTITIONER

## 2022-01-01 PROCEDURE — 1160F RVW MEDS BY RX/DR IN RCRD: CPT | Mod: CPTII,S$GLB,, | Performed by: OPHTHALMOLOGY

## 2022-01-01 PROCEDURE — 1126F PR PAIN SEVERITY QUANTIFIED, NO PAIN PRESENT: ICD-10-PCS | Mod: CPTII,S$GLB,, | Performed by: OPHTHALMOLOGY

## 2022-01-01 PROCEDURE — 1160F RVW MEDS BY RX/DR IN RCRD: CPT | Mod: CPTII,S$GLB,, | Performed by: INTERNAL MEDICINE

## 2022-01-01 PROCEDURE — 1126F PR PAIN SEVERITY QUANTIFIED, NO PAIN PRESENT: ICD-10-PCS | Mod: CPTII,S$GLB,, | Performed by: PHYSICIAN ASSISTANT

## 2022-01-01 PROCEDURE — G0008 FLU VACCINE - QUADRIVALENT - ADJUVANTED: ICD-10-PCS | Mod: S$GLB,,, | Performed by: INTERNAL MEDICINE

## 2022-01-01 PROCEDURE — 3074F SYST BP LT 130 MM HG: CPT | Mod: CPTII,S$GLB,, | Performed by: NURSE PRACTITIONER

## 2022-01-01 PROCEDURE — 99999 PR PBB SHADOW E&M-EST. PATIENT-LVL III: CPT | Mod: PBBFAC,,, | Performed by: INTERNAL MEDICINE

## 2022-01-01 PROCEDURE — 1101F PR PT FALLS ASSESS DOC 0-1 FALLS W/OUT INJ PAST YR: ICD-10-PCS | Mod: CPTII,S$GLB,, | Performed by: OPHTHALMOLOGY

## 2022-01-01 PROCEDURE — 87015 SPECIMEN INFECT AGNT CONCNTJ: CPT | Performed by: INTERNAL MEDICINE

## 2022-01-01 PROCEDURE — 73130 X-RAY EXAM OF HAND: CPT | Mod: 26,LT,, | Performed by: RADIOLOGY

## 2022-01-01 PROCEDURE — 99999 PR PBB SHADOW E&M-EST. PATIENT-LVL III: CPT | Mod: PBBFAC,,, | Performed by: OPHTHALMOLOGY

## 2022-01-01 PROCEDURE — 1159F MED LIST DOCD IN RCRD: CPT | Mod: CPTII,S$GLB,, | Performed by: INTERNAL MEDICINE

## 2022-01-01 PROCEDURE — 71250 CT THORAX DX C-: CPT | Mod: TC

## 2022-01-01 PROCEDURE — 1101F PR PT FALLS ASSESS DOC 0-1 FALLS W/OUT INJ PAST YR: ICD-10-PCS | Mod: CPTII,S$GLB,, | Performed by: INTERNAL MEDICINE

## 2022-01-01 PROCEDURE — 3288F FALL RISK ASSESSMENT DOCD: CPT | Mod: CPTII,S$GLB,, | Performed by: INTERNAL MEDICINE

## 2022-01-01 PROCEDURE — 99214 PR OFFICE/OUTPT VISIT, EST, LEVL IV, 30-39 MIN: ICD-10-PCS | Mod: S$GLB,,, | Performed by: PHYSICIAN ASSISTANT

## 2022-01-01 PROCEDURE — 1160F PR REVIEW ALL MEDS BY PRESCRIBER/CLIN PHARMACIST DOCUMENTED: ICD-10-PCS | Mod: CPTII,S$GLB,, | Performed by: PHYSICIAN ASSISTANT

## 2022-01-01 PROCEDURE — 3288F FALL RISK ASSESSMENT DOCD: CPT | Mod: CPTII,S$GLB,, | Performed by: PHYSICIAN ASSISTANT

## 2022-01-01 PROCEDURE — 92012 PR EYE EXAM, EST PATIENT,INTERMED: ICD-10-PCS | Mod: S$GLB,,, | Performed by: OPHTHALMOLOGY

## 2022-01-01 PROCEDURE — 1160F RVW MEDS BY RX/DR IN RCRD: CPT | Mod: CPTII,S$GLB,, | Performed by: PHYSICIAN ASSISTANT

## 2022-01-01 PROCEDURE — 99214 OFFICE O/P EST MOD 30 MIN: CPT

## 2022-01-01 PROCEDURE — 71046 X-RAY EXAM CHEST 2 VIEWS: CPT | Mod: TC

## 2022-01-01 PROCEDURE — 1160F PR REVIEW ALL MEDS BY PRESCRIBER/CLIN PHARMACIST DOCUMENTED: ICD-10-PCS | Mod: CPTII,S$GLB,, | Performed by: OPHTHALMOLOGY

## 2022-01-01 PROCEDURE — 1159F PR MEDICATION LIST DOCUMENTED IN MEDICAL RECORD: ICD-10-PCS | Mod: CPTII,S$GLB,, | Performed by: INTERNAL MEDICINE

## 2022-01-01 PROCEDURE — 3288F PR FALLS RISK ASSESSMENT DOCUMENTED: ICD-10-PCS | Mod: CPTII,S$GLB,, | Performed by: PHYSICIAN ASSISTANT

## 2022-01-01 PROCEDURE — 36415 COLL VENOUS BLD VENIPUNCTURE: CPT | Performed by: INTERNAL MEDICINE

## 2022-01-01 PROCEDURE — 1126F PR PAIN SEVERITY QUANTIFIED, NO PAIN PRESENT: ICD-10-PCS | Mod: CPTII,S$GLB,, | Performed by: INTERNAL MEDICINE

## 2022-01-01 PROCEDURE — 90694 VACC AIIV4 NO PRSRV 0.5ML IM: CPT | Mod: S$GLB,,, | Performed by: INTERNAL MEDICINE

## 2022-01-01 PROCEDURE — 99214 OFFICE O/P EST MOD 30 MIN: CPT | Mod: 25,S$GLB,, | Performed by: INTERNAL MEDICINE

## 2022-01-01 PROCEDURE — 3078F PR MOST RECENT DIASTOLIC BLOOD PRESSURE < 80 MM HG: ICD-10-PCS | Mod: CPTII,S$GLB,, | Performed by: INTERNAL MEDICINE

## 2022-01-01 PROCEDURE — 3288F PR FALLS RISK ASSESSMENT DOCUMENTED: ICD-10-PCS | Mod: CPTII,S$GLB,, | Performed by: NURSE PRACTITIONER

## 2022-01-01 PROCEDURE — 99213 PR OFFICE/OUTPT VISIT, EST, LEVL III, 20-29 MIN: ICD-10-PCS | Mod: S$GLB,,, | Performed by: INTERNAL MEDICINE

## 2022-01-01 PROCEDURE — 3078F PR MOST RECENT DIASTOLIC BLOOD PRESSURE < 80 MM HG: ICD-10-PCS | Mod: CPTII,S$GLB,, | Performed by: NURSE PRACTITIONER

## 2022-01-01 PROCEDURE — 1160F PR REVIEW ALL MEDS BY PRESCRIBER/CLIN PHARMACIST DOCUMENTED: ICD-10-PCS | Mod: CPTII,S$GLB,, | Performed by: NURSE PRACTITIONER

## 2022-01-01 PROCEDURE — 85025 COMPLETE CBC W/AUTO DIFF WBC: CPT | Performed by: INTERNAL MEDICINE

## 2022-01-01 PROCEDURE — 1159F MED LIST DOCD IN RCRD: CPT | Mod: CPTII,S$GLB,, | Performed by: PHYSICIAN ASSISTANT

## 2022-01-01 PROCEDURE — 3079F PR MOST RECENT DIASTOLIC BLOOD PRESSURE 80-89 MM HG: ICD-10-PCS | Mod: CPTII,S$GLB,, | Performed by: PHYSICIAN ASSISTANT

## 2022-01-01 PROCEDURE — G0439 PR MEDICARE ANNUAL WELLNESS SUBSEQUENT VISIT: ICD-10-PCS | Mod: S$GLB,,, | Performed by: NURSE PRACTITIONER

## 2022-01-01 PROCEDURE — 71250 CT CHEST WITHOUT CONTRAST: ICD-10-PCS | Mod: 26,,, | Performed by: RADIOLOGY

## 2022-01-01 PROCEDURE — 3079F DIAST BP 80-89 MM HG: CPT | Mod: CPTII,S$GLB,, | Performed by: PHYSICIAN ASSISTANT

## 2022-01-01 PROCEDURE — 3288F FALL RISK ASSESSMENT DOCD: CPT | Mod: CPTII,S$GLB,, | Performed by: NURSE PRACTITIONER

## 2022-01-01 PROCEDURE — 99215 OFFICE O/P EST HI 40 MIN: CPT

## 2022-01-01 PROCEDURE — 71046 XR CHEST PA AND LATERAL: ICD-10-PCS | Mod: 26,,, | Performed by: RADIOLOGY

## 2022-01-01 PROCEDURE — 1126F AMNT PAIN NOTED NONE PRSNT: CPT | Mod: CPTII,S$GLB,, | Performed by: OPHTHALMOLOGY

## 2022-01-01 PROCEDURE — 99214 OFFICE O/P EST MOD 30 MIN: CPT | Mod: S$GLB,,, | Performed by: PHYSICIAN ASSISTANT

## 2022-01-01 PROCEDURE — G0439 PPPS, SUBSEQ VISIT: HCPCS | Mod: S$GLB,,, | Performed by: NURSE PRACTITIONER

## 2022-01-01 PROCEDURE — 87070 CULTURE OTHR SPECIMN AEROBIC: CPT | Performed by: INTERNAL MEDICINE

## 2022-01-01 PROCEDURE — 3074F SYST BP LT 130 MM HG: CPT | Mod: CPTII,S$GLB,, | Performed by: INTERNAL MEDICINE

## 2022-01-01 PROCEDURE — 99999 PR PBB SHADOW E&M-EST. PATIENT-LVL IV: ICD-10-PCS | Mod: PBBFAC,,, | Performed by: INTERNAL MEDICINE

## 2022-01-01 PROCEDURE — 1159F MED LIST DOCD IN RCRD: CPT | Mod: CPTII,S$GLB,, | Performed by: OPHTHALMOLOGY

## 2022-01-01 PROCEDURE — 27201912 HC WOUND CARE DEBRIDEMENT SUPPLIES

## 2022-01-01 PROCEDURE — 1101F PT FALLS ASSESS-DOCD LE1/YR: CPT | Mod: CPTII,S$GLB,, | Performed by: INTERNAL MEDICINE

## 2022-01-01 PROCEDURE — 87206 SMEAR FLUORESCENT/ACID STAI: CPT | Performed by: INTERNAL MEDICINE

## 2022-01-01 PROCEDURE — 99999 PR PBB SHADOW E&M-EST. PATIENT-LVL III: ICD-10-PCS | Mod: PBBFAC,,, | Performed by: INTERNAL MEDICINE

## 2022-01-01 PROCEDURE — 99999 PR PBB SHADOW E&M-EST. PATIENT-LVL V: CPT | Mod: PBBFAC,,, | Performed by: PHYSICIAN ASSISTANT

## 2022-01-01 PROCEDURE — 1159F PR MEDICATION LIST DOCUMENTED IN MEDICAL RECORD: ICD-10-PCS | Mod: CPTII,S$GLB,, | Performed by: PHYSICIAN ASSISTANT

## 2022-01-01 PROCEDURE — 1160F RVW MEDS BY RX/DR IN RCRD: CPT | Mod: CPTII,S$GLB,, | Performed by: NURSE PRACTITIONER

## 2022-01-01 PROCEDURE — 99214 PR OFFICE/OUTPT VISIT, EST, LEVL IV, 30-39 MIN: ICD-10-PCS | Mod: S$GLB,,, | Performed by: INTERNAL MEDICINE

## 2022-01-01 PROCEDURE — 3078F DIAST BP <80 MM HG: CPT | Mod: CPTII,S$GLB,, | Performed by: INTERNAL MEDICINE

## 2022-01-01 PROCEDURE — 99999 PR PBB SHADOW E&M-EST. PATIENT-LVL III: ICD-10-PCS | Mod: PBBFAC,,, | Performed by: OPHTHALMOLOGY

## 2022-01-01 PROCEDURE — 97597 DBRDMT OPN WND 1ST 20 CM/<: CPT

## 2022-01-01 PROCEDURE — 1101F PR PT FALLS ASSESS DOC 0-1 FALLS W/OUT INJ PAST YR: ICD-10-PCS | Mod: CPTII,S$GLB,, | Performed by: NURSE PRACTITIONER

## 2022-01-01 PROCEDURE — 71250 CT THORAX DX C-: CPT | Mod: 26,,, | Performed by: RADIOLOGY

## 2022-01-01 PROCEDURE — 87116 MYCOBACTERIA CULTURE: CPT | Performed by: INTERNAL MEDICINE

## 2022-01-01 PROCEDURE — 1101F PT FALLS ASSESS-DOCD LE1/YR: CPT | Mod: CPTII,S$GLB,, | Performed by: NURSE PRACTITIONER

## 2022-01-01 PROCEDURE — 1126F AMNT PAIN NOTED NONE PRSNT: CPT | Mod: CPTII,S$GLB,, | Performed by: PHYSICIAN ASSISTANT

## 2022-01-01 PROCEDURE — 3074F PR MOST RECENT SYSTOLIC BLOOD PRESSURE < 130 MM HG: ICD-10-PCS | Mod: CPTII,S$GLB,, | Performed by: NURSE PRACTITIONER

## 2022-01-01 PROCEDURE — 1101F PR PT FALLS ASSESS DOC 0-1 FALLS W/OUT INJ PAST YR: ICD-10-PCS | Mod: CPTII,S$GLB,, | Performed by: PHYSICIAN ASSISTANT

## 2022-01-01 PROCEDURE — G0008 ADMIN INFLUENZA VIRUS VAC: HCPCS | Mod: S$GLB,,, | Performed by: INTERNAL MEDICINE

## 2022-01-01 PROCEDURE — 71046 X-RAY EXAM CHEST 2 VIEWS: CPT | Mod: 26,,, | Performed by: RADIOLOGY

## 2022-01-01 RX ORDER — LIDOCAINE 50 MG/G
1 PATCH TOPICAL DAILY
Qty: 30 PATCH | Refills: 2 | Status: ON HOLD | OUTPATIENT
Start: 2022-01-01 | End: 2023-01-01

## 2022-01-01 RX ORDER — BRIMONIDINE TARTRATE 2 MG/ML
1 SOLUTION/ DROPS OPHTHALMIC 3 TIMES DAILY
Qty: 30 ML | Refills: 3 | Status: ON HOLD | OUTPATIENT
Start: 2022-01-01 | End: 2023-01-01

## 2022-01-01 RX ORDER — AMOXICILLIN AND CLAVULANATE POTASSIUM 875; 125 MG/1; MG/1
1 TABLET, FILM COATED ORAL 2 TIMES DAILY
Qty: 20 TABLET | Refills: 0 | Status: SHIPPED | OUTPATIENT
Start: 2022-01-01 | End: 2022-01-01

## 2022-01-01 RX ORDER — LEVOFLOXACIN 750 MG/1
750 TABLET ORAL DAILY
Qty: 5 TABLET | Refills: 0 | Status: SHIPPED | OUTPATIENT
Start: 2022-01-01 | End: 2022-01-01

## 2022-01-03 ENCOUNTER — HOSPITAL ENCOUNTER (OUTPATIENT)
Dept: WOUND CARE | Facility: HOSPITAL | Age: 87
Discharge: HOME | End: 2022-01-03
Attending: PREVENTIVE MEDICINE
Payer: MEDICARE

## 2022-01-03 VITALS
HEIGHT: 64 IN | TEMPERATURE: 98 F | HEART RATE: 69 BPM | DIASTOLIC BLOOD PRESSURE: 59 MMHG | SYSTOLIC BLOOD PRESSURE: 117 MMHG | WEIGHT: 119 LBS | BODY MASS INDEX: 20.32 KG/M2

## 2022-01-03 DIAGNOSIS — S90.425A TOE BLISTER WITHOUT INFECTION, LEFT, INITIAL ENCOUNTER: ICD-10-CM

## 2022-01-03 DIAGNOSIS — I87.8 VENOUS STASIS OF BOTH LOWER EXTREMITIES: ICD-10-CM

## 2022-01-03 DIAGNOSIS — L89.152 PRESSURE INJURY OF SACRAL REGION, STAGE 2: ICD-10-CM

## 2022-01-03 DIAGNOSIS — L97.512 ULCER OF RIGHT FOOT WITH FAT LAYER EXPOSED: ICD-10-CM

## 2022-01-03 DIAGNOSIS — I87.393 CHRONIC VENOUS HYPERTENSION (IDIOPATHIC) WITH OTHER COMPLICATIONS OF BILATERAL LOWER EXTREMITY: Primary | ICD-10-CM

## 2022-01-03 DIAGNOSIS — R60.0 BILATERAL LEG EDEMA: ICD-10-CM

## 2022-01-03 PROCEDURE — 27201912 HC WOUND CARE DEBRIDEMENT SUPPLIES

## 2022-01-03 PROCEDURE — 29581 APPL MULTLAYER CMPRN SYS LEG: CPT

## 2022-01-03 PROCEDURE — 11042 DBRDMT SUBQ TIS 1ST 20SQCM/<: CPT

## 2022-01-03 PROCEDURE — 11045 DBRDMT SUBQ TISS EACH ADDL: CPT

## 2022-01-03 NOTE — PROGRESS NOTES
Subjective:       Patient ID: Nelia Brizuela is a 86 y.o. female.    Chief Complaint: Non-healing Wound Follow Up    1/3/22:  F/U with Dr. Wilkes with multiple open wounds to the RLE and a wound on the sacrum.  Patient presents in the clinic today with 2 layer compression rolling down and very tight on both legs.  3+ pitting edema noted.  Bilateral feet are cold to touch.  Doppler pulses present.  Patient has a change in insurance that is not accepted by her current home health.  New orders sent to [facility]. Care tolerated well.      Review of Systems   All other systems reviewed and are negative.        Objective:      Temp:  [97.9 °F (36.6 °C)]   Pulse:  [69]   BP: (117)/(59)   Physical Exam       Altered Skin Integrity 07/02/21 Right lower;lateral Leg Ulceration (Active)   07/02/21    Altered Skin Integrity Present on Admission: yes   Side: Right   Orientation: lower;lateral   Location: Leg   Wound Number:    Is this injury device related?:    Primary Wound Type: Ulceration   Description of Altered Skin Integrity:    Removal Indication and Assessment:    Wound Outcome:    (Retired) Wound Length (cm):    (Retired) Wound Width (cm):    (Retired) Depth (cm):    Wound Description (Comments):    Removal Indications:    Wound Image   01/03/22 1100   Description of Altered Skin Integrity Full thickness tissue loss. Subcutaneous fat may be visible but bone, tendon or muscle are not exposed 01/03/22 1100   Dressing Appearance Intact;Moist drainage 01/03/22 1100   Drainage Amount Small 01/03/22 1100   Drainage Characteristics/Odor Serosanguineous;Green 01/03/22 1100   Appearance Pink;Yellow;Slough;Moist;Fibrin 01/03/22 1100   Tissue loss description Full thickness 01/03/22 1100   Red (%), Wound Tissue Color 20 % 01/03/22 1100   Yellow (%), Wound Tissue Color 80 % 01/03/22 1100   Periwound Area Edematous;Hemosiderin Staining 01/03/22 1100   Wound Edges Irregular;Open 01/03/22 1100   Wound Length (cm) 5 cm  01/03/22 1100   Wound Width (cm) 4.7 cm 01/03/22 1100   Wound Depth (cm) 0.3 cm 01/03/22 1100   Wound Volume (cm^3) 7.05 cm^3 01/03/22 1100   Wound Surface Area (cm^2) 23.5 cm^2 01/03/22 1100   Care Cleansed with:;Sterile normal saline 01/03/22 1100   Dressing Applied;Changed;Absorptive Pad;Cast padding;Compression wrap 01/03/22 1100   Periwound Care Absorptive dressing applied 01/03/22 1100   Compression Two layer compression 01/03/22 1100   Off Loading Off loading shoe;Football dressing 01/03/22 1100   Dressing Change Due 01/05/22 01/03/22 1100            Altered Skin Integrity 09/17/21 1310 Left lower Leg (Active)   09/17/21 1310   Altered Skin Integrity Present on Admission:    Side: Left   Orientation: lower   Location: Leg   Wound Number:    Is this injury device related?:    Primary Wound Type:    Description of Altered Skin Integrity:    Removal Indication and Assessment:    Wound Outcome:    (Retired) Wound Length (cm):    (Retired) Wound Width (cm):    (Retired) Depth (cm):    Wound Description (Comments):    Removal Indications:    Wound Image   01/03/22 1100   Dressing Appearance Intact 01/03/22 1100   Periwound Area Edematous;[place] 01/03/22 1100   Care Cleansed with:;Soap and water 01/03/22 1100   Dressing Applied;Changed;Compression wrap 01/03/22 1100   Compression Two layer compression 01/03/22 1100   Dressing Change Due 01/05/22 01/03/22 1100            Altered Skin Integrity 09/24/21 1130 Sacral spine #4 Partial thickness tissue loss. Shallow open ulcer with a red or pink wound bed, without slough. Intact or Open/Ruptured Serum-filled blister. (Active)   09/24/21 1130   Altered Skin Integrity Present on Admission:    Side:    Orientation:    Location: Sacral spine   Wound Number: #4   Is this injury device related?:    Primary Wound Type:    Description of Altered Skin Integrity: Partial thickness tissue loss. Shallow open ulcer with a red or pink wound bed, without slough. Intact or Open/Ruptured  Serum-filled blister.   Removal Indication and Assessment:    Wound Outcome:    (Retired) Wound Length (cm):    (Retired) Wound Width (cm):    (Retired) Depth (cm):    Wound Description (Comments):    Removal Indications:    Wound Image   01/03/22 1100   Red (%), Wound Tissue Color 100 % 01/03/22 1100   Wound Length (cm) 1.2 cm 01/03/22 1100   Wound Width (cm) 1.3 cm 01/03/22 1100   Wound Depth (cm) 0.2 cm 01/03/22 1100   Wound Volume (cm^3) 0.312 cm^3 01/03/22 1100   Wound Surface Area (cm^2) 1.56 cm^2 01/03/22 1100            Wound 08/16/21 1000 Venous Ulcer Right dorsal Foot (Active)   08/16/21 1000    Pre-existing: Yes   Primary Wound Type: Venous ulcer   Side: Right   Orientation: dorsal   Location: Foot   Wound Number:    Ankle-Brachial Index:    Pulses:    Removal Indication and Assessment:    Wound Outcome:    (Retired) Wound Type:    (Retired) Wound Length (cm):    (Retired) Wound Width (cm):    (Retired) Depth (cm):    Wound Description (Comments):    Removal Indications:    Wound Image   01/03/22 1100   Dressing Appearance Intact;Moist drainage 01/03/22 1100   Drainage Amount Small 01/03/22 1100   Drainage Characteristics/Odor Serosanguineous;Green 01/03/22 1100   Appearance Pink;Yellow;Moist;Slough 01/03/22 1100   Tissue loss description Full thickness 01/03/22 1100   Red (%), Wound Tissue Color 20 % 01/03/22 1100   Yellow (%), Wound Tissue Color 80 % 01/03/22 1100   Periwound Area Edematous;Hemosiderin Staining 01/03/22 1100   Wound Edges Irregular 01/03/22 1100   Wound Length (cm) 3.2 cm 01/03/22 1100   Wound Width (cm) 1.3 cm 01/03/22 1100   Wound Depth (cm) 0.3 cm 01/03/22 1100   Wound Volume (cm^3) 1.248 cm^3 01/03/22 1100   Wound Surface Area (cm^2) 4.16 cm^2 01/03/22 1100   Care Cleansed with:;Sterile normal saline 01/03/22 1100   Dressing Applied;Changed;Absorptive Pad;Cast padding;Compression wrap;Foam 01/03/22 1100   Periwound Care Absorptive dressing applied 01/03/22 1100   Compression Two  layer compression 01/03/22 1100   Off Loading Off loading shoe;Football dressing 01/03/22 1100   Dressing Change Due 01/05/22 01/03/22 1100         Assessment:         ICD-10-CM ICD-9-CM   1. Chronic venous hypertension (idiopathic) with other complications of bilateral lower extremity  I87.393 459.39   2. Venous stasis of both lower extremities  I87.8 459.81   3. Ulcer of right foot with fat layer exposed  L97.512 707.15   4. Pressure injury of sacral region, stage 2  L89.152 707.03     707.22   5. Bilateral leg edema  R60.0 782.3   6. Toe blister without infection, left, initial encounter  S90.425A 917.2         Plan:   Tissue pathology and/or culture taken:  [] Yes [x] No   Sharp debridement performed:   [x] Yes [] No   Labs ordered this visit:   [] Yes [x] No   Imaging ordered this visit:   [] Yes [x] No           Orders Placed This Encounter   Procedures    Change dressing     Right lateral lower leg and right dorsal foot:   Cleanse wound with: Dakins soak x 5 min, rinse with normal saline   Lidocaine: 4% topical   Silver nitrate: prn   Periwound care: Maintain dry chalo wound   Primary dressing: Gentamicin, hydrofera ready   Secondary dressing: Abd pad   Offloading:  Football: Wes foam x 1 to each wound, cast padding x 2, Darco shoe, float heels when in bed or in chair   Edema control: BLE: Coflex with calamine 2 layer compression from toes to knee,  Avoid prolonged standing in one place.  Elevate leg(s) as much as possible.       Sacral wound  Cleanse wound with: Vashe soak x 5 min, rinse with normal saline   Lidocaine: 4% topical   Silver nitrate: prn   Periwound care: cavilon   Primary dressing:Hydrofera Ready     Secondary dressing: Island dressing to sacrum  Off loading. Turn every 2 hours     Frequency: Twice Weekly and PRN per Home Health   Follow-up: with Dr. Wilkes in 3  weeks 1/24/22     Home Health:  [facility]: SN to perform wound care Twice weekly (except when in clinic) and PRN.  Please bring supplies to change dressings.        Follow up in about 3 weeks (around 1/24/2022).

## 2022-01-03 NOTE — PROGRESS NOTES
1/3/22:  F/U with Dr. Wilkes with multiple open wounds to the RLE and a wound on the sacrum.  Patient presents in the clinic today with 2 layer compression rolling down and very tight on both legs.  3+ pitting edema noted.  Bilateral feet are cold to touch.  Doppler pulses present.  Patient has a change in insurance that is not accepted by her current home health.  New orders sent to [facility]. Care tolerated well.

## 2022-01-07 ENCOUNTER — EXTERNAL HOME HEALTH (OUTPATIENT)
Dept: HOME HEALTH SERVICES | Facility: HOSPITAL | Age: 87
End: 2022-01-07
Payer: MEDICARE

## 2022-01-07 NOTE — PROCEDURES
"Debridement    Date/Time: 1/3/2022 11:00 AM  Performed by: Radhames Wilkes MD  Authorized by: Radhames Wilkes MD     Time out: Immediately prior to procedure a "time out" was called to verify the correct patient, procedure, equipment, support staff and site/side marked as required.    Consent Done?:  Yes (Written)  Local anesthesia used?: Yes    Local anesthetic:  Topical anesthetic    Wound Details:    Location:  Right leg    Type of Debridement:  Excisional       Length (cm):  5       Area (sq cm):  23.5       Width (cm):  4.7       Percent Debrided (%):  100       Depth (cm):  0.3       Total Area Debrided (sq cm):  23.5    Depth of debridement:  Subcutaneous tissue    Tissue debrided:  Subcutaneous    Devitalized tissue debrided:  Slough, Fibrin and Callus    Instruments:  Curette    Additional wounds:  1    2nd Wound Details:     Location:  Right foot    Location:  Right Dorsal Foot    Location:  Right Dorsal Foot    Type of Debridement:  Excisional       Length (cm):  3.2       Area (sq cm):  4.16       Width (cm):  1.3       Percent Debrided (%):  100       Depth (cm):  0.3       Total Area Debrided (sq cm):  4.16    Depth of debridement:  Subcutaneous tissue    Tissue debrided:  Subcutaneous    Devitalized tissue debrided:  Slough, Fibrin and Exudate    Instruments:  Curette    Bleeding:  Minimal  Hemostasis Achieved: Yes    Method Used:  Pressure  Patient tolerance:  Patient tolerated the procedure well with no immediate complications    Tissue pathology and/or culture taken : [] Yes [x] No     "

## 2022-01-11 ENCOUNTER — EXTERNAL HOME HEALTH (OUTPATIENT)
Dept: HOME HEALTH SERVICES | Facility: HOSPITAL | Age: 87
End: 2022-01-11
Payer: MEDICARE

## 2022-01-21 ENCOUNTER — HOSPITAL ENCOUNTER (OUTPATIENT)
Dept: WOUND CARE | Facility: HOSPITAL | Age: 87
Discharge: HOME | End: 2022-01-21
Attending: PREVENTIVE MEDICINE
Payer: MEDICARE

## 2022-01-21 VITALS
TEMPERATURE: 98 F | BODY MASS INDEX: 20.32 KG/M2 | HEART RATE: 86 BPM | HEIGHT: 64 IN | WEIGHT: 119 LBS | SYSTOLIC BLOOD PRESSURE: 134 MMHG | DIASTOLIC BLOOD PRESSURE: 73 MMHG

## 2022-01-21 DIAGNOSIS — I87.393 CHRONIC VENOUS HYPERTENSION (IDIOPATHIC) WITH OTHER COMPLICATIONS OF BILATERAL LOWER EXTREMITY: Primary | ICD-10-CM

## 2022-01-21 DIAGNOSIS — L89.152 PRESSURE INJURY OF SACRAL REGION, STAGE 2: ICD-10-CM

## 2022-01-21 DIAGNOSIS — I87.8 VENOUS STASIS OF BOTH LOWER EXTREMITIES: Chronic | ICD-10-CM

## 2022-01-21 PROCEDURE — 27201912 HC WOUND CARE DEBRIDEMENT SUPPLIES

## 2022-01-21 PROCEDURE — 11042 DBRDMT SUBQ TIS 1ST 20SQCM/<: CPT

## 2022-01-21 NOTE — PROGRESS NOTES
Subjective:       Patient ID: Nelia Brizuela is a 86 y.o. female.    Chief Complaint: Wound Care    Follow up appt. Wounds slowly showing improvement. Dr Wilkes assessed patient and debrided wounds. Patient voiced no complaints. Continuing same plan of care for wounds. Changing coflex to left leg to Tubigrip F for edema control. Instructed patient and spouse on plan of care and they voiced understanding. F/U in 3 weeks.    Review of Systems   All other systems reviewed and are negative.        Objective:      Temp:  [97.9 °F (36.6 °C)]   Pulse:  [86]   BP: (134)/(73)   Physical Exam       Altered Skin Integrity 09/17/21 1310 Left lower Leg (Active)   09/17/21 1310   Altered Skin Integrity Present on Admission:    Side: Left   Orientation: lower   Location: Leg   Wound Number:    Is this injury device related?:    Primary Wound Type:    Description of Altered Skin Integrity:    Removal Indication and Assessment:    Wound Outcome:    (Retired) Wound Length (cm):    (Retired) Wound Width (cm):    (Retired) Depth (cm):    Wound Description (Comments):    Removal Indications:    Care Cleansed with:;Soap and water 01/21/22 1300   Compression Tubular elasticized bandage 01/21/22 1300            Altered Skin Integrity 09/24/21 1130 Sacral spine #4 Partial thickness tissue loss. Shallow open ulcer with a red or pink wound bed, without slough. Intact or Open/Ruptured Serum-filled blister. (Active)   09/24/21 1130   Altered Skin Integrity Present on Admission:    Side:    Orientation:    Location: Sacral spine   Wound Number: #4   Is this injury device related?:    Primary Wound Type:    Description of Altered Skin Integrity: Partial thickness tissue loss. Shallow open ulcer with a red or pink wound bed, without slough. Intact or Open/Ruptured Serum-filled blister.   Removal Indication and Assessment:    Wound Outcome:    (Retired) Wound Length (cm):    (Retired) Wound Width (cm):    (Retired) Depth (cm):    Wound Description  (Comments):    Removal Indications:    Wound Image   01/21/22 1300   Dressing Appearance Intact;Moist drainage 01/21/22 1300   Drainage Amount Small 01/21/22 1300   Drainage Characteristics/Odor Serosanguineous 01/21/22 1300   Appearance Red 01/21/22 1300   Tissue loss description Full thickness 01/21/22 1300   Red (%), Wound Tissue Color 100 % 01/21/22 1300   Wound Length (cm) 1.2 cm 01/21/22 1300   Wound Width (cm) 1.7 cm 01/21/22 1300   Wound Depth (cm) 0.2 cm 01/21/22 1300   Wound Volume (cm^3) 0.408 cm^3 01/21/22 1300   Wound Surface Area (cm^2) 2.04 cm^2 01/21/22 1300   Undermining (depth (cm)/location) 0.3@ 7-5 01/21/22 1300   Care Cleansed with:;Sterile normal saline 01/21/22 1300   Dressing Applied;Island/border 01/21/22 1300   Dressing Change Due 01/28/22 01/21/22 1300            Wound 07/12/21 1159 Venous Ulcer Right lateral;lower Leg (Active)   07/12/21 1159    Pre-existing: Yes   Primary Wound Type: Venous ulcer   Side: Right   Orientation: lateral;lower   Location: Leg   Wound Number:    Ankle-Brachial Index:    Pulses: positive    Removal Indication and Assessment:    Wound Outcome:    (Retired) Wound Type:    (Retired) Wound Length (cm):    (Retired) Wound Width (cm):    (Retired) Depth (cm):    Wound Description (Comments):    Removal Indications:    Wound Image   01/21/22 1300   Dressing Appearance Intact;Moist drainage 01/21/22 1300   Drainage Amount Small 01/21/22 1300   Drainage Characteristics/Odor Serosanguineous 01/21/22 1300   Appearance Red;Yellow 01/21/22 1300   Tissue loss description Full thickness 01/21/22 1300   Red (%), Wound Tissue Color 90 % 01/21/22 1300   Yellow (%), Wound Tissue Color 10 % 01/21/22 1300   Periwound Area Macerated 01/21/22 1300   Wound Edges Irregular 01/21/22 1300   Wound Length (cm) 3.9 cm 01/21/22 1300   Wound Width (cm) 3.5 cm 01/21/22 1300   Wound Depth (cm) 0.3 cm 01/21/22 1300   Wound Volume (cm^3) 4.095 cm^3 01/21/22 1300   Wound Surface Area (cm^2) 13.65  cm^2 01/21/22 1300   Care Cleansed with:;Sterile normal saline 01/21/22 1300   Dressing Applied;Calcium alginate;Compression wrap;Other (comment) 01/21/22 1300   Periwound Care Topical treatment applied 01/21/22 1300   Off Loading Off loading shoe 01/21/22 1300   Dressing Change Due 01/28/22 01/21/22 1300            Wound 08/16/21 1000 Venous Ulcer Right dorsal Foot (Active)   08/16/21 1000    Pre-existing: Yes   Primary Wound Type: Venous ulcer   Side: Right   Orientation: dorsal   Location: Foot   Wound Number:    Ankle-Brachial Index:    Pulses:    Removal Indication and Assessment:    Wound Outcome:    (Retired) Wound Type:    (Retired) Wound Length (cm):    (Retired) Wound Width (cm):    (Retired) Depth (cm):    Wound Description (Comments):    Removal Indications:    Wound Image   01/21/22 1300   Dressing Appearance Intact;Moist drainage 01/21/22 1300   Drainage Amount Small 01/21/22 1300   Drainage Characteristics/Odor Serosanguineous 01/21/22 1300   Appearance Pink;Yellow 01/21/22 1300   Tissue loss description Full thickness 01/21/22 1300   Red (%), Wound Tissue Color 90 % 01/21/22 1300   Yellow (%), Wound Tissue Color 10 % 01/21/22 1300   Periwound Area Edematous;Macerated 01/21/22 1300   Wound Edges Irregular 01/21/22 1300   Wound Length (cm) 3 cm 01/21/22 1300   Wound Width (cm) 1.4 cm 01/21/22 1300   Wound Depth (cm) 0.3 cm 01/21/22 1300   Wound Volume (cm^3) 1.26 cm^3 01/21/22 1300   Wound Surface Area (cm^2) 4.2 cm^2 01/21/22 1300   Care Cleansed with:;Sterile normal saline 01/21/22 1300   Dressing Applied;Calcium alginate;Compression wrap;Other (comment) 01/21/22 1300   Periwound Care Topical treatment applied 01/21/22 1300   Compression Two layer compression 01/21/22 1300   Off Loading Off loading shoe 01/21/22 1300   Dressing Change Due 01/28/22 01/21/22 1300         Assessment:         ICD-10-CM ICD-9-CM   1. Chronic venous hypertension (idiopathic) with other complications of bilateral lower  extremity  I87.393 459.39   2. Venous stasis of both lower extremities  I87.8 459.81   3. Pressure injury of sacral region, stage 2  L89.152 707.03     707.22         Plan:   Tissue pathology and/or culture taken:  [] Yes [x] No   Sharp debridement performed:   [x] Yes [] No   Labs ordered this visit:   [] Yes [x] No   Imaging ordered this visit:   [] Yes [x] No           Orders Placed This Encounter   Procedures    Change dressing     Right lateral lower leg and right dorsal foot:   Cleanse wound with: Dakins soak x 5 min, rinse with normal saline   Lidocaine: 4% topical   Silver nitrate: prn   Periwound care: Maintain dry chalo wound   Primary dressing: Gentamicin, hydrofera ready   Secondary dressing: Abd pad   Offloading:  Football: Wes foam x 1 to each wound, cast padding x 2, Darco shoe, float heels when in bed or in chair   Edema control:Right lower leg Coflex with calamine 2 layer compression from toes to knee, Left lower leg Tubigrip F toes to knee. Avoid prolonged standing in one place.  Elevate leg(s) as much as possible.       Sacral wound   Cleanse wound with: Vashe soak x 5 min, rinse with normal saline   Lidocaine: 4% topical   Silver nitrate: prn   Periwound care: cavilon   Primary dressing:Hydrofera Ready     Secondary dressing: Island dressing to sacrum   Off loading. Turn every 2 hours     Frequency: Twice Weekly and PRN per Home Health   Follow-up: with Dr. Wilkes in 3  weeks 2/11/22     Home Health:  [facility]: SN to perform wound care Twice weekly (except when in clinic) and PRN. Please bring supplies to change dressings.        Follow up in about 3 weeks (around 2/11/2022).

## 2022-01-22 NOTE — PROGRESS NOTES
HPI     Glaucoma     Comments: 4 month ck and DFE and pt states her vision is not as clear as   before              Comments     DLS: 9/28/21    1) POAG OS>>OD // now with chronic angle closure OS   2) APD OS  3) NS OD  4) XT OS  5) PCIOL OS  6) Hx Migraines    MEDS:  Cosopt TID OU  Brimonidine TID OU  Lumigan QHS OU   AT's PRN OU  Genteal gel QHS OU            Last edited by Clotilde Mcelroy MD on 1/24/2022 11:03 AM. (History)            Assessment /Plan     For exam results, see Encounter Report.    Primary open-angle glaucoma, right eye, moderate stage    Chronic angle-closure glaucoma, severe stage, left    Posterior synechiae, left    Steroid responder, bilateral    Afferent pupillary defect, left    Senile nuclear sclerosis, right    Exotropia of left eye - Left Eye    Pseudophakia, left eye          1. 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   S/p ALT OU  S/p repeat ALT OS (3/17/93)  S/p SLT OS (12/4/08)  First HVF 1993  First photos 1992  On gtts when started at [facility] in 1992          Family history    +father        Glaucoma meds    cosopt ou / bimonidine ou/lumigan ou // [place] os        H/O adverse rxn to glaucoma drops    Intolerant to diamox 2/2 kidney problems        LASERS    H/O ALT ou / repeat ALT os 3/1993 / SLT os 12/4/2008        GLAUCOMA SURGERIES    Combined OS 8/13/2014 (early over filtration after LSL - had SF6 gas into AC)           Ahmed - [place] - os - 1/27/2016          OTHER EYE SURGERIES    none        CDR    0.7/0.9        Tbase    ?off gtt  (14-18/ 14-22 on T1/2 ou)        Tmax    ? Off gtts // on gtts 19/27            Ttarget  ?         HVF    8 test 1993 to 2008 - HVF //                   GVF - 11 test - 1997 to 2015 - full /  [place]        Gonio    +3 od/ PAS OS        CCT    505/497 - thin ou        OCT   6 test 2005 to 2017 - RNFL wnl od / border NI os        HRT   12  test 2004 to 2018  - MR -  Dec. S/T, bord I od // xx os /// CDR 0.71 od // xx os         Disc photos    1992, 2004 - slides // 2011,2018-, 2019  OIS    - Ttoday   24/17  (IOP creeping up from 17/16)   - Test done today - IOP / no longer able to do any ancillary test    2. APD os   3. Nuclear sclerosis - becoming visually sig. OS>OD   4. Dry eyes - ATs. D/W pt at length   5. Exotropia of left eye - longstanding. observe   6. Neuralgic migraines - stable. Longstanding    7. 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   S/P combined - complex phaco / iol / trabeculectomy with mitomycin OS  Date:8/13/2014 - SN60WF 23.0  Filter failed - developed angle closure with iris bombe  S/p ahmed [place]  os - 1/27/2016      Eye drops   IOP above   target od - target is 18 -   cont brimonidine tid od   Cosopt TID  OD   Cont lumigan q hs od     Consider rhopressa in future - add a trial of rhopressa od      Ideally Would like to do a  phaco/IOL / GDD OD     - but pts health is fragile and I doubt she is really a surgical candidate at this time (1/24/2022)   Discussed this with the family and the care giver - they do NOT wish to pursue surgery at this time and I feel that is a reasonable decision. Could consider a G6 laser - but the decrease in vision is in great part 2/2 to the cataract at this time OD      Not using glaucoma drops in her left eye (if IOP creeps up can add some back - but limited sight and presently off gtts post tube   AT's os and ointment at night - has a dellen anterior to the tube insertion IN     Photo file updated 1/24/2022       f/u in 4 months, (same day as  ) -  IOP check     PT IS NO LONGER ABLE TO DO ANY VF'S - NOT EVEN OROZCO'S / and not photos or OCT's ect   No further ancillary testing - can do dilated exams and fundus exams and IOP checks

## 2022-01-24 ENCOUNTER — OFFICE VISIT (OUTPATIENT)
Dept: OPHTHALMOLOGY | Facility: CLINIC | Age: 87
End: 2022-01-24
Payer: MEDICARE

## 2022-01-24 DIAGNOSIS — H21.542 POSTERIOR SYNECHIAE, LEFT: ICD-10-CM

## 2022-01-24 DIAGNOSIS — Z96.1 PSEUDOPHAKIA, LEFT EYE: ICD-10-CM

## 2022-01-24 DIAGNOSIS — H40.043 STEROID RESPONDER, BILATERAL: ICD-10-CM

## 2022-01-24 DIAGNOSIS — H40.2223 CHRONIC ANGLE-CLOSURE GLAUCOMA, SEVERE STAGE, LEFT: ICD-10-CM

## 2022-01-24 DIAGNOSIS — H40.1112 PRIMARY OPEN-ANGLE GLAUCOMA, RIGHT EYE, MODERATE STAGE: Primary | ICD-10-CM

## 2022-01-24 DIAGNOSIS — H21.562 AFFERENT PUPILLARY DEFECT, LEFT: ICD-10-CM

## 2022-01-24 DIAGNOSIS — H25.11 SENILE NUCLEAR SCLEROSIS, RIGHT: ICD-10-CM

## 2022-01-24 DIAGNOSIS — H50.112 EXOTROPIA OF LEFT EYE: ICD-10-CM

## 2022-01-24 PROCEDURE — 92014 COMPRE OPH EXAM EST PT 1/>: CPT | Mod: S$GLB,,, | Performed by: OPHTHALMOLOGY

## 2022-01-24 PROCEDURE — 99999 PR PBB SHADOW E&M-EST. PATIENT-LVL III: CPT | Mod: PBBFAC,,, | Performed by: OPHTHALMOLOGY

## 2022-01-24 PROCEDURE — 1159F MED LIST DOCD IN RCRD: CPT | Mod: CPTII,S$GLB,, | Performed by: OPHTHALMOLOGY

## 2022-01-24 PROCEDURE — 1160F PR REVIEW ALL MEDS BY PRESCRIBER/CLIN PHARMACIST DOCUMENTED: ICD-10-PCS | Mod: CPTII,S$GLB,, | Performed by: OPHTHALMOLOGY

## 2022-01-24 PROCEDURE — 1159F PR MEDICATION LIST DOCUMENTED IN MEDICAL RECORD: ICD-10-PCS | Mod: CPTII,S$GLB,, | Performed by: OPHTHALMOLOGY

## 2022-01-24 PROCEDURE — 1160F RVW MEDS BY RX/DR IN RCRD: CPT | Mod: CPTII,S$GLB,, | Performed by: OPHTHALMOLOGY

## 2022-01-24 PROCEDURE — 1126F PR PAIN SEVERITY QUANTIFIED, NO PAIN PRESENT: ICD-10-PCS | Mod: CPTII,S$GLB,, | Performed by: OPHTHALMOLOGY

## 2022-01-24 PROCEDURE — 3288F FALL RISK ASSESSMENT DOCD: CPT | Mod: CPTII,S$GLB,, | Performed by: OPHTHALMOLOGY

## 2022-01-24 PROCEDURE — 1101F PT FALLS ASSESS-DOCD LE1/YR: CPT | Mod: CPTII,S$GLB,, | Performed by: OPHTHALMOLOGY

## 2022-01-24 PROCEDURE — 1126F AMNT PAIN NOTED NONE PRSNT: CPT | Mod: CPTII,S$GLB,, | Performed by: OPHTHALMOLOGY

## 2022-01-24 PROCEDURE — 99999 PR PBB SHADOW E&M-EST. PATIENT-LVL III: ICD-10-PCS | Mod: PBBFAC,,, | Performed by: OPHTHALMOLOGY

## 2022-01-24 PROCEDURE — 1101F PR PT FALLS ASSESS DOC 0-1 FALLS W/OUT INJ PAST YR: ICD-10-PCS | Mod: CPTII,S$GLB,, | Performed by: OPHTHALMOLOGY

## 2022-01-24 PROCEDURE — 3288F PR FALLS RISK ASSESSMENT DOCUMENTED: ICD-10-PCS | Mod: CPTII,S$GLB,, | Performed by: OPHTHALMOLOGY

## 2022-01-24 PROCEDURE — 92014 PR EYE EXAM, EST PATIENT,COMPREHESV: ICD-10-PCS | Mod: S$GLB,,, | Performed by: OPHTHALMOLOGY

## 2022-01-24 RX ORDER — BRIMONIDINE TARTRATE 2 MG/ML
1 SOLUTION/ DROPS OPHTHALMIC 3 TIMES DAILY
Qty: 30 ML | Refills: 3 | Status: SHIPPED | OUTPATIENT
Start: 2022-01-24 | End: 2022-01-01

## 2022-01-24 RX ORDER — GENTAMICIN SULFATE 1 MG/G
CREAM TOPICAL
Status: ON HOLD | COMMUNITY
Start: 2021-12-22 | End: 2023-01-01

## 2022-01-24 RX ORDER — NETARSUDIL 0.2 MG/ML
1 SOLUTION/ DROPS OPHTHALMIC; TOPICAL DAILY
Qty: 7.5 ML | Refills: 3 | Status: SHIPPED | OUTPATIENT
Start: 2022-01-24 | End: 2022-01-01

## 2022-01-24 RX ORDER — DORZOLAMIDE HYDROCHLORIDE AND TIMOLOL MALEATE 20; 5 MG/ML; MG/ML
1 SOLUTION/ DROPS OPHTHALMIC 3 TIMES DAILY
Qty: 3 EACH | Refills: 3 | Status: ON HOLD | OUTPATIENT
Start: 2022-01-24 | End: 2023-01-01

## 2022-01-24 RX ORDER — BIMATOPROST 0.1 MG/ML
1 SOLUTION/ DROPS OPHTHALMIC NIGHTLY
Qty: 7.5 ML | Refills: 3 | Status: SHIPPED | OUTPATIENT
Start: 2022-01-24 | End: 2022-01-01

## 2022-01-24 NOTE — PROCEDURES
"Debridement    Date/Time: 1/21/2022 11:00 AM  Performed by: Radhames Wilkes MD  Authorized by: Radhames Wilkes MD     Time out: Immediately prior to procedure a "time out" was called to verify the correct patient, procedure, equipment, support staff and site/side marked as required.    Consent Done?:  Yes (Written)  Local anesthesia used?: Yes    Local anesthetic:  Topical anesthetic    Wound Details:    Location:  Right leg    Type of Debridement:  Excisional       Length (cm):  3.9       Area (sq cm):  13.65       Width (cm):  3.5       Percent Debrided (%):  100       Depth (cm):  0.3       Total Area Debrided (sq cm):  13.65    Depth of debridement:  Subcutaneous tissue    Tissue debrided:  Subcutaneous    Devitalized tissue debrided:  Slough, Fibrin and Exudate    Instruments:  Curette    Additional wounds:  1    2nd Wound Details:     Location:  Right foot    Location:  Right Dorsal Foot    Location:  Right Dorsal Foot    Type of Debridement:  Excisional       Length (cm):  3       Area (sq cm):  4.2       Width (cm):  1.4       Percent Debrided (%):  100       Depth (cm):  0.3       Total Area Debrided (sq cm):  4.2    Depth of debridement:  Subcutaneous tissue    Tissue debrided:  Subcutaneous    Devitalized tissue debrided:  Slough, Fibrin and Exudate    Instruments:  Curette    Bleeding:  Minimal  Hemostasis Achieved: Yes    Method Used:  Pressure  Patient tolerance:  Patient tolerated the procedure well with no immediate complications      "

## 2022-02-07 ENCOUNTER — PES CALL (OUTPATIENT)
Dept: ADMINISTRATIVE | Facility: CLINIC | Age: 87
End: 2022-02-07
Payer: MEDICARE

## 2022-02-11 ENCOUNTER — HOSPITAL ENCOUNTER (OUTPATIENT)
Dept: WOUND CARE | Facility: HOSPITAL | Age: 87
Discharge: HOME | End: 2022-02-11
Attending: PREVENTIVE MEDICINE
Payer: MEDICARE

## 2022-02-11 VITALS
SYSTOLIC BLOOD PRESSURE: 122 MMHG | WEIGHT: 119 LBS | TEMPERATURE: 98 F | HEART RATE: 80 BPM | HEIGHT: 64 IN | DIASTOLIC BLOOD PRESSURE: 53 MMHG | BODY MASS INDEX: 20.32 KG/M2

## 2022-02-11 DIAGNOSIS — I87.393 CHRONIC VENOUS HYPERTENSION (IDIOPATHIC) WITH OTHER COMPLICATIONS OF BILATERAL LOWER EXTREMITY: Primary | ICD-10-CM

## 2022-02-11 DIAGNOSIS — L89.152 PRESSURE INJURY OF SACRAL REGION, STAGE 2: ICD-10-CM

## 2022-02-11 DIAGNOSIS — R60.0 BILATERAL LEG EDEMA: ICD-10-CM

## 2022-02-11 PROCEDURE — 11042 DBRDMT SUBQ TIS 1ST 20SQCM/<: CPT

## 2022-02-11 PROCEDURE — 27201912 HC WOUND CARE DEBRIDEMENT SUPPLIES

## 2022-02-11 NOTE — PROGRESS NOTES
Subjective:       Patient ID: Nelia Brizuela is a 86 y.o. female.    Chief Complaint: Non-healing Wound Follow Up    2/11/22: Follow for RLE venous ulcers and sacral wound. Wounds improving slowly. No complaints tolerating wound care. Continued with current plan of care.     Review of Systems   All other systems reviewed and are negative.        Objective:      Temp:  [98.4 °F (36.9 °C)]   Pulse:  [80]   BP: (122)/(53)   Physical Exam       Altered Skin Integrity 09/17/21 1310 Left lower Leg (Active)   09/17/21 1310   Altered Skin Integrity Present on Admission:    Side: Left   Orientation: lower   Location: Leg   Wound Number:    Is this injury device related?:    Primary Wound Type:    Description of Altered Skin Integrity:    Removal Indication and Assessment:    Wound Outcome:    (Retired) Wound Length (cm):    (Retired) Wound Width (cm):    (Retired) Depth (cm):    Wound Description (Comments):    Removal Indications:    Periwound Area Dry;Edematous 02/11/22 1100   Care Cleansed with:;Soap and water 02/11/22 1100   Dressing Tubular bandage 02/11/22 1100   Periwound Care Moisturizer applied 02/11/22 1100   Compression Tubular elasticized bandage 02/11/22 1100   Dressing Change Due 02/14/22 02/11/22 1100            Altered Skin Integrity 09/24/21 1130 Sacral spine #4 Partial thickness tissue loss. Shallow open ulcer with a red or pink wound bed, without slough. Intact or Open/Ruptured Serum-filled blister. (Active)   09/24/21 1130   Altered Skin Integrity Present on Admission:    Side:    Orientation:    Location: Sacral spine   Wound Number: #4   Is this injury device related?:    Primary Wound Type:    Description of Altered Skin Integrity: Partial thickness tissue loss. Shallow open ulcer with a red or pink wound bed, without slough. Intact or Open/Ruptured Serum-filled blister.   Removal Indication and Assessment:    Wound Outcome:    (Retired) Wound Length (cm):    (Retired) Wound Width (cm):    (Retired)  Depth (cm):    Wound Description (Comments):    Removal Indications:    Wound Image   02/11/22 1100   Dressing Appearance Intact;Moist drainage 02/11/22 1100   Drainage Amount Small 02/11/22 1100   Drainage Characteristics/Odor Serosanguineous 02/11/22 1100   Appearance Red;Moist 02/11/22 1100   Tissue loss description Full thickness 02/11/22 1100   Red (%), Wound Tissue Color 100 % 02/11/22 1100   Periwound Area Intact;Dry 02/11/22 1100   Wound Edges Defined 02/11/22 1100   Wound Length (cm) 1 cm 02/11/22 1100   Wound Width (cm) 1.1 cm 02/11/22 1100   Wound Depth (cm) 0.2 cm 02/11/22 1100   Wound Volume (cm^3) 0.22 cm^3 02/11/22 1100   Wound Surface Area (cm^2) 1.1 cm^2 02/11/22 1100   Care Cleansed with:;Antimicrobial agent;Sterile normal saline 02/11/22 1100   Dressing Applied;Hydrofiber;Island/border 02/11/22 1100   Periwound Care Absorptive dressing applied;Skin barrier film applied 02/11/22 1100   Dressing Change Due 02/14/22 02/11/22 1100            Wound 07/12/21 1159 Venous Ulcer Right lateral;lower Leg (Active)   07/12/21 1159    Pre-existing: Yes   Primary Wound Type: Venous ulcer   Side: Right   Orientation: lateral;lower   Location: Leg   Wound Number:    Ankle-Brachial Index:    Pulses: positive    Removal Indication and Assessment:    Wound Outcome:    (Retired) Wound Type:    (Retired) Wound Length (cm):    (Retired) Wound Width (cm):    (Retired) Depth (cm):    Wound Description (Comments):    Removal Indications:    Wound Image   02/11/22 1100   Dressing Appearance Intact;Moist drainage 02/11/22 1100   Drainage Amount Small 02/11/22 1100   Drainage Characteristics/Odor Serosanguineous 02/11/22 1100   Appearance Red;Moist;Granulating 02/11/22 1100   Tissue loss description Full thickness 02/11/22 1100   Red (%), Wound Tissue Color 100 % 02/11/22 1100   Periwound Area Edematous;Scar tissue 02/11/22 1100   Wound Edges Irregular 02/11/22 1100   Wound Length (cm) 3.6 cm 02/11/22 1100   Wound Width (cm)  2.52 cm 02/11/22 1100   Wound Depth (cm) 0.2 cm 02/11/22 1100   Wound Volume (cm^3) 1.8144 cm^3 02/11/22 1100   Wound Surface Area (cm^2) 9.072 cm^2 02/11/22 1100   Care Cleansed with:;Sterile normal saline;Debrided;Antimicrobial agent 02/11/22 1100   Dressing Applied;Hydrofiber;Cast padding;Compression wrap 02/11/22 1100   Periwound Care Absorptive dressing applied 02/11/22 1100   Compression Two layer compression 02/11/22 1100   Off Loading Football dressing;Off loading shoe 02/11/22 1100   Dressing Change Due 02/14/22 02/11/22 1100            Wound 08/16/21 1000 Venous Ulcer Right dorsal Foot (Active)   08/16/21 1000    Pre-existing: Yes   Primary Wound Type: Venous ulcer   Side: Right   Orientation: dorsal   Location: Foot   Wound Number:    Ankle-Brachial Index:    Pulses:    Removal Indication and Assessment:    Wound Outcome:    (Retired) Wound Type:    (Retired) Wound Length (cm):    (Retired) Wound Width (cm):    (Retired) Depth (cm):    Wound Description (Comments):    Removal Indications:    Wound Image   02/11/22 1100   Dressing Appearance Intact;Moist drainage 02/11/22 1100   Drainage Amount Small 02/11/22 1100   Drainage Characteristics/Odor Serosanguineous 02/11/22 1100   Appearance Red;Pink;Epithelialization 02/11/22 1100   Tissue loss description Full thickness 02/11/22 1100   Red (%), Wound Tissue Color 100 % 02/11/22 1100   Periwound Area Edematous 02/11/22 1100   Wound Edges Irregular 02/11/22 1100   Wound Length (cm) 2 cm 02/11/22 1100   Wound Width (cm) 0.6 cm 02/11/22 1100   Wound Depth (cm) 0.2 cm 02/11/22 1100   Wound Volume (cm^3) 0.24 cm^3 02/11/22 1100   Wound Surface Area (cm^2) 1.2 cm^2 02/11/22 1100   Care Cleansed with:;Antimicrobial agent;Sterile normal saline;Debrided 02/11/22 1100   Dressing Applied;Hydrofiber;Cast padding;Compression wrap 02/11/22 1100   Periwound Care Absorptive dressing applied 02/11/22 1100   Compression Two layer compression 02/11/22 1100   Off Loading  Football dressing;Off loading shoe 02/11/22 1100   Dressing Change Due 02/14/22 02/11/22 1100         Assessment:         ICD-10-CM ICD-9-CM   1. Chronic venous hypertension (idiopathic) with other complications of bilateral lower extremity  I87.393 459.39   2. Pressure injury of sacral region, stage 2  L89.152 707.03     707.22   3. Bilateral leg edema  R60.0 782.3         Plan:   Tissue pathology and/or culture taken:  [] Yes [x] No   Sharp debridement performed:   [x] Yes [] No   Labs ordered this visit:   [] Yes [x] No   Imaging ordered this visit:   [] Yes [x] No           Orders Placed This Encounter   Procedures    Change dressing     Right lateral lower leg and right dorsal foot:   Cleanse wound with: Dakins soak x 5 min, rinse with normal saline   Lidocaine: 4% topical   Silver nitrate: prn   Periwound care: Maintain dry chalo wound   Primary dressing: Gentamicin, Hydrofera Ready   Secondary dressing: Abd pad   Offloading:  Football: Wes foam x 1 to each wound, cast padding x 2, Darco shoe, float heels when in bed or in chair   Edema control:Right lower leg Coflex with calamine 2 layer compression from toes to knee, Left lower leg Tubigrip F toes to knee. Avoid prolonged standing in one place.  Elevate leg(s) as much as possible.       Sacral wound   Cleanse wound with: Vashe soak x 5 min, rinse with normal saline   Lidocaine: 4% topical   Silver nitrate: prn   Periwound care: cavilon   Primary dressing:Hydrofera Ready     Secondary dressing: Island dressing to sacrum   Off loading. Turn every 2 hours     Frequency: Twice Weekly and PRN per Home Health   Follow-up: with Dr. Wilkes in 4  weeks 3/11/22     Home Health: Continue [facility]: SN to perform wound care Twice weekly (except when in clinic) and PRN. Please bring supplies to change dressings.        Follow up in about 4 weeks (around 3/11/2022) for .

## 2022-02-11 NOTE — PROCEDURES
"Debridement    Date/Time: 2/11/2022 10:47 AM  Performed by: Radhames Wilkes MD  Authorized by: Radhames Wilkes MD     Time out: Immediately prior to procedure a "time out" was called to verify the correct patient, procedure, equipment, support staff and site/side marked as required.    Consent Done?:  Yes (Written)  Local anesthesia used?: Yes    Local anesthetic:  Topical anesthetic    Wound Details:    Location:  Left leg       Length (cm):  3.6       Area (sq cm):  9       Width (cm):  2.5       Percent Debrided (%):  100       Depth (cm):  0.2       Total Area Debrided (sq cm):  9    Depth of debridement:  Subcutaneous tissue    Tissue debrided:  Subcutaneous    Devitalized tissue debrided:  Slough, Fibrin, Exudate and Callus    Instruments:  Curette    Additional wounds:  1    2nd Wound Details:     Location:  Right foot    Location:  Right Dorsal Foot    Location:  Right Dorsal Foot    Type of Debridement:  Excisional       Length (cm):  2       Area (sq cm):  1.2       Width (cm):  0.6       Percent Debrided (%):  100       Depth (cm):  0.2       Total Area Debrided (sq cm):  1.2    Depth of debridement:  Subcutaneous tissue    Tissue debrided:  Subcutaneous    Devitalized tissue debrided:  Slough, Fibrin, Exudate and Callus    Instruments:  Curette    Bleeding:  Minimal  Hemostasis Achieved: Yes    Method Used:  Pressure  Patient tolerance:  Patient tolerated the procedure well with no immediate complications      "

## 2022-03-11 ENCOUNTER — HOSPITAL ENCOUNTER (OUTPATIENT)
Dept: WOUND CARE | Facility: HOSPITAL | Age: 87
Discharge: HOME | End: 2022-03-11
Attending: PREVENTIVE MEDICINE
Payer: MEDICARE

## 2022-03-11 VITALS
WEIGHT: 119 LBS | DIASTOLIC BLOOD PRESSURE: 73 MMHG | BODY MASS INDEX: 20.32 KG/M2 | HEIGHT: 64 IN | HEART RATE: 76 BPM | SYSTOLIC BLOOD PRESSURE: 150 MMHG | TEMPERATURE: 98 F

## 2022-03-11 DIAGNOSIS — I87.8 VENOUS STASIS OF BOTH LOWER EXTREMITIES: ICD-10-CM

## 2022-03-11 DIAGNOSIS — L89.152 PRESSURE INJURY OF SACRAL REGION, STAGE 2: ICD-10-CM

## 2022-03-11 DIAGNOSIS — I87.393 CHRONIC VENOUS HYPERTENSION (IDIOPATHIC) WITH OTHER COMPLICATIONS OF BILATERAL LOWER EXTREMITY: Primary | ICD-10-CM

## 2022-03-11 DIAGNOSIS — R60.0 BILATERAL LEG EDEMA: ICD-10-CM

## 2022-03-11 PROCEDURE — 97597 DBRDMT OPN WND 1ST 20 CM/<: CPT

## 2022-03-11 PROCEDURE — 29581 APPL MULTLAYER CMPRN SYS LEG: CPT

## 2022-03-11 PROCEDURE — 27201912 HC WOUND CARE DEBRIDEMENT SUPPLIES

## 2022-03-11 PROCEDURE — 11042 DBRDMT SUBQ TIS 1ST 20SQCM/<: CPT

## 2022-03-11 NOTE — PROCEDURES
"Debridement    Date/Time: 3/11/2022 11:00 AM  Performed by: Radhames Wilkes MD  Authorized by: Radhames Wilkes MD     Time out: Immediately prior to procedure a "time out" was called to verify the correct patient, procedure, equipment, support staff and site/side marked as required.    Consent Done?:  Yes (Written)  Local anesthesia used?: Yes    Local anesthetic:  Topical anesthetic    Wound Details:    Location:  Right leg    Type of Debridement:  Excisional       Length (cm):  3.6       Area (sq cm):  9       Width (cm):  2.5       Percent Debrided (%):  100       Depth (cm):  0.2       Total Area Debrided (sq cm):  9    Depth of debridement:  Subcutaneous tissue    Tissue debrided:  Subcutaneous    Devitalized tissue debrided:  Slough, Fibrin, Exudate and Callus    Instruments:  Curette    Additional wounds:  1    2nd Wound Details:     Location:  Right foot    Location:  Right Dorsal Foot    Location:  Right Dorsal Foot    Type of Debridement:  Excisional       Length (cm):  2       Area (sq cm):  1.2       Width (cm):  0.6       Percent Debrided (%):  100       Depth (cm):  0.2       Total Area Debrided (sq cm):  1.2    Depth of debridement:  Epidermis/Dermis    Tissue debrided:  Epidermis and Dermis    Devitalized tissue debrided:  Slough, Fibrin and Exudate    Instruments:  Curette    Bleeding:  Minimal  Hemostasis Achieved: Yes    Method Used:  Pressure  Patient tolerance:  Patient tolerated the procedure well with no immediate complications      "

## 2022-03-11 NOTE — PROGRESS NOTES
Subjective:       Patient ID: Nelia Brizuela is a 86 y.o. female.    Chief Complaint: Non-healing Wound Follow Up    3/11/22:  F/U with Dr. Wilkes for RLE and sacral wounds.  No new complaints.  Continue POC.  Sharps debridement and wound care tolerated well.  Updated orders sent to .      Review of Systems   All other systems reviewed and are negative.        Objective:      Temp:  [98.1 °F (36.7 °C)]   Pulse:  [76]   BP: (150)/(73)   Physical Exam       Altered Skin Integrity 09/17/21 1310 Left lower Leg (Active)   09/17/21 1310   Altered Skin Integrity Present on Admission:    Side: Left   Orientation: lower   Location: Leg   Wound Number:    Is this injury device related?:    Primary Wound Type:    Description of Altered Skin Integrity:    Removal Indication and Assessment:    Wound Outcome:    (Retired) Wound Length (cm):    (Retired) Wound Width (cm):    (Retired) Depth (cm):    Wound Description (Comments):    Removal Indications:    Wound Image   03/11/22 1100   Dressing Appearance Intact;Clean 03/11/22 1100   Tissue loss description Not applicable 03/11/22 1100   Periwound Area Edematous;Dry 03/11/22 1100   Care Cleansed with:;Soap and water 03/11/22 1100   Dressing Applied;Changed;Tubular bandage 03/11/22 1100   Compression Two layer compression;Tubular elasticized bandage 03/11/22 1100   Dressing Change Due 03/14/22 03/11/22 1100            Altered Skin Integrity 09/24/21 1130 Sacral spine #4 Partial thickness tissue loss. Shallow open ulcer with a red or pink wound bed, without slough. Intact or Open/Ruptured Serum-filled blister. (Active)   09/24/21 1130   Altered Skin Integrity Present on Admission:    Side:    Orientation:    Location: Sacral spine   Wound Number: #4   Is this injury device related?:    Primary Wound Type:    Description of Altered Skin Integrity: Partial thickness tissue loss. Shallow open ulcer with a red or pink wound bed, without slough. Intact or Open/Ruptured Serum-filled  blister.   Removal Indication and Assessment:    Wound Outcome:    (Retired) Wound Length (cm):    (Retired) Wound Width (cm):    (Retired) Depth (cm):    Wound Description (Comments):    Removal Indications:    Wound Image   03/11/22 1100   Description of Altered Skin Integrity Full thickness tissue loss. Subcutaneous fat may be visible but bone, tendon or muscle are not exposed 03/11/22 1100   Dressing Appearance Moist drainage;Intact 03/11/22 1100   Drainage Amount Small 03/11/22 1100   Drainage Characteristics/Odor Serosanguineous 03/11/22 1100   Appearance Red;Moist 03/11/22 1100   Tissue loss description Full thickness 03/11/22 1100   Red (%), Wound Tissue Color 100 % 03/11/22 1100   Periwound Area Intact;Dry 03/11/22 1100   Wound Edges Open 03/11/22 1100   Wound Length (cm) 1 cm 03/11/22 1100   Wound Width (cm) 1.1 cm 03/11/22 1100   Wound Depth (cm) 0.2 cm 03/11/22 1100   Wound Volume (cm^3) 0.22 cm^3 03/11/22 1100   Wound Surface Area (cm^2) 1.1 cm^2 03/11/22 1100   Care Cleansed with:;Antimicrobial agent;Sterile normal saline 03/11/22 1100   Dressing Applied;Changed;Island/border 03/11/22 1100   Periwound Care Absorptive dressing applied;Skin barrier film applied 03/11/22 1100   Dressing Change Due 03/14/22 03/11/22 1100            Wound 07/12/21 1159 Venous Ulcer Right lateral;lower Leg (Active)   07/12/21 1159    Pre-existing: Yes   Primary Wound Type: Venous ulcer   Side: Right   Orientation: lateral;lower   Location: Leg   Wound Number:    Ankle-Brachial Index:    Pulses: positive    Removal Indication and Assessment:    Wound Outcome:    (Retired) Wound Type:    (Retired) Wound Length (cm):    (Retired) Wound Width (cm):    (Retired) Depth (cm):    Wound Description (Comments):    Removal Indications:    Wound Image   03/11/22 1100   Dressing Appearance Intact;Moist drainage 03/11/22 1100   Drainage Amount Moderate 03/11/22 1100   Drainage Characteristics/Odor Serosanguineous 03/11/22 1100    Appearance Red;Moist;Granulating 03/11/22 1100   Tissue loss description Full thickness 03/11/22 1100   Red (%), Wound Tissue Color 100 % 03/11/22 1100   Periwound Area Edematous;Scar tissue 03/11/22 1100   Wound Edges Open;Irregular 03/11/22 1100   Wound Length (cm) 3.6 cm 03/11/22 1100   Wound Width (cm) 2.5 cm 03/11/22 1100   Wound Depth (cm) 0.2 cm 03/11/22 1100   Wound Volume (cm^3) 1.8 cm^3 03/11/22 1100   Wound Surface Area (cm^2) 9 cm^2 03/11/22 1100   Care Cleansed with:;Sterile normal saline;Antimicrobial agent 03/11/22 1100   Dressing Applied;Changed;Cast padding;Absorptive Pad;Compression wrap 03/11/22 1100   Periwound Care Absorptive dressing applied 03/11/22 1100   Compression Two layer compression 03/11/22 1100   Off Loading Off loading shoe;Football dressing 03/11/22 1100   Dressing Change Due 03/14/22 03/11/22 1100            Wound 08/16/21 1000 Venous Ulcer Right dorsal Foot (Active)   08/16/21 1000    Pre-existing: Yes   Primary Wound Type: Venous ulcer   Side: Right   Orientation: dorsal   Location: Foot   Wound Number:    Ankle-Brachial Index:    Pulses:    Removal Indication and Assessment:    Wound Outcome:    (Retired) Wound Type:    (Retired) Wound Length (cm):    (Retired) Wound Width (cm):    (Retired) Depth (cm):    Wound Description (Comments):    Removal Indications:    Wound Image    03/11/22 1100   Dressing Appearance Intact;Moist drainage 03/11/22 1100   Drainage Amount Small 03/11/22 1100   Drainage Characteristics/Odor Serosanguineous 03/11/22 1100   Appearance Pink;Red;Epithelialization 03/11/22 1100   Tissue loss description Full thickness 03/11/22 1100   Red (%), Wound Tissue Color 100 % 03/11/22 1100   Periwound Area Edematous 03/11/22 1100   Wound Edges Irregular 03/11/22 1100   Wound Length (cm) 2 cm 03/11/22 1100   Wound Width (cm) 0.6 cm 03/11/22 1100   Wound Depth (cm) 0.2 cm 03/11/22 1100   Wound Volume (cm^3) 0.24 cm^3 03/11/22 1100   Wound Surface Area (cm^2) 1.2 cm^2  03/11/22 1100   Care Cleansed with:;Antimicrobial agent;Sterile normal saline 03/11/22 1100   Dressing Applied;Changed;Cast padding;Compression wrap 03/11/22 1100   Periwound Care Absorptive dressing applied 03/11/22 1100   Compression Two layer compression 03/11/22 1100   Off Loading Off loading shoe;Football dressing 03/11/22 1100   Dressing Change Due 03/14/22 03/11/22 1100         Assessment:         ICD-10-CM ICD-9-CM   1. Chronic venous hypertension (idiopathic) with other complications of bilateral lower extremity  I87.393 459.39   2. Pressure injury of sacral region, stage 2  L89.152 707.03     707.22   3. Bilateral leg edema  R60.0 782.3   4. Venous stasis of both lower extremities  I87.8 459.81       All wounds improving  Plan:   Tissue pathology and/or culture taken:  [] Yes [x] No   Sharp debridement performed:   [x] Yes [] No   Labs ordered this visit:   [] Yes [x] No   Imaging ordered this visit:   [] Yes [x] No           Orders Placed This Encounter   Procedures    Change dressing     Right lateral lower leg and right dorsal foot:   Cleanse wound with: Dakins soak x 5 min, rinse with normal saline   Lidocaine: 4% topical   Silver nitrate: prn   Periwound care: Maintain dry chalo wound   Primary dressing: Gentamicin, Hydrofera Ready   Secondary dressing: Abd pad   Offloading:  Football: Wes foam x 1 to each wound, cast padding x 2, Darco shoe, float heels when in bed or in chair   Edema control:Right lower leg Coflex with calamine 2 layer compression from toes to knee, Left lower leg Tubigrip F toes to knee. Avoid prolonged standing in one place.  Elevate leg(s) as much as possible.       Sacral wound   Cleanse wound with: Vashe soak x 5 min, rinse with normal saline   Lidocaine: 4% topical   Silver nitrate: prn   Periwound care: cavilon   Primary dressing:Hydrofera Ready     Secondary dressing: Island dressing to sacrum   Off loading. Turn every 2 hours     Frequency: Twice Weekly and PRN per Home  Health   Follow-up: with Dr. Wilkes in 4  weeks 4/8/22     Home Health: Continue [facility]: SN to perform wound care Twice weekly (except when in clinic) and PRN. Please bring supplies to change dressings.        Follow up in about 4 weeks (around 4/8/2022).

## 2022-03-14 ENCOUNTER — EXTERNAL HOME HEALTH (OUTPATIENT)
Dept: HOME HEALTH SERVICES | Facility: HOSPITAL | Age: 87
End: 2022-03-14
Payer: MEDICARE

## 2022-03-28 NOTE — PROGRESS NOTES
"  HISTORY OF PRESENT ILLNESS:  Ms. Pickens is here today with her   and daughter            Last visit we discussed her 15 # WEIGHT LOSS.    She has been eating Regularly (per  and daughter)    And has been  drinking ensure less regularly--  Her wt is up 17   # since last visit.    Her  reports that she has not been eating but has started again and is eating good now.    Her elderly  cooks.  .    She is weighing 127   pounds. .  She has ensure at the house but does not drink it.            She has historyof pulmonary nodules and abnormal chest CT.  She is having some abdominal   issues.  We got a CAT scan of the abdomen.  It showed some patchy infiltrates in   the right end up getting a PET scan of her lungs and then we had her see   Pulmonary.  There is question about whether she is having aspiration, which she says she is not aspirating.  Her previous CT scan back in 2014 did not show any of this.  She did see Pulmonary  In October 2018.  That note was reviewed at last visit .  SHe was suppose to follow up again with a CT scan again eayvaner this year but never followed up.   She is not having any trouble aspirating after eating per her and  family.  she does not want to follow up.            She does have COPD.  She is still an active smoker  "LIKE A TRAIN" Mr Brizuela .  "SOME" per her , not wanting to  quit smoking.   SHe does say she is not interested in any help quitting.  Feels her breathing is doing pretty well.  She takes cough syrup as needed.          She continues to have bilateral lower extremity edema.   She has continued wrapping her legs.  She does sleep in a chair sometimes leaving her legs down.  Family is trying to make they   are putting her legs up as much as possible.       She also has had hypertension,   which she has had for multiple years.  Blood pressure today is 110/66.   She is   on hydrochlorothiazide 12.5 for this.  Otherwise, no new complaints.       SHe has " "glaucoma and is following with Dr Caldwell  (1/2022) .  Today she has poor vison sin the left eye but the right is doing pretty good.      She has chronic venous insufficiency she has her legs wrapped byy nurse at [facility] nad is wearing soft protective boots. She has some foot wounds and sees wound care doctor on 4/8/2022.             REVIEW OF SYSTEMS:  She denies any dyspnea on exertion or any shortness of   breath.  No PND or orthopnea.  She has had trouble with her shoulder in the   past, but this resolved.  She denies cough, wheezing.  She did continue to have   bilateral lower extremity edema. SHe denies coughing after eating .   She is not moving around very much.  She is looking at TV all day.        PHYSICAL EXAMINATION:       /66 (BP Location: Right arm, Patient Position: Sitting, BP Method: Large (Manual))   Pulse 75   Ht 5' 4" (1.626 m)   Wt 57.7 kg (127 lb 3.3 oz)   LMP  (LMP Unknown)   SpO2 97%   BMI 21.83 kg/m²           GENERAL:  She is a chronic ill appearing 86--year-old female.  At first, she appears to   Well.  Memory is ok- daughter and son  are here with them.  , hearing is ok.   I find that   it is more of a hearing issue and she is alert and oriented x4.    She is alert to   person, place, time and situation.     Left eye uis mostly colsed/ but she can open it.    NECK:  Supple.  She has no JVD.  LUNGS:  Sound clear bilaterally.  CARDIOVASCULAR:  S1 and S2, regular rate and rhythm without murmur, gallop or   rub.  ABDOMEN:  Soft, nontender, no hepatosplenomegaly, no guarding, rebound or   tenderness.  LOWER EXTREMITIES:  Both wrapped.  I got a report from Home Health that the   wounds on her legs have healed, but she has severe venous stasis changes of the   lower extremities. she has some stocking on both legs- she is    ASSESSMENT:  Hypertension, bilateral lower extremity edema, weight loss, which   Has stablaized, COPD with pulmonary nodules and abnormal CT scan suggestive of " a   possible aspiration- no recent hosptialization or lung issues-- still and active smoker.  She has had her covid vaccines x2       . Discussed  Following up with pulmonary-- I don't know how much benefit it will be  Since she is asymptomatic and transportation is taxing.  Discussed.    She will continue at wound care -- currently helps with transfers only-- WIll see wound care 4/8th--- will let me know if she can weight bare and set up PT>

## 2022-04-08 NOTE — PROGRESS NOTES
Subjective:       Patient ID: Nelia Brizuela is a 86 y.o. female.    Chief Complaint: Venous Ulcer (Right leg)    4/8/22: F/U with Dr. Wilkes. RLE sites improved. Continue POC. Sacral site care changed to Aquacel Ag and bordered foam. Orders sent to . Return in 4 weeks.    Review of Systems   All other systems reviewed and are negative.        Objective:      Temp:  [98.1 °F (36.7 °C)]   Pulse:  [74]   BP: (123)/(56)   Physical Exam       Altered Skin Integrity 09/24/21 1130 Sacral spine #4 Partial thickness tissue loss. Shallow open ulcer with a red or pink wound bed, without slough. Intact or Open/Ruptured Serum-filled blister. (Active)   09/24/21 1130   Altered Skin Integrity Present on Admission:    Side:    Orientation:    Location: Sacral spine   Wound Number: #4   Is this injury device related?:    Primary Wound Type:    Description of Altered Skin Integrity: Partial thickness tissue loss. Shallow open ulcer with a red or pink wound bed, without slough. Intact or Open/Ruptured Serum-filled blister.   Removal Indication and Assessment:    Wound Outcome:    (Retired) Wound Length (cm):    (Retired) Wound Width (cm):    (Retired) Depth (cm):    Wound Description (Comments):    Removal Indications:    Wound Image   04/08/22 1200   Dressing Appearance Intact 04/08/22 1200   Drainage Amount Small 04/08/22 1200   Drainage Characteristics/Odor Serosanguineous 04/08/22 1200   Appearance Red;Moist 04/08/22 1200   Tissue loss description Full thickness 04/08/22 1200   Red (%), Wound Tissue Color 100 % 04/08/22 1200   Periwound Area Dry 04/08/22 1200   Wound Edges Irregular 04/08/22 1200   Wound Length (cm) 1 cm 04/08/22 1200   Wound Width (cm) 1 cm 04/08/22 1200   Wound Depth (cm) 0.4 cm 04/08/22 1200   Wound Volume (cm^3) 0.4 cm^3 04/08/22 1200   Wound Surface Area (cm^2) 1 cm^2 04/08/22 1200   Care Cleansed with:;Sterile normal saline 04/08/22 1200   Dressing Applied;Calcium alginate;Silver;Island/border 04/08/22  1200   Periwound Care Skin barrier film applied 04/08/22 1200   Dressing Change Due 04/12/22 04/08/22 1200            Wound 07/12/21 1159 Venous Ulcer Right lateral;lower Leg (Active)   07/12/21 1159    Pre-existing: Yes   Primary Wound Type: Venous ulcer   Side: Right   Orientation: lateral;lower   Location: Leg   Wound Number:    Ankle-Brachial Index:    Pulses: positive    Removal Indication and Assessment:    Wound Outcome:    (Retired) Wound Type:    (Retired) Wound Length (cm):    (Retired) Wound Width (cm):    (Retired) Depth (cm):    Wound Description (Comments):    Removal Indications:    Wound Image   04/08/22 1200   Dressing Appearance Moist drainage 04/08/22 1200   Drainage Amount Small 04/08/22 1200   Drainage Characteristics/Odor Serosanguineous 04/08/22 1200   Appearance Red;Moist 04/08/22 1200   Tissue loss description Full thickness 04/08/22 1200   Red (%), Wound Tissue Color 100 % 04/08/22 1200   Periwound Area Edematous 04/08/22 1200   Wound Edges Irregular 04/08/22 1200   Wound Length (cm) 3 cm 04/08/22 1200   Wound Width (cm) 1 cm 04/08/22 1200   Wound Depth (cm) 0.2 cm 04/08/22 1200   Wound Volume (cm^3) 0.6 cm^3 04/08/22 1200   Wound Surface Area (cm^2) 3 cm^2 04/08/22 1200   Care Cleansed with:;Sterile normal saline 04/08/22 1200   Dressing Applied;Hydrofiber;Foam;Cast padding;Absorptive Pad;Compression wrap 04/08/22 1200   Periwound Care Absorptive dressing applied 04/08/22 1200   Compression Two layer compression 04/08/22 1200   Off Loading Football dressing;Off loading shoe 04/08/22 1200   Dressing Change Due 04/12/22 04/08/22 1200            Wound 08/16/21 1000 Venous Ulcer Right dorsal Foot (Active)   08/16/21 1000    Pre-existing: Yes   Primary Wound Type: Venous ulcer   Side: Right   Orientation: dorsal   Location: Foot   Wound Number:    Ankle-Brachial Index:    Pulses:    Removal Indication and Assessment:    Wound Outcome:    (Retired) Wound Type:    (Retired) Wound Length (cm):     (Retired) Wound Width (cm):    (Retired) Depth (cm):    Wound Description (Comments):    Removal Indications:    Wound Image   04/08/22 1200   Dressing Appearance Moist drainage 04/08/22 1200   Drainage Amount Small 04/08/22 1200   Drainage Characteristics/Odor Serosanguineous 04/08/22 1200   Appearance Pink;Moist 04/08/22 1200   Tissue loss description Full thickness 04/08/22 1200   Red (%), Wound Tissue Color 100 % 04/08/22 1200   Periwound Area Edematous 04/08/22 1200   Wound Edges Irregular 04/08/22 1200   Wound Length (cm) 1.5 cm 04/08/22 1200   Wound Width (cm) 0.4 cm 04/08/22 1200   Wound Depth (cm) 0.1 cm 04/08/22 1200   Wound Volume (cm^3) 0.06 cm^3 04/08/22 1200   Wound Surface Area (cm^2) 0.6 cm^2 04/08/22 1200   Care Cleansed with:;Sterile normal saline 04/08/22 1200   Dressing Applied;Hydrofiber;Foam;Cast padding;Compression wrap 04/08/22 1200   Periwound Care Absorptive dressing applied 04/08/22 1200   Compression Two layer compression 04/08/22 1200   Off Loading Football dressing;Off loading shoe 04/08/22 1200   Dressing Change Due 04/12/22 04/08/22 1200         Assessment:         ICD-10-CM ICD-9-CM   1. Chronic venous hypertension (idiopathic) with other complications of bilateral lower extremity  I87.393 459.39   2. Ulcer of right foot with fat layer exposed  L97.512 707.15   3. Pressure injury of sacral region, stage 2  L89.152 707.03     707.22   4. Bilateral leg edema  R60.0 782.3         Plan:   Tissue pathology and/or culture taken:  [] Yes [x] No   Sharp debridement performed:   [] Yes [x] No   Labs ordered this visit:   [] Yes [x] No   Imaging ordered this visit:   [] Yes [x] No           Orders Placed This Encounter   Procedures    Change dressing     Right lateral lower leg and right dorsal foot:   Cleanse wound with: Dakins soak x 5 min, rinse with normal saline   Lidocaine: 4% topical   Silver nitrate: prn   Periwound care: Maintain dry chalo wound   Primary dressing: Gentamicin,  Hydrofera Ready   Secondary dressing: Abd pad   Offloading:  Football: Ews foam x 1 to each wound, cast padding x 2, Darco shoe, float heels when in bed or in chair   Edema control:Right lower leg Coflex with calamine 2 layer compression from toes to knee, Left lower leg Tubigrip F toes to knee. Avoid prolonged standing in one place.  Elevate leg(s) as much as possible.       Sacral wound   Cleanse wound with: Vashe soak x 5 min, rinse with normal saline   Lidocaine: 4% topical   Silver nitrate: prn   Periwound care: cavilon   Primary dressing: Aquacel AG  Secondary dressing: Island dressing to sacrum   Off loading. Turn every 2 hours     Frequency: Twice Weekly and PRN per Home Health   Follow-up: with Dr. Wilkes in 4  weeks 5/6/22    Home Health: Continue [facility]: SN to perform wound care Twice weekly (except when in clinic) and PRN. Please bring supplies to change dressings.        Follow up in about 4 weeks (around 5/6/2022).

## 2022-04-20 NOTE — PATIENT INSTRUCTIONS
Counseling and Referral of Other Preventative  (Italic type indicates deductible and co-insurance are waived)    Patient Name: Nelia Brizuela  Today's Date: 4/20/2022    Health Maintenance       Date Due Completion Date    DEXA Scan Never done ---    COVID-19 Vaccine (3 - Booster for Moderna series) 09/01/2021 4/1/2021    Shingles Vaccine (3 of 3) 05/23/2022 3/28/2022    Lipid Panel 04/16/2023 4/16/2018    Override on 10/7/2009: (N/S)    TETANUS VACCINE 06/07/2027 6/7/2017        No orders of the defined types were placed in this encounter.    The following information is provided to all patients.  This information is to help you find resources for any of the problems found today that may be affecting your health:                Living healthy guide: www.[facility].[place].gov      Understanding Diabetes: www.diabetes.org      Eating healthy: www.cdc.gov/healthyweight      [facility] home safety checklist: www.cdc.gov/steadi/patient.html      Agency on Aging: www.goea.[place].[facility]      Alcoholics anonymous (AA): www.aa.org      Physical Activity: www.shane.nih.gov/uo7tuay      Tobacco use: www.quitwithusla.org

## 2022-04-20 NOTE — PROGRESS NOTES
Nelia Brizuela presented for a  Medicare AWV and comprehensive Health Risk Assessment today. The following components were reviewed and updated:    · Medical history  · Family History  · Social history  · Allergies and Current Medications  · Health Risk Assessment  · Health Maintenance  · Care Team         ** See Completed Assessments for Annual Wellness Visit within the encounter summary.**         The following assessments were completed:  · Living Situation  · CAGE  · Depression Screening  · Timed Get Up and Go - not performed, pt wheelchair bound, can transfer with standby assist  · Whisper Test  · Cognitive Function Screening - not performed, poor vision  · Nutrition Screening  · ADL Screening  · PAQ Screening        Vitals:    04/20/22 0947   BP: 122/78   Pulse: 71   Temp: 97.3 °F (36.3 °C)   SpO2: 95%     There is no height or weight on file to calculate BMI. - Unable to obtain wt during visit to calculate BMI.  Physical Exam  Vitals reviewed.   HENT:      Head: Normocephalic.   Eyes:      Comments: Poor vision   Cardiovascular:      Rate and Rhythm: Normal rate and regular rhythm.      Heart sounds: Normal heart sounds.   Pulmonary:      Effort: Pulmonary effort is normal.      Breath sounds: Normal breath sounds.   Abdominal:      General: Bowel sounds are normal.      Palpations: Abdomen is soft.   Musculoskeletal:         General: Normal range of motion.      Cervical back: Normal range of motion.      Right lower leg: Edema present.      Left lower leg: Edema present.      Comments: Legs wrapped   Skin:     General: Skin is warm and dry.      Comments: Right foot and sacral wound reported by family, HH does wound care weekly   Neurological:      Mental Status: She is alert and oriented to person, place, and time.      Motor: Weakness present.      Gait: Gait abnormal (wheelchair bound).   Psychiatric:         Behavior: Behavior normal.         Cognition and Memory: Cognition is impaired. Memory is  impaired.               Diagnoses and health risks identified today and associated recommendations/orders:    1. Encounter for preventive health examination  - Above assessments completed. Preventive measures and health maintenance reviewed with patient,  and daughter Maria Eugenia.  -encouraged covid booster  -will defer bone density to PCP due to pt age and physical debility.    2. Peripheral vascular disease, unspecified  3. Ulcer of right foot with fat layer exposed  Chronic, followed by PCP and Wound care  -Wound care nurse comes weekly  -legs currently wrapped  -has appt with Wound Care provider on 5/6    4. Atherosclerosis of aorta  Stable, followed by PCP  -on asa    5. Panlobular emphysema  6. Pulmonary nodule  Chronic, followed by PCP  -encouraged Pulmonology f/u as recommended by PCP at last visit.    7. Tobacco abuse  -no interest in quitting, counseled    8. Bilateral leg edema  Chronic  -legs currently wrapped, encouraged elevation and low Na+ diet    9. Primary open-angle glaucoma, moderate stage, unspecified laterality  Chronic, followed by Ophthalmology  -on eye gtts    10. Age-related physical debility  -no falls reported  -patient is wheelchair bound, she is only up with assist and can transfer with stand by assist.  -discussed safety and fall precaution      Provided Nelia with a 5-10 year written screening schedule and personal prevention plan. Recommendations were developed using the USPSTF age appropriate recommendations. Education, counseling, and referrals were provided as needed. After Visit Summary printed and given to patient which includes a list of additional screenings\tests needed.    Follow up in about 1 year (around 4/20/2023) for your next annual wellness visit.    Any Pham NP    I offered to discuss advanced care planning, including how to pick a person who would make decisions for you if you were unable to make them for yourself, called a health care power of , and  what kind of decisions you might make such as use of life sustaining treatments such as ventilators and tube feeding when faced with a life limiting illness recorded on a living will that they will need to know. (How you want to be cared for as you near the end of your natural life)     X  Patient is unable to engage in a discussion regarding advanced directives due to severe physical and/or cognitive impairment.

## 2022-04-21 NOTE — TELEPHONE ENCOUNTER
Spoke with nurse Lucy stated patient has been having a productive cough for about a month.  Lungs are clear, rust colored sputum.   bp is fine     appt with np scheduled

## 2022-04-22 NOTE — PROGRESS NOTES
Subjective:       Patient ID: Nelia Brizuela is a 86 y.o. female with PMH of COPD, PVD, chronic venous insuff, tobacco use    Chief Complaint: Cough    HPI     Established pt of Lenin Sandhu MD (new to me)      Here with son, dtr and .     [facility] wound care nurse noted she coughed reddish/brown tinge phlegm and she advised family to make a clinic appt.     Family states she has also has a productive cough with brownish sputum, first time they noticed any reddish tinge.     She takes robitussin every am for her chronic cough. She is a smoker.    Pt states she feels well. No fevers, cp, sob, or night sweats. Good appetite    Per chart review, seen by Pulm in OCT 2018 for abnl CT chest and PET scan but declined having any follow up.          Past Medical History:   Diagnosis Date    Arthritis of knee     Cataract     COPD (chronic obstructive pulmonary disease)     CTS (carpal tunnel syndrome)     Diverticulosis of colon     Glaucoma (increased eye pressure)     HTN (hypertension)     Joint pain     Metabolic alkalosis     Psoriasis 2007    Smoker     Strabismus     XT OS    Uterine myoma     Venous stasis of both lower extremities      Social History     Tobacco Use    Smoking status: Current Every Day Smoker     Packs/day: 1.00     Years: 57.00     Pack years: 57.00    Smokeless tobacco: Never Used    Tobacco comment: Pt not ready to quit.   Substance Use Topics    Alcohol use: Yes     Comment: seldom alcohol use.    Drug use: No     Review of patient's allergies indicates:   Allergen Reactions    Codeine       Unknown           Review of Systems   Constitutional: Negative for chills, diaphoresis, fever and unexpected weight change.   Respiratory: Positive for cough (chronic). Negative for shortness of breath and wheezing.    Cardiovascular: Positive for leg swelling (chronic). Negative for chest pain.   Gastrointestinal: Negative for abdominal pain, nausea and vomiting.   Integumentary:  " Negative for rash.         Objective: /80 (BP Location: Left arm, Patient Position: Sitting, BP Method: Medium (Manual))   Pulse 77   Resp 16   Ht 5' 4" (1.626 m)   Wt 57.6 kg (127 lb)   LMP  (LMP Unknown)   SpO2 98%   BMI 21.80 kg/m²         Physical Exam  Constitutional:       General: She is not in acute distress.  Cardiovascular:      Rate and Rhythm: Normal rate and regular rhythm.   Pulmonary:      Effort: Pulmonary effort is normal. No respiratory distress.      Breath sounds: Rhonchi (scattered throughout) present. No wheezing.   Musculoskeletal:      Right lower leg: Edema present.      Left lower leg: Edema present.      Comments: Ace bandage/edema wraps to BLE   Neurological:      Mental Status: She is alert.         Assessment:       Problem List Items Addressed This Visit        Pulmonary    COPD (chronic obstructive pulmonary disease) (Chronic)    Relevant Orders    X-Ray Chest PA And Lateral (Completed)      Other Visit Diagnoses     Productive cough    -  Primary    Relevant Orders    X-Ray Chest PA And Lateral (Completed)    Abnormal CXR              Plan:         Nelia was seen today for cough.    Diagnoses and all orders for this visit:    Productive cough  Chronic obstructive pulmonary disease, unspecified COPD type  VSS, SpO2 98% on RA  In no distress  CXR today for further eval  -     X-Ray Chest PA And Lateral; Future    Hellen Conti PA-C        Addendum: CXR Lungs worse from prior study. Will obtain CT chest and lab for further eval      X-Ray Chest PA And Lateral  Chest two views: Heart size is upper normal.  There is bilateral edema.  Bones bowel gas are noncontributory.  There is aortic plaque and DJD.  Lungs are worse from the prior study.  Impression: Pulmonary edema pneumonia aspiration or sepsis.      Abnormal CXR  -     CT Chest Without Contrast; Future      Hellen Conti PA-C      "

## 2022-04-22 NOTE — TELEPHONE ENCOUNTER
Please contact  or son.   CXR from today is abnormal, needs further follow up with a CT CHEST. Order placed. Please help schedule

## 2022-05-06 NOTE — PROGRESS NOTES
Subjective:       Patient ID: Nelia Brizuela is a 86 y.o. female.    Chief Complaint: Pressure Ulcer and Non-healing Wound Follow Up    5/6/22 Patient was seen in clinic for evaluation and treatment of right lateral leg, right dorsal foot and sacral wounds.  Right lateral leg with pearly epithelization tissue.  Right dorsal with small amount of white fibrin over granulation tissue, selective debridement done.  Sacral wound with island of epithelization.  Tolerated dressing changes well.      Review of Systems   All other systems reviewed and are negative.        Objective:      Temp:  [98.3 °F (36.8 °C)]   Pulse:  [80]   BP: (125)/(66)   Physical Exam       Altered Skin Integrity 09/24/21 1130 Sacral spine #4 Partial thickness tissue loss. Shallow open ulcer with a red or pink wound bed, without slough. Intact or Open/Ruptured Serum-filled blister. (Active)   09/24/21 1130   Altered Skin Integrity Present on Admission:    Side:    Orientation:    Location: Sacral spine   Wound Number: #4   Is this injury device related?:    Primary Wound Type:    Description of Altered Skin Integrity: Partial thickness tissue loss. Shallow open ulcer with a red or pink wound bed, without slough. Intact or Open/Ruptured Serum-filled blister.   Removal Indication and Assessment:    Wound Outcome:    (Retired) Wound Length (cm):    (Retired) Wound Width (cm):    (Retired) Depth (cm):    Wound Description (Comments):    Removal Indications:    Wound Image   05/06/22 1100   Dressing Appearance Open to air 05/06/22 1100   Drainage Amount Small 05/06/22 1100   Drainage Characteristics/Odor Serous 05/06/22 1100   Appearance Red 05/06/22 1100   Tissue loss description Full thickness 05/06/22 1100   Red (%), Wound Tissue Color 75 % 05/06/22 1100   Yellow (%), Wound Tissue Color 25 % 05/06/22 1100   Periwound Area Intact;Macerated;Moist 05/06/22 1100   Wound Edges Defined;Open 05/06/22 1100   Wound Length (cm) 1 cm 05/06/22 1100   Wound Width  (cm) 1.3 cm 05/06/22 1100   Wound Depth (cm) 0.3 cm 05/06/22 1100   Wound Volume (cm^3) 0.39 cm^3 05/06/22 1100   Wound Surface Area (cm^2) 1.3 cm^2 05/06/22 1100   Undermining (depth (cm)/location) 12-4=0.5cm 05/06/22 1100   Care Cleansed with:;Antimicrobial agent;Sterile normal saline 05/06/22 1100   Dressing Applied;Calcium alginate;Silver 05/06/22 1100   Dressing Change Due 05/10/22 05/06/22 1100            Wound 07/12/21 1159 Venous Ulcer Right lateral;lower Leg (Active)   07/12/21 1159    Pre-existing: Yes   Primary Wound Type: Venous ulcer   Side: Right   Orientation: lateral;lower   Location: Leg   Wound Number:    Ankle-Brachial Index:    Pulses: positive    Removal Indication and Assessment:    Wound Outcome:    (Retired) Wound Type:    (Retired) Wound Length (cm):    (Retired) Wound Width (cm):    (Retired) Depth (cm):    Wound Description (Comments):    Removal Indications:    Wound Image   05/06/22 1100   Dressing Appearance Dry;Intact 05/06/22 1100   Drainage Amount Scant 05/06/22 1100   Drainage Characteristics/Odor Serous 05/06/22 1100   Appearance Pink;Epithelialization 05/06/22 1100   Tissue loss description Full thickness 05/06/22 1100   Red (%), Wound Tissue Color 100 % 05/06/22 1100   Periwound Area Dry;Intact 05/06/22 1100   Wound Edges Open;Irregular 05/06/22 1100   Wound Length (cm) 1.5 cm 05/06/22 1100   Wound Width (cm) 0.8 cm 05/06/22 1100   Wound Depth (cm) 0.1 cm 05/06/22 1100   Wound Volume (cm^3) 0.12 cm^3 05/06/22 1100   Wound Surface Area (cm^2) 1.2 cm^2 05/06/22 1100   Care Cleansed with:;Antimicrobial agent;Sterile normal saline;Soap and water 05/06/22 1100   Dressing Applied;Hydrofiber 05/06/22 1100   Periwound Care Absorptive dressing applied 05/06/22 1100   Compression Two layer compression 05/06/22 1100   Dressing Change Due 05/10/22 05/06/22 1100            Wound 08/16/21 1000 Venous Ulcer Right dorsal Foot (Active)   08/16/21 1000    Pre-existing: Yes   Primary Wound Type:  Venous ulcer   Side: Right   Orientation: dorsal   Location: Foot   Wound Number:    Ankle-Brachial Index:    Pulses:    Removal Indication and Assessment:    Wound Outcome:    (Retired) Wound Type:    (Retired) Wound Length (cm):    (Retired) Wound Width (cm):    (Retired) Depth (cm):    Wound Description (Comments):    Removal Indications:    Wound Image   05/06/22 1100   Dressing Appearance Intact;Moist drainage 05/06/22 1100   Drainage Amount Small 05/06/22 1100   Drainage Characteristics/Odor Green;Purulent 05/06/22 1100   Appearance Red 05/06/22 1100   Tissue loss description Full thickness 05/06/22 1100   Red (%), Wound Tissue Color 75 % 05/06/22 1100   Yellow (%), Wound Tissue Color 25 % 05/06/22 1100   Periwound Area Dry;Intact;Hemosiderin Staining 05/06/22 1100   Wound Edges Defined;Open 05/06/22 1100   Wound Length (cm) 2.5 cm 05/06/22 1100   Wound Width (cm) 0.8 cm 05/06/22 1100   Wound Depth (cm) 0.1 cm 05/06/22 1100   Wound Volume (cm^3) 0.2 cm^3 05/06/22 1100   Wound Surface Area (cm^2) 2 cm^2 05/06/22 1100   Care Cleansed with:;Soap and water;Sterile normal saline;Debrided 05/06/22 1100   Dressing Applied;Hydrofiber 05/06/22 1100   Compression Two layer compression 05/06/22 1100   Dressing Change Due 05/10/22 05/06/22 1100         Assessment:         ICD-10-CM ICD-9-CM   1. Chronic venous hypertension (idiopathic) with other complications of bilateral lower extremity  I87.393 459.39   2. Pressure injury of sacral region, stage 3  L89.153 707.03     707.23   3. Ulcer of right foot with fat layer exposed  L97.512 707.15   4. Venous stasis of both lower extremities  I87.8 459.81         Plan:   Tissue pathology and/or culture taken:  [] Yes [x] No   Sharp debridement performed:   [x] Yes [] No   Labs ordered this visit:   [] Yes [x] No   Imaging ordered this visit:   [] Yes [x] No           Orders Placed This Encounter   Procedures    Change dressing     Right lateral lower leg and right dorsal foot:      Cleanse wound with: Vashe soak x 5 min, rinse with normal saline   Lidocaine: prn  Silver nitrate: prn   Periwound care: Maintain dry chalo wound   Primary dressing: Hydrofera Ready cut to wound size  Secondary dressing: Abd pad or Lili foam  Edema control: Coflex with calamine 2 layer compression from toes to knee+bootie+Darco boot   Left lower leg Tubigrip F toes to knee.         Sacral wound   Cleanse wound with: Vashe soak x 5 min, rinse with normal saline   Lidocaine: prn  Silver nitrate: prn   Periwound care: cavilon   Primary dressing: Aquacel AG   Secondary dressing: Island dressing to sacrum     Frequency: Twice Weekly and PRN per Home Health   Follow-up: with Dr. Wilkes in 8  weeks     Home Health: Continue [facility]: SN to perform wound care twice weekly (except when in clinic) and PRN.        Follow up in about 2 months (around 7/6/2022) for Dr Wilkes.

## 2022-05-09 NOTE — TELEPHONE ENCOUNTER
Please contact  and/or son   Mrs. Brizuela needs follow up in our [facility] for further eval of her cough and abnormal CT scan.   Referral placed. Please help schedule. I also want her to start abx for possible pneumonia. Rx sent to [facility], [place]. Let me know of any questions.

## 2022-05-09 NOTE — TELEPHONE ENCOUNTER
Spoke with patient spouse Mr Gao to relay message from Hellen Conti, PAC regarding CT results and new antibiotic order

## 2022-05-09 NOTE — PROCEDURES
"Debridement    Date/Time: 5/6/2022 10:28 AM  Performed by: Radhames Wilkes MD  Authorized by: Radhames Wilkes MD     Time out: Immediately prior to procedure a "time out" was called to verify the correct patient, procedure, equipment, support staff and site/side marked as required.    Consent Done?:  Yes (Written)  Local anesthesia used?: Yes    Local anesthetic:  Topical anesthetic    Wound Details:    Location:  Right foot    Location:  Right Dorsal Foot    Type of Debridement:  Excisional       Length (cm):  1       Area (sq cm):  1.3       Width (cm):  1.3       Percent Debrided (%):  100       Depth (cm):  0.3       Total Area Debrided (sq cm):  1.3    Depth of debridement:  Subcutaneous tissue    Tissue debrided:  Subcutaneous    Devitalized tissue debrided:  Slough, Fibrin and Exudate    Instruments:  Curette    Bleeding:  Minimal  Hemostasis Achieved: Yes    Method Used:  Pressure      "

## 2022-05-11 NOTE — TELEPHONE ENCOUNTER
Thanks for informing pt/.     I see pt is scheduled for Pulmnology in July, is there an appt sooner? I think that's too far out     [facility]    Future Appointments   Date Time Provider Department Center   5/26/2022  2:15 PM Clotilde Mcelroy MD [facility]   7/1/2022 11:00 AM Radhames Wilkes MD [facility] WOUND [facility]   7/21/2022 10:30 AM Caroline Trejo MD [facility] PULMS [facility]   10/3/2022 10:00 AM Lenin Sandhu Jr., MD [facility]

## 2022-05-13 NOTE — TELEPHONE ENCOUNTER
Spoke with patient , informed him that I'm contacting patient in regards to offering patient sooner appointment. Patient  verbalized that he understands and states that patient will like to keep her original appointment on 7/22/22. I verbalized to patient  that I understand.

## 2022-07-06 NOTE — PROGRESS NOTES
HPI     DLS: 1/24/2022    Pt here for 4 Month check;  Pt's  states     Meds;  Cosopt BID OU   Brimonidine BID OU   Lumigan QHS OU   Rhopressa QDAY OD  AT's PRN OU   Genteal gel QHS OU     1) POAG OS>>OD // now with chronic angle closure OS   2) APD OS   3) NS OD   4) XT OS   5) PCIOL OS   6) Hx Migraines     Last edited by Helena Mejias on 7/7/2022  2:55 PM. (History)              Assessment /Plan     For exam results, see Encounter Report.    Primary open-angle glaucoma, right eye, moderate stage    Chronic angle-closure glaucoma, severe stage, left    Posterior synechiae, left    Steroid responder, bilateral    Afferent pupillary defect, left    Senile nuclear sclerosis, right    Exotropia of left eye - Left Eye    Pseudophakia, left eye    History of glaucoma tube shunt procedure        1. 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   S/p ALT OU  S/p repeat ALT OS (3/17/93)  S/p SLT OS (12/4/08)  First HVF 1993  First photos 1992  On gtts when started at [facility] in 1992          Family history    +father        Glaucoma meds    cosopt ou / bimonidine ou/lumigan ou // daktoa os        H/O adverse rxn to glaucoma drops    Intolerant to diamox 2/2 kidney problems        LASERS    H/O ALT ou / repeat ALT os 3/1993 / SLT os 12/4/2008        GLAUCOMA SURGERIES    Combined OS 8/13/2014 (early over filtration after LSL - had SF6 gas into AC)           Ahmed - [place] - os - 1/27/2016          OTHER EYE SURGERIES    none        CDR    0.7/0.9        Tbase    ?off gtt  (14-18/ 14-22 on T1/2 ou)        Tmax    ? Off gtts // on gtts 19/27            Ttarget  ?         HVF    8 test 1993 to 2008 - HVF //                   GVF - 11 test - 1997 to 2015 - full /  [place]        Gonio    +3 od/ PAS OS        CCT    505/497 - thin ou        OCT   6 test 2005 to 2017 - RNFL wnl od / border NI os        HRT   12  test 2004 to 2018 - MR -  Dec. S/T,  bord I od // xx os /// CDR 0.71 od // xx os         Disc photos    1992, 2004 - slides // 2011,2018-, 2019  OIS    - Ttoday  21/16 (IOP creeping up from 17/16)   - Test done today - IOP  / no longer able to do any ancillary test    2. APD os   3. Nuclear sclerosis - becoming visually sig. OS>OD   4. Dry eyes - ATs. D/W pt at length   5. Exotropia of left eye - longstanding. observe   6. Neuralgic migraines - stable. Longstanding    7. 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   S/P combined - complex phaco / iol / trabeculectomy with mitomycin OS  Date:8/13/2014 - SN60WF 23.0  Filter failed - developed angle closure with iris bombe  S/p ahmed [place]  os - 1/27/2016      Eye drops   IOP above   target od - target is 18 -   cont brimonidine tid od   Cosopt TID  OD   Cont lumigan q hs od     Consider rhopressa in future - add a trial of rhopressa od      Ideally Would like to do a  phaco/IOL / GDD OD     - but pts health is fragile and I doubt she is really a surgical candidate at this time (1/24/2022)   Discussed this with the family and the care giver - they do NOT wish to pursue surgery at this time and I feel that is a reasonable decision. Could consider a G6 laser - but the decrease in vision is in great part 2/2 to the cataract at this time OD      Not using glaucoma drops in her left eye (if IOP creeps up can add some back - but limited sight and presently off gtts post tube   AT's os and ointment at night - has a dellen anterior to the tube insertion IN     Photo file updated 1/24/2022       f/u in 4 months, (same day as  ) -  IOP check     PT IS NO LONGER ABLE TO DO ANY VF'S - NOT EVEN OROZCO'S / and not photos or OCT's ect   No further ancillary testing - can do dilated exams and fundus exams and IOP checks

## 2022-07-08 NOTE — TELEPHONE ENCOUNTER
Received call from Lucy, home health nurse, reporting that patient had significant drainage from her wounds that is not contained by foam dressing.  Ok to use ABD pads over Aquacel AG instead of bordered foam and to continue with tubigrips as per patient  tolerance.

## 2022-07-15 NOTE — TELEPHONE ENCOUNTER
"Pt home health nurse called reporting, ble weeping again and Flexinet is too tight to legs "diggin in."  Order given to stop flexinet and reapply Calamine coflex ble toe to knee.   "

## 2022-07-21 NOTE — TELEPHONE ENCOUNTER
Spoke with patient daughter, Patient daughter states that patient is having transportation problems and can not make her appointment with Dr Trejo on today. Patient daughter also states that patient daughter also states that patient will contact the office once she his ready to schedule appointment. I verbalized to patient daughter that I understand and also advised patient daughter that I will cancel patient appointment. Patient daughter verbalized that she understand.

## 2022-08-26 NOTE — PROGRESS NOTES
[facility]    Subjective:       Patient ID: Nelia Brizuela is a 86 y.o. female.    Chief Complaint: Pressure Ulcer and Venous Stasis (Sacral Ulcer, Ulcer of Right Foot)    Patient seen for follow up regarding her right leg, right dorsal foot and sacral wound. No acute complaints by the patient or her . Has slight increase in lower leg swelling.    Review of Systems   All other systems reviewed and are negative.      Objective:      Physical Exam       Altered Skin Integrity 09/24/21 1130 Sacral spine #4 Partial thickness tissue loss. Shallow open ulcer with a red or pink wound bed, without slough. Intact or Open/Ruptured Serum-filled blister. (Active)   09/24/21 1130   Altered Skin Integrity Present on Admission:    Side:    Orientation:    Location: Sacral spine   Wound Number: #4   Is this injury device related?:    Primary Wound Type:    Description of Altered Skin Integrity: Partial thickness tissue loss. Shallow open ulcer with a red or pink wound bed, without slough. Intact or Open/Ruptured Serum-filled blister.   Removal Indication and Assessment:    Wound Outcome:    (Retired) Wound Length (cm):    (Retired) Wound Width (cm):    (Retired) Depth (cm):    Wound Description (Comments):    Removal Indications:    Wound Image   08/26/22 0900   Dressing Appearance Open to air 08/26/22 0900   Drainage Amount Small 08/26/22 0900   Drainage Characteristics/Odor Serous 08/26/22 0900   Appearance Pink;Red 08/26/22 0900   Tissue loss description Full thickness 08/26/22 0900   Red (%), Wound Tissue Color 100 % 08/26/22 0900   Periwound Area Intact;Dry 08/26/22 0900   Wound Edges Defined;Open 08/26/22 0900   Wound Length (cm) 0.9 cm 08/26/22 0900   Wound Width (cm) 1.1 cm 08/26/22 0900   Wound Depth (cm) 0.2 cm 08/26/22 0900   Wound Volume (cm^3) 0.198 cm^3 08/26/22 0900   Wound Surface Area (cm^2) 0.99 cm^2 08/26/22 0900   Care Soap and water;Sterile normal saline  08/26/22 0900   Dressing Applied;Hydrofiber;Island/border 08/26/22 0900   Periwound Care Skin barrier film applied 08/26/22 0900   Dressing Change Due 08/29/22 08/26/22 0900            Wound 07/12/21 1159 Venous Ulcer Right lateral;lower Leg (Active)   07/12/21 1159    Pre-existing: Yes   Primary Wound Type: Venous ulcer   Side: Right   Orientation: lateral;lower   Location: Leg   Wound Number:    Ankle-Brachial Index:    Pulses: positive    Removal Indication and Assessment:    Wound Outcome:    (Retired) Wound Type:    (Retired) Wound Length (cm):    (Retired) Wound Width (cm):    (Retired) Depth (cm):    Wound Description (Comments):    Removal Indications:    Wound Image   08/26/22 0900   Dressing Appearance Dry 08/26/22 0900   Drainage Amount Scant 08/26/22 0900   Drainage Characteristics/Odor Serous 08/26/22 0900   Appearance Pink;Granulating 08/26/22 0900   Tissue loss description Full thickness 08/26/22 0900   Red (%), Wound Tissue Color 100 % 08/26/22 0900   Periwound Area Dry;Intact 08/26/22 0900   Wound Edges Open;Irregular 08/26/22 0900   Wound Length (cm) 1.1 cm 08/26/22 0900   Wound Width (cm) 0.5 cm 08/26/22 0900   Wound Depth (cm) 0.2 cm 08/26/22 0900   Wound Volume (cm^3) 0.11 cm^3 08/26/22 0900   Wound Surface Area (cm^2) 0.55 cm^2 08/26/22 0900   Care Cleansed with:;Soap and water;Sterile normal saline 08/26/22 0900   Dressing Hydrofiber;Island/border 08/26/22 0900   Periwound Care Dry periwound area maintained 08/26/22 0900   Compression Tubular elasticized bandage 08/26/22 0900   Dressing Change Due 08/29/22 08/26/22 0900            Wound 08/16/21 1000 Venous Ulcer Right dorsal Foot (Active)   08/16/21 1000    Pre-existing: Yes   Primary Wound Type: Venous ulcer   Side: Right   Orientation: dorsal   Location: Foot   Wound Number:    Ankle-Brachial Index:    Pulses:    Removal Indication and Assessment:    Wound Outcome:    (Retired) Wound Type:    (Retired) Wound Length (cm):    (Retired) Wound  Width (cm):    (Retired) Depth (cm):    Wound Description (Comments):    Removal Indications:    Wound Image   08/26/22 0900   Dressing Appearance Intact;Moist drainage 08/26/22 0900   Drainage Amount Small 08/26/22 0900   Drainage Characteristics/Odor Serosanguineous 08/26/22 0900   Appearance Pink;Yellow;Red 08/26/22 0900   Tissue loss description Full thickness 08/26/22 0900   Red (%), Wound Tissue Color 80 % 08/26/22 0900   Yellow (%), Wound Tissue Color 20 % 08/26/22 0900   Periwound Area Intact;Dry 08/26/22 0900   Wound Edges Irregular 08/26/22 0900   Wound Length (cm) 2.3 cm 08/26/22 0900   Wound Width (cm) 0.8 cm 08/26/22 0900   Wound Depth (cm) 0.3 cm 08/26/22 0900   Wound Volume (cm^3) 0.552 cm^3 08/26/22 0900   Wound Surface Area (cm^2) 1.84 cm^2 08/26/22 0900   Care Cleansed with:;Soap and water;Sterile normal saline 08/26/22 0900   Dressing Applied;Island/border 08/26/22 0900   Periwound Care Dry periwound area maintained 08/26/22 0900   Compression Tubular elasticized bandage 08/26/22 0900   Dressing Change Due 08/29/22 08/26/22 0900         Assessment:         ICD-10-CM ICD-9-CM   1. Chronic venous hypertension (idiopathic) with other complications of bilateral lower extremity  I87.393 459.39   2. Ulcer of right foot with fat layer exposed  L97.512 707.15   3. Pressure injury of sacral region, stage 3  L89.153 707.03     707.23   4. Venous stasis of both lower extremities  I87.8 459.81   5. Bilateral leg edema  R60.0 782.3       Wounds are stable. RLE ulcer is resolved.  Plan:   Tissue pathology and/or culture taken:  [] Yes [x] No   Sharp debridement performed:   [x] Yes [] No   Labs ordered this visit:   [] Yes [x] No   Imaging ordered this visit:   [] Yes [x] No     Need to increase mobility. Request to reestablish with home PT.  Debrided right foot ulcer      Orders Placed This Encounter   Procedures    Change dressing     Right lateral lower leg and right dorsal foot:     Cleanse wound with:  Vashe soak x 5 min, rinse with normal saline   Lidocaine: prn   Silver nitrate: prn   Periwound care: DO not use Gention Alyson   Primary dressing: Hydrofera Ready  Secondary dressing: bordered foam   Edema control: Double Tubigrip G from toes to knee+bootie+Darco boot to BLE           Sacral wound   Cleanse wound with: Vashe soak x 5 min, rinse with normal saline   Lidocaine: prn   Silver nitrate: prn   Primary dressing: Hydrofera Ready  Secondary dressing: Foam island dressing   Frequency: Twice Weekly and PRN per Home Health   Follow-up: with Dr. Wilkes in 2 months     Continue [facility]: SN to perform wound care twice weekly (except when in clinic) and PRN.    PT to eval patient's transfers   Patient is to come to clinic via ambulance or Family        Follow up in about 8 weeks (around 10/21/2022) for Dr. Wilkes.

## 2022-08-29 NOTE — PROCEDURES
"Debridement    Date/Time: 8/26/2022 10:57 AM  Performed by: Radhames Wilkes MD  Authorized by: Radhames Wilkes MD   Associated wounds:        Wound 08/16/21 1000 Venous Ulcer Right dorsal Foot  Time out: Immediately prior to procedure a "time out" was called to verify the correct patient, procedure, equipment, support staff and site/side marked as required.    Consent Done?:  Yes (Written)  Local anesthesia used?: Yes    Local anesthetic:  Topical anesthetic    Wound Details:    Location:  Right foot    Location:  Right Dorsal Foot    Type of Debridement:  Excisional       Length (cm):  2.3       Area (sq cm):  1.8       Width (cm):  0.8       Percent Debrided (%):  100       Depth (cm):  0.3       Total Area Debrided (sq cm):  1.8    Depth of debridement:  Subcutaneous tissue    Tissue debrided:  Subcutaneous    Devitalized tissue debrided:  Slough, Fibrin and Exudate    Instruments:  Curette    Bleeding:  Minimal  Hemostasis Achieved: Yes    Method Used:  Pressure  Patient tolerance:  Patient tolerated the procedure well with no immediate complications  "

## 2022-09-30 NOTE — PROGRESS NOTES
[facility]    Subjective:       Patient ID: Nelia Brizuela is a 86 y.o. female.    Chief Complaint: Non-healing Wound Follow Up    Patient seen for RLE and sacral wounds.  Increased edema BLE appreciated.  New orders routed to home health.   Review of Systems   All other systems reviewed and are negative.      Objective:      Physical Exam       Altered Skin Integrity 09/24/21 1130 Sacral spine #4 Partial thickness tissue loss. Shallow open ulcer with a red or pink wound bed, without slough. Intact or Open/Ruptured Serum-filled blister. (Active)   09/24/21 1130   Altered Skin Integrity Present on Admission:    Side:    Orientation:    Location: Sacral spine   Wound Number: #4   Is this injury device related?:    Primary Wound Type:    Description of Altered Skin Integrity: Partial thickness tissue loss. Shallow open ulcer with a red or pink wound bed, without slough. Intact or Open/Ruptured Serum-filled blister.   Ankle-Brachial Index:    Pulses:    Removal Indication and Assessment:    Wound Outcome:    (Retired) Wound Length (cm):    (Retired) Wound Width (cm):    (Retired) Depth (cm):    Wound Description (Comments):    Removal Indications:    Wound Image   09/30/22 1442   Description of Altered Skin Integrity Full thickness tissue loss with exposed bone, tendon, or muscle. Often includes undermining and tunneling. May extend into muscle and/or supporting structures. 09/30/22 1442   Dressing Appearance Open to air 09/30/22 1442   Drainage Amount Small 09/30/22 1442   Drainage Characteristics/Odor Serous 09/30/22 1442   Appearance Pink;Not granulating;Smooth 09/30/22 1442   Tissue loss description Full thickness 09/30/22 1442   Red (%), Wound Tissue Color 100 % 09/30/22 1442   Periwound Area Intact;Dry 09/30/22 1442   Wound Edges Defined 09/30/22 1442   Wound Length (cm) 2.2 cm 09/30/22 1442   Wound Width (cm) 1.8 cm 09/30/22 1442   Wound Depth (cm) 0.5 cm 09/30/22  1442   Wound Volume (cm^3) 1.98 cm^3 09/30/22 1442   Wound Surface Area (cm^2) 3.96 cm^2 09/30/22 1442   Undermining (depth (cm)/location) 7-5=1.0cm 09/30/22 1442   Care Cleansed with:;Sterile normal saline 09/30/22 1442   Dressing Applied;Hydrofiber;Island/border 09/30/22 1442   Dressing Change Due 10/03/22 09/30/22 1442            Wound 07/12/21 1159 Venous Ulcer Right lateral;lower Leg (Active)   07/12/21 1159    Pre-existing: Yes   Primary Wound Type: Venous ulcer   Side: Right   Orientation: lateral;lower   Location: Leg   Wound Number:    Ankle-Brachial Index:    Pulses: positive    Removal Indication and Assessment:    Wound Outcome:    (Retired) Wound Type:    (Retired) Wound Length (cm):    (Retired) Wound Width (cm):    (Retired) Depth (cm):    Wound Description (Comments):    Removal Indications:    Wound Image   09/30/22 1442   Dressing Appearance Moist drainage 09/30/22 1442   Drainage Amount Moderate 09/30/22 1442   Drainage Characteristics/Odor Serous 09/30/22 1442   Appearance Pink;Fibrin 09/30/22 1442   Tissue loss description Partial thickness 09/30/22 1442   Red (%), Wound Tissue Color 100 % 09/30/22 1442   Periwound Area Moist;Denuded;Edematous 09/30/22 1442   Wound Edges Irregular;Undefined 09/30/22 1442   Wound Length (cm) 3.5 cm 09/30/22 1442   Wound Width (cm) 3 cm 09/30/22 1442   Wound Depth (cm) 0.1 cm 09/30/22 1442   Wound Volume (cm^3) 1.05 cm^3 09/30/22 1442   Wound Surface Area (cm^2) 10.5 cm^2 09/30/22 1442   Care Cleansed with:;Soap and water;Sterile normal saline 09/30/22 1442   Dressing Applied;Hydrofiber 09/30/22 1442   Compression Two layer compression 09/30/22 1442   Dressing Change Due 10/03/22 09/30/22 1442            Wound 08/16/21 1000 Venous Ulcer Right dorsal Foot (Active)   08/16/21 1000    Pre-existing: Yes   Primary Wound Type: Venous ulcer   Side: Right   Orientation: dorsal   Location: Foot   Wound Number:    Ankle-Brachial Index:    Pulses:    Removal Indication and  Assessment:    Wound Outcome:    (Retired) Wound Type:    (Retired) Wound Length (cm):    (Retired) Wound Width (cm):    (Retired) Depth (cm):    Wound Description (Comments):    Removal Indications:    Wound Image   09/30/22 1442   Dressing Appearance Moist drainage 09/30/22 1442   Drainage Amount Moderate 09/30/22 1442   Drainage Characteristics/Odor Serosanguineous 09/30/22 1442   Appearance Slough;Pink 09/30/22 1442   Tissue loss description Full thickness 09/30/22 1442   Red (%), Wound Tissue Color 30 % 09/30/22 1442   Yellow (%), Wound Tissue Color 70 % 09/30/22 1442   Periwound Area Edematous 09/30/22 1442   Wound Edges Irregular;Undefined 09/30/22 1442   Wound Length (cm) 4 cm 09/30/22 1442   Wound Width (cm) 2.3 cm 09/30/22 1442   Wound Depth (cm) 0.6 cm 09/30/22 1442   Wound Volume (cm^3) 5.52 cm^3 09/30/22 1442   Wound Surface Area (cm^2) 9.2 cm^2 09/30/22 1442   Care Cleansed with:;Soap and water;Sterile normal saline 09/30/22 1442   Dressing Applied;Hydrofiber 09/30/22 1442   Compression Two layer compression 09/30/22 1442   Dressing Change Due 10/03/22 09/30/22 1442         Assessment:         ICD-10-CM ICD-9-CM   1. Chronic venous hypertension (idiopathic) with other complications of bilateral lower extremity  I87.393 459.39   2. Ulcer of right foot with fat layer exposed  L97.512 707.15   3. Pressure injury of sacral region, stage 3  L89.153 707.03     707.23   4. Bilateral leg edema  R60.0 782.3         Plan:   Tissue pathology and/or culture taken:  [] Yes [x] No   Sharp debridement performed:   [] Yes [x] No   Labs ordered this visit:   [] Yes [x] No   Imaging ordered this visit:   [] Yes [x] No           Orders Placed This Encounter   Procedures    Change dressing     Right lateral lower leg and right dorsal foot:   Cleanse wound with: Vashe soak x 5 min, rinse with normal saline   Lidocaine: prn   Silver nitrate: prn   Periwound care: DO not use Gention Alyson   Primary dressing: Hydrofera  Classic  Secondary dressing: gauze  Edema control: Co-flex with calamine to BLE.       Sacral wound   Cleanse wound with: Vashe soak x 5 min, rinse with normal saline   Lidocaine: prn   Silver nitrate: prn   Primary dressing: Hydrofera classic  Secondary dressing: Foam island dressing     Frequency: Twice Weekly and PRN per Home Health   Follow-up: with Dr. Wilkes in 2 months     Continue [facility]: SN to perform wound care twice weekly (except when in clinic) and PRN.        Follow up in about 2 months (around 11/30/2022) for Dr Wilkes.

## 2022-10-13 NOTE — TELEPHONE ENCOUNTER
----- Message from Speedy Suarez sent at 10/13/2022 10:38 AM CDT -----  Contact: Darci (khanh) [phone]  Darci home health nurse from [facility] have a cough r 02 state 91% and 100 trmp reading, respirations 22 and when she  her around increase to 24. Per darci if need to schedule call  [facility] [phone]    Please christian and advise

## 2022-11-02 NOTE — PROGRESS NOTES
"  HISTORY OF PRESENT ILLNESS:  Ms. Pickens is here today with her   and son & daughter           Weight loss has been a problem.  She has been eating Regularly (per  and daughter)    SHe does not like drinking he ensure. --  Her wt was up 17   #   last visit.    Her  reports that she has not been eating but has started again and is eating good now- but slacks off sometimes.    Her elderly  cooks.      She is weighing 127   pounds.              She has historyof pulmonary nodules and abnormal chest CT.  She is having some abdominal   issues.  We got a CAT scan of the abdomen.  It showed some patchy infiltrates in   the right end up getting a PET scan of her lungs and then we had her see   Pulmonary.  There is question about whether she is having aspiration, which she says she is not aspirating.  Her previous CT scan back in 2014 did not show any of this.  She did see Pulmonary  In October 2018. .  SHe was suppose to follow up again with a CT scan again ealier this year but never followed up.  SHe had a folloow up Ct of the chest in 5/2022.   FInding " Extensive chronic lung changes most notably bronchiectasis and bronchial wall thickening in the lung bases, right greater than left.  Worsening peribronchial thickening, coalescent bronchiectasis/cavitation and peribronchial opacity suggests superimposed pneumonia.  Consider and non tuberculous and tuberculous mycobacterial infection in addition to community-acquired pneumonia and other atypical pathogens."        She is not having any trouble aspirating after eating per her and  family.          She does have COPD.  She is still an active smoker  "LIKE A TRAIN" Mr Brizuela .  "SOME" per her , not wanting to  quit smoking.   SHe does say she is not interested in any help quitting.  Feels her breathing is doing pretty well.  She takes cough syrup as needed.          She continues to have bilateral lower extremity edema.   She has continued " "wrapping her legs.  She does sleep in a chair sometimes leaving her legs down.  Family is trying to make they   are putting her legs up as much as possible.       She also has had hypertension,   which she has had for multiple years.  Blood pressure today is 110/66.   She is   on hydrochlorothiazide 12.5 for this.  Otherwise, no new complaints.       SHe has glaucoma and is following with Dr Caldwell  (7/2022) .  Today she has poor vison sin the left eye but the right is doing pretty good.       She has chronic venous insufficiency she has her legs wrapped byy nurse at [facility] nad is wearing soft protective boots.          REVIEW OF SYSTEMS:  She denies any dyspnea on exertion or any shortness of   breath.  No PND or orthopnea.  She has had trouble with her shoulder in the   past, but this resolved.  She denies cough, wheezing.  She did continue to have   bilateral lower extremity edema. SHe denies coughing after or while eating .   She is not moving around very much.  She is looking at TV all day.        PHYSICAL EXAMINATION:       /86 (BP Location: Left arm, Patient Position: Sitting, BP Method: Medium (Manual))   Pulse 62   Ht 5' 4" (1.626 m)   LMP  (LMP Unknown)   SpO2 (!) 94%   BMI 21.80 kg/m²        GENERAL:  She is a chronic ill appearing 87--year-old female.   SHe is in a wheelchair.   Memory is ok- daughter and son  are here with them.  , hearing is poor   I she is alert and oriented x4.    She is alert to   person, place, time and situation.     Left eye uis mostly closed but she can open it.  Right eye is tracking and has Normal ROM.    NECK:  Supple.  She has no JVD.  LUNGS:  Sound clear bilaterally.  CARDIOVASCULAR:  S1 and S2, regular rate and rhythm without murmur, gallop or   rub.  ABDOMEN:  Soft, nontender, no hepatosplenomegaly, no guarding, rebound or   tenderness.  LOWER EXTREMITIES:  Both wrapped.  I got a report from Home Health that the   wounds on her legs have healed, but she has " severe venous stasis changes of the   lower extremities. she has some stocking on both legs- she is    ASSESSMENT:  Hypertension, bilateral lower extremity edema, weight loss, which   Has stablaized, COPD with pulmonary nodules and abnormal CT scan suggestive of a   possible aspiration- no recent hosptialization or lung issues-- still and active smoker.  She has had her covid vaccines x2         Assessment and plan.    . Discussed  Following up with pulmonary-- I don't know how much benefit it will be  Since she is asymptomatic and transportation is taxing.  Discussed.     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  Reviewd chest CT from 5/2022.  WIll check sputum culture.  WIll check CMP to see albumin statiues-- Will treat with augmentin 875 bid for 10 days.    We could have her see Pulmonary butif culture is negative would rather montor due to over all health issues.  SHe is not a good canidate for a bronch with sampling.

## 2022-11-02 NOTE — PROGRESS NOTES
Two pt identifier verified. Pt tolerated well. Advised pt to wait 15 minutes post immunization. Pt verbalized understanding.    Adele MILLIGAN

## 2022-11-10 NOTE — TELEPHONE ENCOUNTER
----- Message from Hilda Dubois sent at 11/10/2022 12:08 PM CST -----  Contact: [phone] @ [facility] Daily Afternoon  [facility] is calling patient's left hand and back pain/Patient has a wound. [facility] would like order for wheelchair pillow.    Please fax to [facility]

## 2022-11-12 NOTE — TELEPHONE ENCOUNTER
Left Ms. Jean Baptiste a voicemail regarding pt, just trying to clarify exactly what is needed so that orders can be written correctly.     Adele MILLIGAN

## 2022-11-12 NOTE — TELEPHONE ENCOUNTER
----- Message from Katherine Saez sent at 11/12/2022 10:17 AM CST -----  Contact: Lex/Lauren [phone]  Patient is returning a phone call.  Who left a message for the patient: Adele Baugh MA  Does patient know what this is regarding:  Lt hand has +1 +2 pitting edema in hand. Also, she has sacral wound 2.5 by 0.4. Nurse was calling to make you aware. She would you like to know if you would like to add pain med b/c pt is having a lot of pain with wound. Also, would you like to change any medication due to swelling in hands? Pt is in a sitting position all day and all night. It is uncomfortable for her to lay down due to COPD. Can you please order wheel chair cushion so pt can get some relief with wound to sacrum. Can you please call to okay wheelchair cushion and Lex can assist with getting it to her.   Would you like a call back, or a response through your [facility] portal?:   call  Comments: Lex is going back out Monday    Lauren will be driving for the next hour if you need to reach her she is available.       [facility] [address] [place] [address], [place] - [address] [facility]  [address] [facility] [address]  Phone: [phone] Fax: [phone]

## 2022-11-14 NOTE — PROGRESS NOTES
HPI    DLS: 7/07/2022    Pt here for 4 Month Check;  Pt states no eye pain but feels like eyes aren't doing too good.    Meds;  Cosopt BID OU   Brimonidine BID OU   Lumigan QHS OU   Rhopressa QDAY OD   AT's PRN OU   Genteal gel QHS OU       1) POAG OS>>OD // now with chronic angle closure OS   2) APD OS   3 ) NS OD   4) XT OS   5) PCIOL OS   6) Hx Migraines     Last edited by Helena Mejias on 11/15/2022 10:56 AM.            Assessment /Plan     For exam results, see Encounter Report.    Primary open-angle glaucoma, right eye, moderate stage    Chronic angle-closure glaucoma, severe stage, left    Posterior synechiae, left    Steroid responder, bilateral    Afferent pupillary defect, left    Senile nuclear sclerosis, right    Exotropia of left eye - Left Eye    Pseudophakia, left eye    History of glaucoma tube shunt procedure        1. 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   S/p ALT OU  S/p repeat ALT OS (3/17/93)  S/p SLT OS (12/4/08)  First HVF 1993  First photos 1992  On gtts when started at [facility] in 1992          Family history    +father        Glaucoma meds    cosopt ou / bimonidine ou/lumigan ou // [place] os        H/O adverse rxn to glaucoma drops    Intolerant to diamox 2/2 kidney problems        LASERS    H/O ALT ou / repeat ALT os 3/1993 / SLT os 12/4/2008        GLAUCOMA SURGERIES    Combined OS 8/13/2014 (early over filtration after LSL - had SF6 gas into AC)           Ahmed - [place] - os - 1/27/2016          OTHER EYE SURGERIES    none        CDR    0.7/0.9        Tbase    ?off gtt  (14-18/ 14-22 on T1/2 ou)        Tmax    ? Off gtts // on gtts 19/27            Ttarget  ?         HVF    8 test 1993 to 2008 - HVF //                   GVF - 11 test - 1997 to 2015 - full /  [place]        Gonio    +3 od/ PAS OS        CCT    505/497 - thin ou        OCT   6 test 2005 to 2017 - RNFL wnl od / border NI os        HRT    12  test 2004 to 2018 - MR -  Dec. S/T, bord I od // xx os /// CDR 0.71 od // xx os         Disc photos    1992, 2004 - slides // 2011,2018-, 2019  OIS    - Ttoday 20//17(IOP creeping up from 17/16)   - Test done today - IOP     2. APD os   3. Nuclear sclerosis - becoming visually sig. OS>OD   4. Dry eyes - ATs. D/W pt at length   5. Exotropia of left eye - longstanding. observe   6. Neuralgic migraines - stable. Longstanding    7. 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   S/P combined - complex phaco / iol / trabeculectomy with mitomycin OS  Date:8/13/2014 - SN60WF 23.0  Filter failed - developed angle closure with iris bombe  S/p ahmed IN  os - 1/27/2016      Eye drops   IOP above   target od - target is 18 -   cont brimonidine tid od   Cosopt TID  OD   Cont lumigan q hs od     Consider rhopressa in future - add a trial of rhopressa od      Ideally Would like to do a  phaco/IOL / GDD OD     - but pts health is fragile and I doubt she is really a surgical candidate at this time (1/24/2022)   Discussed this with the family and the care giver - they do NOT wish to pursue surgery at this time and I feel that is a reasonable decision. Could consider a G6 laser - but the decrease in vision is in great part 2/2 to the cataract at this time OD      Not using glaucoma drops in her left eye (if IOP creeps up can add some back - but limited sight and presently off gtts post tube   AT's os and ointment at night - has a dellen anterior to the tube insertion IN     Photo file updated 1/24/2022       f/u in 6 months, (same day as  ) -  IOP AND - dilated fundus exam    PT IS NO LONGER ABLE TO DO ANY VF'S - NOT EVEN OROZCO'S / and not photos or OCT's ect   No further ancillary testing - can do dilated exams and fundus exams and IOP checks

## 2022-11-14 NOTE — TELEPHONE ENCOUNTER
----- Message from Madeline Oconnor sent at 11/14/2022  1:05 PM CST -----  Contact: [phone]  Pt would like a call back. Pt has some questions.

## 2022-11-14 NOTE — TELEPHONE ENCOUNTER
Okay.  I am not sure what is going on the hand.  If it continues to be a problem we need to get her in to be seen.  I would suggest try elevation 1st.  For the pain, less try some over-the-counter Tylenol.  Rx for cushion is at you desk

## 2022-11-15 NOTE — PROGRESS NOTES
"Subjective:       Patient ID: Nelia Brizuela is a 87 y.o. female.    Chief Complaint: Swelling (Left hand )  This is an 87-year-old who presents today for urgent visit she is been having some swelling in her left hand.  Family notes it has been going on for the last 3 or 4 weeks she has not had an injury that they are aware of and she has some stiffness and decreased range of motion but not a lot of tenderness to the touch or movement.  She does tend to sit on that hand a lot when she is home sitting.  She is had no injuries or open wounds to the area she does have some leg wounds that her home health has been treating her for along with decubitus that they report does seem to be improving   She is had some issues with glaucoma and had an eye exam earlier today.    HPI  Review of Systems   Musculoskeletal:         Left hand swelling no erythema  No arm pain or swelling      Objective:    Blood pressure 122/68, pulse 76, height 5' 4" (1.626 m), weight 57.6 kg (126 lb 15.8 oz), SpO2 98 %.   Physical Exam  Constitutional:       General: She is not in acute distress.     Comments: Seated in wheelchair    HENT:      Head: Normocephalic.      Comments: Left eye drooping some erythema   Cardiovascular:      Rate and Rhythm: Normal rate and regular rhythm.      Heart sounds: Normal heart sounds. No murmur heard.    No friction rub. No gallop.   Pulmonary:      Effort: Pulmonary effort is normal.      Breath sounds: Normal breath sounds.   Abdominal:      General: Bowel sounds are normal.      Palpations: Abdomen is soft. There is no mass.      Tenderness: There is no abdominal tenderness.   Musculoskeletal:      Comments: Legs wrapped    Left hand some edema no erythema no point tenderness    Skin:     Findings: No erythema.   Neurological:      Mental Status: She is alert.       Assessment:       1. Swelling of left hand          Plan:       Nelia was seen today for swelling.    Diagnoses and all orders for this " visit:    Swelling of left hand  No injury that they are aware of but will x-ray hand for further evaluation she has no arm swelling or no joint redness  Discussed with patient and family will proceed with an x-ray for further evaluation  Her albumin is low and discussed this may be contributing to some dependent edema of the hand as she tends to sit on that hand when she is in the wheelchair advised that she avoid that position and work on elevating the hand keeping it above her throughout the day to prevent swelling  Will proceed with x-ray for further evaluation  -     X-Ray Hand 3 View Left; Future    For her wounds she continues to follow with wound clinic at home health  Discussed increase her dietary protein make sure she is eating well at home and follow-up with PCP is recommended

## 2022-11-16 NOTE — TELEPHONE ENCOUNTER
Called and spoke to daughter Maria Eugenia about recent xray result. Daughter states shes glad that she was called and will relay message to patient. ( Her dad does not always remember what the Drs say.) Verbalized understanding with read back.

## 2022-11-16 NOTE — TELEPHONE ENCOUNTER
----- Message from Yeny Brizuela MD sent at 11/15/2022  2:08 PM CST -----  Call and notify pt /family  Xray showed no fracture some arthritis changes noted   Try elevation of hand as discussed throughout the day

## 2022-11-18 NOTE — PROGRESS NOTES
[facility]    Subjective:       Patient ID: Nelia Brizuela is a 87 y.o. female.    Chief Complaint: Wound Care    Patient to wound care center for RLE and sacral wounds, improving.  No new concerns, continue with current plan of care. Orders sent to   Review of Systems   All other systems reviewed and are negative.      Objective:      Physical Exam       Altered Skin Integrity 09/24/21 1130 Sacral spine #4 Partial thickness tissue loss. Shallow open ulcer with a red or pink wound bed, without slough. Intact or Open/Ruptured Serum-filled blister. (Active)   09/24/21 1130   Altered Skin Integrity Present on Admission:    Side:    Orientation:    Location: Sacral spine   Wound Number: #4   Is this injury device related?:    Primary Wound Type:    Description of Altered Skin Integrity: Partial thickness tissue loss. Shallow open ulcer with a red or pink wound bed, without slough. Intact or Open/Ruptured Serum-filled blister.   Ankle-Brachial Index:    Pulses:    Removal Indication and Assessment:    Wound Outcome:    (Retired) Wound Length (cm):    (Retired) Wound Width (cm):    (Retired) Depth (cm):    Wound Description (Comments):    Removal Indications:    Wound Image   11/18/22 1144   Dressing Appearance Intact;Moist drainage 11/18/22 1144   Drainage Amount Small 11/18/22 1144   Drainage Characteristics/Odor Serosanguineous 11/18/22 1144   Appearance Intact;Pink;Red;Moist 11/18/22 1144   Tissue loss description Full thickness 11/18/22 1144   Red (%), Wound Tissue Color 100 % 11/18/22 1144   Periwound Area Intact;Dry 11/18/22 1144   Wound Edges Defined;Open 11/18/22 1144   Wound Length (cm) 0.6 cm 11/18/22 1144   Wound Width (cm) 0.6 cm 11/18/22 1144   Wound Depth (cm) 0.3 cm 11/18/22 1144   Wound Volume (cm^3) 0.108 cm^3 11/18/22 1144   Wound Surface Area (cm^2) 0.36 cm^2 11/18/22 1144   Care Cleansed with:;Sterile normal saline 11/18/22 1144   Dressing  Applied;Changed;Hydrofiber;Island/border 11/18/22 1144   Periwound Care Skin barrier film applied 11/18/22 1144   Dressing Change Due 11/22/22 11/18/22 1144            Wound 07/12/21 1159 Venous Ulcer Right lateral;lower Leg (Active)   07/12/21 1159    Pre-existing: Yes   Primary Wound Type: Venous ulcer   Side: Right   Orientation: lateral;lower   Location: Leg   Wound Number:    Ankle-Brachial Index:    Pulses: positive    Removal Indication and Assessment:    Wound Outcome:    (Retired) Wound Type:    (Retired) Wound Length (cm):    (Retired) Wound Width (cm):    (Retired) Depth (cm):    Wound Description (Comments):    Removal Indications:    Wound Image   11/18/22 1144   Dressing Appearance Intact;Dry 11/18/22 1144   Drainage Amount None 11/18/22 1144   Appearance Pink;Red;Dry 11/18/22 1144   Tissue loss description Full thickness 11/18/22 1144   Red (%), Wound Tissue Color 100 % 11/18/22 1144   Periwound Area Intact;Dry 11/18/22 1144   Wound Edges Irregular 11/18/22 1144   Wound Length (cm) 1.5 cm 11/18/22 1144   Wound Width (cm) 0.5 cm 11/18/22 1144   Wound Depth (cm) 0.2 cm 11/18/22 1144   Wound Volume (cm^3) 0.15 cm^3 11/18/22 1144   Wound Surface Area (cm^2) 0.75 cm^2 11/18/22 1144   Care Cleansed with:;Soap and water;Sterile normal saline 11/18/22 1144   Dressing Applied;Changed;Hydrofiber;Compression wrap 11/18/22 1144   Periwound Care Dry periwound area maintained 11/18/22 1144   Compression Two layer compression 11/18/22 1144   Dressing Change Due 11/22/22 11/18/22 1144            Wound 08/16/21 1000 Venous Ulcer Right dorsal Foot (Active)   08/16/21 1000    Pre-existing: Yes   Primary Wound Type: Venous ulcer   Side: Right   Orientation: dorsal   Location: Foot   Wound Number:    Ankle-Brachial Index:    Pulses:    Removal Indication and Assessment:    Wound Outcome:    (Retired) Wound Type:    (Retired) Wound Length (cm):    (Retired) Wound Width (cm):    (Retired) Depth (cm):    Wound Description  (Comments):    Removal Indications:    Wound Image   11/18/22 1144   Dressing Appearance Intact;Moist drainage 11/18/22 1144   Drainage Amount Small 11/18/22 1144   Drainage Characteristics/Odor Serosanguineous 11/18/22 1144   Appearance Intact;Pink;Red;Yellow 11/18/22 1144   Tissue loss description Full thickness 11/18/22 1144   Red (%), Wound Tissue Color 90 % 11/18/22 1144   Yellow (%), Wound Tissue Color 10 % 11/18/22 1144   Periwound Area Intact;Dry 11/18/22 1144   Wound Edges Irregular 11/18/22 1144   Wound Length (cm) 1.9 cm 11/18/22 1144   Wound Width (cm) 0.7 cm 11/18/22 1144   Wound Depth (cm) 0.2 cm 11/18/22 1144   Wound Volume (cm^3) 0.266 cm^3 11/18/22 1144   Wound Surface Area (cm^2) 1.33 cm^2 11/18/22 1144   Care Cleansed with:;Soap and water;Sterile normal saline 11/18/22 1144   Dressing Applied;Changed;Hydrofiber;Compression wrap 11/18/22 1144   Periwound Care Dry periwound area maintained 11/18/22 1144   Compression Two layer compression 11/18/22 1144   Dressing Change Due 11/22/22 11/18/22 1144         Assessment/Plan:         ICD-10-CM ICD-9-CM   1. Pressure injury of sacral region, stage 3  L89.153 707.03     707.23   2. Ulcer of right foot with fat layer exposed  L97.512 707.15   3. Chronic venous hypertension (idiopathic) with other complications of bilateral lower extremity  I87.393 459.39   4. Bilateral leg edema  R60.0 782.3       Will need low air loss mattress.    Tissue pathology and/or culture taken:  [] Yes [x] No   Sharp debridement performed:   [] Yes [x] No   Labs ordered this visit:   [] Yes [x] No   Imaging ordered this visit:   [] Yes [x] No           Orders Placed This Encounter   Procedures    Change dressing     Right lateral lower leg and right dorsal foot:   Cleanse wound with: Vashe soak x 5 min, rinse with normal saline   Lidocaine: prn   Silver nitrate: prn   Periwound care: DO not use Gention Alyson   Primary dressing: Hydrofera Classic   Secondary dressing: gauze    Edema control: Co-flex with calamine to BLE.        Sacral wound   Cleanse wound with: Vashe soak x 5 min, rinse with normal saline   Lidocaine: prn   Silver nitrate: prn   Primary dressing: Hydrofera classic   Secondary dressing: Large Sacral Foam island dressing     Frequency: Twice Weekly and PRN per Home Health   Follow-up: with Dr. Wilkes in 2 months     Continue [facility]: SN to perform wound care twice weekly (except when in clinic) and PRN.        Follow up in about 2 months (around 1/18/2023).

## 2022-12-12 NOTE — TELEPHONE ENCOUNTER
I spoke with her home health nurse, she noticed that pt ear is clogged and they would like to know if she should come in to see you or should she be sent to ENT?     Also, she is still experiencing some slight edema on her right hand and plus 1 edema on her left hand.     Adele MILLIGAN

## 2022-12-12 NOTE — TELEPHONE ENCOUNTER
----- Message from Speedy Suarez sent at 12/12/2022 11:21 AM CST -----  Contact: domenico () [phone]  Pt  requesting a call in regards to wife right ear clogged trouble hearing.    Please call and advise

## 2022-12-13 NOTE — TELEPHONE ENCOUNTER
I spoke with the pt    I advised new patient not available until after the new year    I suggested he call his PCP or go to UC

## 2022-12-13 NOTE — TELEPHONE ENCOUNTER
----- Message from Manuela Zamora sent at 12/13/2022 12:34 PM CST -----  Regarding: sooner appt  Contact: Patient   .Type:  Sooner Apoointment Request    Caller is requesting a sooner appointment.  Caller declined first available appointment listed below.  Caller will not accept being placed on the waitlist and is requesting a message be sent to doctor.  Name of Caller:patient's   When is the first available appointment? No appts generated  Symptoms:clogged ears  Would the patient rather a call back or a response via [facility]? call  Best Call Back Number:[phone]  Additional Information: patient has been seen but over three years ago

## 2022-12-14 NOTE — TELEPHONE ENCOUNTER
Ok-- ent referral in -- if she wants to be evaluated for the swelling in the hands- she needs to be seen

## 2022-12-14 NOTE — TELEPHONE ENCOUNTER
Pt  stated that they are going to urgent care later on today. Instructed them to contact the office if anything was needed from us. Also, they'll wait until after the appt today to schedule ENT appt.     Adele MILLIGAN

## 2022-12-29 NOTE — TELEPHONE ENCOUNTER
----- Message from Cari Garcia sent at 12/29/2022 12:23 PM CST -----  Contact: [facility]/Yuridia/  Yuridia said that she is calling in regards to needing to get pt's Hospice order faxed to her at : [phone]   Please advise

## 2022-12-29 NOTE — TELEPHONE ENCOUNTER
----- Message from Ena Sanchez sent at 12/29/2022 11:35 AM CST -----  Contact: Yuridia galvan/ hospice   Type: Orders Request    What orders/ testing are being requested? Order for Hospice Services. Can take verbally    Is there a future appointment scheduled for the patient with PCP?    When?    Would you prefer a response via [facility]? Please Call Yuridia galvan/ Hospice   can take verbal order    Comments:

## 2022-12-30 NOTE — TELEPHONE ENCOUNTER
----- Message from Richard Stallings MA sent at 12/28/2022  4:26 PM CST -----  Regarding: Please return call  Contact: [phone]    ----- Message -----  From: Anastacia Nolasco  Sent: 12/28/2022   4:21 PM CST  To: Phoebe DU Jr Staff    Yuridia with [facility] called to speak to the provider in reference to requesting Hospice Service order from the provider. Pt's family has requested Hospice. Please Advise

## 2023-01-01 ENCOUNTER — TELEPHONE (OUTPATIENT)
Dept: PRIMARY CARE CLINIC | Facility: CLINIC | Age: 88
End: 2023-01-01
Payer: MEDICARE

## 2023-01-01 ENCOUNTER — TELEPHONE (OUTPATIENT)
Dept: INTERNAL MEDICINE | Facility: CLINIC | Age: 88
End: 2023-01-01
Payer: MEDICARE

## 2023-01-01 ENCOUNTER — PATIENT MESSAGE (OUTPATIENT)
Dept: INTERNAL MEDICINE | Facility: CLINIC | Age: 88
End: 2023-01-01
Payer: MEDICARE

## 2023-01-01 ENCOUNTER — CARE AT HOME (OUTPATIENT)
Dept: HOME HEALTH SERVICES | Facility: CLINIC | Age: 88
End: 2023-01-01
Payer: MEDICARE

## 2023-01-01 ENCOUNTER — HOSPITAL ENCOUNTER (INPATIENT)
Facility: HOSPITAL | Age: 88
LOS: 4 days | DRG: 871 | End: 2023-03-16
Attending: EMERGENCY MEDICINE | Admitting: INTERNAL MEDICINE
Payer: MEDICARE

## 2023-01-01 ENCOUNTER — OFFICE VISIT (OUTPATIENT)
Dept: OTOLARYNGOLOGY | Facility: CLINIC | Age: 88
End: 2023-01-01
Payer: MEDICARE

## 2023-01-01 ENCOUNTER — PES CALL (OUTPATIENT)
Dept: ADMINISTRATIVE | Facility: CLINIC | Age: 88
End: 2023-01-01
Payer: MEDICARE

## 2023-01-01 VITALS — HEIGHT: 64 IN | BODY MASS INDEX: 21.53 KG/M2 | WEIGHT: 126.13 LBS

## 2023-01-01 VITALS
DIASTOLIC BLOOD PRESSURE: 35 MMHG | HEART RATE: 50 BPM | HEIGHT: 64 IN | OXYGEN SATURATION: 26 % | BODY MASS INDEX: 21.51 KG/M2 | SYSTOLIC BLOOD PRESSURE: 55 MMHG | TEMPERATURE: 98 F | WEIGHT: 126 LBS

## 2023-01-01 DIAGNOSIS — R54 AGE-RELATED PHYSICAL DEBILITY: Primary | ICD-10-CM

## 2023-01-01 DIAGNOSIS — R60.0 BILATERAL LEG EDEMA: Primary | ICD-10-CM

## 2023-01-01 DIAGNOSIS — I73.9 PERIPHERAL VASCULAR DISEASE, UNSPECIFIED: Chronic | ICD-10-CM

## 2023-01-01 DIAGNOSIS — I87.8 VENOUS STASIS OF BOTH LOWER EXTREMITIES: Chronic | ICD-10-CM

## 2023-01-01 DIAGNOSIS — I50.9 CHF (CONGESTIVE HEART FAILURE): ICD-10-CM

## 2023-01-01 DIAGNOSIS — R53.81 DEBILITY: ICD-10-CM

## 2023-01-01 DIAGNOSIS — Z51.5 PALLIATIVE CARE ENCOUNTER: ICD-10-CM

## 2023-01-01 DIAGNOSIS — E43 SEVERE MALNUTRITION: Primary | ICD-10-CM

## 2023-01-01 DIAGNOSIS — R52 PAIN: ICD-10-CM

## 2023-01-01 DIAGNOSIS — I70.0 ATHEROSCLEROSIS OF AORTA: Chronic | ICD-10-CM

## 2023-01-01 DIAGNOSIS — A41.9 SEPTIC SHOCK: Primary | ICD-10-CM

## 2023-01-01 DIAGNOSIS — H61.23 BILATERAL IMPACTED CERUMEN: ICD-10-CM

## 2023-01-01 DIAGNOSIS — I95.9 HYPOTENSION: ICD-10-CM

## 2023-01-01 DIAGNOSIS — Z71.89 GOALS OF CARE, COUNSELING/DISCUSSION: ICD-10-CM

## 2023-01-01 DIAGNOSIS — J43.1 PANLOBULAR EMPHYSEMA: Chronic | ICD-10-CM

## 2023-01-01 DIAGNOSIS — E43 SEVERE MALNUTRITION: ICD-10-CM

## 2023-01-01 DIAGNOSIS — Z71.89 ADVANCE CARE PLANNING: ICD-10-CM

## 2023-01-01 DIAGNOSIS — R65.21 SEPTIC SHOCK: Primary | ICD-10-CM

## 2023-01-01 LAB
ALBUMIN SERPL BCP-MCNC: 1.2 G/DL (ref 3.5–5.2)
ALBUMIN SERPL BCP-MCNC: 1.3 G/DL (ref 3.5–5.2)
ALBUMIN SERPL BCP-MCNC: 1.3 G/DL (ref 3.5–5.2)
ALBUMIN SERPL BCP-MCNC: 1.4 G/DL (ref 3.5–5.2)
ALLENS TEST: ABNORMAL
ALLENS TEST: ABNORMAL
ALP SERPL-CCNC: 102 U/L (ref 55–135)
ALP SERPL-CCNC: 112 U/L (ref 55–135)
ALP SERPL-CCNC: 125 U/L (ref 55–135)
ALP SERPL-CCNC: 93 U/L (ref 55–135)
ALT SERPL W/O P-5'-P-CCNC: 1318 U/L (ref 10–44)
ALT SERPL W/O P-5'-P-CCNC: 453 U/L (ref 10–44)
ALT SERPL W/O P-5'-P-CCNC: 585 U/L (ref 10–44)
ALT SERPL W/O P-5'-P-CCNC: 965 U/L (ref 10–44)
ANION GAP SERPL CALC-SCNC: 11 MMOL/L (ref 8–16)
ANION GAP SERPL CALC-SCNC: 13 MMOL/L (ref 8–16)
ANION GAP SERPL CALC-SCNC: 14 MMOL/L (ref 8–16)
ANION GAP SERPL CALC-SCNC: 8 MMOL/L (ref 8–16)
ANISOCYTOSIS BLD QL SMEAR: SLIGHT
ASCENDING AORTA: 3.15 CM
AST SERPL-CCNC: 1118 U/L (ref 10–40)
AST SERPL-CCNC: 208 U/L (ref 10–40)
AST SERPL-CCNC: 2396 U/L (ref 10–40)
AST SERPL-CCNC: 345 U/L (ref 10–40)
AV INDEX (PROSTH): 0.46
AV MEAN GRADIENT: 3 MMHG
AV PEAK GRADIENT: 5 MMHG
AV VALVE AREA: 1.27 CM2
AV VELOCITY RATIO: 0.45
BACTERIA BLD CULT: ABNORMAL
BACTERIA SPEC AEROBE CULT: ABNORMAL
BASOPHILS # BLD AUTO: 0.07 K/UL (ref 0–0.2)
BASOPHILS # BLD AUTO: 0.14 K/UL (ref 0–0.2)
BASOPHILS NFR BLD: 0.3 % (ref 0–1.9)
BASOPHILS NFR BLD: 0.6 % (ref 0–1.9)
BILIRUB SERPL-MCNC: 1 MG/DL (ref 0.1–1)
BILIRUB SERPL-MCNC: 1.2 MG/DL (ref 0.1–1)
BNP SERPL-MCNC: 3431 PG/ML (ref 0–99)
BNP SERPL-MCNC: 3577 PG/ML (ref 0–99)
BSA FOR ECHO PROCEDURE: 1.61 M2
BUN SERPL-MCNC: 46 MG/DL (ref 6–30)
BUN SERPL-MCNC: 46 MG/DL (ref 8–23)
BUN SERPL-MCNC: 48 MG/DL (ref 8–23)
BUN SERPL-MCNC: 54 MG/DL (ref 8–23)
BUN SERPL-MCNC: 57 MG/DL (ref 8–23)
CALCIUM SERPL-MCNC: 7.3 MG/DL (ref 8.7–10.5)
CALCIUM SERPL-MCNC: 7.4 MG/DL (ref 8.7–10.5)
CALCIUM SERPL-MCNC: 7.8 MG/DL (ref 8.7–10.5)
CALCIUM SERPL-MCNC: 7.9 MG/DL (ref 8.7–10.5)
CHLORIDE SERPL-SCNC: 100 MMOL/L (ref 95–110)
CHLORIDE SERPL-SCNC: 101 MMOL/L (ref 95–110)
CHLORIDE SERPL-SCNC: 102 MMOL/L (ref 95–110)
CHLORIDE SERPL-SCNC: 103 MMOL/L (ref 95–110)
CHLORIDE SERPL-SCNC: 103 MMOL/L (ref 95–110)
CO2 SERPL-SCNC: 26 MMOL/L (ref 23–29)
CO2 SERPL-SCNC: 28 MMOL/L (ref 23–29)
CO2 SERPL-SCNC: 28 MMOL/L (ref 23–29)
CO2 SERPL-SCNC: 30 MMOL/L (ref 23–29)
CREAT SERPL-MCNC: 1.5 MG/DL (ref 0.5–1.4)
CREAT SERPL-MCNC: 1.5 MG/DL (ref 0.5–1.4)
CREAT SERPL-MCNC: 1.6 MG/DL (ref 0.5–1.4)
CREAT SERPL-MCNC: 1.6 MG/DL (ref 0.5–1.4)
CREAT SERPL-MCNC: 1.8 MG/DL (ref 0.5–1.4)
CV ECHO LV RWT: 0.25 CM
DIFFERENTIAL METHOD: ABNORMAL
DIFFERENTIAL METHOD: ABNORMAL
DOHLE BOD BLD QL SMEAR: PRESENT
DOP CALC AO PEAK VEL: 1.1 M/S
DOP CALC AO VTI: 16.42 CM
DOP CALC LVOT AREA: 2.8 CM2
DOP CALC LVOT DIAMETER: 1.88 CM
DOP CALC LVOT PEAK VEL: 0.5 M/S
DOP CALC LVOT STROKE VOLUME: 20.84 CM3
DOP CALCLVOT PEAK VEL VTI: 7.51 CM
E/E' RATIO: 5.29 M/S
ECHO LV POSTERIOR WALL: 0.6 CM (ref 0.6–1.1)
EJECTION FRACTION: 20 %
EOSINOPHIL # BLD AUTO: 0 K/UL (ref 0–0.5)
EOSINOPHIL # BLD AUTO: 0.1 K/UL (ref 0–0.5)
EOSINOPHIL NFR BLD: 0 % (ref 0–8)
EOSINOPHIL NFR BLD: 0.4 % (ref 0–8)
ERYTHROCYTE [DISTWIDTH] IN BLOOD BY AUTOMATED COUNT: 16.8 % (ref 11.5–14.5)
ERYTHROCYTE [DISTWIDTH] IN BLOOD BY AUTOMATED COUNT: 16.8 % (ref 11.5–14.5)
EST. GFR  (NO RACE VARIABLE): 31.2 ML/MIN/1.73 M^2
EST. GFR  (NO RACE VARIABLE): 31.2 ML/MIN/1.73 M^2
EST. GFR  (NO RACE VARIABLE): 33.7 ML/MIN/1.73 M^2
EST. GFR  (NO RACE VARIABLE): 33.7 ML/MIN/1.73 M^2
FRACTIONAL SHORTENING: 6 % (ref 28–44)
GLUCOSE SERPL-MCNC: 129 MG/DL (ref 70–110)
GLUCOSE SERPL-MCNC: 133 MG/DL (ref 70–110)
GLUCOSE SERPL-MCNC: 149 MG/DL (ref 70–110)
GLUCOSE SERPL-MCNC: 90 MG/DL (ref 70–110)
GLUCOSE SERPL-MCNC: 94 MG/DL (ref 70–110)
HCO3 UR-SCNC: 32.3 MMOL/L (ref 24–28)
HCT VFR BLD AUTO: 31 % (ref 37–48.5)
HCT VFR BLD AUTO: 34.8 % (ref 37–48.5)
HCT VFR BLD CALC: 36 %PCV (ref 36–54)
HGB BLD-MCNC: 11.5 G/DL (ref 12–16)
HGB BLD-MCNC: 9.8 G/DL (ref 12–16)
HYPOCHROMIA BLD QL SMEAR: ABNORMAL
IMM GRANULOCYTES # BLD AUTO: 0.18 K/UL (ref 0–0.04)
IMM GRANULOCYTES # BLD AUTO: 0.18 K/UL (ref 0–0.04)
IMM GRANULOCYTES NFR BLD AUTO: 0.8 % (ref 0–0.5)
IMM GRANULOCYTES NFR BLD AUTO: 0.8 % (ref 0–0.5)
INR PPP: 2.2 (ref 0.8–1.2)
INTERVENTRICULAR SEPTUM: 0.74 CM (ref 0.6–1.1)
LA MAJOR: 5.6 CM
LA MINOR: 5.77 CM
LA WIDTH: 4.48 CM
LACTATE SERPL-SCNC: 1.9 MMOL/L (ref 0.5–2.2)
LACTATE SERPL-SCNC: 2.2 MMOL/L (ref 0.5–2.2)
LACTATE SERPL-SCNC: 2.2 MMOL/L (ref 0.5–2.2)
LACTATE SERPL-SCNC: 2.3 MMOL/L (ref 0.5–2.2)
LACTATE SERPL-SCNC: 2.5 MMOL/L (ref 0.5–2.2)
LACTATE SERPL-SCNC: 2.6 MMOL/L (ref 0.5–2.2)
LACTATE SERPL-SCNC: 2.9 MMOL/L (ref 0.5–2.2)
LACTATE SERPL-SCNC: 2.9 MMOL/L (ref 0.5–2.2)
LACTATE SERPL-SCNC: 3 MMOL/L (ref 0.5–2.2)
LACTATE SERPL-SCNC: 3.2 MMOL/L (ref 0.5–2.2)
LACTATE SERPL-SCNC: 3.4 MMOL/L (ref 0.5–2.2)
LDH SERPL L TO P-CCNC: 4.04 MMOL/L (ref 0.5–2.2)
LEFT ATRIUM SIZE: 4 CM
LEFT ATRIUM VOLUME INDEX MOD: 46.6 ML/M2
LEFT ATRIUM VOLUME INDEX: 53.8 ML/M2
LEFT ATRIUM VOLUME MOD: 75.1 CM3
LEFT ATRIUM VOLUME: 86.57 CM3
LEFT INTERNAL DIMENSION IN SYSTOLE: 4.46 CM (ref 2.1–4)
LEFT VENTRICLE DIASTOLIC VOLUME INDEX: 65.33 ML/M2
LEFT VENTRICLE DIASTOLIC VOLUME: 105.18 ML
LEFT VENTRICLE MASS INDEX: 62 G/M2
LEFT VENTRICLE SYSTOLIC VOLUME INDEX: 56.2 ML/M2
LEFT VENTRICLE SYSTOLIC VOLUME: 90.41 ML
LEFT VENTRICULAR INTERNAL DIMENSION IN DIASTOLE: 4.75 CM (ref 3.5–6)
LEFT VENTRICULAR MASS: 99.35 G
LIPASE SERPL-CCNC: 48 U/L (ref 4–60)
LV LATERAL E/E' RATIO: 3.46 M/S
LV SEPTAL E/E' RATIO: 11.25 M/S
LYMPHOCYTES # BLD AUTO: 0.8 K/UL (ref 1–4.8)
LYMPHOCYTES # BLD AUTO: 1.1 K/UL (ref 1–4.8)
LYMPHOCYTES NFR BLD: 3.5 % (ref 18–48)
LYMPHOCYTES NFR BLD: 4.9 % (ref 18–48)
MAGNESIUM SERPL-MCNC: 1.7 MG/DL (ref 1.6–2.6)
MAGNESIUM SERPL-MCNC: 1.9 MG/DL (ref 1.6–2.6)
MAGNESIUM SERPL-MCNC: 2.1 MG/DL (ref 1.6–2.6)
MAGNESIUM SERPL-MCNC: 2.1 MG/DL (ref 1.6–2.6)
MCH RBC QN AUTO: 29.6 PG (ref 27–31)
MCH RBC QN AUTO: 30.8 PG (ref 27–31)
MCHC RBC AUTO-ENTMCNC: 31.6 G/DL (ref 32–36)
MCHC RBC AUTO-ENTMCNC: 33 G/DL (ref 32–36)
MCV RBC AUTO: 93 FL (ref 82–98)
MCV RBC AUTO: 94 FL (ref 82–98)
MONOCYTES # BLD AUTO: 0.6 K/UL (ref 0.3–1)
MONOCYTES # BLD AUTO: 0.6 K/UL (ref 0.3–1)
MONOCYTES NFR BLD: 2.6 % (ref 4–15)
MONOCYTES NFR BLD: 2.6 % (ref 4–15)
MRSA ID BY PCR: NEGATIVE
MV PEAK E VEL: 0.45 M/S
NEUTROPHILS # BLD AUTO: 19.3 K/UL (ref 1.8–7.7)
NEUTROPHILS # BLD AUTO: 20.4 K/UL (ref 1.8–7.7)
NEUTROPHILS NFR BLD: 91 % (ref 38–73)
NEUTROPHILS NFR BLD: 92.5 % (ref 38–73)
NRBC BLD-RTO: 1 /100 WBC
NRBC BLD-RTO: 2 /100 WBC
OVALOCYTES BLD QL SMEAR: ABNORMAL
PCO2 BLDA: 55.6 MMHG (ref 35–45)
PH SMN: 7.37 [PH] (ref 7.35–7.45)
PHOSPHATE SERPL-MCNC: 3.7 MG/DL (ref 2.7–4.5)
PHOSPHATE SERPL-MCNC: 3.8 MG/DL (ref 2.7–4.5)
PHOSPHATE SERPL-MCNC: 4.5 MG/DL (ref 2.7–4.5)
PISA MRMAX VEL: 0.04 M/S
PISA TR MAX VEL: 3.02 M/S
PLATELET # BLD AUTO: 162 K/UL (ref 150–450)
PLATELET # BLD AUTO: 176 K/UL (ref 150–450)
PLATELET BLD QL SMEAR: ABNORMAL
PMV BLD AUTO: 10.9 FL (ref 9.2–12.9)
PMV BLD AUTO: 11.2 FL (ref 9.2–12.9)
PO2 BLDA: 18 MMHG (ref 40–60)
POC BE: 7 MMOL/L
POC IONIZED CALCIUM: 0.97 MMOL/L (ref 1.06–1.42)
POC SATURATED O2: 25 % (ref 95–100)
POC TCO2 (MEASURED): 29 MMOL/L (ref 23–29)
POC TCO2: 34 MMOL/L (ref 24–29)
POCT GLUCOSE: 155 MG/DL (ref 70–110)
POCT GLUCOSE: 248 MG/DL (ref 70–110)
POIKILOCYTOSIS BLD QL SMEAR: SLIGHT
POLYCHROMASIA BLD QL SMEAR: ABNORMAL
POTASSIUM BLD-SCNC: 4.8 MMOL/L (ref 3.5–5.1)
POTASSIUM SERPL-SCNC: 3.4 MMOL/L (ref 3.5–5.1)
POTASSIUM SERPL-SCNC: 3.7 MMOL/L (ref 3.5–5.1)
POTASSIUM SERPL-SCNC: 3.8 MMOL/L (ref 3.5–5.1)
POTASSIUM SERPL-SCNC: 4.1 MMOL/L (ref 3.5–5.1)
PROCALCITONIN SERPL IA-MCNC: 1.38 NG/ML
PROT SERPL-MCNC: 6.2 G/DL (ref 6–8.4)
PROT SERPL-MCNC: 6.5 G/DL (ref 6–8.4)
PROT SERPL-MCNC: 6.7 G/DL (ref 6–8.4)
PROT SERPL-MCNC: 7.3 G/DL (ref 6–8.4)
PROTHROMBIN TIME: 21.9 SEC (ref 9–12.5)
RA MAJOR: 5.2 CM
RA PRESSURE: 15 MMHG
RA WIDTH: 4.9 CM
RBC # BLD AUTO: 3.31 M/UL (ref 4–5.4)
RBC # BLD AUTO: 3.73 M/UL (ref 4–5.4)
RIGHT VENTRICULAR END-DIASTOLIC DIMENSION: 4.32 CM
SAMPLE: ABNORMAL
SINUS: 2.9 CM
SITE: ABNORMAL
SITE: ABNORMAL
SODIUM BLD-SCNC: 142 MMOL/L (ref 136–145)
SODIUM SERPL-SCNC: 136 MMOL/L (ref 136–145)
SODIUM SERPL-SCNC: 142 MMOL/L (ref 136–145)
SODIUM SERPL-SCNC: 143 MMOL/L (ref 136–145)
SODIUM SERPL-SCNC: 143 MMOL/L (ref 136–145)
SPHEROCYTES BLD QL SMEAR: ABNORMAL
STAPH AUREUS ID BY PCR: NEGATIVE
STJ: 2.49 CM
TARGETS BLD QL SMEAR: ABNORMAL
TDI LATERAL: 0.13 M/S
TDI SEPTAL: 0.04 M/S
TDI: 0.09 M/S
TOXIC GRANULES BLD QL SMEAR: PRESENT
TR MAX PG: 36 MMHG
TRICUSPID ANNULAR PLANE SYSTOLIC EXCURSION: 0.75 CM
TROPONIN I SERPL DL<=0.01 NG/ML-MCNC: 2.26 NG/ML (ref 0–0.03)
TROPONIN I SERPL DL<=0.01 NG/ML-MCNC: 2.89 NG/ML (ref 0–0.03)
TROPONIN I SERPL DL<=0.01 NG/ML-MCNC: 3.1 NG/ML (ref 0–0.03)
TROPONIN I SERPL DL<=0.01 NG/ML-MCNC: 4.04 NG/ML (ref 0–0.03)
TROPONIN I SERPL DL<=0.01 NG/ML-MCNC: 4.04 NG/ML (ref 0–0.03)
TROPONIN I SERPL DL<=0.01 NG/ML-MCNC: 4.42 NG/ML (ref 0–0.03)
TV REST PULMONARY ARTERY PRESSURE: 51 MMHG
VANCOMYCIN SERPL-MCNC: 13.6 UG/ML
VANCOMYCIN SERPL-MCNC: 17.6 UG/ML
VANCOMYCIN TROUGH SERPL-MCNC: 5.1 UG/ML (ref 10–22)
WBC # BLD AUTO: 21.24 K/UL (ref 3.9–12.7)
WBC # BLD AUTO: 22.08 K/UL (ref 3.9–12.7)
WBC TOXIC VACUOLES BLD QL SMEAR: PRESENT

## 2023-01-01 PROCEDURE — 87150 DNA/RNA AMPLIFIED PROBE: CPT | Mod: 59 | Performed by: EMERGENCY MEDICINE

## 2023-01-01 PROCEDURE — 99291 PR CRITICAL CARE, E/M 30-74 MINUTES: ICD-10-PCS | Mod: GW,GC,HPC, | Performed by: EMERGENCY MEDICINE

## 2023-01-01 PROCEDURE — 63600175 PHARM REV CODE 636 W HCPCS

## 2023-01-01 PROCEDURE — 25000003 PHARM REV CODE 250: Performed by: STUDENT IN AN ORGANIZED HEALTH CARE EDUCATION/TRAINING PROGRAM

## 2023-01-01 PROCEDURE — 99900035 HC TECH TIME PER 15 MIN (STAT)

## 2023-01-01 PROCEDURE — 80053 COMPREHEN METABOLIC PANEL: CPT | Performed by: STUDENT IN AN ORGANIZED HEALTH CARE EDUCATION/TRAINING PROGRAM

## 2023-01-01 PROCEDURE — 83735 ASSAY OF MAGNESIUM: CPT | Performed by: STUDENT IN AN ORGANIZED HEALTH CARE EDUCATION/TRAINING PROGRAM

## 2023-01-01 PROCEDURE — 63600175 PHARM REV CODE 636 W HCPCS: Performed by: STUDENT IN AN ORGANIZED HEALTH CARE EDUCATION/TRAINING PROGRAM

## 2023-01-01 PROCEDURE — 84484 ASSAY OF TROPONIN QUANT: CPT | Mod: 91 | Performed by: STUDENT IN AN ORGANIZED HEALTH CARE EDUCATION/TRAINING PROGRAM

## 2023-01-01 PROCEDURE — 99291 PR CRITICAL CARE, E/M 30-74 MINUTES: ICD-10-PCS | Mod: GW,HPC,, | Performed by: INTERNAL MEDICINE

## 2023-01-01 PROCEDURE — 25000003 PHARM REV CODE 250

## 2023-01-01 PROCEDURE — 63600175 PHARM REV CODE 636 W HCPCS: Mod: TB,JG

## 2023-01-01 PROCEDURE — 87077 CULTURE AEROBIC IDENTIFY: CPT | Performed by: STUDENT IN AN ORGANIZED HEALTH CARE EDUCATION/TRAINING PROGRAM

## 2023-01-01 PROCEDURE — 20000000 HC ICU ROOM

## 2023-01-01 PROCEDURE — 99443 PR PHYSICIAN TELEPHONE EVALUATION 21-30 MIN: ICD-10-PCS | Mod: 95,,, | Performed by: NURSE PRACTITIONER

## 2023-01-01 PROCEDURE — 99499 NO LOS: ICD-10-PCS | Mod: S$GLB,,, | Performed by: PHYSICIAN ASSISTANT

## 2023-01-01 PROCEDURE — 87040 BLOOD CULTURE FOR BACTERIA: CPT | Performed by: EMERGENCY MEDICINE

## 2023-01-01 PROCEDURE — 25000003 PHARM REV CODE 250: Performed by: INTERNAL MEDICINE

## 2023-01-01 PROCEDURE — 99497 PR ADVNCD CARE PLAN 30 MIN: ICD-10-PCS | Mod: 25,GV,HPC, | Performed by: CLINICAL NURSE SPECIALIST

## 2023-01-01 PROCEDURE — 80053 COMPREHEN METABOLIC PANEL: CPT | Performed by: EMERGENCY MEDICINE

## 2023-01-01 PROCEDURE — 85025 COMPLETE CBC W/AUTO DIFF WBC: CPT | Performed by: STUDENT IN AN ORGANIZED HEALTH CARE EDUCATION/TRAINING PROGRAM

## 2023-01-01 PROCEDURE — 83605 ASSAY OF LACTIC ACID: CPT

## 2023-01-01 PROCEDURE — 80202 ASSAY OF VANCOMYCIN: CPT | Performed by: INTERNAL MEDICINE

## 2023-01-01 PROCEDURE — 99291 CRITICAL CARE FIRST HOUR: CPT | Mod: GW,HPC,, | Performed by: INTERNAL MEDICINE

## 2023-01-01 PROCEDURE — 85610 PROTHROMBIN TIME: CPT | Performed by: EMERGENCY MEDICINE

## 2023-01-01 PROCEDURE — 99291 CRITICAL CARE FIRST HOUR: CPT | Mod: ,,, | Performed by: EMERGENCY MEDICINE

## 2023-01-01 PROCEDURE — 99291 CRITICAL CARE FIRST HOUR: CPT | Mod: GW,GC,HPC, | Performed by: EMERGENCY MEDICINE

## 2023-01-01 PROCEDURE — 94761 N-INVAS EAR/PLS OXIMETRY MLT: CPT

## 2023-01-01 PROCEDURE — 1126F PR PAIN SEVERITY QUANTIFIED, NO PAIN PRESENT: ICD-10-PCS | Mod: CPTII,S$GLB,, | Performed by: PHYSICIAN ASSISTANT

## 2023-01-01 PROCEDURE — 84484 ASSAY OF TROPONIN QUANT: CPT | Performed by: EMERGENCY MEDICINE

## 2023-01-01 PROCEDURE — 93005 ELECTROCARDIOGRAM TRACING: CPT

## 2023-01-01 PROCEDURE — 99291 CRITICAL CARE FIRST HOUR: CPT | Mod: 25

## 2023-01-01 PROCEDURE — 99291 PR CRITICAL CARE, E/M 30-74 MINUTES: ICD-10-PCS | Mod: ,,, | Performed by: EMERGENCY MEDICINE

## 2023-01-01 PROCEDURE — 80048 BASIC METABOLIC PNL TOTAL CA: CPT

## 2023-01-01 PROCEDURE — 1101F PT FALLS ASSESS-DOCD LE1/YR: CPT | Mod: CPTII,S$GLB,, | Performed by: PHYSICIAN ASSISTANT

## 2023-01-01 PROCEDURE — 99499 UNLISTED E&M SERVICE: CPT | Mod: S$GLB,,, | Performed by: PHYSICIAN ASSISTANT

## 2023-01-01 PROCEDURE — 1101F PR PT FALLS ASSESS DOC 0-1 FALLS W/OUT INJ PAST YR: ICD-10-PCS | Mod: CPTII,S$GLB,, | Performed by: PHYSICIAN ASSISTANT

## 2023-01-01 PROCEDURE — 87077 CULTURE AEROBIC IDENTIFY: CPT | Performed by: EMERGENCY MEDICINE

## 2023-01-01 PROCEDURE — 27000221 HC OXYGEN, UP TO 24 HOURS

## 2023-01-01 PROCEDURE — 83605 ASSAY OF LACTIC ACID: CPT | Mod: 91 | Performed by: STUDENT IN AN ORGANIZED HEALTH CARE EDUCATION/TRAINING PROGRAM

## 2023-01-01 PROCEDURE — 83735 ASSAY OF MAGNESIUM: CPT | Performed by: EMERGENCY MEDICINE

## 2023-01-01 PROCEDURE — 63600175 PHARM REV CODE 636 W HCPCS: Performed by: INTERNAL MEDICINE

## 2023-01-01 PROCEDURE — 84484 ASSAY OF TROPONIN QUANT: CPT | Performed by: STUDENT IN AN ORGANIZED HEALTH CARE EDUCATION/TRAINING PROGRAM

## 2023-01-01 PROCEDURE — 85025 COMPLETE CBC W/AUTO DIFF WBC: CPT | Performed by: EMERGENCY MEDICINE

## 2023-01-01 PROCEDURE — 83880 ASSAY OF NATRIURETIC PEPTIDE: CPT | Performed by: EMERGENCY MEDICINE

## 2023-01-01 PROCEDURE — 1159F PR MEDICATION LIST DOCUMENTED IN MEDICAL RECORD: ICD-10-PCS | Mod: CPTII,S$GLB,, | Performed by: PHYSICIAN ASSISTANT

## 2023-01-01 PROCEDURE — 87186 SC STD MICRODIL/AGAR DIL: CPT | Performed by: STUDENT IN AN ORGANIZED HEALTH CARE EDUCATION/TRAINING PROGRAM

## 2023-01-01 PROCEDURE — 99223 1ST HOSP IP/OBS HIGH 75: CPT | Mod: GV,HPC,, | Performed by: CLINICAL NURSE SPECIALIST

## 2023-01-01 PROCEDURE — 3288F FALL RISK ASSESSMENT DOCD: CPT | Mod: CPTII,S$GLB,, | Performed by: PHYSICIAN ASSISTANT

## 2023-01-01 PROCEDURE — 93010 EKG 12-LEAD: ICD-10-PCS | Mod: GW,,, | Performed by: INTERNAL MEDICINE

## 2023-01-01 PROCEDURE — 84100 ASSAY OF PHOSPHORUS: CPT | Performed by: STUDENT IN AN ORGANIZED HEALTH CARE EDUCATION/TRAINING PROGRAM

## 2023-01-01 PROCEDURE — 83605 ASSAY OF LACTIC ACID: CPT | Performed by: STUDENT IN AN ORGANIZED HEALTH CARE EDUCATION/TRAINING PROGRAM

## 2023-01-01 PROCEDURE — 63600175 PHARM REV CODE 636 W HCPCS: Mod: TB,JG | Performed by: INTERNAL MEDICINE

## 2023-01-01 PROCEDURE — 87070 CULTURE OTHR SPECIMN AEROBIC: CPT | Performed by: STUDENT IN AN ORGANIZED HEALTH CARE EDUCATION/TRAINING PROGRAM

## 2023-01-01 PROCEDURE — 3288F PR FALLS RISK ASSESSMENT DOCUMENTED: ICD-10-PCS | Mod: CPTII,S$GLB,, | Performed by: PHYSICIAN ASSISTANT

## 2023-01-01 PROCEDURE — 25000003 PHARM REV CODE 250: Performed by: EMERGENCY MEDICINE

## 2023-01-01 PROCEDURE — 83880 ASSAY OF NATRIURETIC PEPTIDE: CPT | Mod: 91 | Performed by: STUDENT IN AN ORGANIZED HEALTH CARE EDUCATION/TRAINING PROGRAM

## 2023-01-01 PROCEDURE — 87186 SC STD MICRODIL/AGAR DIL: CPT | Performed by: EMERGENCY MEDICINE

## 2023-01-01 PROCEDURE — 99291 PR CRITICAL CARE, E/M 30-74 MINUTES: ICD-10-PCS | Mod: GV,HPC,, | Performed by: INTERNAL MEDICINE

## 2023-01-01 PROCEDURE — 63600175 PHARM REV CODE 636 W HCPCS: Performed by: EMERGENCY MEDICINE

## 2023-01-01 PROCEDURE — 96365 THER/PROPH/DIAG IV INF INIT: CPT

## 2023-01-01 PROCEDURE — 99999 PR PBB SHADOW E&M-EST. PATIENT-LVL IV: ICD-10-PCS | Mod: PBBFAC,,, | Performed by: PHYSICIAN ASSISTANT

## 2023-01-01 PROCEDURE — 99497 ADVNCD CARE PLAN 30 MIN: CPT | Mod: 25,GV,HPC, | Performed by: CLINICAL NURSE SPECIALIST

## 2023-01-01 PROCEDURE — 82803 BLOOD GASES ANY COMBINATION: CPT

## 2023-01-01 PROCEDURE — 99999 PR PBB SHADOW E&M-EST. PATIENT-LVL IV: CPT | Mod: PBBFAC,,, | Performed by: PHYSICIAN ASSISTANT

## 2023-01-01 PROCEDURE — 84145 PROCALCITONIN (PCT): CPT | Performed by: EMERGENCY MEDICINE

## 2023-01-01 PROCEDURE — 1126F AMNT PAIN NOTED NONE PRSNT: CPT | Mod: CPTII,S$GLB,, | Performed by: PHYSICIAN ASSISTANT

## 2023-01-01 PROCEDURE — 99223 PR INITIAL HOSPITAL CARE,LEVL III: ICD-10-PCS | Mod: GV,HPC,, | Performed by: CLINICAL NURSE SPECIALIST

## 2023-01-01 PROCEDURE — 63600175 PHARM REV CODE 636 W HCPCS: Mod: TB,JG | Performed by: STUDENT IN AN ORGANIZED HEALTH CARE EDUCATION/TRAINING PROGRAM

## 2023-01-01 PROCEDURE — 99291 CRITICAL CARE FIRST HOUR: CPT | Mod: GV,HPC,, | Performed by: INTERNAL MEDICINE

## 2023-01-01 PROCEDURE — 96361 HYDRATE IV INFUSION ADD-ON: CPT

## 2023-01-01 PROCEDURE — 25500020 PHARM REV CODE 255: Performed by: INTERNAL MEDICINE

## 2023-01-01 PROCEDURE — 99443 PR PHYSICIAN TELEPHONE EVALUATION 21-30 MIN: CPT | Mod: 95,,, | Performed by: NURSE PRACTITIONER

## 2023-01-01 PROCEDURE — 83690 ASSAY OF LIPASE: CPT | Performed by: EMERGENCY MEDICINE

## 2023-01-01 PROCEDURE — 93010 ELECTROCARDIOGRAM REPORT: CPT | Mod: GW,,, | Performed by: INTERNAL MEDICINE

## 2023-01-01 PROCEDURE — 1159F MED LIST DOCD IN RCRD: CPT | Mod: CPTII,S$GLB,, | Performed by: PHYSICIAN ASSISTANT

## 2023-01-01 RX ORDER — POTASSIUM CHLORIDE 7.45 MG/ML
10 INJECTION INTRAVENOUS
Status: DISCONTINUED | OUTPATIENT
Start: 2023-01-01 | End: 2023-01-01

## 2023-01-01 RX ORDER — HYDROMORPHONE HYDROCHLORIDE 1 MG/ML
0.2 INJECTION, SOLUTION INTRAMUSCULAR; INTRAVENOUS; SUBCUTANEOUS EVERY 4 HOURS PRN
Status: DISCONTINUED | OUTPATIENT
Start: 2023-01-01 | End: 2023-01-01 | Stop reason: HOSPADM

## 2023-01-01 RX ORDER — CALCIUM GLUCONATE 20 MG/ML
1 INJECTION, SOLUTION INTRAVENOUS
Status: DISCONTINUED | OUTPATIENT
Start: 2023-01-01 | End: 2023-01-01

## 2023-01-01 RX ORDER — CALCIUM GLUCONATE 20 MG/ML
2 INJECTION, SOLUTION INTRAVENOUS
Status: DISCONTINUED | OUTPATIENT
Start: 2023-01-01 | End: 2023-01-01

## 2023-01-01 RX ORDER — MORPHINE SULFATE 2 MG/ML
1 INJECTION, SOLUTION INTRAMUSCULAR; INTRAVENOUS
Status: DISCONTINUED | OUTPATIENT
Start: 2023-01-01 | End: 2023-01-01 | Stop reason: HOSPADM

## 2023-01-01 RX ORDER — LATANOPROST 50 UG/ML
1 SOLUTION/ DROPS OPHTHALMIC NIGHTLY
Status: DISCONTINUED | OUTPATIENT
Start: 2023-01-01 | End: 2023-01-01 | Stop reason: HOSPADM

## 2023-01-01 RX ORDER — HYDROMORPHONE HYDROCHLORIDE 1 MG/ML
1 INJECTION, SOLUTION INTRAMUSCULAR; INTRAVENOUS; SUBCUTANEOUS EVERY 4 HOURS PRN
Status: DISCONTINUED | OUTPATIENT
Start: 2023-01-01 | End: 2023-01-01 | Stop reason: HOSPADM

## 2023-01-01 RX ORDER — NOREPINEPHRINE BITARTRATE/D5W 4MG/250ML
0-.2 PLASTIC BAG, INJECTION (ML) INTRAVENOUS CONTINUOUS
Status: DISPENSED | OUTPATIENT
Start: 2023-01-01 | End: 2023-01-01

## 2023-01-01 RX ORDER — GLYCERIN 1 G/1
1 SUPPOSITORY RECTAL ONCE
Status: COMPLETED | OUTPATIENT
Start: 2023-01-01 | End: 2023-01-01

## 2023-01-01 RX ORDER — HYDROMORPHONE HYDROCHLORIDE 1 MG/ML
0.2 INJECTION, SOLUTION INTRAMUSCULAR; INTRAVENOUS; SUBCUTANEOUS ONCE
Status: DISCONTINUED | OUTPATIENT
Start: 2023-01-01 | End: 2023-01-01

## 2023-01-01 RX ORDER — NOREPINEPHRINE BITARTRATE/D5W 4MG/250ML
0-3 PLASTIC BAG, INJECTION (ML) INTRAVENOUS CONTINUOUS
Status: DISCONTINUED | OUTPATIENT
Start: 2023-01-01 | End: 2023-01-01

## 2023-01-01 RX ORDER — MAGNESIUM SULFATE HEPTAHYDRATE 40 MG/ML
4 INJECTION, SOLUTION INTRAVENOUS
Status: DISCONTINUED | OUTPATIENT
Start: 2023-01-01 | End: 2023-01-01

## 2023-01-01 RX ORDER — NOREPINEPHRINE BITARTRATE/D5W 4MG/250ML
0-.2 PLASTIC BAG, INJECTION (ML) INTRAVENOUS CONTINUOUS
Status: ACTIVE | OUTPATIENT
Start: 2023-01-01 | End: 2023-01-01

## 2023-01-01 RX ORDER — SENNOSIDES 8.6 MG/1
8.6 TABLET ORAL DAILY
Status: DISCONTINUED | OUTPATIENT
Start: 2023-01-01 | End: 2023-01-01

## 2023-01-01 RX ORDER — BRIMONIDINE TARTRATE 1.5 MG/ML
1 SOLUTION/ DROPS OPHTHALMIC 3 TIMES DAILY
Status: DISCONTINUED | OUTPATIENT
Start: 2023-01-01 | End: 2023-01-01 | Stop reason: HOSPADM

## 2023-01-01 RX ORDER — HEPARIN SODIUM 5000 [USP'U]/ML
5000 INJECTION, SOLUTION INTRAVENOUS; SUBCUTANEOUS EVERY 8 HOURS
Status: DISCONTINUED | OUTPATIENT
Start: 2023-01-01 | End: 2023-01-01

## 2023-01-01 RX ORDER — NOREPINEPHRINE BITARTRATE/D5W 4MG/250ML
0-.2 PLASTIC BAG, INJECTION (ML) INTRAVENOUS CONTINUOUS
Status: DISCONTINUED | OUTPATIENT
Start: 2023-01-01 | End: 2023-01-01 | Stop reason: HOSPADM

## 2023-01-01 RX ORDER — ENOXAPARIN SODIUM 100 MG/ML
1 INJECTION SUBCUTANEOUS
Status: DISCONTINUED | OUTPATIENT
Start: 2023-01-01 | End: 2023-01-01 | Stop reason: HOSPADM

## 2023-01-01 RX ORDER — POLYETHYLENE GLYCOL 3350 17 G/17G
17 POWDER, FOR SOLUTION ORAL DAILY
Status: DISCONTINUED | OUTPATIENT
Start: 2023-01-01 | End: 2023-01-01

## 2023-01-01 RX ORDER — SODIUM CHLORIDE 0.9 % (FLUSH) 0.9 %
10 SYRINGE (ML) INJECTION
Status: DISCONTINUED | OUTPATIENT
Start: 2023-01-01 | End: 2023-01-01 | Stop reason: HOSPADM

## 2023-01-01 RX ORDER — POTASSIUM CHLORIDE 7.45 MG/ML
40 INJECTION INTRAVENOUS
Status: DISCONTINUED | OUTPATIENT
Start: 2023-01-01 | End: 2023-01-01

## 2023-01-01 RX ORDER — NOREPINEPHRINE BITARTRATE/D5W 4MG/250ML
0-3 PLASTIC BAG, INJECTION (ML) INTRAVENOUS CONTINUOUS
Status: CANCELLED | OUTPATIENT
Start: 2023-01-01

## 2023-01-01 RX ORDER — NOREPINEPHRINE BITARTRATE/D5W 4MG/250ML
PLASTIC BAG, INJECTION (ML) INTRAVENOUS
Status: COMPLETED
Start: 2023-01-01 | End: 2023-01-01

## 2023-01-01 RX ORDER — MAGNESIUM SULFATE HEPTAHYDRATE 40 MG/ML
2 INJECTION, SOLUTION INTRAVENOUS
Status: DISCONTINUED | OUTPATIENT
Start: 2023-01-01 | End: 2023-01-01

## 2023-01-01 RX ORDER — CALCIUM GLUCONATE 20 MG/ML
3 INJECTION, SOLUTION INTRAVENOUS
Status: DISCONTINUED | OUTPATIENT
Start: 2023-01-01 | End: 2023-01-01

## 2023-01-01 RX ADMIN — NOREPINEPHRINE BITARTRATE 0.2 MCG/KG/MIN: 4 INJECTION, SOLUTION INTRAVENOUS at 02:03

## 2023-01-01 RX ADMIN — VANCOMYCIN HYDROCHLORIDE 750 MG: 750 INJECTION, POWDER, LYOPHILIZED, FOR SOLUTION INTRAVENOUS at 12:03

## 2023-01-01 RX ADMIN — AMIODARONE HYDROCHLORIDE 0.5 MG/MIN: 50 INJECTION, SOLUTION INTRAVENOUS at 01:03

## 2023-01-01 RX ADMIN — HYDROMORPHONE HYDROCHLORIDE 1 MG: 1 INJECTION, SOLUTION INTRAMUSCULAR; INTRAVENOUS; SUBCUTANEOUS at 08:03

## 2023-01-01 RX ADMIN — BRIMONIDINE TARTRATE 1 DROP: 1.5 SOLUTION OPHTHALMIC at 08:03

## 2023-01-01 RX ADMIN — BRIMONIDINE TARTRATE 1 DROP: 1.5 SOLUTION OPHTHALMIC at 05:03

## 2023-01-01 RX ADMIN — PIPERACILLIN SODIUM AND TAZOBACTAM SODIUM 4.5 G: 4; .5 INJECTION, POWDER, FOR SOLUTION INTRAVENOUS at 05:03

## 2023-01-01 RX ADMIN — NOREPINEPHRINE BITARTRATE 0.08 MCG/KG/MIN: 4 INJECTION, SOLUTION INTRAVENOUS at 02:03

## 2023-01-01 RX ADMIN — PIPERACILLIN AND TAZOBACTAM 4.5 G: 4; .5 INJECTION, POWDER, LYOPHILIZED, FOR SOLUTION INTRAVENOUS; PARENTERAL at 06:03

## 2023-01-01 RX ADMIN — NOREPINEPHRINE BITARTRATE 0.2 MCG/KG/MIN: 4 INJECTION, SOLUTION INTRAVENOUS at 09:03

## 2023-01-01 RX ADMIN — NOREPINEPHRINE BITARTRATE 4 MG: 4 INJECTION, SOLUTION INTRAVENOUS at 03:03

## 2023-01-01 RX ADMIN — BRIMONIDINE TARTRATE 1 DROP: 1.5 SOLUTION OPHTHALMIC at 03:03

## 2023-01-01 RX ADMIN — AMIODARONE HYDROCHLORIDE 1 MG/MIN: 50 INJECTION, SOLUTION INTRAVENOUS at 08:03

## 2023-01-01 RX ADMIN — VANCOMYCIN HYDROCHLORIDE 1000 MG: 1 INJECTION, POWDER, LYOPHILIZED, FOR SOLUTION INTRAVENOUS at 10:03

## 2023-01-01 RX ADMIN — NOREPINEPHRINE BITARTRATE 0.02 MCG/KG/MIN: 4 INJECTION, SOLUTION INTRAVENOUS at 10:03

## 2023-01-01 RX ADMIN — GLYCERIN 1 SUPPOSITORY: 2 SUPPOSITORY RECTAL at 03:03

## 2023-01-01 RX ADMIN — AMIODARONE HYDROCHLORIDE 0.5 MG/MIN: 50 INJECTION, SOLUTION INTRAVENOUS at 07:03

## 2023-01-01 RX ADMIN — PIPERACILLIN SODIUM AND TAZOBACTAM SODIUM 4.5 G: 4; .5 INJECTION, POWDER, FOR SOLUTION INTRAVENOUS at 11:03

## 2023-01-01 RX ADMIN — LATANOPROST 1 DROP: 50 SOLUTION OPHTHALMIC at 09:03

## 2023-01-01 RX ADMIN — VANCOMYCIN HYDROCHLORIDE 750 MG: 750 INJECTION, POWDER, LYOPHILIZED, FOR SOLUTION INTRAVENOUS at 11:03

## 2023-01-01 RX ADMIN — BRIMONIDINE TARTRATE 1 DROP: 1.5 SOLUTION OPHTHALMIC at 09:03

## 2023-01-01 RX ADMIN — PIPERACILLIN SODIUM AND TAZOBACTAM SODIUM 4.5 G: 4; .5 INJECTION, POWDER, FOR SOLUTION INTRAVENOUS at 02:03

## 2023-01-01 RX ADMIN — POTASSIUM CHLORIDE 10 MEQ: 7.46 INJECTION, SOLUTION INTRAVENOUS at 06:03

## 2023-01-01 RX ADMIN — AMIODARONE HYDROCHLORIDE 150 MG: 1.5 INJECTION, SOLUTION INTRAVENOUS at 06:03

## 2023-01-01 RX ADMIN — AMIODARONE HYDROCHLORIDE 150 MG: 1.5 INJECTION, SOLUTION INTRAVENOUS at 08:03

## 2023-01-01 RX ADMIN — HEPARIN SODIUM 5000 UNITS: 5000 INJECTION INTRAVENOUS; SUBCUTANEOUS at 05:03

## 2023-01-01 RX ADMIN — HYDROMORPHONE HYDROCHLORIDE 0.2 MG: 1 INJECTION, SOLUTION INTRAMUSCULAR; INTRAVENOUS; SUBCUTANEOUS at 05:03

## 2023-01-01 RX ADMIN — PIPERACILLIN SODIUM AND TAZOBACTAM SODIUM 4.5 G: 4; .5 INJECTION, POWDER, FOR SOLUTION INTRAVENOUS at 01:03

## 2023-01-01 RX ADMIN — PIPERACILLIN SODIUM AND TAZOBACTAM SODIUM 4.5 G: 4; .5 INJECTION, POWDER, FOR SOLUTION INTRAVENOUS at 10:03

## 2023-01-01 RX ADMIN — MORPHINE SULFATE 1 MG: 2 INJECTION, SOLUTION INTRAMUSCULAR; INTRAVENOUS at 02:03

## 2023-01-01 RX ADMIN — AMIODARONE HYDROCHLORIDE 0.5 MG/MIN: 50 INJECTION, SOLUTION INTRAVENOUS at 06:03

## 2023-01-01 RX ADMIN — ENOXAPARIN SODIUM 60 MG: 60 INJECTION SUBCUTANEOUS at 03:03

## 2023-01-01 RX ADMIN — SODIUM CHLORIDE, POTASSIUM CHLORIDE, SODIUM LACTATE AND CALCIUM CHLORIDE 1716 ML: 600; 310; 30; 20 INJECTION, SOLUTION INTRAVENOUS at 05:03

## 2023-01-01 RX ADMIN — ENOXAPARIN SODIUM 60 MG: 60 INJECTION SUBCUTANEOUS at 01:03

## 2023-01-01 RX ADMIN — POTASSIUM CHLORIDE 10 MEQ: 7.46 INJECTION, SOLUTION INTRAVENOUS at 07:03

## 2023-01-01 RX ADMIN — MAGNESIUM SULFATE 2 G: 2 INJECTION INTRAVENOUS at 07:03

## 2023-01-01 RX ADMIN — ENOXAPARIN SODIUM 60 MG: 60 INJECTION SUBCUTANEOUS at 11:03

## 2023-01-01 RX ADMIN — HYDROMORPHONE HYDROCHLORIDE 0.2 MG: 1 INJECTION, SOLUTION INTRAMUSCULAR; INTRAVENOUS; SUBCUTANEOUS at 10:03

## 2023-01-01 RX ADMIN — VANCOMYCIN HYDROCHLORIDE 1000 MG: 1 INJECTION, POWDER, LYOPHILIZED, FOR SOLUTION INTRAVENOUS at 07:03

## 2023-01-01 RX ADMIN — IOHEXOL 75 ML: 350 INJECTION, SOLUTION INTRAVENOUS at 09:03

## 2023-01-01 RX ADMIN — AMIODARONE HYDROCHLORIDE 0.5 MG/MIN: 50 INJECTION, SOLUTION INTRAVENOUS at 08:03

## 2023-01-01 RX ADMIN — POTASSIUM CHLORIDE 10 MEQ: 7.46 INJECTION, SOLUTION INTRAVENOUS at 09:03

## 2023-01-01 RX ADMIN — PIPERACILLIN SODIUM AND TAZOBACTAM SODIUM 4.5 G: 4; .5 INJECTION, POWDER, FOR SOLUTION INTRAVENOUS at 06:03

## 2023-01-01 RX ADMIN — HEPARIN SODIUM 5000 UNITS: 5000 INJECTION INTRAVENOUS; SUBCUTANEOUS at 11:03

## 2023-01-01 RX ADMIN — PIPERACILLIN SODIUM AND TAZOBACTAM SODIUM 4.5 G: 4; .5 INJECTION, POWDER, FOR SOLUTION INTRAVENOUS at 09:03

## 2023-01-06 NOTE — PROGRESS NOTES
Subjective:     Nelia Brizuela is a 87 y.o. female who was referred to me by Dr. Lenin Sandhu Jr. in consultation for  ear wax .  Patient accompanied by  and family (daughter: Maria Eugenia) who assists in history.    Patient denies any hearing loss, pain, drainage, aural fullness. Patient has been taking OTC drops to soften wax.    There is not a family history of hearing loss at a young age.  There is not a prior history of ear surgery.  She does not wear hearing aids currently.  She has not had a hearing test recently.     Past Medical/Past Surgical History  Past Medical History:   Diagnosis Date    Arthritis of knee     Cataract     COPD (chronic obstructive pulmonary disease)     CTS (carpal tunnel syndrome)     Diverticulosis of colon     Glaucoma (increased eye pressure)     HTN (hypertension)     Joint pain     Metabolic alkalosis     Psoriasis 2007    Smoker     Strabismus     XT OS    Uterine myoma     Venous stasis of both lower extremities      She has a past surgical history that includes Tubal ligation; Cataract extraction w/ intraocular lens implant w/ trabeculectomy (Left, 8/13/14); AHMED IMPLANT AND SURGICAL PI (Left, 1/27/16); and Cataract extraction w/  intraocular lens implant (Left, 8/13/2014).    Family History/Social History  Her family history includes Glaucoma in her father.  She reports that she has been smoking. She has a 57.00 pack-year smoking history. She has never used smokeless tobacco. She reports current alcohol use. She reports that she does not use drugs.    Allergies/Immunizations  She is allergic to codeine.  Immunization History   Administered Date(s) Administered    COVID-19, MRNA, LN-S, PF (MODERNA FULL 0.5 ML DOSE) 03/04/2021, 04/01/2021    Influenza 10/07/2009    Influenza (FLUAD) - Quadrivalent - Adjuvanted - PF *Preferred* (65+) 09/28/2021, 11/02/2022    Influenza - High Dose - PF (65 years and older) 10/28/2014, 01/24/2017, 10/15/2018    Influenza Split 10/07/2009     Pneumococcal Conjugate - 13 Valent 02/01/2017    Pneumococcal Polysaccharide - 23 Valent 10/15/2018    Tdap 06/07/2017    Zoster 08/06/2015    Zoster Recombinant 03/28/2022        Medications   acetaminophen  aspirin Tab  brimonidine 0.2% Drop  CENTRUM ORAL  dorzolamide-timolol 2-0.5%  fluticasone propionate  gentamicin  hydroCHLOROthiazide  LIDOcaine  LUMIGAN Drop  MIRALAX ORAL  nystatin  nystatin-triamcinolone  pantoprazole  RHOPRESSA Drop     Review of Systems     Constitutional: Negative for chills and fever.      HENT: Negative for ear discharge, ear pain, hearing loss, nosebleeds and postnasal drip.      Respiratory:  Negative for cough and snoring.      Allergy: Negative for seasonal allergies.     Neurological: Negative for dizziness and headaches.      Hematologic: Negative for swollen glands.      Psychiatric: Negative for sleep disturbance.           Objective:     LMP  (LMP Unknown)      Constitutional:   She appears well-developed and well-nourished. She appears alert.   Elderly, chronically ill appearing female sitting in wheelchair  BLE wraps with ortho boots on      Head:  Normocephalic and atraumatic.     Ears:    Right Ear: No drainage. No mastoid tenderness. Tympanic membrane is not perforated, not erythematous and not retracted. Tympanic membrane mobility is normal. No middle ear effusion.   Left Ear: No drainage. No mastoid tenderness.  No middle ear effusion.   Cerumen buildup bilaterally    Nose:  Nose normal including turbinates, nasal mucosa, sinuses and nasal septum.     Mouth/Throat  Lips, teeth, and gums normal and oropharynx normal.     Neck:    Kyphotic posture     Neurological:   She is alert.       Procedure    None    Data Reviewed    WBC (K/uL)   Date Value   11/02/2022 7.59     Platelets (K/uL)   Date Value   11/02/2022 284      Creatinine (mg/dL)   Date Value   11/02/2022 0.7     TSH (uIU/mL)   Date Value   08/08/2018 0.740     Glucose (mg/dL)   Date Value   11/02/2022 80     No  results found for: HGBA1C     Assessment & Plan:     1. Bilateral impacted cerumen  -     difficult exam as patient could not tolerate cerumen removal d/t positioning (chronic sacral ulcer and joint pains) as well as sensitivity during exam.  -  Advised follow up with neuro-otolgist    She will follow up with Dr. Tinajreo on 2/13 for cerumen impaction removal.  I had a discussion with the patient and her family  regarding her condition and the further workup and management options.    All questions were answered, and the patient is in agreement with the above.

## 2023-01-06 NOTE — PATIENT INSTRUCTIONS
Continue to use Debrox drops once weekly to assist with cerumen removal.  Schedule Audiogram sometime after to evaluate hearing.

## 2023-01-27 NOTE — TELEPHONE ENCOUNTER
----- Message from Arabella pSencer sent at 1/27/2023  3:42 PM CST -----  Contact: Daughter in law Rosario   Daughter in law Rosario would bennie a call back about the status of a message that was sent to Dr Sandhu on Tuesday

## 2023-02-01 NOTE — TELEPHONE ENCOUNTER
----- Message from Manuela Talbot sent at 2/1/2023  3:27 PM CST -----  Contact: [phone]  [facility]  is calling in regards to home health for the pt she is asking if the office will contact the pt when this is ready to be set up for the pt

## 2023-02-01 NOTE — TELEPHONE ENCOUNTER
----- Message from Lauryn Byrd sent at 2/1/2023  3:56 PM CST -----  Contact: Spouse/ Murray [phone]  Consult    He wants to know when someone is coming to take the wrap off the patient's leg.    Thank you

## 2023-02-02 NOTE — TELEPHONE ENCOUNTER
[facility] from [facility] her area, Reached out to the correct HH family decided to turn to Hospice instead.

## 2023-02-08 NOTE — TELEPHONE ENCOUNTER
I reached out to [facility] twice on last Friday, it was after hours, I did not reach anyone. I called back on Monday and was told someone would go out to the patients home on MOnday. Today is Wednesday and the  is calling back to say that no one has come out. I called  and was told a team  lead would call me. I explained that I've been trying to reach someone since last Friday, and on Monday I was told a nurse would go out on Tuesday. I called the [facility] agency and was told when someone went to the house they were told she as going A nurse did go out on the 4th, patients  and children were present and asked [facility] next week.  Annie gave me the statement. Thank you.

## 2023-02-09 NOTE — TELEPHONE ENCOUNTER
----- Message from Adele Baugh MA sent at 2/8/2023  2:29 PM CST -----  Contact:  Murray     ----- Message -----  From: Arabella Spencer  Sent: 2/8/2023  10:18 AM CST  To: Phoebe DU Jr Staff      Murray is calling again about having someone from wound care to  come to the house to take a wrap off Nelia's leg & bottom. He said no one has come out to do this

## 2023-02-10 NOTE — ASSESSMENT & PLAN NOTE
Protein calorie malnutrition  Albumin of 1.9  Likely contributing to edema and wounds  Increase protein with shakes and supplements  More frequent offerings

## 2023-02-10 NOTE — TELEPHONE ENCOUNTER
Yes, I agree with order to admit to hospice from NP Nneka Sin.    This patient does have a prognosis for a life expectancy of 6 mos or less.    Thanks  Danelle Blanco MD/MPH  [facility]  [phone] ayaan Blanco MD  Phone Number: [phone]     We received an order to eval for hospice admission Nneka Sin NP due to COPD.     Do you agree with the order to Admit to Hospice?   Do you also certify, based on your clinical judgement, that this patient's prognosis is for a life expectancy of six months or less if the terminal illness runs its normal course?     If you have any questions you can call me at # [phone].     Sincerely,   Jenn Krueger LPN,    [facility]   office # [phone] or [phone]   fax # [phone] or [phone]   cell # [phone]   email: [email or web address]

## 2023-02-10 NOTE — ASSESSMENT & PLAN NOTE
Likely related to smoking history  Edema to BLE  JOSE ALBEROT with stenosis previously  ASA in place  Elevate legs  Monitor

## 2023-02-10 NOTE — TELEPHONE ENCOUNTER
YAMINI Rose MD; Jenn Krueger  Caller: Unspecified (Today, 12:29 PM)  Thanks, everyone, she really needs services, bed bound, albumin 1.9 with wounds   Thanks so much   Nneka

## 2023-02-10 NOTE — PROGRESS NOTES
Established Patient - Audio Only Telehealth Visit     The patient location is: [place], [place]  The chief complaint leading to consultation is: Palliative Care  Visit type: Virtual visit with audio only (telephone)  Total time spent with patient: 30 min       The reason for the audio only service rather than synchronous audio and video virtual visit was related to technical difficulties or patient preference/necessity.     Each patient to whom I provide medical services by telemedicine is:  (1) informed of the relationship between the physician and patient and the respective role of any other health care provider with respect to management of the patient; and (2) notified that they may decline to receive medical services by telemedicine and may withdraw from such care at any time. Patient verbally consented to receive this service via voice-only telephone call.       HPI:   Past Medical History:   Diagnosis Date    Arthritis of knee     Cataract     COPD (chronic obstructive pulmonary disease)     CTS (carpal tunnel syndrome)     Diverticulosis of colon     Glaucoma (increased eye pressure)     HTN (hypertension)     Joint pain     Metabolic alkalosis     Psoriasis 2007    Smoker     Strabismus     XT OS    Uterine myoma     Venous stasis of both lower extremities      Current Outpatient Medications on File Prior to Visit   Medication Sig Dispense Refill    acetaminophen (TYLENOL) 500 MG tablet Take 2 tablets (1,000 mg total) by mouth every 8 (eight) hours as needed for Pain.  0    aspirin 81 mg Tab Take 81 mg by mouth once daily.       brimonidine 0.2% (ALPHAGAN) 0.2 % Drop Place 1 drop into both eyes 3 (three) times daily. 30 mL 3    dorzolamide-timolol 2-0.5% (COSOPT) 22.3-6.8 mg/mL ophthalmic solution Place 1 drop into both eyes 3 (three) times daily. 3 each 3    fluticasone (FLONASE) 50 mcg/actuation nasal spray 1 spray (50 mcg total) by Each Nare route once daily. 1 Bottle 12    FOLIC ACID/MV,FE,OTHER MIN (CENTRUM  ORAL) Take 1 capsule by mouth once daily.       gentamicin (GARAMYCIN) 0.1 % cream APPLY CREAM TOPICALLY EVERY 3 DAYS      hydroCHLOROthiazide (MICROZIDE) 12.5 mg capsule TAKE 1 CAPSULE BY MOUTH  ONCE DAILY 90 capsule 1    LIDOcaine (LIDODERM) 5 % Place 1 patch onto the skin once daily. Remove & Discard patch daily. 30 patch 2    LUMIGAN 0.01 % Drop INSTILL 1 DROP INTO BOTH  EYES IN THE EVENING 7.5 mL 3    nystatin (MYCOSTATIN) cream Apply topically 2 (two) times daily. 30 g 2    nystatin-triamcinolone (MYCOLOG II) cream APPLY TOPICALLY TO AFFECTED AREA 4 TIMES DAILY 15 g 2    pantoprazole (PROTONIX) 40 MG tablet TAKE 1 TABLET BY MOUTH ONCE DAILY 90 tablet 1    polyethylene glycol 3350 (MIRALAX ORAL) Take 1 packet by mouth once daily.       RHOPRESSA 0.02 % ophthalmic solution INSTILL 1 DROP INTO BOTH  EYES ONCE DAILY 7.5 mL 3    [DISCONTINUED] megestrol (MEGACE) 400 mg/10 mL (40 mg/mL) Susp Take 10 mLs (400 mg total) by mouth once daily. 300 mL 11     No current facility-administered medications on file prior to visit.     Ms Brizuela has COPD, former smoker, dementia, bedbound, lymphedema, protein calorie malnutrition, htn, glaucoma, and pressures ulcers. Her , son and daughter have been on phone today for this visit. Family reports that she was on [facility]. Daughter reports her father was not happy with the nursing services of [facility] and signed her out of hospice and contacted her PCP, Dr Sandhu for home health. Dr Sandhu sent orders to [facility] health, their nurse did come to the house and discussed with family that pt was not appropriate for HH and would be better served by hospice. Referral [facility] placed to [facility]. Family reports [facility] declined to admit. Family reports they need assistance for her wound care and leg wraps. They are agreeable to readmitting to hospice.    Ms Brizuela is a 88 y/o female, confused per son, recognizes family members most days, impaired short term  memory.  Reports appetite is not good. Comes and goes. Low albumin of 1.9 3 months ago. Continues to have problems with edema and third spacing to extremities, likely due to low albumin.  Son reports mother is bedbound and max assist with ADLs.    Referral placed to [facility] hospice. Intake will meet with family today to discuss admit.       Assessment and plan:         Problem List Items Addressed This Visit          Pulmonary    COPD (chronic obstructive pulmonary disease) (Chronic)    Current Assessment & Plan     Lifelong smoker  Monitor         Relevant Orders    Ambulatory referral/consult to Hospice       Cardiac/Vascular    Peripheral vascular disease, unspecified (Chronic)    Current Assessment & Plan     Likely related to smoking history  Edema to BLE  JOSE ALBERTO with stenosis previously  ASA in place  Elevate legs  Monitor         Atherosclerosis of aorta (Chronic)    Overview     HCC code noted on CT Chest 2018         Current Assessment & Plan     Chronic  ASA in place  Monitor            Endocrine    Severe malnutrition - Primary    Current Assessment & Plan     Protein calorie malnutrition  Albumin of 1.9  Likely contributing to edema and wounds  Increase protein with shakes and supplements  More frequent offerings         Relevant Orders    Ambulatory referral/consult to Hospice     Discussed end of life goals and advanced care planning with pt and/or family. Pt is an DNR, previously on hospice care  Reviewed living will, [place] Post, and pt's wishes regarding life support and tube feeding.  Reviewed pt's code status and prognosis.   Reviewed hospice program and services to pt and/or family.  All questions answered and referral placed upon family agreement to info visit by hospice service-[facility]  30 minutes spent in discussion of these services.      - Continue all medications, treatments and therapies as ordered.   - Follow all instructions, recommendations as discussed.  - Maintain Safety Precautions at all  times.  - Attend all medical appointments as scheduled.  - For worsening symptoms: call Primary Care Physician or Nurse Practitioner.  - For emergencies, call 911 or immediately report to the nearest emergency room.  - Limit Risks of environmental exposure to coronavirus/COVID-19 as discussed including: social distancing, hand hygiene, and limiting departures from the home for necessities only.     Questions elicited and answered.  Contact information provided for any changes in condition or concerns                   This service was not originating from a related E/M service provided within the previous 7 days nor will  to an E/M service or procedure within the next 24 hours or my soonest available appointment.  Prevailing standard of care was able to be met in this audio-only visit.

## 2023-03-12 PROBLEM — R65.21 SEPTIC SHOCK: Status: RESOLVED | Noted: 2023-01-01 | Resolved: 2023-01-01

## 2023-03-12 PROBLEM — A41.9 SEPTIC SHOCK: Status: ACTIVE | Noted: 2023-01-01

## 2023-03-12 PROBLEM — I48.91 ATRIAL FIBRILLATION WITH RVR: Status: ACTIVE | Noted: 2023-01-01

## 2023-03-12 PROBLEM — A41.9 SEVERE SEPSIS: Status: ACTIVE | Noted: 2023-01-01

## 2023-03-12 PROBLEM — A41.9 SEPTIC SHOCK: Status: RESOLVED | Noted: 2023-01-01 | Resolved: 2023-01-01

## 2023-03-12 PROBLEM — R79.89 ELEVATED TROPONIN: Status: ACTIVE | Noted: 2023-01-01

## 2023-03-12 PROBLEM — R65.21 SEPTIC SHOCK: Status: ACTIVE | Noted: 2023-01-01

## 2023-03-12 PROBLEM — R65.20 SEVERE SEPSIS: Status: ACTIVE | Noted: 2023-01-01

## 2023-03-12 NOTE — ED NOTES
I-STAT Chem-8+ Results:   Value Reference Range   Sodium 142 136-145 mmol/L   Potassium  4.8 3.5-5.1 mmol/L   Chloride 103  mmol/L   Ionized Calcium 0.97 1.06-1.42 mmol/L   CO2 (measured) 29 23-29 mmol/L   Glucose 94  mg/dL   BUN 46 6-30 mg/dL   Creatinine 1.8 0.5-1.4 mg/dL   Hematocrit 36 36-54%

## 2023-03-12 NOTE — PLAN OF CARE
03/12/23 1758   Post-Acute Status   Post-Acute Authorization Hospice   Hospice Status Pending medical clearance/testing   Patient choice form signed by patient/caregiver List with quality metrics by geographic area provided;List from System Post-Acute Care   Discharge Delays None known at this time   Discharge Plan   Discharge Plan A Inpatient Hospice   Discharge Plan B Hospice/home

## 2023-03-12 NOTE — ASSESSMENT & PLAN NOTE
PMHx of afib, per chart review pt is not rate controlled with any home medications, is on daily aspirin. Pt tachycardic to 160s on chart review. Pt receiving 1L bolus at time of exam     PLAN   - start amiodarone gtt, plan to wean as titrated

## 2023-03-12 NOTE — ED NOTES
Rolled patient to exam her back with 2 staff assist. Unstageable wound noted to sacral area. MD morales notified

## 2023-03-12 NOTE — ASSESSMENT & PLAN NOTE
Pt with history of COPD, per chart review not on any home maintenance medications. Of note patient was recently enrolled in hospice, enrollment was terminated by family today, 3/12, prior to arrival to the hospital. Pt maintaining appropriate O2 sats on RA     -no acute interventions at this time

## 2023-03-12 NOTE — ASSESSMENT & PLAN NOTE
Pt with severe malnutrition, central wasting apparent on exam. Per family at bedside, at baseline pt will take small meals by mouth on a daily basis    - Most recent weight and BMI monitored-   Measurements:  Wt Readings from Last 1 Encounters:   03/12/23 57.2 kg (126 lb)   Body mass index is 21.63 kg/m².    PLAN  - Consider nutrition consult  - Will need swallow eval prior to starting diet, NPO at this time

## 2023-03-12 NOTE — PLAN OF CARE
Initial Discharge Planning   Case Management Assessment Note:     Patient admitted on 3-12-23  Chart reviewed  Care plan discussed with treatment team,    attending Dr Kate PEREIRA at home: per [facility]   Current dispo: pending.   Was on home hospice with [facility].  In patient hospice care at [facility] being discussed today and tomorrow  Code status also to be addressed by Dr Delacruz  Spoke with Jammie (on call [facility]) [phone].  Jammie confirmed that [facility] has bed availabilty  Consults following: case mgt, wound care  Case management  to follow     [facility] - Emergency Dept  Initial Discharge Assessment       Primary Care Provider: Lenin Sandhu MD    Admission Diagnosis: No admission diagnoses are documented for this encounter.    Admission Date: 3/12/2023  Expected Discharge Date:          Payor: [facility] MCARE / Plan: [employer] MEDICARE ADVANTAGE / Product Type: Medicare Advantage /     Extended Emergency Contact Information  Primary Emergency Contact: Murray Brizuela  Address: [address] [place] [address]  Home Phone: [phone]  Relation: Spouse  Secondary Emergency Contact: Maria Eugenia Frost  Mobile Phone: [phone]  Relation: Daughter    Discharge Plan A: (P) Inpatient Hospice  Discharge Plan B: (P) Hospice/home      [facility] [facility], [place] - [address]  [address] [facility] [place] [address]  Phone: [phone] Fax: [phone]    [facility] - [place] [address] [address]  Phone: [phone] Fax: [phone]    [facility] ) - [place], [place] - [address]  [address]  [address]  [facility] [address]  Phone: [phone] Fax: [phone]      Initial Assessment (most recent)       Adult Discharge Assessment - 03/12/23 1750           Discharge Assessment    Assessment Type Discharge Planning Assessment (P)      Confirmed/corrected address, phone number and insurance Yes (P)      Confirmed Demographics Correct on Facesheet (P)      Source of Information patient;family;health record (P)      Communicated KEYON with patient/caregiver Yes (P)      People in Home spouse (P)      Do you expect to return to your current living situation? No (P)      Do you currently have service(s) that help you manage your care at home? Yes (P)      Name and Contact number of agency [facility] (P)      Do you take prescription medications? Yes (P)      Do you have prescription coverage? Yes (P)      Do you have any problems affording any of your prescribed medications? No (P)      Is the patient taking medications as prescribed? yes (P)      How do you get to doctors appointments? health plan transportation;family or friend will provide (P)      Discharge Plan A Inpatient Hospice (P)      Discharge Plan B Hospice/home (P)      DME Needed Upon Discharge  none (P)      Discharge Plan discussed with: Patient;Spouse/sig other (P)

## 2023-03-12 NOTE — ASSESSMENT & PLAN NOTE
Recent Labs     03/12/23  1622   TROPONINI 4.417*     -Troponin elevated on admission. Suspect Type 2 NSTEMI secondary to demand ischemia in the setting of sepsis  -Initial EKG without ST elevations    PLAN  -Trend troponin to peak   -STAT EKG prn chest pain

## 2023-03-12 NOTE — SUBJECTIVE & OBJECTIVE
Past Medical History:   Diagnosis Date    Arthritis of knee     Cataract     COPD (chronic obstructive pulmonary disease)     CTS (carpal tunnel syndrome)     Diverticulosis of colon     Glaucoma (increased eye pressure)     HTN (hypertension)     Joint pain     Metabolic alkalosis     Psoriasis 2007    Smoker     Strabismus     XT OS    Uterine myoma     Venous stasis of both lower extremities        Past Surgical History:   Procedure Laterality Date    AHMED IMPLANT AND SURGICAL PI Left 1/27/16         CATARACT EXTRACTION W/  INTRAOCULAR LENS IMPLANT Left 8/13/2014        CATARACT EXTRACTION W/ INTRAOCULAR LENS IMPLANTW/ TRABECULECTOMY Left 8/13/14    os ()    TUBAL LIGATION         Review of patient's allergies indicates:   Allergen Reactions    Codeine       Unknown       Family History       Problem Relation (Age of Onset)    Glaucoma Father          Tobacco Use    Smoking status: Every Day     Packs/day: 1.00     Years: 57.00     Pack years: 57.00     Types: Cigarettes    Smokeless tobacco: Never    Tobacco comments:     Pt not ready to quit.   Substance and Sexual Activity    Alcohol use: Yes     Comment: seldom alcohol use.    Drug use: No    Sexual activity: Not Currently      Review of Systems   Unable to perform ROS: Dementia   Objective:     Vital Signs (Most Recent):  Temp: 97.7 °F (36.5 °C) (03/12/23 1527)  Pulse: (!) 144 (03/12/23 1818)  Resp: (!) 27 (03/12/23 1818)  BP: 108/70 (03/12/23 1818)  SpO2: (!) 94 % (03/12/23 1818)   Vital Signs (24h Range):  Temp:  [97.7 °F (36.5 °C)] 97.7 °F (36.5 °C)  Pulse:  [114-171] 144  Resp:  [27-41] 27  SpO2:  [94 %-100 %] 94 %  BP: ()/(46-70) 108/70   Weight: 57.2 kg (126 lb)  Body mass index is 21.63 kg/m².    No intake or output data in the 24 hours ending 03/12/23 1836    Physical Exam  Vitals and nursing note reviewed.   Constitutional:       Appearance: She is underweight. She is ill-appearing and diaphoretic.       Interventions: Nasal cannula in place.   HENT:      Head: Normocephalic and atraumatic.      Mouth/Throat:      Mouth: Mucous membranes are dry.   Eyes:      General: No scleral icterus.  Cardiovascular:      Rate and Rhythm: Tachycardia present. Rhythm irregular.   Pulmonary:      Effort: Tachypnea present.   Abdominal:      General: Bowel sounds are normal. There is no distension.      Palpations: Abdomen is soft.      Tenderness: There is abdominal tenderness (grimaces to palpation).   Musculoskeletal:         General: Swelling (Diffuse anasarca in UE and LE) present.      Right lower leg: Edema present.      Left lower leg: Edema present.   Skin:     General: Skin is warm.      Coloration: Skin is not jaundiced.      Findings: Wound (foul smelling, purulent sacral wound) present.   Neurological:      Mental Status: She is lethargic and disoriented.           Lines/Drains/Airways       Peripheral Intravenous Line  Duration                  Peripheral IV - Single Lumen 03/12/23 1717 20 G Anterior;Distal;Right Upper Arm <1 day                  Significant Labs:    CBC/Anemia Profile:  Recent Labs   Lab 03/12/23  1622 03/12/23  1709   WBC 21.24*  --    HGB 11.5*  --    HCT 34.8* 36     --    MCV 93  --    RDW 16.8*  --         Chemistries:  Recent Labs   Lab 03/12/23  1720      K 4.1      CO2 26   BUN 46*   CREATININE 1.6*   CALCIUM 7.9*   ALBUMIN 1.4*   PROT 7.3   BILITOT 1.2*   ALKPHOS 125   ALT 1,318*   AST 2,396*   MG 1.9       All pertinent labs within the past 24 hours have been reviewed.    Significant Imaging: I have reviewed all pertinent imaging results/findings within the past 24 hours.  Imaging Results              X-Ray Chest AP Portable (Final result)  Result time 03/12/23 16:11:15      Final result by Mehrdad Campbell MD (03/12/23 16:11:15)                   Impression:      Increased small right and new small left pleural effusions with probable bibasilar atelectasis/infiltrate on a  background chronic interstitial lung changes.  Interstitial type pneumonia not excluded.    Grossly stable additional chronic findings as above.      Electronically signed by: Mehrdad Campbell MD  Date:    03/12/2023  Time:    16:11               Narrative:    EXAMINATION:  XR CHEST AP PORTABLE    CLINICAL HISTORY:  Sepsis;    TECHNIQUE:  Single frontal view of the chest was performed.    COMPARISON:  Chest radiograph 04/22/2022, CT thorax 05/06/2022    FINDINGS:  Monitoring leads and tubing overlie the chest.  Patient is rotated.    Cardiomediastinal silhouette is midline and grossly stable without convincing evidence of failure.  There are small bilateral pleural effusions increased on the right and new on the left with probable bibasilar atelectasis/infiltrate.  Grossly similar scattered linear opacities with additional coarsening at the right midlung zone and bilateral lung bases likely correlating with known chronic interstitial lung changes and bronchiectasis better demonstrated on CT thorax of 05/06/2022.  No consolidation at the upper lung zones.  No pneumothorax.  Hilar contours are within normal limits.  No acute osseous process seen.  PA and lateral views can be obtained.

## 2023-03-12 NOTE — ED PROVIDER NOTES
Encounter Date: 3/12/2023       History     Chief Complaint   Patient presents with    Weakness     Patient was placed on hospice 3 weeks ago. Family states she was not acting her normal since Thursday. Family requested ems transport her to hospital for evaluation.     87-year-old female, history of COPD, dementia, hypertension, bed-bound, severe malnutrition, living at home and enrolled in [facility] hospice 1 month ago, now brought in by EMS for decreased responsiveness, diaphoresis and decreased p.o. intake.  EMS reports the family decided to disc enroll from hospice today.  I was able to speak to the patient's daughter who says that the patient normally eats a little bit of food and talks a little bit but over the last 3 days she has become essentially unresponsive, diaphoretic and since yesterday is not responding to them at all.    The history is provided by the EMS personnel. The history is limited by the absence of a caregiver and the condition of the patient.   Review of patient's allergies indicates:   Allergen Reactions    Codeine       Unknown     Past Medical History:   Diagnosis Date    Arthritis of knee     Cataract     COPD (chronic obstructive pulmonary disease)     CTS (carpal tunnel syndrome)     Diverticulosis of colon     Glaucoma (increased eye pressure)     HTN (hypertension)     Joint pain     Metabolic alkalosis     Psoriasis 2007    Smoker     Strabismus     XT OS    Uterine myoma     Venous stasis of both lower extremities      Past Surgical History:   Procedure Laterality Date    AHMED IMPLANT AND SURGICAL PI Left 1/27/16         CATARACT EXTRACTION W/  INTRAOCULAR LENS IMPLANT Left 8/13/2014        CATARACT EXTRACTION W/ INTRAOCULAR LENS IMPLANTW/ TRABECULECTOMY Left 8/13/14    os ()    TUBAL LIGATION       Family History   Problem Relation Age of Onset    Glaucoma Father     Cancer Neg Hx     Psoriasis Neg Hx     Amblyopia Neg Hx     Blindness Neg Hx      Macular degeneration Neg Hx     Retinal detachment Neg Hx     Strabismus Neg Hx      Social History     Tobacco Use    Smoking status: Every Day     Packs/day: 1.00     Years: 57.00     Pack years: 57.00     Types: Cigarettes    Smokeless tobacco: Never    Tobacco comments:     Pt not ready to quit.   Substance Use Topics    Alcohol use: Yes     Comment: seldom alcohol use.    Drug use: No     Review of Systems    Physical Exam     Initial Vitals [03/12/23 1527]   BP Pulse Resp Temp SpO2   (!) 68/46 (!) 140 (!) 40 97.7 °F (36.5 °C) 100 %      MAP       --         Physical Exam    Nursing note and vitals reviewed.  Constitutional: She has a sickly appearance. She appears ill. She appears distressed.   Eyes: Conjunctivae are normal.   Neck: Neck supple.   Pulmonary/Chest: Accessory muscle usage present. Tachypnea noted. She is in respiratory distress.   Musculoskeletal:         General: Edema present.      Cervical back: Neck supple.      Comments: Anasarca  Left upper extremity with 3+ edema, right upper extremity with no edema, bilateral lower extremity with 2+ edema     Neurological:   Lethargic, opens her eyes to verbal and tactile stimuli   Skin: Skin is warm and dry.             ED Course   Procedures  Labs Reviewed   CBC W/ AUTO DIFFERENTIAL - Abnormal; Notable for the following components:       Result Value    WBC 21.24 (*)     RBC 3.73 (*)     Hemoglobin 11.5 (*)     Hematocrit 34.8 (*)     RDW 16.8 (*)     Immature Granulocytes 0.8 (*)     Gran # (ANC) 19.3 (*)     Immature Grans (Abs) 0.18 (*)     nRBC 2 (*)     Gran % 91.0 (*)     Lymph % 4.9 (*)     Mono % 2.6 (*)     All other components within normal limits   PROTIME-INR - Abnormal; Notable for the following components:    Prothrombin Time 21.9 (*)     INR 2.2 (*)     All other components within normal limits   TROPONIN I - Abnormal; Notable for the following components:    Troponin I 4.417 (*)     All other components within normal limits    PROCALCITONIN - Abnormal; Notable for the following components:    Procalcitonin 1.38 (*)     All other components within normal limits   COMPREHENSIVE METABOLIC PANEL - Abnormal; Notable for the following components:    BUN 46 (*)     Creatinine 1.6 (*)     Calcium 7.9 (*)     Albumin 1.4 (*)     Total Bilirubin 1.2 (*)     AST 2,396 (*)     ALT 1,318 (*)     eGFR 31.2 (*)     All other components within normal limits   B-TYPE NATRIURETIC PEPTIDE - Abnormal; Notable for the following components:    BNP 3,431 (*)     All other components within normal limits    Narrative:     add on bnp per Christine Love MD   LACTIC ACID, PLASMA - Abnormal; Notable for the following components:    Lactate (Lactic Acid) 3.4 (*)     All other components within normal limits   B-TYPE NATRIURETIC PEPTIDE - Abnormal; Notable for the following components:    BNP 3,577 (*)     All other components within normal limits   TROPONIN I - Abnormal; Notable for the following components:    Troponin I 4.045 (*)     All other components within normal limits   TROPONIN I - Abnormal; Notable for the following components:    Troponin I 4.045 (*)     All other components within normal limits   LACTIC ACID, PLASMA - Abnormal; Notable for the following components:    Lactate (Lactic Acid) 3.4 (*)     All other components within normal limits   ISTAT PROCEDURE - Abnormal; Notable for the following components:    POC BUN 46 (*)     POC Creatinine 1.8 (*)     POC Ionized Calcium 0.97 (*)     All other components within normal limits   ISTAT PROCEDURE - Abnormal; Notable for the following components:    POC PCO2 55.6 (*)     POC PO2 18 (*)     POC HCO3 32.3 (*)     POC SATURATED O2 25 (*)     POC TCO2 34 (*)     All other components within normal limits   ISTAT LACTATE - Abnormal; Notable for the following components:    POC Lactate 4.04 (*)     All other components within normal limits   CULTURE, BLOOD   CULTURE, BLOOD   CULTURE, AEROBIC  (SPECIFY  SOURCE)   LIPASE   MAGNESIUM   B-TYPE NATRIURETIC PEPTIDE   URINALYSIS, REFLEX TO URINE CULTURE   ISTAT CHEM8     EKG Readings: (Independently Interpreted)   AFib with RVR     Imaging Results              X-Ray Chest AP Portable (Final result)  Result time 03/12/23 16:11:15      Final result by Mehrdad Campbell MD (03/12/23 16:11:15)                   Impression:      Increased small right and new small left pleural effusions with probable bibasilar atelectasis/infiltrate on a background chronic interstitial lung changes.  Interstitial type pneumonia not excluded.    Grossly stable additional chronic findings as above.      Electronically signed by: Mehrdad Campbell MD  Date:    03/12/2023  Time:    16:11               Narrative:    EXAMINATION:  XR CHEST AP PORTABLE    CLINICAL HISTORY:  Sepsis;    TECHNIQUE:  Single frontal view of the chest was performed.    COMPARISON:  Chest radiograph 04/22/2022, CT thorax 05/06/2022    FINDINGS:  Monitoring leads and tubing overlie the chest.  Patient is rotated.    Cardiomediastinal silhouette is midline and grossly stable without convincing evidence of failure.  There are small bilateral pleural effusions increased on the right and new on the left with probable bibasilar atelectasis/infiltrate.  Grossly similar scattered linear opacities with additional coarsening at the right midlung zone and bilateral lung bases likely correlating with known chronic interstitial lung changes and bronchiectasis better demonstrated on CT thorax of 05/06/2022.  No consolidation at the upper lung zones.  No pneumothorax.  Hilar contours are within normal limits.  No acute osseous process seen.  PA and lateral views can be obtained.                                    X-Rays:   Independently Interpreted Readings:   Chest X-Ray: Normal heart size. No acute abnormalities   Medications   sodium chloride 0.9% flush 10 mL (has no administration in time range)   potassium chloride 10 mEq in 100 mL IVPB  (has no administration in time range)     And   potassium chloride 10 mEq in 100 mL IVPB (has no administration in time range)     And   potassium chloride 10 mEq in 100 mL IVPB (has no administration in time range)   magnesium sulfate 2g in water 50mL IVPB (premix) (has no administration in time range)   magnesium sulfate 2g in water 50mL IVPB (premix) (has no administration in time range)   sodium phosphate 15 mmol in dextrose 5 % (D5W) 250 mL IVPB (has no administration in time range)   sodium phosphate 20.01 mmol in dextrose 5 % (D5W) 250 mL IVPB (has no administration in time range)   sodium phosphate 30 mmol in dextrose 5 % (D5W) 250 mL IVPB (has no administration in time range)   calcium gluconate 1 g in NS IVPB (premixed) (has no administration in time range)   calcium gluconate 1 g in NS IVPB (premixed) (has no administration in time range)   calcium gluconate 1 g in NS IVPB (premixed) (has no administration in time range)   amiodarone in dextrose 150 mg/100 mL (1.5 mg/mL) loading dose 150 mg (has no administration in time range)   amiodarone 360 mg/200 mL (1.8 mg/mL) infusion (has no administration in time range)   piperacillin-tazobactam (ZOSYN) 4.5 g in dextrose 5 % in water (D5W) 5 % 100 mL IVPB (MB+) (has no administration in time range)   vancomycin - pharmacy to dose (has no administration in time range)   NORepinephrine 4 mg in dextrose 5% 250 mL infusion (premix) (titrating) (has no administration in time range)   heparin (porcine) injection 5,000 Units (has no administration in time range)   vancomycin (VANCOCIN) 1,000 mg in dextrose 5 % (D5W) 250 mL IVPB (Vial-Mate) (has no administration in time range)   lactated ringers bolus 1,716 mL (1,716 mLs Intravenous New Bag 3/12/23 1723)   piperacillin-tazobactam (ZOSYN) 4.5 g in dextrose 5 % in water (D5W) 5 % 100 mL IVPB (MB+) (0 g Intravenous Stopped 3/12/23 1850)     Medical Decision Making:   History:   I obtained history from: EMS provider and  "someone other than patient.  Old Medical Records: I decided to obtain old medical records.  Old Records Summarized: records from clinic visits, records from previous admission(s) and records from another hospital.  Initial Assessment:   Patient arrived to the ED in respiratory distress and shock  Very ill-appearing  Responsive to tactile stimuli only  Independently Interpreted Test(s):   I have ordered and independently interpreted X-rays - see prior notes.  I have ordered and independently interpreted EKG Reading(s) - see prior notes  Clinical Tests:   Lab Tests: Ordered and Reviewed  Radiological Study: Ordered and Reviewed  Medical Tests: Reviewed and Ordered  Sepsis Perfusion Assessment: "I attest a sepsis perfusion exam was performed within 6 hours of sepsis, severe sepsis, or septic shock presentation, following fluid resuscitation."  ED Management:  Differential diagnosis for hypotension would include hypovolemia 2/2 dehydration or blood loss, distributive process like sepsis (large sacral decub likely the source) or anaphylaxis, endocrine crisis like adrenal shock or hypothyroidism, cardiopulmonary process like cardiogenic shock, PE or tamponade, or polypharmacy.    At this point in time, I think the most likely etiology of this patient's hypotension is multifactorial, sepsis is certainly a possibility, exacerbated by her underlying poor health state and multiple comorbidities.    The following tests and interventions will be performed to determine the source of this patient's hypotension:  -Labs: CBC, CMP, lactate, UA, cultures  -Imaging:  Chest x-ray  -Close monitoring with frequent VS checks  -Intravenous fluid boluses:  LR bolus  -Medication review    -Disposition:  Uncertain at this time pending further discussions with family about goals of care    Other:   I have discussed this case with another health care provider.       <> Summary of the Discussion: ED     GO d/w daughter and then "   -I initially spoke to patient's daughter about the patient's severe critical illness.  I explained that the patient is likely dying and that time short and that I did not think that putting her through a critical care stay and getting aggressive treatment would be to her benefit.  Daughter agrees and thinks that patient would be best served in an inpatient hospice facility.  I then was finally able to reach the patient's  and explained my recommendation to refocus on the patient's comfort admit her to inpatient hospice.  He is seemed somewhat amenable to this plan but wanted to discuss further in person.  He states that he and his son will be coming into the hospital in about an hour.  In the meantime we will give her IV fluids and antibiotics until further discussions can sort out a clear care plan.    5:37 PM  Goals of care discussion with patient's  and son who are at bedside now.  Son states that the only reason they enrolled in hospice was because they wanted more assistance with wound care at home.  It does not seem that there goals at the time were completely in line with the hospice philosophy.  I explained that the patient is likely now and multiorgan failure, has had a heart attack, has a serious infection and then I think it would be in her best interest to continue with hospice care, refocus on her comfort and go to the inpatient facility that [facility].  I explained that both their PCP Dr. Sandhu and the nurse practitioner who saw the patient last month recommended hospice care for her.  They verbalized understanding of this but said that they would like us to do things to try to prolong her life to keep her going a little bit longer.  I asked if this is what the patient would want and they said yes.  I made a strong recommendation that the patient not receive any sort of invasive treatment should her condition worsened despite getting IV fluids and IV antibiotics and they were  in agreement that the patient would be DNR.  In terms of DNI status they would like to think about it more.    6:19 PM  RNs and myself turned pt, large sacral decub, Stage IV, foul-smelling drainage.  I suspect this is the source of her infection.  Already covered with broad spectrum antibiotics.  Family updated.          Attending Attestation:         Attending Critical Care:   Critical Care Times:   ==============================================================  Total Critical Care Time - exclusive of procedural time: 45 minutes.  ==============================================================  Critical care was necessary to treat or prevent imminent or life-threatening deterioration of the following conditions: respiratory failure and sepsis.   The following critical care procedures were done by me (see procedure notes): pulse oximetry.   Critical care was time spent personally by me on the following activities: obtaining history from patient or relative, examination of patient, review of old charts, review of x-rays / CT sent with the patient, ordering lab, x-rays, and/or EKG, development of treatment plan with patient or relative, ordering and performing treatments and interventions, evaluation of patient's response to treatment, discussion with consultants, re-evaluation of patient's conition and end of life discussions.   Critical Care Condition: life-threatening         ED Course as of 03/12/23 2039   Sun Mar 12, 2023   1800 Spoke with ICU team - they will come evaluate the pt [AS]      ED Course User Index  [AS] Christine Love MD                 Clinical Impression:   Final diagnoses:  [I95.9] Hypotension  [A41.9, R65.21] Septic shock (Primary)        ED Disposition Condition    Admit Stable                Christine Love MD  03/12/23 2039

## 2023-03-12 NOTE — ASSESSMENT & PLAN NOTE
Pt w/ severe sacral ulcer with foul smelling purulent discharge appreciated on exam. Per son at bedside the ulcer began as the size of a quarter approximately 1 year ago, and that ulcer has rapidly expanded over the course of the past few weeks. Wound likely source of sepsis     -see severe sepsis for plan

## 2023-03-12 NOTE — HPI
Patient information was obtained from patient, past medical records, and ER records. Patient history limited due to dementia.    Ms. Nelia Brizuela is a 87 y.o F w/ hx of dementia, COPD, HTN, severe malnutrition who presented to the ED from [facility] hospice after family disenrolled her from hospice as over the past three days, she has had worsening mentation, poor oral intake, and has been more diaphoretic. In the ED, patient was noted to have foul-smelling purulent sacral wound. Per son, the sacral wound was initially the size of a quarter and has noted be rapidly expanding over the past 3 weeks. At her baseline, patient is able to have a limited conversation due to her dementia and has been mostly bedbound.     ED course notable for WBC 21.24, Hgb 11.5, Troponin 4.417,  POC lactate 4.04, POC creatinine 4.04. CXR noted increased small right and new small left pleural effusions with probable bibasilar atelectasis/infiltrate.  Vitals notable for hypotension, tachycardia with EKG noting Afib w/ RVR. She was started on fluid resuscitation and broad spectrum antibiotics.

## 2023-03-12 NOTE — ASSESSMENT & PLAN NOTE
PMHx of essential HTN, treated with home HCTZ    PLAN   - pt hypotensive on initial presentation, will hold antihypertensives at this time   - plan to restart when clinically appropriate

## 2023-03-12 NOTE — ASSESSMENT & PLAN NOTE
Pt with pressure ulcer of right foot, per family has been slowly healing over the past year. Possibly contributing source to sepsis picture     -see severe sepsis for plan

## 2023-03-12 NOTE — ASSESSMENT & PLAN NOTE
Creatinine 1.6  on admit, baseline around 0.7  Possible Etiologies:  - Likely intra-renal from hypotension secondary to sepsis vs. pre-renal from dehydration    Recent Labs     03/12/23  1720   CREATININE 1.6*   BUN 46*     Estimated Creatinine Clearance: 21.8 mL/min (A) (based on SCr of 1.6 mg/dL (H)).    PLAN  - Trend on CMP, will consider renal U/S if no improvement with fluid resuscitation  - Strict I&Os and daily weights   - Avoid nephrotoxic agents such as NSAIDs, gadolinium and IV radiocontrast.  - Renally dose meds to current GFR.  - Maintain MAP > 65.

## 2023-03-12 NOTE — ASSESSMENT & PLAN NOTE
This patient does have evidence of infective focus  My overall impression is septic shock due to lactate > 4.  Source: Skin and Soft Tissue (location large sacral wound)  Antibiotics given-   Antibiotics (72h ago, onward)    Start     Stop Route Frequency Ordered    03/12/23 1615  vancomycin (VANCOCIN) 1,000 mg in dextrose 5 % (D5W) 250 mL IVPB (Vial-Mate)         03/13 0414 IV ED 1 Time 03/12/23 1608    03/12/23 1615  piperacillin-tazobactam (ZOSYN) 4.5 g in dextrose 5 % in water (D5W) 5 % 100 mL IVPB (MB+)         03/13 0414 IV ED 1 Time 03/12/23 1608        Latest lactate reviewed-  No results for input(s): LACTATE in the last 72 hours.  Organ dysfunction indicated by Encephalopathy    Fluid challenge Ideal Body Weight- The patient's ideal body weight is Ideal body weight: 54.7 kg (120 lb 9.5 oz) which will be used to calculate fluid bolus of 30 ml/kg for treatment of septic shock.      Post- resuscitation assessment Yes Perfusion exam was performed within 6 hours of septic shock presentation after bolus shows Inadequate tissue perfusion assessed by non-invasive monitoring       Will Start Pressors- Levophed for MAP of 65  Source control achieved by: vanc/[email or web address]    -Fu blood/urine cultures   -Consult wound care for sacral wound that is presumably the source of infection  -Fu CT abdomen pelvis in setting of abdominal pain appreciated on exam   36.9

## 2023-03-13 PROBLEM — I50.9 CONGESTIVE HEART FAILURE: Status: ACTIVE | Noted: 2023-01-01

## 2023-03-13 NOTE — PLAN OF CARE
CMICU DAILY GOALS       A: Awake    RASS: Goal -    Actual -     Restraint necessity:    B: Breathe   SBT: Not intubated   C: Coordinate A & B, analgesics/sedatives   Pain: managed    SAT: Not intubated  D: Delirium   CAM-ICU:    E: Early(intubated/ Progressive (non-intubated) Mobility   MOVE Screen: Fail   Activity: Activity Management: Rolling - L1  FAS: Feeding/Nutrition   Diet order:  ,    T: Thrombus   DVT prophylaxis: VTE Required Core Measure: Pharmacological prophylaxis initiated/maintained  H: HOB Elevation   Head of Bed (HOB) Positioning: HOB at 30 degrees  U: Ulcer Prophylaxis   GI: no  G: Glucose control   Not diabetic.     S: Skin   Bathing/Skin Care: bath, complete, incontinence care  Device Skin Pressure Protection: absorbent pad utilized/changed, adhesive use limited, positioning supports utilized  Pressure Reduction Devices: pressure-redistributing mattress utilized  Pressure Reduction Techniques: frequent weight shift encouraged, heels elevated off bed, positioned off wounds, pressure points protected, weight shift assistance provided  Skin Protection: adhesive use limited, incontinence pads utilized  B: Bowel Function   no issues   I: Indwelling Catheters   Torres necessity:     CVC necessity: No  D: De-escalation Antibiotics   No    Family/Goals of care/Code Status   Code Status: DNR    24H Vital Sign Range  Temp:  [94.4 °F (34.7 °C)-97.7 °F (36.5 °C)]   Pulse:  []   Resp:  [6-41]   BP: ()/(46-85)   SpO2:  [64 %-100 %]      Shift Events   Admitted to unit from the ED. Has foul smelling unstageable sacral ulcer. Cleaned the pressure ulcer and put a new dressing. Also has bilateral heel altered skin integrity. Wound care consult ordered. Still tachycardic even after initiation of amiodarone infusion. Team regularly updated on pt's tachycardia. MD said to order another loading dose of amiodarone.    VS and assessment per flow sheet, patient progressing towards goals as tolerated, plan of  care reviewed with family, all concerns addressed, will continue to monitor.

## 2023-03-13 NOTE — ASSESSMENT & PLAN NOTE
Recent Labs     03/12/23  1622 03/12/23  1928 03/12/23  2232   TROPONINI 4.417* 4.045*  4.045* 2.261*     -Troponin elevated on admission. Suspect Type 2 NSTEMI secondary to demand ischemia in the setting of sepsis  -Initial EKG without ST elevations    PLAN  -Trend troponin to peak   -STAT EKG prn chest pain

## 2023-03-13 NOTE — PROGRESS NOTES
[facility] - Cardiac Medical ICU  Critical Care Medicine  Progress Note    Patient Name: Nelia Brizuela  MRN: [id number]  Admission Date: 3/12/2023  Hospital Length of Stay: 1 days  Code Status: DNR  Attending Provider: Stephan Powell MD  Primary Care Provider: Lenin Sandhu MD   Principal Problem: Septic shock    Subjective:     HPI:  Patient information was obtained from patient, past medical records, and ER records. Patient history limited due to dementia.    Ms. Nelia Brizuela is a 87 y.o F w/ hx of dementia, COPD, HTN, severe malnutrition who presented to the ED from [facility] hospice after family disenrolled her from hospice as over the past three days, she has had worsening mentation, poor oral intake, and has been more diaphoretic. In the ED, patient was noted to have foul-smelling purulent sacral wound. Per son, the sacral wound was initially the size of a quarter and has noted be rapidly expanding over the past 3 weeks. At her baseline, patient is able to have a limited conversation due to her dementia and has been mostly bedbound.     ED course notable for WBC 21.24, Hgb 11.5, Troponin 4.417,  POC lactate 4.04, POC creatinine 4.04. CXR noted increased small right and new small left pleural effusions with probable bibasilar atelectasis/infiltrate.  Vitals notable for hypotension, tachycardia with EKG noting Afib w/ RVR. She was started on fluid resuscitation and broad spectrum antibiotics.      Hospital/ICU Course:  No notes on file    Interval History/Significant Events: Continuing to address goals of care    Review of Systems   Unable to perform ROS: Dementia   Objective:     Vital Signs (Most Recent):  Temp: 97.9 °F (36.6 °C) (03/13/23 0714)  Pulse: 108 (03/13/23 0730)  Resp: 19 (03/13/23 0730)  BP: (!) 86/56 (03/13/23 0730)  SpO2: (!) 92 % (03/13/23 0730)   Vital Signs (24h Range):  Temp:  [94.4 °F (34.7 °C)-97.9 °F (36.6 °C)] 97.9 °F (36.6 °C)  Pulse:  [] 108  Resp:  [6-41] 19  SpO2:  [64 %-100 %] 92  %  BP: ()/(46-85) 86/56   Weight: 57.2 kg (126 lb)  Body mass index is 21.63 kg/m².      Intake/Output Summary (Last 24 hours) at 3/13/2023 0800  Last data filed at 3/13/2023 0600  Gross per 24 hour   Intake 1692.38 ml   Output --   Net 1692.38 ml       Physical Exam  Vitals and nursing note reviewed.   Constitutional:       Appearance: She is underweight. She is ill-appearing and diaphoretic.      Interventions: Nasal cannula in place.   HENT:      Head: Normocephalic and atraumatic.      Mouth/Throat:      Mouth: Mucous membranes are dry.   Eyes:      General: No scleral icterus.  Cardiovascular:      Rate and Rhythm: Tachycardia present. Rhythm irregular.   Pulmonary:      Effort: Tachypnea present.   Abdominal:      General: Bowel sounds are normal. There is no distension.      Palpations: Abdomen is soft.      Tenderness: There is abdominal tenderness (grimaces to palpation).   Musculoskeletal:         General: Swelling (Diffuse anasarca in UE and LE) present.      Right lower leg: Edema present.      Left lower leg: Edema present.   Skin:     General: Skin is warm.      Coloration: Skin is not jaundiced.      Findings: Wound (foul smelling, purulent sacral wound) present.   Neurological:      Mental Status: She is lethargic and disoriented.       Vents:     Lines/Drains/Airways       Peripheral Intravenous Line  Duration                  Peripheral IV - Single Lumen 03/12/23 1717 20 G Anterior;Distal;Right Upper Arm <1 day         Peripheral IV - Single Lumen 03/12/23 2250 20 G Anterior;Proximal;Right Forearm <1 day                  Significant Labs:    CBC/Anemia Profile:  Recent Labs   Lab 03/12/23  1622 03/12/23  1709 03/13/23  0414   WBC 21.24*  --  22.08*   HGB 11.5*  --  9.8*   HCT 34.8* 36 31.0*     --  176   MCV 93  --  94   RDW 16.8*  --  16.8*        Chemistries:  Recent Labs   Lab 03/12/23  1720 03/13/23  0414    143   K 4.1 3.8    102   CO2 26 30*   BUN 46* 48*   CREATININE  1.6* 1.5*   CALCIUM 7.9* 7.4*   ALBUMIN 1.4* 1.3*   PROT 7.3 6.5   BILITOT 1.2* 1.0   ALKPHOS 125 112   ALT 1,318* 965*   AST 2,396* 1,118*   MG 1.9 1.7   PHOS  --  4.5       All pertinent labs within the past 24 hours have been reviewed.    Significant Imaging:  I have reviewed all pertinent imaging results/findings within the past 24 hours.      ABG  Recent Labs   Lab 03/12/23  1723   PH 7.372   PO2 18*   PCO2 55.6*   HCO3 32.3*   BE 7     Assessment/Plan:     Pulmonary  COPD (chronic obstructive pulmonary disease)  Pt with history of COPD, per chart review not on any home maintenance medications. Of note patient was recently enrolled in hospice, enrollment was terminated by family today, 3/12, prior to arrival to the hospital. Pt maintaining appropriate O2 sats on RA     -no acute interventions at this time    Cardiac/Vascular  Congestive heart failure  Patient is identified as having Combined Systolic and Diastolic heart failure that is Chronic. CHF is currently controlled. Latest ECHO performed and demonstrates- Results for orders placed during the hospital encounter of 03/12/23    Echo    Interpretation Summary  · The left ventricle is normal in size with severely decreased systolic function.  · The estimated ejection fraction is 20%.  · There is left ventricular global hypokinesis.  · Moderate right ventricular enlargement with moderately to severely reduced right ventricular systolic function.  · Biatrial enlargement.  · Mild mitral regurgitation.  · Moderate tricuspid regurgitation.  · The estimated PA systolic pressure is 51 mmHg.  · There is pulmonary hypertension.  · Elevated central venous pressure (15 mmHg).  · There are bilateral pleural effusions.  · Atrial fibrillation observed.  . Continue Furosemide and monitor clinical status closely. Monitor on telemetry. Patient is on CHF pathway.  Monitor strict Is&Os and daily weights.  Place on fluid restriction of 1 L. Continue to stress to patient  importance of self efficacy and  on diet for CHF. Last BNP reviewed- and noted below   Recent Labs   Lab 03/12/23 1928   BNP 3,577*   .      Elevated troponin  Recent Labs     03/12/23  1622 03/12/23  1928 03/12/23  2232   TROPONINI 4.417* 4.045*  4.045* 2.261*     -Troponin elevated on admission. Suspect Type 2 NSTEMI secondary to demand ischemia in the setting of sepsis  -Initial EKG without ST elevations    PLAN  -Trend troponin to peak   -STAT EKG prn chest pain    Atrial fibrillation with RVR  PMHx of afib, per chart review pt is not rate controlled with any home medications, is on daily aspirin. Pt tachycardic to 160s on chart review. Pt receiving 1L bolus at time of exam     PLAN   - start amiodarone gtt, plan to wean as titrated       HTN (hypertension)  PMHx of essential HTN, treated with home HCTZ    PLAN   - pt hypotensive on initial presentation, will hold antihypertensives at this time   - plan to restart when clinically appropriate     Renal/  RICARDO (acute kidney injury)  Creatinine 1.6  on admit, baseline around 0.7  Possible Etiologies:  - Likely intra-renal from hypotension secondary to sepsis vs. pre-renal from dehydration    Recent Labs     03/12/23  1720 03/13/23  0414   CREATININE 1.6* 1.5*   BUN 46* 48*     Estimated Creatinine Clearance: 23.2 mL/min (A) (based on SCr of 1.5 mg/dL (H)).    PLAN  - Trend on CMP, will consider renal U/S if no improvement with fluid resuscitation  - Strict I&Os and daily weights   - Avoid nephrotoxic agents such as NSAIDs, gadolinium and IV radiocontrast.  - Renally dose meds to current GFR.  - Maintain MAP > 65.    ID  Severe sepsis  This patient does have evidence of infective focus  My overall impression is septic shock due to lactate > 4.  Source: Skin and Soft Tissue (location large sacral wound)  Antibiotics given-   Antibiotics (72h ago, onward)    Start     Stop Route Frequency Ordered    03/13/23 0230  piperacillin-tazobactam (ZOSYN) 4.5 g in  dextrose 5 % in water (D5W) 5 % 100 mL IVPB (MB+)         -- IV Every 8 hours (non-standard times) 03/12/23 1856 03/12/23 2015  vancomycin (VANCOCIN) 1,000 mg in dextrose 5 % (D5W) 250 mL IVPB (Vial-Mate)         -- IV Once 03/12/23 1913    03/12/23 1955  vancomycin - pharmacy to dose  (vancomycin IVPB)        See [facility] for full Linked Orders Report.    -- IV pharmacy to manage frequency 03/12/23 1856        Latest lactate reviewed-  Recent Labs   Lab 03/12/23 1928 03/12/23 2232 03/13/23  0414   LACTATE 3.4*  3.4* 3.0* 2.6*     Organ dysfunction indicated by Encephalopathy    Fluid challenge Ideal Body Weight- The patient's ideal body weight is Ideal body weight: 54.7 kg (120 lb 9.5 oz) which will be used to calculate fluid bolus of 30 ml/kg for treatment of septic shock.      Post- resuscitation assessment Yes Perfusion exam was performed within 6 hours of septic shock presentation after bolus shows Inadequate tissue perfusion assessed by non-invasive monitoring       Will Start Pressors- Levophed for MAP of 65  Source control achieved by: vanc/zosyn    -Fu blood/urine cultures   -Consult wound care for sacral wound that is presumably the source of infection  -Fu CT abdomen pelvis in setting of abdominal pain appreciated on exam    Endocrine  Severe malnutrition  Pt with severe malnutrition, central wasting apparent on exam. Per family at bedside, at baseline pt will take small meals by mouth on a daily basis    - Most recent weight and BMI monitored-   Measurements:  Wt Readings from Last 1 Encounters:   03/12/23 57.2 kg (126 lb)   Body mass index is 21.63 kg/m².    PLAN  - Consider nutrition consult  - Will need swallow eval prior to starting diet, NPO at this time    Orthopedic  Pressure injury of sacral region, stage 3  Pt w/ severe sacral ulcer with foul smelling purulent discharge appreciated on exam. Per son at bedside the ulcer began as the size of a quarter approximately 1 year ago, and that  ulcer has rapidly expanded over the course of the past few weeks. Wound likely source of sepsis     -see severe sepsis for plan     Ulcer of right foot with fat layer exposed  Pt with pressure ulcer of right foot, per family has been slowly healing over the past year. Possibly contributing source to sepsis picture     -see severe sepsis for plan       Critical Care Daily Checklist:    A: Awake: RASS Goal/Actual Goal:    Actual:     B: Spontaneous Breathing Trial Performed?     C: SAT & SBT Coordinated?  NA                      D: Delirium: CAM-ICU     E: Early Mobility Performed? No   F: Feeding Goal:    Status:     Current Diet Order   No orders of the defined types were placed in this encounter.      AS: Analgesia/Sedation Dilaudid as needed   T: Thromboembolic Prophylaxis Treatment dose lovenox   H: HOB > 300 Yes   U: Stress Ulcer Prophylaxis (if needed) No   G: Glucose Control NPO   B: Bowel Function Stool Occurrence: 1   I: Indwelling Catheter (Lines & Torres) Necessity Torres PIV   D: De-escalation of Antimicrobials/Pharmacotherapies Stopped Vanc    Plan for the day/ETD Continue to talk about goals of care    Code Status:  Family/Goals of Care: DNR         Critical secondary to Patient has a condition that poses threat to life and bodily function: Severe Sepsis      Critical care was time spent personally by me on the following activities: development of treatment plan with patient or surrogate and bedside caregivers, discussions with consultants, evaluation of patient's response to treatment, examination of patient, ordering and performing treatments and interventions, ordering and review of laboratory studies, ordering and review of radiographic studies, pulse oximetry, re-evaluation of patient's condition. This critical care time did not overlap with that of any other provider or involve time for any procedures.     Kenan Gaming MD  Critical Care Medicine  [facility] - Cardiac Medical ICU

## 2023-03-13 NOTE — ED NOTES
Spoke with Fina valadez RN about patients limited IV access with multiple medications due. Fina spoke with ICU team and reported to give medication most important in the IV until a midline is placed

## 2023-03-13 NOTE — ASSESSMENT & PLAN NOTE
Creatinine 1.6  on admit, baseline around 0.7  Possible Etiologies:  - Likely intra-renal from hypotension secondary to sepsis vs. pre-renal from dehydration    Recent Labs     03/12/23  1720 03/13/23  0414   CREATININE 1.6* 1.5*   BUN 46* 48*     Estimated Creatinine Clearance: 23.2 mL/min (A) (based on SCr of 1.5 mg/dL (H)).    PLAN  - Trend on CMP, will consider renal U/S if no improvement with fluid resuscitation  - Strict I&Os and daily weights   - Avoid nephrotoxic agents such as NSAIDs, gadolinium and IV radiocontrast.  - Renally dose meds to current GFR.  - Maintain MAP > 65.

## 2023-03-13 NOTE — PLAN OF CARE
Patient seen and examined in conjunction with resident team. I have reviewed note and pertinent laboratory and imaging studies. I agree with findings including diagnostic interpretation and medical decision making as written.     ASSESSMENT:   Shock- Septic. Multiple potential sources- Large sacral wound + PNA + stercoral colitis + ?osteo. Low dose NE support.   AF- RVR   Transaminitis- C/W ischemic injury- concern for hepatic infarcts-- Suspect a consequence of low flow + stenotic celiac aa.   Acute hypoxemic respiratory failure- Chest imaging with bilateral effusion + airspace disease vs. Atelectasis.. Clinically dry.. Confounder includes elevated BNP.   Encephalopathy- Septic + metabolic on baseline dementia   COPD- FEV1 1L (55%)- No e/o acute exacerbation on exam.   RICARDO- pre-renal vs. iATN   Anemia- Hb baseline. No e/o blood loss.   Sacral decubitus- unable to stage. Present on admit.   Profound debility. Bed bound.   Severe malnutrition- Alb 1.4   Elevated troponin- Down trending-- Suspect Type 2 2/2 strain   Metabolic acidosis- RICARDO + lactate     PLAN:   BS abx. Follow cultures   Check echo.   Levophed if needed to maintain MAP >65   Low flow NC- wean targeting SpO2 >90%   PRN bronchodilators.   Amio Gtt   Check hepatic U/S + doppler..   Trend LFT   Trend RFP/UOP. Renal dose all meds and avoid additional nephrotoxic agents.   Wound care   Bowel regimen.. Needs [place] to eval for impaction..   Hb transfusion threshold < 7   Heparin PPx.   SUP- not indicated.   NPO   DNR/DNI     Profoundly debilitated with unfixable sacral decub.. Now with multi-organ failure and shock. Frail with baseline demntia and severe nutritional deficit.. Overall prognosis appears very poor. We will continue with supportive care targeting reversible etiologies. Long discussion with  and son at bedside regarding concerns. DNR/DNI based on discussion and code status updated to reflect this conversation.. low threshold to transition to a  more palliative approach if decompensation and in line with goals.. At best hospice needs strong consideration..      Critical Care Time: 65 minutes  Critical care was time spent personally by me on the following activities: evaluating this patient's organ dysfunction, development of treatment plan, discussing treatment plan with patient or surrogate and bedside caregivers, discussions with consultants, evaluation of patient's response to treatment, examination of patient, ordering and performing treatments and interventions, ordering and review of laboratory studies, ordering and review of radiographic studies, re-evaluation of patient's condition. This critical care time did not overlap with that of any other provider or involve time for any procedures.

## 2023-03-13 NOTE — PROGRESS NOTES
Pharmacokinetic Initial Assessment: IV Vancomycin    Assessment/Plan:    Initiate intravenous vancomycin with loading dose of 750 mg once followed by a maintenance dose of vancomycin 750mg IV every 24 hours  Desired empiric serum trough concentration is 10 to 20 mcg/mL  Draw vancomycin trough level 30 min prior to third dose on 3/15 at approximately 1830  Pharmacy will continue to follow and monitor vancomycin.      Please contact pharmacy at extension [phone] with any questions regarding this assessment.     Thank you for the consult,   Bernardino Montoya       Patient brief summary:  Nelia Brizuela is a 87 y.o. female initiated on antimicrobial therapy with IV Vancomycin for treatment of suspected urinary tract infection    Drug Allergies:   Review of patient's allergies indicates:   Allergen Reactions    Codeine       Unknown       Actual Body Weight:   57.2 kg      Renal Function:   Estimated Creatinine Clearance: 21.8 mL/min (A) (based on SCr of 1.6 mg/dL (H)).,     Dialysis Method (if applicable):  N/A    CBC (last 72 hours):  Recent Labs   Lab Result Units 03/12/23  1622   WBC K/uL 21.24*   Hemoglobin g/dL 11.5*   Hematocrit % 34.8*   Platelets K/uL 162   Gran % % 91.0*   Lymph % % 4.9*   Mono % % 2.6*   Eosinophil % % 0.4   Basophil % % 0.3   Differential Method  Automated       Metabolic Panel (last 72 hours):  Recent Labs   Lab Result Units 03/12/23  1720   Sodium mmol/L 143   Potassium mmol/L 4.1   Chloride mmol/L 103   CO2 mmol/L 26   Glucose mg/dL 90   BUN mg/dL 46*   Creatinine mg/dL 1.6*   Albumin g/dL 1.4*   Total Bilirubin mg/dL 1.2*   Alkaline Phosphatase U/L 125   AST U/L 2,396*   ALT U/L 1,318*   Magnesium mg/dL 1.9       Drug levels (last 3 results):  No results for input(s): VANCOMYCINRA, VANCORANDOM, VANCOMYCINPE, VANCOPEAK, VANCOMYCINTR, VANCOTROUGH in the last 72 hours.    Microbiologic Results:  Microbiology Results (last 7 days)       Procedure Component Value Units Date/Time    Aerobic culture  [[id number]]     Order Status: No result Specimen: Decubitus     Blood culture x two cultures. Draw prior to antibiotics. [[id number]] Collected: 03/12/23 1720    Order Status: Sent Specimen: Blood from Peripheral, Antecubital, Right Updated: 03/12/23 1736    Blood culture x two cultures. Draw prior to antibiotics. [[id number]] Collected: 03/12/23 1720    Order Status: Sent Specimen: Blood from Peripheral, Antecubital, Right Updated: 03/12/23 1736

## 2023-03-13 NOTE — ED NOTES
ICU team at bedside consulting patient family members. ICU team aware of limited access, discussing central line

## 2023-03-13 NOTE — ED NOTES
Pt placed on bedside cardiac monitoring. Will take patient to CT then to MICU. This RN at bedside for transport

## 2023-03-13 NOTE — SUBJECTIVE & OBJECTIVE
Interval History/Significant Events: Continuing to address goals of care    Review of Systems   Unable to perform ROS: Dementia   Objective:     Vital Signs (Most Recent):  Temp: 97.9 °F (36.6 °C) (03/13/23 0714)  Pulse: 108 (03/13/23 0730)  Resp: 19 (03/13/23 0730)  BP: (!) 86/56 (03/13/23 0730)  SpO2: (!) 92 % (03/13/23 0730)   Vital Signs (24h Range):  Temp:  [94.4 °F (34.7 °C)-97.9 °F (36.6 °C)] 97.9 °F (36.6 °C)  Pulse:  [] 108  Resp:  [6-41] 19  SpO2:  [64 %-100 %] 92 %  BP: ()/(46-85) 86/56   Weight: 57.2 kg (126 lb)  Body mass index is 21.63 kg/m².      Intake/Output Summary (Last 24 hours) at 3/13/2023 0800  Last data filed at 3/13/2023 0600  Gross per 24 hour   Intake 1692.38 ml   Output --   Net 1692.38 ml       Physical Exam  Vitals and nursing note reviewed.   Constitutional:       Appearance: She is underweight. She is ill-appearing and diaphoretic.      Interventions: Nasal cannula in place.   HENT:      Head: Normocephalic and atraumatic.      Mouth/Throat:      Mouth: Mucous membranes are dry.   Eyes:      General: No scleral icterus.  Cardiovascular:      Rate and Rhythm: Tachycardia present. Rhythm irregular.   Pulmonary:      Effort: Tachypnea present.   Abdominal:      General: Bowel sounds are normal. There is no distension.      Palpations: Abdomen is soft.      Tenderness: There is abdominal tenderness (grimaces to palpation).   Musculoskeletal:         General: Swelling (Diffuse anasarca in UE and LE) present.      Right lower leg: Edema present.      Left lower leg: Edema present.   Skin:     General: Skin is warm.      Coloration: Skin is not jaundiced.      Findings: Wound (foul smelling, purulent sacral wound) present.   Neurological:      Mental Status: She is lethargic and disoriented.       Vents:     Lines/Drains/Airways       Peripheral Intravenous Line  Duration                  Peripheral IV - Single Lumen 03/12/23 1717 20 G Anterior;Distal;Right Upper Arm <1 day          Peripheral IV - Single Lumen 03/12/23 2250 20 G Anterior;Proximal;Right Forearm <1 day                  Significant Labs:    CBC/Anemia Profile:  Recent Labs   Lab 03/12/23  1622 03/12/23  1709 03/13/23  0414   WBC 21.24*  --  22.08*   HGB 11.5*  --  9.8*   HCT 34.8* 36 31.0*     --  176   MCV 93  --  94   RDW 16.8*  --  16.8*        Chemistries:  Recent Labs   Lab 03/12/23  1720 03/13/23  0414    143   K 4.1 3.8    102   CO2 26 30*   BUN 46* 48*   CREATININE 1.6* 1.5*   CALCIUM 7.9* 7.4*   ALBUMIN 1.4* 1.3*   PROT 7.3 6.5   BILITOT 1.2* 1.0   ALKPHOS 125 112   ALT 1,318* 965*   AST 2,396* 1,118*   MG 1.9 1.7   PHOS  --  4.5       All pertinent labs within the past 24 hours have been reviewed.    Significant Imaging:  I have reviewed all pertinent imaging results/findings within the past 24 hours.

## 2023-03-13 NOTE — ASSESSMENT & PLAN NOTE
This patient does have evidence of infective focus  My overall impression is septic shock due to lactate > 4.  Source: Skin and Soft Tissue (location large sacral wound)  Antibiotics given-   Antibiotics (72h ago, onward)    Start     Stop Route Frequency Ordered    03/13/23 0230  piperacillin-tazobactam (ZOSYN) 4.5 g in dextrose 5 % in water (D5W) 5 % 100 mL IVPB (MB+)         -- IV Every 8 hours (non-standard times) 03/12/23 1856 03/12/23 2015  vancomycin (VANCOCIN) 1,000 mg in dextrose 5 % (D5W) 250 mL IVPB (Vial-Mate)         -- IV Once 03/12/23 1913    03/12/23 1955  vancomycin - pharmacy to dose  (vancomycin IVPB)        [facility].    -- IV pharmacy to manage frequency 03/12/23 1856        Latest lactate reviewed-  Recent Labs   Lab 03/12/23  1928 03/12/23  2232 03/13/23  0414   LACTATE 3.4*  3.4* 3.0* 2.6*     Organ dysfunction indicated by Encephalopathy    Fluid challenge Ideal Body Weight- The patient's ideal body weight is Ideal body weight: 54.7 kg (120 lb 9.5 oz) which will be used to calculate fluid bolus of 30 ml/kg for treatment of septic shock.      Post- resuscitation assessment Yes Perfusion exam was performed within 6 hours of septic shock presentation after bolus shows Inadequate tissue perfusion assessed by non-invasive monitoring       Will Start Pressors- Levophed for MAP of 65  Source control achieved by: vanc/zosyn    -Fu blood/urine cultures   -Consult wound care for sacral wound that is presumably the source of infection  -Fu CT abdomen pelvis in setting of abdominal pain appreciated on exam

## 2023-03-13 NOTE — PROGRESS NOTES
03/13/23 1000   WOCN Assessment   WOCN Total Time (mins) 45   Visit Date 03/13/23   Visit Time 1000   Consult Type New   WOCN Speciality Wound   Intervention assessed;changed;applied;chart review;coordination of care;orders        Altered Skin Integrity 09/24/21 1130 Sacral spine #4 Partial thickness tissue loss. Shallow open ulcer with a red or pink wound bed, without slough. Intact or Open/Ruptured Serum-filled blister.   Date First Assessed/Time First Assessed: 09/24/21 1130   Location: Sacral spine  Wound Number: #4  Description of Altered Skin Integrity: Partial thickness tissue loss. Shallow open ulcer with a red or pink wound bed, without slough. Intact or Open/Ru...   Wound Image    Description of Altered Skin Integrity Full thickness tissue loss with exposed bone, tendon, or muscle. Often includes undermining and tunneling. May extend into muscle and/or supporting structures.   Dressing Appearance Moist drainage   Drainage Amount Large   Drainage Characteristics/Odor Green;Purulent;Malodorous   Appearance Red   Tissue loss description Full thickness   Black (%), Wound Tissue Color 100 %   Periwound Area Blistered;Edematous;Macerated   Wound Length (cm) 6.5 cm   Wound Width (cm) 20.5 cm   Wound Depth (cm) 3 cm   Wound Volume (cm^3) 399.75 cm^3   Wound Surface Area (cm^2) 133.25 cm^2   Undermining (depth (cm)/location) 5.0@ 3 O'clock and 2.5 @ 9 O'cloc        Altered Skin Integrity 03/12/23 2245 Left medial Heel #2 Ulceration Purple or maroon localized area of discolored intact skin or non-intact skin or a blood-filled blister.   Date First Assessed/Time First Assessed: 03/12/23 2245   Altered Skin Integrity Present on Admission - Did Patient arrive to the hospital with altered skin?: yes  Side: Left  Orientation: medial  Location: Heel  Wound Number: #2  Is this injury device...   Wound Image    Description of Altered Skin Integrity Intact skin with non-blanchable redness of localized area   Dressing  Appearance Open to air   Drainage Amount None   Red (%), Wound Tissue Color 100 %   Wound Edges Defined   Wound Length (cm) 3 cm   Wound Width (cm) 5 cm   Wound Depth (cm) 0.1 cm   Wound Volume (cm^3) 1.5 cm^3   Wound Surface Area (cm^2) 15 cm^2        Altered Skin Integrity 03/12/23 2245 Right medial Heel #3 Ulceration Purple or maroon localized area of discolored intact skin or non-intact skin or a blood-filled blister.   Date First Assessed/Time First Assessed: 03/12/23 2245   Side: Right  Orientation: medial  Location: Heel  Wound Number: #3  Is this injury device related?: No  Primary Wound Type: Ulceration  Description of Altered Skin Integrity: Purple or maroon lo...   Wound Image    Description of Altered Skin Integrity Intact skin with non-blanchable redness of localized area   Dressing Appearance Open to air   Drainage Amount None   Red (%), Wound Tissue Color 100 %   Periwound Area Dry   Wound Edges Defined   Wound Length (cm) 4 cm   Wound Width (cm) 5 cm   Wound Depth (cm) 0.1 cm   Wound Volume (cm^3) 2 cm^3   Wound Surface Area (cm^2) 20 cm^2     [facility] - Card[facility]  Wound Care    Patient Name:  Nelia Brizuela   MRN:  [id number]  Date: 3/13/2023  Diagnosis: Septic shock    History:     Past Medical History:   Diagnosis Date    Arthritis of knee     Cataract     COPD (chronic obstructive pulmonary disease)     CTS (carpal tunnel syndrome)     Diverticulosis of colon     Glaucoma (increased eye pressure)     HTN (hypertension)     Joint pain     Metabolic alkalosis     Psoriasis 2007    Smoker     Strabismus     XT OS    Uterine myoma     Venous stasis of both lower extremities        Social History     Socioeconomic History    Marital status:      Spouse name: Murray    Number of children: 5   Occupational History    Occupation: Retired     Employer: RETIRED   Tobacco Use    Smoking status: Every Day     Packs/day: 1.00     Years: 57.00     Pack years: 57.00     Types: Cigarettes    Smokeless  tobacco: Never    Tobacco comments:     Pt not ready to quit.   Substance and Sexual Activity    Alcohol use: Yes     Comment: seldom alcohol use.    Drug use: No    Sexual activity: Not Currently   Other Topics Concern    Are you pregnant or think you may be? No    Breast-feeding No     Social Determinants of Health     Financial Resource Strain: Low Risk     Difficulty of Paying Living Expenses: Not hard at all   Food Insecurity: No Food Insecurity    Worried About Running Out of Food in the Last Year: Never true    Ran Out of Food in the Last Year: Never true   Transportation Needs: No Transportation Needs    Lack of Transportation (Medical): No    Lack of Transportation (Non-Medical): No   Physical Activity: Inactive    Days of Exercise per Week: 0 days    Minutes of Exercise per Session: 0 min   Stress: No Stress Concern Present    Feeling of Stress : Not at all   Social Connections: Moderately Isolated    Frequency of Communication with Friends and Family: More than three times a week    Frequency of Social Gatherings with Friends and Family: More than three times a week    Attends [employer] Services: Never    Active Member of Clubs or Organizations: No    Attends Club or Organization Meetings: Never    Marital Status:    Housing Stability: Unknown    Unable to Pay for Housing in the Last Year: No    Unstable Housing in the Last Year: No       Precautions:     Allergies as of 03/12/2023 - Reviewed 03/12/2023   Allergen Reaction Noted    Codeine  06/30/2012       WO Assessment Details/Treatment   Patient consult  for wound care to sacral area. This wound has 100% of dark foul smelling slough, with undermining and bone exposed. Both heel has a purple localized area. A immerse is ordered. And heel protector boot. The treatment to the sacral area to cleanse with dakin's solution then apply dakin's moist gauze follow by abd pad secure with a dry border or tape. I wiil aske for debridement to the sacral. The  bi lateral heels treatment is betadine cover with dry gauze and abd pads apply antifungal ointment to feet wrap with cast padding secure with ace bandage.       Recommendations made to primary team for  the above  Orders placed.     03/13/2023

## 2023-03-13 NOTE — ASSESSMENT & PLAN NOTE
Patient is identified as having Combined Systolic and Diastolic heart failure that is Chronic. CHF is currently controlled. Latest ECHO performed and demonstrates- Results for orders placed during the hospital encounter of 03/12/23    Echo    Interpretation Summary  · The left ventricle is normal in size with severely decreased systolic function.  · The estimated ejection fraction is 20%.  · There is left ventricular global hypokinesis.  · Moderate right ventricular enlargement with moderately to severely reduced right ventricular systolic function.  · Biatrial enlargement.  · Mild mitral regurgitation.  · Moderate tricuspid regurgitation.  · The estimated PA systolic pressure is 51 mmHg.  · There is pulmonary hypertension.  · Elevated central venous pressure (15 mmHg).  · There are bilateral pleural effusions.  · Atrial fibrillation observed.  . Continue Furosemide and monitor clinical status closely. Monitor on telemetry. Patient is on CHF pathway.  Monitor strict Is&Os and daily weights.  Place on fluid restriction of 1 L. Continue to stress to patient importance of self efficacy and  on diet for CHF. Last BNP reviewed- and noted below   Recent Labs   Lab 03/12/23 1928   BNP 3,577*   .

## 2023-03-13 NOTE — H&P
[facility] Emergency Dept  Critical Care Medicine  History & Physical    Patient Name: Nelia Brizuela  MRN: [id number]   Admission Date: 3/12/2023  Hospital Length of Stay: 0 days  Code Status: DNR  Attending Physician: Pedro Bean MD   Primary Care Provider: Lenin Sandhu MD   Principal Problem: Septic shock    Subjective:     HPI:  Patient information was obtained from patient, past medical records, and ER records. Patient history limited due to dementia.    Ms. Nelia Brizuela is a 87 y.o F w/ hx of dementia, COPD, HTN, severe malnutrition who presented to the ED from [facility] hospice after family disenrolled her from hospice as over the past three days, she has had worsening mentation, poor oral intake, and has been more diaphoretic. In the ED, patient was noted to have foul-smelling purulent sacral wound. Per son, the sacral wound was initially the size of a quarter and has noted be rapidly expanding over the past 3 weeks. At her baseline, patient is able to have a limited conversation due to her dementia and has been mostly bedbound.     ED course notable for WBC 21.24, Hgb 11.5, Troponin 4.417,  POC lactate 4.04, POC creatinine 4.04. CXR noted increased small right and new small left pleural effusions with probable bibasilar atelectasis/infiltrate.  Vitals notable for hypotension, tachycardia with EKG noting Afib w/ RVR. She was started on fluid resuscitation and broad spectrum antibiotics.      Hospital/ICU Course:  No notes on file     Past Medical History:   Diagnosis Date    Arthritis of knee     Cataract     COPD (chronic obstructive pulmonary disease)     CTS (carpal tunnel syndrome)     Diverticulosis of colon     Glaucoma (increased eye pressure)     HTN (hypertension)     Joint pain     Metabolic alkalosis     Psoriasis 2007    Smoker     Strabismus     XT OS    Uterine myoma     Venous stasis of both lower extremities        Past Surgical History:   Procedure Laterality Date     AHMED IMPLANT AND SURGICAL PI Left 1/27/16         CATARACT EXTRACTION W/  INTRAOCULAR LENS IMPLANT Left 8/13/2014        CATARACT EXTRACTION W/ INTRAOCULAR LENS IMPLANTW/ TRABECULECTOMY Left 8/13/14    os ()    TUBAL LIGATION         Review of patient's allergies indicates:   Allergen Reactions    Codeine       Unknown       Family History       Problem Relation (Age of Onset)    Glaucoma Father          Tobacco Use    Smoking status: Every Day     Packs/day: 1.00     Years: 57.00     Pack years: 57.00     Types: Cigarettes    Smokeless tobacco: Never    Tobacco comments:     Pt not ready to quit.   Substance and Sexual Activity    Alcohol use: Yes     Comment: seldom alcohol use.    Drug use: No    Sexual activity: Not Currently      Review of Systems   Unable to perform ROS: Dementia   Objective:     Vital Signs (Most Recent):  Temp: 97.7 °F (36.5 °C) (03/12/23 1527)  Pulse: (!) 144 (03/12/23 1818)  Resp: (!) 27 (03/12/23 1818)  BP: 108/70 (03/12/23 1818)  SpO2: (!) 94 % (03/12/23 1818)   Vital Signs (24h Range):  Temp:  [97.7 °F (36.5 °C)] 97.7 °F (36.5 °C)  Pulse:  [114-171] 144  Resp:  [27-41] 27  SpO2:  [94 %-100 %] 94 %  BP: ()/(46-70) 108/70   Weight: 57.2 kg (126 lb)  Body mass index is 21.63 kg/m².    No intake or output data in the 24 hours ending 03/12/23 1836    Physical Exam  Vitals and nursing note reviewed.   Constitutional:       Appearance: She is underweight. She is ill-appearing and diaphoretic.      Interventions: Nasal cannula in place.   HENT:      Head: Normocephalic and atraumatic.      Mouth/Throat:      Mouth: Mucous membranes are dry.   Eyes:      General: No scleral icterus.  Cardiovascular:      Rate and Rhythm: Tachycardia present. Rhythm irregular.   Pulmonary:      Effort: Tachypnea present.   Abdominal:      General: Bowel sounds are normal. There is no distension.      Palpations: Abdomen is soft.      Tenderness: There is  abdominal tenderness (grimaces to palpation).   Musculoskeletal:         General: Swelling (Diffuse anasarca in UE and LE) present.      Right lower leg: Edema present.      Left lower leg: Edema present.   Skin:     General: Skin is warm.      Coloration: Skin is not jaundiced.      Findings: Wound (foul smelling, purulent sacral wound) present.   Neurological:      Mental Status: She is lethargic and disoriented.           Lines/Drains/Airways       Peripheral Intravenous Line  Duration                  Peripheral IV - Single Lumen 03/12/23 1717 20 G Anterior;Distal;Right Upper Arm <1 day                  Significant Labs:    CBC/Anemia Profile:  Recent Labs   Lab 03/12/23  1622 03/12/23  1709   WBC 21.24*  --    HGB 11.5*  --    HCT 34.8* 36     --    MCV 93  --    RDW 16.8*  --         Chemistries:  Recent Labs   Lab 03/12/23  1720      K 4.1      CO2 26   BUN 46*   CREATININE 1.6*   CALCIUM 7.9*   ALBUMIN 1.4*   PROT 7.3   BILITOT 1.2*   ALKPHOS 125   ALT 1,318*   AST 2,396*   MG 1.9       All pertinent labs within the past 24 hours have been reviewed.    Significant Imaging: I have reviewed all pertinent imaging results/findings within the past 24 hours.  Imaging Results              X-Ray Chest AP Portable (Final result)  Result time 03/12/23 16:11:15      Final result by Mehrdad Campbell MD (03/12/23 16:11:15)                   Impression:      Increased small right and new small left pleural effusions with probable bibasilar atelectasis/infiltrate on a background chronic interstitial lung changes.  Interstitial type pneumonia not excluded.    Grossly stable additional chronic findings as above.      Electronically signed by: Mehrdad Campbell MD  Date:    03/12/2023  Time:    16:11               Narrative:    EXAMINATION:  XR CHEST AP PORTABLE    CLINICAL HISTORY:  Sepsis;    TECHNIQUE:  Single frontal view of the chest was performed.    COMPARISON:  Chest radiograph 04/22/2022, CT thorax  05/06/2022    FINDINGS:  Monitoring leads and tubing overlie the chest.  Patient is rotated.    Cardiomediastinal silhouette is midline and grossly stable without convincing evidence of failure.  There are small bilateral pleural effusions increased on the right and new on the left with probable bibasilar atelectasis/infiltrate.  Grossly similar scattered linear opacities with additional coarsening at the right midlung zone and bilateral lung bases likely correlating with known chronic interstitial lung changes and bronchiectasis better demonstrated on CT thorax of 05/06/2022.  No consolidation at the upper lung zones.  No pneumothorax.  Hilar contours are within normal limits.  No acute osseous process seen.  PA and lateral views can be obtained.                                        Assessment/Plan:     Pulmonary  COPD (chronic obstructive pulmonary disease)  Pt with history of COPD, per chart review not on any home maintenance medications. Of note patient was recently enrolled in hospice, enrollment was terminated by family today, 3/12, prior to arrival to the hospital. Pt maintaining appropriate O2 sats on RA     -no acute interventions at this time    Cardiac/Vascular  Elevated troponin  Recent Labs     03/12/23  1622   TROPONINI 4.417*     -Troponin elevated on admission. Suspect Type 2 NSTEMI secondary to demand ischemia in the setting of sepsis  -Initial EKG without ST elevations    PLAN  -Trend troponin to peak   -STAT EKG prn chest pain    Atrial fibrillation with RVR  PMHx of afib, per chart review pt is not rate controlled with any home medications, is on daily aspirin. Pt tachycardic to 160s on chart review. Pt receiving 1L bolus at time of exam     PLAN   - start amiodarone gtt, plan to wean as titrated       HTN (hypertension)  PMHx of essential HTN, treated with home HCTZ    PLAN   - pt hypotensive on initial presentation, will hold antihypertensives at this time   - plan to restart when clinically  appropriate     Renal/  RICARDO (acute kidney injury)  Creatinine 1.6  on admit, baseline around 0.7  Possible Etiologies:  - Likely intra-renal from hypotension secondary to sepsis vs. pre-renal from dehydration    Recent Labs     03/12/23  1720   CREATININE 1.6*   BUN 46*     Estimated Creatinine Clearance: 21.8 mL/min (A) (based on SCr of 1.6 mg/dL (H)).    PLAN  - Trend on CMP, will consider renal U/S if no improvement with fluid resuscitation  - Strict I&Os and daily weights   - Avoid nephrotoxic agents such as NSAIDs, gadolinium and IV radiocontrast.  - Renally dose meds to current GFR.  - Maintain MAP > 65.    ID  Severe sepsis  This patient does have evidence of infective focus  My overall impression is septic shock due to lactate > 4.  Source: Skin and Soft Tissue (location large sacral wound)  Antibiotics given-   Antibiotics (72h ago, onward)    Start     Stop Route Frequency Ordered    03/12/23 1615  vancomycin (VANCOCIN) 1,000 mg in dextrose 5 % (D5W) 250 mL IVPB (Vial-Mate)         03/13 0414 IV ED 1 Time 03/12/23 1608    03/12/23 1615  piperacillin-tazobactam (ZOSYN) 4.5 g in dextrose 5 % in water (D5W) 5 % 100 mL IVPB (MB+)         03/13 0414 IV ED 1 Time 03/12/23 1608        Latest lactate reviewed-  No results for input(s): LACTATE in the last 72 hours.  Organ dysfunction indicated by Encephalopathy    Fluid challenge Ideal Body Weight- The patient's ideal body weight is Ideal body weight: 54.7 kg (120 lb 9.5 oz) which will be used to calculate fluid bolus of 30 ml/kg for treatment of septic shock.      Post- resuscitation assessment Yes Perfusion exam was performed within 6 hours of septic shock presentation after bolus shows Inadequate tissue perfusion assessed by non-invasive monitoring       Will Start Pressors- Levophed for MAP of 65  Source control achieved by: vanc/zosyn    -Fu blood/urine cultures   -Consult wound care for sacral wound that is presumably the source of infection  -Fu CT  abdomen pelvis in setting of abdominal pain appreciated on exam    Endocrine  Severe malnutrition  Pt with severe malnutrition, central wasting apparent on exam. Per family at bedside, at baseline pt will take small meals by mouth on a daily basis    - Most recent weight and BMI monitored-   Measurements:  Wt Readings from Last 1 Encounters:   03/12/23 57.2 kg (126 lb)   Body mass index is 21.63 kg/m².    PLAN  - Consider nutrition consult  - Will need swallow eval prior to starting diet, NPO at this time    Orthopedic  Pressure injury of sacral region, stage 3  Pt w/ severe sacral ulcer with foul smelling purulent discharge appreciated on exam. Per son at bedside the ulcer began as the size of a quarter approximately 1 year ago, and that ulcer has rapidly expanded over the course of the past few weeks. Wound likely source of sepsis     -see severe sepsis for plan     Ulcer of right foot with fat layer exposed  Pt with pressure ulcer of right foot, per family has been slowly healing over the past year. Possibly contributing source to sepsis picture     -see severe sepsis for plan      Critical Care Daily Checklist:    A: Awake: RASS Goal/Actual Goal:    Actual:     B: Spontaneous Breathing Trial Performed?   N/A   C: SAT & SBT Coordinated?  N/A                      D: Delirium: CAM-ICU     E: Early Mobility Performed? Not yet   F: Feeding Goal:    Status:     Current Diet Order   No orders of the defined types were placed in this encounter.      AS: Analgesia/Sedation N/A   T: Thromboembolic Prophylaxis Heparin   H: HOB > 300 Yes   U: Stress Ulcer Prophylaxis (if needed) N/A   G: Glucose Control Stable   B: Bowel Function     I: Indwelling Catheter (Lines & Torres) Necessity PIV   D: De-escalation of Antimicrobials/Pharmacotherapies Not yet    Plan for the day/ETD Stabilize, Goals of care discussion    Code Status:  Family/Goals of Care: DNR         Critical secondary to Patient has a condition that poses threat to  life and bodily function: Shock requiring pressors     Critical care was time spent personally by me on the following activities: development of treatment plan with patient or surrogate and bedside caregivers, discussions with consultants, evaluation of patient's response to treatment, examination of patient, ordering and performing treatments and interventions, ordering and review of laboratory studies, ordering and review of radiographic studies, pulse oximetry, re-evaluation of patient's condition. This critical care time did not overlap with that of any other provider or involve time for any procedures.     Jose Mc,   Critical Care Medicine  [facility] - Emergency Dept

## 2023-03-14 PROBLEM — Z71.89 GOALS OF CARE, COUNSELING/DISCUSSION: Status: ACTIVE | Noted: 2023-01-01

## 2023-03-14 PROBLEM — Z71.89 ADVANCE CARE PLANNING: Status: ACTIVE | Noted: 2023-01-01

## 2023-03-14 PROBLEM — R52 PAIN: Status: ACTIVE | Noted: 2023-01-01

## 2023-03-14 PROBLEM — Z51.5 PALLIATIVE CARE ENCOUNTER: Status: ACTIVE | Noted: 2023-01-01

## 2023-03-14 PROBLEM — R53.81 DEBILITY: Status: ACTIVE | Noted: 2023-01-01
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
## 2023-03-14 NOTE — PLAN OF CARE
Had an extensive goals of care discussion. The family wishes for aggressive treatment to continue. Their goal is hospice at a facility. I discussed the likelihood of the patient's demise and her poor prognosis. They acknowledged their understanding and appreciated the further discussions. I will continue to follow    Kevin Gaming MD

## 2023-03-14 NOTE — PROGRESS NOTES
Pharmacokinetic Assessment Follow Up: IV Vancomycin    Vancomycin serum concentration assessment(s):    The trough level was drawn correctly and can be used to guide therapy at this time. The measurement is below the desired definitive target range of 15 to 20 mcg/mL.    Vancomycin Regimen Plan:    Will give vancomycin 1000 mg once now.   Re-dose when the random level is less than 20 mcg/mL, next level to be drawn at 1000 on 3/13  (12 hrs post dose considering 24 hr trough was subtherapeutic)    Drug levels (last 3 results):  Recent Labs   Lab Result Units 03/13/23  1938   Vancomycin-Trough ug/mL 5.1*       Pharmacy will continue to follow and monitor vancomycin.    Please contact pharmacy at extension [phone] for questions regarding this assessment.    Thank you for the consult,   Mercedes Ramey       Patient brief summary:  Nelia Brizuela is a 87 y.o. female initiated on antimicrobial therapy with IV Vancomycin for treatment of sepsis    The patient's current regimen is pulse dosing    Drug Allergies:   Review of patient's allergies indicates:   Allergen Reactions    Codeine       Unknown       Actual Body Weight:   57.2 kg    Renal Function:   Estimated Creatinine Clearance: 23.2 mL/min (A) (based on SCr of 1.5 mg/dL (H)).,     Dialysis Method (if applicable):  N/A    CBC (last 72 hours):  Recent Labs   Lab Result Units 03/12/23  1622 03/13/23  0414   WBC K/uL 21.24* 22.08*   Hemoglobin g/dL 11.5* 9.8*   Hematocrit % 34.8* 31.0*   Platelets K/uL 162 176   Gran % % 91.0* 92.5*   Lymph % % 4.9* 3.5*   Mono % % 2.6* 2.6*   Eosinophil % % 0.4 0.0   Basophil % % 0.3 0.6   Differential Method  Automated Automated       Metabolic Panel (last 72 hours):  Recent Labs   Lab Result Units 03/12/23  1720 03/13/23  0414   Sodium mmol/L 143 143   Potassium mmol/L 4.1 3.8   Chloride mmol/L 103 102   CO2 mmol/L 26 30*   Glucose mg/dL 90 129*   BUN mg/dL 46* 48*   Creatinine mg/dL 1.6* 1.5*   Albumin g/dL 1.4* 1.3*   Total  Bilirubin mg/dL 1.2* 1.0   Alkaline Phosphatase U/L 125 112   AST U/L 2,396* 1,118*   ALT U/L 1,318* 965*   Magnesium mg/dL 1.9 1.7   Phosphorus mg/dL  --  4.5       Vancomycin Administrations:  vancomycin given in the last 96 hours                     vancomycin (VANCOCIN) 1,000 mg in dextrose 5 % (D5W) 250 mL IVPB (Vial-Mate) ()  Restarted 03/12/23 1929     1,000 mg New Bag  1922                    Microbiologic Results:  Microbiology Results (last 7 days)       Procedure Component Value Units Date/Time    MRSA/SA Rapid ID by PCR from Blood culture [[id number]] Collected: 03/12/23 1720    Order Status: Completed Updated: 03/13/23 1534     Staph aureus ID by PCR Negative     MRSA ID by PCR Negative    Narrative:      Aerobic and anaerobic    Blood culture x two cultures. Draw prior to antibiotics. [[id number]] Collected: 03/12/23 1720    Order Status: Completed Specimen: Blood from Peripheral, Antecubital, Right Updated: 03/13/23 1433     Blood Culture, Routine Gram stain aer bottle: Gram positive cocci in clusters resembling Staph      Gram stain reynaldo bottle: Gram positive cocci in clusters resembling Staph      Gram stain reynaldo bottle: Gram negative rods      Results called to and read back by: Taylor Case RN  03/13/2023  14:32    Narrative:      Aerobic and anaerobic    Blood culture x two cultures. Draw prior to antibiotics. [[id number]] Collected: 03/12/23 1720    Order Status: Completed Specimen: Blood from Peripheral, Antecubital, Right Updated: 03/13/23 1432     Blood Culture, Routine Gram stain reynaldo bottle: Gram negative rods      Results called to and read back by: Taylor Case RN  03/13/2023  09:28      Gram stain aer bottle: Gram positive cocci in clusters resembling Staph      Results called to and read back by: Taylor Case RN  03/13/2023  14:32    Narrative:      Aerobic and anaerobic    Aerobic culture [[id number]]     Order Status: No result Specimen: Decubitus

## 2023-03-14 NOTE — ASSESSMENT & PLAN NOTE
Impression: Pt is an 86 y/o female who was on hospice for COPD. Pt has large decubitus ulcer with sepsis. Pt is alert but confused. Pt is bed-bound. Pt has malnutrition. Pt is a DNR.     Reason for consult: GOC/ACP Communicated with Dr. Ventura with [facility].     Goals of care/ACP:   1000: No family at bedside. Spoke to Brooklynn MITCHELL, Radha Ford. Per Radha pt was on [facility] for COPD. Pt was on home hospice. Pt's  main -cargiver. Son and daughter assist. They had 5 kids but 3 have .     Per ICU MD, family deciding on transitioning to full comfort care and possible inpt placement.     Called pt's spouses number. Pt's son answered. Per son, family has been discussing possible inpt hospice. No decision has been made yet. Will f/u with pt's spouse and son.     1334:   Spoke to pt's spouse at bedside. Per spouse, family considering inpt hospice if she meets criteria to go there. Will f/u tomorrow with family.     Symptom management:     Debility r/t to chronic illnesses.   Pt is bed-bound.   Pt has large sacral decubitus ulcer.   Frequent turning/position change per nursing.     Large Sacral decubitus.   Wound care following.   Pt on antibiotics.     Severe malnutrition  Pt with severe malnutrition, central wasting apparent on exam. Per family at bedside, at baseline pt will take small meals by mouth on a daily basis     - Most recent weight and BMI monitored-   Measurements:      Wt Readings from Last 1 Encounters:   23 57.2 kg (126 lb)   Body mass index is 21.63 kg/m².     - Consider swallow eval prior to starting diet, NPO at this time    Pain r/t sacral ulcer.   Pt has dilaudid 0.2 mg IVP q 4 hrs prn pain.   Continue.     Plan:   Back with hospice home or inpt.   Will follow.

## 2023-03-14 NOTE — ASSESSMENT & PLAN NOTE
Recent Labs     03/12/23  2232 03/13/23  0752 03/13/23  1357   TROPONINI 2.261* 3.104* 2.889*     -Troponin elevated on admission. Suspect Type 2 NSTEMI secondary to demand ischemia in the setting of sepsis  -Initial EKG without ST elevations    PLAN  -Trend troponin to peak   -STAT EKG prn chest pain

## 2023-03-14 NOTE — PLAN OF CARE
CMICU DAILY GOALS       A: Awake    RASS: Goal -    Actual -     Restraint necessity:    B: Breathe   SBT: NA   C: Coordinate A & B, analgesics/sedatives   Pain: managed    SAT: NA  D: Delirium   CAM-ICU:    E: Early(intubated/ Progressive (non-intubated) Mobility   MOVE Screen: Fail   Activity: Activity Management: Patient unable to perform activities  FAS: Feeding/Nutrition   Diet order: Diet/Nutrition Received: NPO,    T: Thrombus   DVT prophylaxis: VTE Required Core Measure: Pharmacological prophylaxis initiated/maintained  H: HOB Elevation   Head of Bed (HOB) Positioning: HOB at 45 degrees  U: Ulcer Prophylaxis   GI: no  G: Glucose control   managed Glycemic Management: blood glucose monitored  S: Skin   Bathing/Skin Care: bath, complete, linen changed, incontinence care  Device Skin Pressure Protection: pressure points protected  Pressure Reduction Devices: specialty bed utilized  Pressure Reduction Techniques: weight shift assistance provided  Skin Protection: adhesive use limited, incontinence pads utilized  B: Bowel Function   Fecal incontinence    I: Indwelling Catheters   Torres necessity:     CVC necessity: No  D: De-escalation Antibiotics   No    Family/Goals of care/Code Status   Code Status: DNR    24H Vital Sign Range  Temp:  [96.3 °F (35.7 °C)-97.7 °F (36.5 °C)]   Pulse:  []   Resp:  [17-59]   BP: ()/(43-68)   SpO2:  [70 %-100 %]      Shift Events   HR increased at the start of Levophed, primary team made aware. Maintaining temp with warming device. Levophed restarted 2/2 hypotension. Wound care completed with the help of wound care nurse. Family at bedside..    VS and assessment per flow sheet, patient progressing towards goals as tolerated, plan of care reviewed with family, all concerns addressed, will continue to monitor.    Mitchell Case
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
## 2023-03-14 NOTE — ASSESSMENT & PLAN NOTE
Creatinine 1.6  on admit, baseline around 0.7  Possible Etiologies:  - Likely intra-renal from hypotension secondary to sepsis vs. pre-renal from dehydration    Recent Labs     03/12/23  1720 03/13/23  0414 03/14/23  0324   CREATININE 1.6* 1.5* 1.6*   BUN 46* 48* 54*     Estimated Creatinine Clearance: 21.8 mL/min (A) (based on SCr of 1.6 mg/dL (H)).    PLAN  - Trend on CMP, will consider renal U/S if no improvement with fluid resuscitation  - Strict I&Os and daily weights   - Avoid nephrotoxic agents such as NSAIDs, gadolinium and IV radiocontrast.  - Renally dose meds to current GFR.  - Maintain MAP > 65.

## 2023-03-14 NOTE — ASSESSMENT & PLAN NOTE
Pt with history of COPD, per chart review not on any home maintenance medications. Of note patient was recently enrolled in hospice, enrollment was terminated by family today, 3/12, prior to arrival to the hospital. Pt maintaining appropriate O2 sats on RA     -continue duo nebs

## 2023-03-14 NOTE — HPI
Ms. Nelia Brizuela is a 87 y.o F w/ hx of dementia, COPD, HTN, severe malnutrition who presented to the ED from [facility] after family disenrolled her from hospice as over the past three days, she has had worsening mentation, poor oral intake, and has been more diaphoretic. In the ED, patient was noted to have foul-smelling purulent sacral wound. Per son, the sacral wound was initially the size of a quarter and has noted be rapidly expanding over the past 3 weeks. At her baseline, patient is able to have a limited conversation due to her dementia and has been mostly bedbound.      ED course notable for WBC 21.24, Hgb 11.5, Troponin 4.417,  POC lactate 4.04, POC creatinine 4.04. CXR noted increased small right and new small left pleural effusions with probable bibasilar atelectasis/infiltrate.  Vitals notable for hypotension, tachycardia with EKG noting Afib w/ RVR. She was started on fluid resuscitation and broad spectrum antibiotics.    Pt was living at home with home hospice through [facility]. Pt lives at home with spouse.     Pt now DNR.

## 2023-03-14 NOTE — PLAN OF CARE
CMICU DAILY GOALS       A: Awake    RASS: Goal -    Actual -     Restraint necessity:    B: Breathe   SBT: Not intubated   C: Coordinate A & B, analgesics/sedatives   Pain: managed    SAT: Not intubated  D: Delirium   CAM-ICU:    E: Early(intubated/ Progressive (non-intubated) Mobility   MOVE Screen: Fail   Activity: Activity Management: Rolling - L1  FAS: Feeding/Nutrition   Diet order: Diet/Nutrition Received: NPO,    T: Thrombus   DVT prophylaxis: VTE Required Core Measure: Pharmacological prophylaxis initiated/maintained  H: HOB Elevation   Head of Bed (HOB) Positioning: HOB at 30-45 degrees  U: Ulcer Prophylaxis   GI: yes  G: Glucose control   Not diabetic.     S: Skin   Bathing/Skin Care: bath, complete, linen changed, incontinence care  Device Skin Pressure Protection: absorbent pad utilized/changed, positioning supports utilized  Pressure Reduction Devices: pressure-redistributing mattress utilized  Pressure Reduction Techniques: weight shift assistance provided, frequent weight shift encouraged, heels elevated off bed, positioned off wounds, pressure points protected  Skin Protection: adhesive use limited, incontinence pads utilized  B: Bowel Function   no issues   I: Indwelling Catheters   Torres necessity:     CVC necessity: No  D: De-escalation Antibiotics   No    Family/Goals of care/Code Status   Code Status: DNR    24H Vital Sign Range  Temp:  [96.3 °F (35.7 °C)-97.9 °F (36.6 °C)]   Pulse:  []   Resp:  [11-59]   BP: ()/(50-78)   SpO2:  [70 %-100 %]      Shift Events   No acute events throughout shift. Off levo overnight. Wound cleansed and dressing changed per order.    VS and assessment per flow sheet, patient progressing towards goals as tolerated, plan of care reviewed with  pt , all concerns addressed, will continue to monitor.

## 2023-03-14 NOTE — SUBJECTIVE & OBJECTIVE
Interval History: Pt now DNR.     Past Medical History:   Diagnosis Date    Arthritis of knee     Cataract     COPD (chronic obstructive pulmonary disease)     CTS (carpal tunnel syndrome)     Diverticulosis of colon     Glaucoma (increased eye pressure)     HTN (hypertension)     Joint pain     Metabolic alkalosis     Psoriasis 2007    Smoker     Strabismus     XT OS    Uterine myoma     Venous stasis of both lower extremities        Past Surgical History:   Procedure Laterality Date    AHMED IMPLANT AND SURGICAL PI Left 1/27/16         CATARACT EXTRACTION W/  INTRAOCULAR LENS IMPLANT Left 8/13/2014        CATARACT EXTRACTION W/ INTRAOCULAR LENS IMPLANTW/ TRABECULECTOMY Left 8/13/14    os ()    TUBAL LIGATION         Review of patient's allergies indicates:   Allergen Reactions    Codeine       Unknown       Medications:  Continuous Infusions:   amiodarone in dextrose 5% 0.5 mg/min (03/14/23 0900)    NORepinephrine bitartrate-D5W 0.02 mcg/kg/min (03/14/23 1023)     Scheduled Meds:   brimonidine 0.15 % OPTH DROP  1 drop Both Eyes TID    enoxaparin  1 mg/kg Subcutaneous Q24H    latanoprost  1 drop Both Eyes QHS    piperacillin-tazobactam (ZOSYN) IVPB  4.5 g Intravenous Q8H    polyethylene glycol  17 g Oral Daily    senna  8.6 mg Oral Daily     PRN Meds:calcium gluconate IVPB, calcium gluconate IVPB, calcium gluconate IVPB, HYDROmorphone, magnesium sulfate IVPB, magnesium sulfate IVPB, potassium chloride **AND** potassium chloride **AND** potassium chloride, sodium chloride 0.9%, sodium phosphate IVPB, sodium phosphate IVPB, sodium phosphate IVPB, Pharmacy to dose Vancomycin consult **AND** vancomycin - pharmacy to dose    Family History       Problem Relation (Age of Onset)    Glaucoma Father          Tobacco Use    Smoking status: Every Day     Packs/day: 1.00     Years: 57.00     Pack years: 57.00     Types: Cigarettes    Smokeless tobacco: Never    Tobacco comments:     Pt not  ready to quit.   Substance and Sexual Activity    Alcohol use: Yes     Comment: seldom alcohol use.    Drug use: No    Sexual activity: Not Currently       Review of Systems   Musculoskeletal:  Positive for gait problem.   Skin:  Positive for wound.   Neurological:  Positive for weakness.   Psychiatric/Behavioral:  Positive for confusion and decreased concentration.    Objective:     Vital Signs (Most Recent):  Temp: 97.3 °F (36.3 °C) (23 0710)  Pulse: 80 (23 1023)  Resp: (!) 41 (23 1023)  BP: (S) (!) 66/47 (Provider made aware, Levophed started @ 0.02mcg) (23 1023)  SpO2: 98 % (23 1023)   Vital Signs (24h Range):  Temp:  [96.3 °F (35.7 °C)-97.7 °F (36.5 °C)] 97.3 °F (36.3 °C)  Pulse:  [] 80  Resp:  [11-59] 41  SpO2:  [70 %-100 %] 98 %  BP: ()/(46-78) 66/47     Weight: 57.2 kg (126 lb)  Body mass index is 21.63 kg/m².    Physical Exam  Constitutional:       Appearance: She is ill-appearing.   HENT:      Head: Normocephalic and atraumatic.   Cardiovascular:      Rate and Rhythm: Normal rate and regular rhythm.   Pulmonary:      Effort: Pulmonary effort is normal. No respiratory distress.   Musculoskeletal:      Comments: Bed-bound  Edema present.      Skin:     Comments: Large sacral decubutis   Neurological:      Mental Status: She is alert.       Review of Symptoms      Symptom Assessment (ESAS 0-10 Scale)  Pain:  0  Dyspnea:  0  Anxiety:  0  Nausea:  0  Depression:  0  Anorexia:  0  Fatigue:  0  Insomnia:  0  Restlessness:  0  Agitation:  0  Unable to complete assessment due to Dementia         Pain Assessment in Advanced Demential Scale (PAINAD)   Breathing - Independent of vocalization:  0  Negative vocalization:  0  Facial expression:  0  Body language:  0  Consolability:  0  Total:  0    Living Arrangements:  Lives with family    Psychosocial/Cultural:   See Palliative Psychosocial Note: No  Pt lives with spouse at home in [place]. Pt has 2 children living, 3 .  Pt was a homemaker. Pt's spouse is main caregiver.   **Primary  to Follow**  Palliative Care  Consult: No    Spiritual:  A - Address in Care:  Will ask vijay Faulkner to visit.       Advance Care Planning        Significant Labs: All pertinent labs within the past 24 hours have been reviewed.  CBC:   Recent Labs   Lab 03/13/23  0414   WBC 22.08*   HGB 9.8*   HCT 31.0*   MCV 94        BMP:  Recent Labs   Lab 03/14/23  0324   *      K 3.4*      CO2 28   BUN 54*   CREATININE 1.6*   CALCIUM 7.3*   MG 2.1     LFT:  Lab Results   Component Value Date     (H) 03/14/2023    ALKPHOS 93 03/14/2023    BILITOT 1.0 03/14/2023     Albumin:   Albumin   Date Value Ref Range Status   03/14/2023 1.2 (L) 3.5 - 5.2 g/dL Final     Protein:   Total Protein   Date Value Ref Range Status   03/14/2023 6.2 6.0 - 8.4 g/dL Final     Lactic acid:   Lab Results   Component Value Date    LACTATE 2.2 03/14/2023    LACTATE 2.2 03/14/2023       Significant Imaging: I have reviewed all pertinent imaging results/findings within the past 24 hours.

## 2023-03-14 NOTE — ASSESSMENT & PLAN NOTE
Pt with severe malnutrition, central wasting apparent on exam. Per family at bedside, at baseline pt will take small meals by mouth on a daily basis    - Most recent weight and BMI monitored-   Measurements:  Wt Readings from Last 1 Encounters:   03/12/23 57.2 kg (126 lb)   Body mass index is 21.63 kg/m².    PLAN  - Will need swallow eval prior to starting diet, NPO at this time

## 2023-03-14 NOTE — CONSULTS
[facility] - Cardiac Medical ICU  Palliative Medicine  Consult Note    Patient Name: Nelia Brizuela  MRN: [id number]  Admission Date: 3/12/2023  Hospital Length of Stay: 2 days  Code Status: DNR   Attending Provider: Stephan Powell MD  Consulting Provider: ERWIN Serna  Primary Care Physician: Lenin Sandhu MD  Principal Problem:Septic shock    Patient information was obtained from spouse/SO, relative(s) and primary team.      Inpatient consult to Palliative Care  Consult performed by: ERWIN Perez  Consult ordered by: Kenan Gaming MD        Assessment/Plan:     Palliative Care  Palliative care encounter  Impression: Pt is an 88 y/o female who was on hospice for COPD. Pt has large decubitus ulcer with sepsis. Pt is alert but confused. Pt is bed-bound. Pt has malnutrition. Pt is a DNR.     Reason for consult: GOC/ACP Communicated with Dr. Ventura with [facility].     Goals of care/ACP:   1000: No family at bedside. Spoke to Brooklynn MITCHELL, Radha Ford. Per Radha pt was on [facility] for COPD. Pt was on home hospice. Pt's  main -leesa. Son and daughter assist. They had 5 kids but 3 have .     Per ICU MD, family deciding on transitioning to full comfort care and possible inpt placement.     Called pt's spouses number. Pt's son answered. Per son, family has been discussing possible inpt hospice. No decision has been made yet. Will f/u with pt's spouse and son.     1334:   Spoke to pt's spouse at bedside. Per spouse, family considering inpt hospice if she meets criteria to go there. Will f/u tomorrow with family.     Symptom management:     Debility r/t to chronic illnesses.   Pt is bed-bound.   Pt has large sacral decubitus ulcer.   Frequent turning/position change per nursing.     Large Sacral decubitus.   Wound care following.   Pt on antibiotics.     Severe malnutrition  Pt with severe malnutrition, central wasting apparent on exam. Per family at bedside, at baseline pt will  take small meals by mouth on a daily basis     - Most recent weight and BMI monitored-   Measurements:      Wt Readings from Last 1 Encounters:   03/12/23 57.2 kg (126 lb)   Body mass index is 21.63 kg/m².     - Consider swallow eval prior to starting diet, NPO at this time    Pain r/t sacral ulcer.   Pt has dilaudid 0.2 mg IVP q 4 hrs prn pain.   Continue.     Plan:   Back with hospice home or inpt.   Will follow.                 Thank you for your consult. I will follow-up with patient. Please contact us if you have any additional questions.    Subjective:     HPI:      Ms. Nelia Brizuela is a 87 y.o F w/ hx of dementia, COPD, HTN, severe malnutrition who presented to the ED from [facility] after family disenrolled her from hospice as over the past three days, she has had worsening mentation, poor oral intake, and has been more diaphoretic. In the ED, patient was noted to have foul-smelling purulent sacral wound. Per son, the sacral wound was initially the size of a quarter and has noted be rapidly expanding over the past 3 weeks. At her baseline, patient is able to have a limited conversation due to her dementia and has been mostly bedbound.      ED course notable for WBC 21.24, Hgb 11.5, Troponin 4.417,  POC lactate 4.04, POC creatinine 4.04. CXR noted increased small right and new small left pleural effusions with probable bibasilar atelectasis/infiltrate.  Vitals notable for hypotension, tachycardia with EKG noting Afib w/ RVR. She was started on fluid resuscitation and broad spectrum antibiotics.    Pt was living at home with home hospice through [facility]. Pt lives at home with spouse.     Pt now DNR.       Hospital Course:  No notes on file    Interval History: Pt now DNR.     Past Medical History:   Diagnosis Date    Arthritis of knee     Cataract     COPD (chronic obstructive pulmonary disease)     CTS (carpal tunnel syndrome)     Diverticulosis of colon     Glaucoma (increased eye  pressure)     HTN (hypertension)     Joint pain     Metabolic alkalosis     Psoriasis 2007    Smoker     Strabismus     XT OS    Uterine myoma     Venous stasis of both lower extremities        Past Surgical History:   Procedure Laterality Date    AHMED IMPLANT AND SURGICAL PI Left 1/27/16         CATARACT EXTRACTION W/  INTRAOCULAR LENS IMPLANT Left 8/13/2014        CATARACT EXTRACTION W/ INTRAOCULAR LENS IMPLANTW/ TRABECULECTOMY Left 8/13/14    os ()    TUBAL LIGATION         Review of patient's allergies indicates:   Allergen Reactions    Codeine       Unknown       Medications:  Continuous Infusions:   amiodarone in dextrose 5% 0.5 mg/min (03/14/23 0900)    NORepinephrine bitartrate-D5W 0.02 mcg/kg/min (03/14/23 1023)     Scheduled Meds:   brimonidine 0.15 % OPTH DROP  1 drop Both Eyes TID    enoxaparin  1 mg/kg Subcutaneous Q24H    latanoprost  1 drop Both Eyes QHS    piperacillin-tazobactam (ZOSYN) IVPB  4.5 g Intravenous Q8H    polyethylene glycol  17 g Oral Daily    senna  8.6 mg Oral Daily     PRN Meds:calcium gluconate IVPB, calcium gluconate IVPB, calcium gluconate IVPB, HYDROmorphone, magnesium sulfate IVPB, magnesium sulfate IVPB, potassium chloride **AND** potassium chloride **AND** potassium chloride, sodium chloride 0.9%, sodium phosphate IVPB, sodium phosphate IVPB, sodium phosphate IVPB, Pharmacy to dose Vancomycin consult **AND** vancomycin - pharmacy to dose    Family History       Problem Relation (Age of Onset)    Glaucoma Father          Tobacco Use    Smoking status: Every Day     Packs/day: 1.00     Years: 57.00     Pack years: 57.00     Types: Cigarettes    Smokeless tobacco: Never    Tobacco comments:     Pt not ready to quit.   Substance and Sexual Activity    Alcohol use: Yes     Comment: seldom alcohol use.    Drug use: No    Sexual activity: Not Currently       Review of Systems   Musculoskeletal:  Positive for gait  problem.   Skin:  Positive for wound.   Neurological:  Positive for weakness.   Psychiatric/Behavioral:  Positive for confusion and decreased concentration.    Objective:     Vital Signs (Most Recent):  Temp: 97.3 °F (36.3 °C) (23 0710)  Pulse: 80 (23 1023)  Resp: (!) 41 (23 1023)  BP: (S) (!) 66/47 (Provider made aware, Levophed started @ 0.02mcg) (23 1023)  SpO2: 98 % (23 1023)   Vital Signs (24h Range):  Temp:  [96.3 °F (35.7 °C)-97.7 °F (36.5 °C)] 97.3 °F (36.3 °C)  Pulse:  [] 80  Resp:  [11-59] 41  SpO2:  [70 %-100 %] 98 %  BP: ()/(46-78) 66/47     Weight: 57.2 kg (126 lb)  Body mass index is 21.63 kg/m².    Physical Exam  Constitutional:       Appearance: She is ill-appearing.   HENT:      Head: Normocephalic and atraumatic.   Cardiovascular:      Rate and Rhythm: Normal rate and regular rhythm.   Pulmonary:      Effort: Pulmonary effort is normal. No respiratory distress.   Musculoskeletal:      Comments: Bed-bound  Edema present.      Skin:     Comments: Large sacral decubutis   Neurological:      Mental Status: She is alert.       Review of Symptoms      Symptom Assessment (ESAS 0-10 Scale)  Pain:  0  Dyspnea:  0  Anxiety:  0  Nausea:  0  Depression:  0  Anorexia:  0  Fatigue:  0  Insomnia:  0  Restlessness:  0  Agitation:  0  Unable to complete assessment due to Dementia         Pain Assessment in Advanced Demential Scale (PAINAD)   Breathing - Independent of vocalization:  0  Negative vocalization:  0  Facial expression:  0  Body language:  0  Consolability:  0  Total:  0    Living Arrangements:  Lives with family    Psychosocial/Cultural:   See Palliative Psychosocial Note: No  Pt lives with spouse at home in [place]. Pt has 2 children living, 3 . Pt was a homemaker. Pt's spouse is main caregiver.   **Primary  to Follow**  Palliative Care  Consult: No    Spiritual:  A - Address in Care:  Will ask chaplain Faulkner to visit.        Advance Care Planning        Significant Labs: All pertinent labs within the past 24 hours have been reviewed.  CBC:   Recent Labs   Lab 03/13/23  0414   WBC 22.08*   HGB 9.8*   HCT 31.0*   MCV 94        BMP:  Recent Labs   Lab 03/14/23  0324   *      K 3.4*      CO2 28   BUN 54*   CREATININE 1.6*   CALCIUM 7.3*   MG 2.1     LFT:  Lab Results   Component Value Date     (H) 03/14/2023    ALKPHOS 93 03/14/2023    BILITOT 1.0 03/14/2023     Albumin:   Albumin   Date Value Ref Range Status   03/14/2023 1.2 (L) 3.5 - 5.2 g/dL Final     Protein:   Total Protein   Date Value Ref Range Status   03/14/2023 6.2 6.0 - 8.4 g/dL Final     Lactic acid:   Lab Results   Component Value Date    LACTATE 2.2 03/14/2023    LACTATE 2.2 03/14/2023       Significant Imaging: I have reviewed all pertinent imaging results/findings within the past 24 hours.        25 minutes of ACP completed.       Bouchra Currie, CNS  Palliative Medicine  [facility] - Cardiac Medical ICU

## 2023-03-15 NOTE — NURSING
Patient son and spouse present for meeting with , [facility], and RN for update and plan for outpatient hospice evaluation.

## 2023-03-15 NOTE — PROGRESS NOTES
Communicated with Dr. Powell. Plan is [facility].     Bouchra ERIC, APRN, AGCNS

## 2023-03-15 NOTE — PLAN OF CARE
CMICU DAILY GOALS       A: Awake    RASS: Goal -    Actual -     Restraint necessity:    B: Breathe   SBT: Not intubated   C: Coordinate A & B, analgesics/sedatives   Pain: managed    SAT: Not intubated  D: Delirium   CAM-ICU:    E: Early(intubated/ Progressive (non-intubated) Mobility   MOVE Screen: Fail   Activity: Activity Management: Rolling - L1  FAS: Feeding/Nutrition   Diet order: Diet/Nutrition Received: NPO,    T: Thrombus   DVT prophylaxis: VTE Required Core Measure: Pharmacological prophylaxis initiated/maintained  H: HOB Elevation   Head of Bed (HOB) Positioning: HOB at 30-45 degrees  U: Ulcer Prophylaxis   GI: yes  G: Glucose control   Not diabetic.  Glycemic Management: blood glucose monitored  S: Skin   Bathing/Skin Care: bath, complete, linen changed, incontinence care  Device Skin Pressure Protection: absorbent pad utilized/changed, positioning supports utilized, adhesive use limited  Pressure Reduction Devices: pressure-redistributing mattress utilized  Pressure Reduction Techniques: frequent weight shift encouraged, heels elevated off bed, positioned off wounds, pressure points protected, weight shift assistance provided  Skin Protection: adhesive use limited, incontinence pads utilized  B: Bowel Function   no issues   I: Indwelling Catheters   Torres necessity:     CVC necessity: No  D: De-escalation Antibiotics   No    Family/Goals of care/Code Status   Code Status: DNR    24H Vital Sign Range  Temp:  [97 °F (36.1 °C)-97.7 °F (36.5 °C)]   Pulse:  []   Resp:  [22-54]   BP: ()/(43-78)   SpO2:  [78 %-100 %]      Shift Events   No acute events throughout shift. On levo and amio infusion overnight. Levo currently at .08 mcg/kg/min. Sacral wound cleansed and dressed per order.    VS and assessment per flow sheet, patient progressing towards goals as tolerated, plan of care reviewed with  pt , all concerns addressed, will continue to monitor.

## 2023-03-15 NOTE — PROGRESS NOTES
Pharmacokinetic Assessment Follow Up: IV Vancomycin    Vancomycin serum concentration assessment(s):    The random level was drawn correctly and can be used to guide therapy at this time. The measurement is within the desired definitive target range of 10 to 20 mcg/mL.    Vancomycin Regimen Plan:    Will give vancomycin 750 mg x1 considering worsening renal function and vancomycin level <20 mcg/mL. Next level to be drawn at 3/15 on 2330      Drug levels (last 3 results):  Recent Labs   Lab Result Units 03/13/23  1938 03/14/23  2101   Vancomycin, Random ug/mL  --  13.6   Vancomycin-Trough ug/mL 5.1*  --        Pharmacy will continue to follow and monitor vancomycin.    Please contact pharmacy at extension [phone] for questions regarding this assessment.    Thank you for the consult,   Mercedes Ramey       Patient brief summary:  Nelia Brizuela is a 87 y.o. female initiated on antimicrobial therapy with IV Vancomycin for treatment of sepsis    The patient's current regimen is pulse dosing    Drug Allergies:   Review of patient's allergies indicates:   Allergen Reactions    Codeine       Unknown       Actual Body Weight:   57.2 kg    Renal Function:   Estimated Creatinine Clearance: 21.8 mL/min (A) (based on SCr of 1.6 mg/dL (H)).,     Dialysis Method (if applicable):  N/A    CBC (last 72 hours):  Recent Labs   Lab Result Units 03/12/23  1622 03/13/23  0414   WBC K/uL 21.24* 22.08*   Hemoglobin g/dL 11.5* 9.8*   Hematocrit % 34.8* 31.0*   Platelets K/uL 162 176   Gran % % 91.0* 92.5*   Lymph % % 4.9* 3.5*   Mono % % 2.6* 2.6*   Eosinophil % % 0.4 0.0   Basophil % % 0.3 0.6   Differential Method  Automated Automated       Metabolic Panel (last 72 hours):  Recent Labs   Lab Result Units 03/12/23  1720 03/13/23  0414 03/14/23  0324   Sodium mmol/L 143 143 142   Potassium mmol/L 4.1 3.8 3.4*   Chloride mmol/L 103 102 101   CO2 mmol/L 26 30* 28   Glucose mg/dL 90 129* 133*   BUN mg/dL 46* 48* 54*   Creatinine mg/dL 1.6* 1.5*  1.6*   Albumin g/dL 1.4* 1.3* 1.2*   Total Bilirubin mg/dL 1.2* 1.0 1.0   Alkaline Phosphatase U/L 125 112 93   AST U/L 2,396* 1,118* 345*   ALT U/L 1,318* 965* 585*   Magnesium mg/dL 1.9 1.7 2.1   Phosphorus mg/dL  --  4.5 3.7       Vancomycin Administrations:  vancomycin given in the last 96 hours                     vancomycin (VANCOCIN) 1,000 mg in dextrose 5 % (D5W) 250 mL IVPB (Vial-Mate) (mg) 1,000 mg New Bag 03/13/23 2244    vancomycin (VANCOCIN) 1,000 mg in dextrose 5 % (D5W) 250 mL IVPB (Vial-Mate) ()  Restarted 03/12/23 1929     1,000 mg New Bag  1922                    Microbiologic Results:  Microbiology Results (last 7 days)       Procedure Component Value Units Date/Time    Aerobic culture [[id number]] Collected: 03/14/23 1322    Order Status: Sent Specimen: Decubitus from Sacrum Updated: 03/14/23 1349    Blood culture x two cultures. Draw prior to antibiotics. [[id number]]  (Abnormal) Collected: 03/12/23 1720    Order Status: Completed Specimen: Blood from Peripheral, Antecubital, Right Updated: 03/14/23 1201     Blood Culture, Routine Gram stain aer bottle: Gram positive cocci in clusters resembling Staph      Gram stain reynaldo bottle: Gram positive cocci in clusters resembling Staph      Gram stain reynaldo bottle: Gram negative rods      Results called to and read back by: Taylor Case RN  03/13/2023  14:32      PROTEUS MIRABILIS  Susceptibility pending        STAPHYLOCOCCUS EPIDERMIDIS  Susceptibility pending      Narrative:      Aerobic and anaerobic    Blood culture x two cultures. Draw prior to antibiotics. [[id number]]  (Abnormal) Collected: 03/12/23 1720    Order Status: Completed Specimen: Blood from Peripheral, Antecubital, Right Updated: 03/14/23 1127     Blood Culture, Routine Gram stain reynaldo bottle: Gram negative rods      Results called to and read back by: Taylor Case RN  03/13/2023  09:28      Gram stain aer bottle: Gram positive cocci in clusters resembling Staph      Results called to and read back  by: Taylor Case RN  03/13/2023  14:32      PROTEUS MIRABILIS  For susceptibility see order #[id number]      Narrative:      Aerobic and anaerobic    MRSA/SA Rapid ID by PCR from Blood culture [[id number]] Collected: 03/12/23 1720    Order Status: Completed Updated: 03/13/23 1534     Staph aureus ID by PCR Negative     MRSA ID by PCR Negative    Narrative:      Aerobic and anaerobic

## 2023-03-15 NOTE — PLAN OF CARE
QUIN spoke with Hortensia with [facility]. She advised that patient is accepted and they can accept today. Family is agreeable to patient going to [facility] today.      Nicky Junior RN     [phone]

## 2023-03-15 NOTE — PLAN OF CARE
[facility] - Cardiac Medical ICU  Discharge Reassessment    Primary Care Provider: Lenin Sandhu MD    Expected Discharge Date: 3/16/2023    Patient not medically ready for discharge per MD.    Reassessment (most recent)       Discharge Reassessment - 03/15/23 0934          Discharge Reassessment    Assessment Type Discharge Planning Reassessment     Did the patient's condition or plan change since previous assessment? Yes     Discharge Plan discussed with: Spouse/sig other;Adult children     Name(s) and Number(s) Murray Brizuela  (spouse) [phone] ,Maria Eugenia Frost (daughter)  [phone]     Communicated KEYON with patient/caregiver Date not available/Unable to determine     Discharge Plan A Inpatient Hospice     Discharge Plan B Hospice/home     DME Needed Upon Discharge  other (see comments)   TBD    Discharge Barriers Identified None        Post-Acute Status    Discharge Delays None known at this time                   Nicky Junior RN     [phone]

## 2023-03-15 NOTE — SUBJECTIVE & OBJECTIVE
Interval History/Significant Events: Extensive goals of care discussion with son and spouse at bedside. Family hoping for best chance to extend her life as possible, discussed that she is approaching end of life and in significant pain with sacral wound. Agreed to gather family at 1pm on 3/16 and decide on transitioning to comfort care measures.     Review of Systems   Unable to perform ROS: Dementia   Objective:     Vital Signs (Most Recent):  Temp: 97.7 °F (36.5 °C) (03/15/23 1500)  Pulse: (!) 116 (03/15/23 1800)  Resp: (!) 35 (03/15/23 1800)  BP: (!) 117/57 (03/15/23 1800)  SpO2: (!) 94 % (03/15/23 1800)   Vital Signs (24h Range):  Temp:  [95.9 °F (35.5 °C)-97.9 °F (36.6 °C)] 97.7 °F (36.5 °C)  Pulse:  [] 116  Resp:  [22-42] 35  SpO2:  [94 %-100 %] 94 %  BP: ()/(53-78) 117/57   Weight: 57.2 kg (126 lb)  Body mass index is 21.63 kg/m².      Intake/Output Summary (Last 24 hours) at 3/15/2023 1852  Last data filed at 3/15/2023 1600  Gross per 24 hour   Intake 1609.07 ml   Output 220 ml   Net 1389.07 ml       Physical Exam  Vitals reviewed.   Constitutional:       Appearance: She is ill-appearing.      Comments: Elderly, frail appearing. Patient moaning in pain. More alert this morning, able to answer short y/n questions about discomfort   HENT:      Head: Normocephalic and atraumatic.      Mouth/Throat:      Mouth: Mucous membranes are dry.   Eyes:      Pupils: Pupils are equal, round, and reactive to light.   Cardiovascular:      Rate and Rhythm: Tachycardia present. Rhythm irregular.   Pulmonary:      Effort: Pulmonary effort is normal.   Abdominal:      Palpations: Abdomen is soft.      Tenderness: There is no abdominal tenderness.   Musculoskeletal:         General: Tenderness present.      Right lower leg: No edema.      Left lower leg: No edema.   Neurological:      Mental Status: She is disoriented.       Vents:  Oxygen Concentration (%): 100 (off wall) (03/15/23 1636)  Lines/Drains/Airways        Drain  Duration                  Urethral Catheter 03/15/23 0410 Double-lumen 16 Fr. <1 day              Peripheral Intravenous Line  Duration                  Peripheral IV - Single Lumen 03/12/23 1717 20 G Anterior;Distal;Right Upper Arm 3 days         Peripheral IV - Single Lumen 03/13/23 1100 20 G Anterior;Distal;Left Forearm 2 days         Peripheral IV - Single Lumen 03/14/23 1500 Anterior;Left;Proximal Forearm 1 day                  Significant Labs:    CBC/Anemia Profile:  No results for input(s): WBC, HGB, HCT, PLT, MCV, RDW, IRON, FERRITIN, RETIC, FOLATE, [id number], OCCULTBLOOD in the last 48 hours.     Chemistries:  Recent Labs   Lab 03/14/23  0324 03/15/23  0300    136   K 3.4* 3.7    100   CO2 28 28   BUN 54* 57*   CREATININE 1.6* 1.5*   CALCIUM 7.3* 7.8*   ALBUMIN 1.2* 1.3*   PROT 6.2 6.7   BILITOT 1.0 1.0   ALKPHOS 93 102   * 453*   * 208*   MG 2.1 2.1   PHOS 3.7 3.8       All pertinent labs within the past 24 hours have been reviewed.    Significant Imaging:  I have reviewed all pertinent imaging results/findings within the past 24 hours.

## 2023-03-15 NOTE — HOSPITAL COURSE
Patient presented from home hospice (disenrolled prior to admission) with worsening mentation, poor PO intake, diaphoresis and purulent sacral wound. Increased WBC count, lactate, hypotension and tachycardia with Afib w/RVR concerning for sepsis. Patient started on broad spectrum antibiotics and given fluid resuscitation. Patient has had stable pressor support during admission. Ongoing goals of care discussions with family (HPOA son and spouse at bedside). After extensive discussion reached understanding that patient does not have enough support for home hospice care and would need to stay in an inpatient setting. Family has hopes of patient improving with treatment however we discussed that her course may not improve further due to infection and need for life support with pressors. Discussed gathering family at 1 pm on 3/16 for possibly de-escalating care and transitioning to comfort care measures. Patient with increasing pressor requirements, maxed on Levo, discussed possibility of increasing dose of Levo with  in order to maintain MAPs>65 in spite of risks of tissue necrosis from administration through peripheral IV and was agreeable to plan in order to facilitate family traveling to hospital. Increasing O2 requirements to non re-breather, bradycardia developed to 30-40s. Patient  at 0315 on 22, family notified and met when they arrived at hospital. Discussed events leading to expiration and patient expressed gratitude to whole hospital team for their efforts.

## 2023-03-15 NOTE — PLAN OF CARE
CM sent inpatient hospice referral to [facility], via [facility].        Nicky Junior RN     [phone]
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
## 2023-03-16 NOTE — ASSESSMENT & PLAN NOTE
Advance Care Planning     [facility]  I engaged the family and healthcare power of   in a conversation about advance care planning and we specifically addressed what the goals of care would be moving forward, in light of the patient's change in clinical status, specifically transitioning to comfort focused care with severity of infection and the need for continued pressor support.  We did specifically address the patient's likely prognosis, which is poor.  We explored the patient's values and preferences for future care.  The family and healthcare power of   endorses that what is most important right now is to focus on symptom/pain control and improvement in condition but with limits to invasive therapies    Accordingly, we have decided that the best plan to meet the patient's goals includes continuing with treatment with consideration of transitioning to comfort focused care on 3/16    I did explain the role for hospice care at this stage of the patient's illness, including its ability to help the patient live with the best quality of life possible.  We will not be making a hospice referral.    I spent a total of 90 minutes engaging the patient in this advance care planning discussion.

## 2023-03-16 NOTE — NURSING
CCS called to bedside patient oxygen requirement increasing. Clarification of GOC meeting. Will titrate as needed.
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
## 2023-03-16 NOTE — PROGRESS NOTES
Pharmacokinetic Assessment Follow Up: IV Vancomycin    Vancomycin serum concentration assessment(s):    The random level was drawn correctly and can be used to guide therapy at this time. The measurement is within the desired definitive target range of 10 to 20 mcg/mL.    Vancomycin Regimen Plan:    Will give another 750 mg dose. Re-dose when the random level is less than 20 mcg/mL, next level to be drawn at 3/17 on 0100    Drug levels (last 3 results):  Recent Labs   Lab Result Units 03/13/23  1938 03/14/23  2101 03/15/23  2227   Vancomycin, Random ug/mL  --  13.6 17.6   Vancomycin-Trough ug/mL 5.1*  --   --        Pharmacy will continue to follow and monitor vancomycin.    Please contact pharmacy at extension [phone] for questions regarding this assessment.    Thank you for the consult,   aRsheed Keyes       Patient brief summary:  Nelia Brizuela is a 87 y.o. female initiated on antimicrobial therapy with IV Vancomycin for treatment of sepsis      Drug Allergies:   Review of patient's allergies indicates:   Allergen Reactions    Codeine       Unknown       Actual Body Weight:   57.2 kg    Renal Function:   Estimated Creatinine Clearance: 23.2 mL/min (A) (based on SCr of 1.5 mg/dL (H)).,     Dialysis Method (if applicable):  N/A    CBC (last 72 hours):  Recent Labs   Lab Result Units 03/13/23  0414   WBC K/uL 22.08*   Hemoglobin g/dL 9.8*   Hematocrit % 31.0*   Platelets K/uL 176   Gran % % 92.5*   Lymph % % 3.5*   Mono % % 2.6*   Eosinophil % % 0.0   Basophil % % 0.6   Differential Method  Automated       Metabolic Panel (last 72 hours):  Recent Labs   Lab Result Units 03/13/23  0414 03/14/23  0324 03/15/23  0300   Sodium mmol/L 143 142 136   Potassium mmol/L 3.8 3.4* 3.7   Chloride mmol/L 102 101 100   CO2 mmol/L 30* 28 28   Glucose mg/dL 129* 133* 149*   BUN mg/dL 48* 54* 57*   Creatinine mg/dL 1.5* 1.6* 1.5*   Albumin g/dL 1.3* 1.2* 1.3*   Total Bilirubin mg/dL 1.0 1.0 1.0   Alkaline Phosphatase U/L 112 93 102    AST U/L 1,118* 345* 208*   ALT U/L 965* 585* 453*   Magnesium mg/dL 1.7 2.1 2.1   Phosphorus mg/dL 4.5 3.7 3.8       Vancomycin Administrations:  vancomycin given in the last 96 hours                     vancomycin 750 mg in dextrose 5 % (D5W) 250 mL IVPB (Vial-Mate) (mg) 750 mg New Bag 03/14/23 2357    vancomycin (VANCOCIN) 1,000 mg in dextrose 5 % (D5W) 250 mL IVPB (Vial-Mate) (mg) 1,000 mg New Bag 03/13/23 2244    vancomycin (VANCOCIN) 1,000 mg in dextrose 5 % (D5W) 250 mL IVPB (Vial-Mate) ()  Restarted 03/12/23 1929     1,000 mg New Bag  1922                    Microbiologic Results:  Microbiology Results (last 7 days)       Procedure Component Value Units Date/Time    Aerobic culture [[id number]]  (Abnormal) Collected: 03/14/23 1322    Order Status: Completed Specimen: Decubitus from Sacrum Updated: 03/15/23 1514     Aerobic Bacterial Culture PRESUMPTIVE PROTEUS SPECIES  Moderate  Identification and susceptibility pending      Blood culture x two cultures. Draw prior to antibiotics. [[id number]]  (Abnormal) Collected: 03/12/23 1720    Order Status: Completed Specimen: Blood from Peripheral, Antecubital, Right Updated: 03/15/23 1509     Blood Culture, Routine Gram stain reynaldo bottle: Gram negative rods      Results called to and read back by: Taylor Case RN  03/13/2023  09:28      Gram stain aer bottle: Gram positive cocci in clusters resembling Staph      Results called to and read back by: Taylor Case RN  03/13/2023  14:32      PROTEUS MIRABILIS  For susceptibility see order #[id number]        STAPHYLOCOCCUS EPIDERMIDIS  Susceptibility pending      Narrative:      Aerobic and anaerobic    Blood culture x two cultures. Draw prior to antibiotics. [[id number]]  (Abnormal)  (Susceptibility) Collected: 03/12/23 1720    Order Status: Completed Specimen: Blood from Peripheral, Antecubital, Right Updated: 03/15/23 1052     Blood Culture, Routine Gram stain aer bottle: Gram positive cocci in clusters resembling Staph       Gram stain reynaldo bottle: Gram positive cocci in clusters resembling Staph      Gram stain reynaldo bottle: Gram negative rods      Results called to and read back by: Taylor Case RN  03/13/2023  14:32      PROTEUS MIRABILIS      STAPHYLOCOCCUS EPIDERMIDIS    Narrative:      Aerobic and anaerobic    MRSA/SA Rapid ID by PCR from Blood culture [[id number]] Collected: 03/12/23 1720    Order Status: Completed Updated: 03/13/23 1534     Staph aureus ID by PCR Negative     MRSA ID by PCR Negative    Narrative:      Aerobic and anaerobic

## 2023-03-16 NOTE — NURSING
Pt's pressor requirement was steadily increasing throughout the night. She has peripheral Levophed going. MD made aware of the increasing levo requirement. Levo eventually maxed. Her O2 sat was also going down. Put her on non rebreather mask. MD came to the bedside. IV morphine ordered q 1 hr. Will continue to monitor.

## 2023-03-16 NOTE — PLAN OF CARE
Death Note    I was called to bedside on 3/16/2023 at 0316. Nursing at beside, nursing supervisor notified.  has been notified. Family notified and en route to hospital.    Patient is not responding to verbal or tactile stimuli. No heart or breath sounds on auscultation. No respirations. No palpable pulses. Patient does not have a pupillary or corneal reflex. Patient's pupils are fixed and dilated.      Time of death is 3/16/2023  at 0315        Cause of Death: Septic shock secondary to sacral decubitus ulcer        Signing Physician:    Donald Cooley MD   [facility] Contact  Email: [email or web address]  Cell: [phone]

## 2023-03-16 NOTE — EICU
Intervention Initiated From:  COR / EICU    Julissa intervened regarding:  BP    Nurse Notified:  Yes    Doctor Notified:  Yes    Comments: Physician at bedside> aware status, vital signs. Patient on Norepinephrine and 100% nrb MASK. Family called by PHYSICIAN

## 2023-03-16 NOTE — PLAN OF CARE
[facility] Cardiac Medical ICU  Discharge Final Note    Primary Care Provider: Lenin Sandhu MD    Expected Discharge Date: 3/16/2023    Date of Death: 2023  Time of Death: 03:15 am  Cause of Death: Septic Shock Secondary to Sacral Decubitus Ulcer    Final Discharge Note (most recent)       Final Note - 23 1538          Final Note    Assessment Type Final Discharge Note     Anticipated Discharge Disposition                      Important Message from Medicare        Millie Garcia LMSW  [facility]  X [phone]

## 2023-03-17 LAB
BACTERIA BLD CULT: ABNORMAL

## 2023-03-21 NOTE — PHYSICIAN QUERY
PT Name: Nelia Brizuela  MR #: [id number]     DOCUMENTATION CLARIFICATION     CDS/: Sydney Weston RN CDI          Contact information: [email or web address]   This form is a permanent document in the medical record.     Query Date: March 21, 2023    By submitting this query, we are merely seeking further clarification of documentation.  Please utilize your independent clinical judgment when addressing the question(s) below.  The Medical Record contains the following   Indicators   Supporting Clinical Findings Location in Medical Record   X SOB, ROBERTSON, Wheezing, Productive Cough, Use of Accessory Muscles, etc. Accessory muscle usage present. Tachypnea noted.   She is in respiratory distress.  ER 3/12 PARKER Love MD   X RR         ABGs         O2 sat           RR    Sat       O2  3/12   40     4 L NC  3/13   35   75-98    5 L NC  3/14   35     3 L NC  3/15   32     4 L NC    3/12 1723      Component 03/12/23 1723   POC PH 7.372    POC PCO2 55.6 High     POC PO2 18 Low     POC HCO3 32.3 High     POC BE 7    POC SATURATED O2 25 Low     POC TCO2 34 High          Vitals            Venous gas    Hypoxia/Hypercapnia      BiPAP/Intubation/Mechanical Ventilation     X Supplemental O2 4-5 L NC Vitals      Home O2, Oxygen Dependence     X Respiratory distress or failure Acute hypoxemic respiratory failure- Chest imaging with bilateral effusion + airspace disease vs. Atelectasis.. Clinically dry.. Confounder includes elevated BNP.    Pulm CC 3/13 PAO Nolasco MD   X Radiology findings Increased small right and new small left pleural effusions with probable bibasilar atelectasis/infiltrate on a background chronic interstitial lung changes.  Interstitial type pneumonia not excluded.    Cxr 3/12   X Acute/Chronic Illness Septic shock  Ence[halopathy - Metabolic - Septic  COPD  RICARDO Anemia  Sacral decubitis  Profound debility  Secer malnutrition  Elevated troponin - suspect Type 2 2/2 strain  Metabolic acidosis +  lactate RICARDO   Pulm CC 3/13 PAO Nolasco MD   X Treatment Low flow NC- wean targeting SpO2 >90%   PRN bronchodilators Pulm CC 3/13 D Gaurav BRANDON    Other         The noted clinical guidelines are only system guidelines and do not replace the providers clinical judgment.    Provider, please specify the diagnosis or diagnoses associated with above clinical findings.   [   x ] Acute Respiratory Failure with Hypoxia - ABG pO2 < 60 mmHg or O2 sat of <91% on room air and respiratory symptoms documented     [    ] Other Respiratory Diagnosis (please specify): _________________     [   ] Clinically Undetermined       Please document in your progress notes daily for the duration of treatment until resolved and include in your discharge summary.     Reference:    ANTONIO Wilkes MD. (2020, March 13). Acute respiratory distress syndrome: Clinical features, diagnosis, and complications in adults ([id number] RIGOBERTO Palomino MD & [id number] DUDLEY Lemon MD, Eds.). Retrieved November 13, 2020, from [email or web address]-respiratory-distress-syndrome-[email or web address]-features-diagnosis-and-complications-[email or web address]-adults?search=ards&source=search_result&selectedTitle=1~150&usage_type=default&display_rank=1  Form No. [id number]

## 2023-03-21 NOTE — PHYSICIAN QUERY
PT Name: Nelia Brizuela  MR #: [id number]     DOCUMENTATION CLARIFICATION     CDS/: Sydney Weston RN CDI          Contact information: [email or web address]   This form is a permanent document in the medical record.     Query Date: March 21, 2023    By submitting this query, we are merely seeking further clarification of documentation.  Please utilize your independent clinical judgment when addressing the question(s) below.    The Medical Record contains the following:   Indicators   Supporting Clinical Findings Location in Medical Record    Non-blanchable erythema/redness     X Ulcer/Injury/Skin Breakdown Ulcer of right foot with fat layer exposed  Pt with pressure ulcer of right foot, per family has been slowly healing over the past year. Possibly contributing source to sepsis picture  H&P 3/12 S Alexandro BRANDON/PARKER Bean MD    Deep Tissue Injury     X Wound care consult     Altered Skin Integrity 03/12/23 2245 Right medial Heel #3 Ulceration   Purple or maroon localized area of discolored intact skin or non-intact skin or a blood-filled blister.   Date First Assessed/Time First Assessed: 03/12/23 2245     Side: Right  Orientation: medial  Location: Heel     Is this injury device related?: No  P  rimary Wound Type: Ulceration  Description of Altered Skin Integrity: Purple or maroon lo...   Wound Image    Description of Altered Skin Integrity Intact skin with non-blanchable redness of localized area   Dressing Appearance Open to air   Drainage Amount None   Red (%), Wound Tissue Color 100 %   Periwound Area Dry   Wound Edges Defined   Wound Length (cm) 4 cm   Wound Width (cm) 5 cm   Wound Depth (cm) 0.1 cm    Wound care note 3/13 S Laureano LPN   X Acute/Chronic Illness Severe sepsis w/shock  Severe malnutrition  RICARDO  Metabolic encephalopathy  Chronic Combined Systolic and Diastolic heart failure    CC med 3/15 S Zohra BRANDON /MELINA Powell MD   X Medication/Treatment  The bi lateral heels treatment is betadine cover with dry gauze  and abd pads apply antifungal ointment to feet wrap with cast padding secure with ace bandage.   A immerse is ordered. And heel protector boot. Wound care note 3/13 S Sridevi Villanueva       The clinical guidelines noted are only a system guideline. It does not replace the providers clinical judgment.    Per the National Pressure Injury Advisory Panel:   A pressure injury is localized damage to the skin and underlying soft tissue usually over a bony prominence or related to a medical or other device. The injury can present as intact skin or an open ulcer and may be painful. The injury occurs as a result of intense and/or prolonged pressure or pressure in combination with shear. The tolerance of soft tissue for pressure and shear may also be affected by microclimate, nutrition, perfusion, co-morbidities and condition of the soft tissue.       Stage 1 Pressure Injury:  Intact skin with a localized area of non-blanchable erythema, which may appear differently in darkly pigmented skin. Color changes do not include purple or maroon discoloration; these may indicate deep tissue pressure injury.    Stage 2 Pressure Injury:  Partial-thickness loss of skin with exposed dermis. The wound bed is viable, pink or red, moist, and may also present as an intact or ruptured serum-filled blister.    Stage 3 Pressure Injury:  Full-thickness loss of skin, in which adipose (fat) is visible in the ulcer and granulation tissue and epibole (rolled wound edges) are often present. Slough and/or eschar may be visible. Undermining and tunneling may occur.    Stage 4 Pressure Injury:  Full-thickness skin and tissue loss with exposed or directly palpable fascia, muscle, tendon, ligament, cartilage or bone in the ulcer. Slough and/or eschar may be visible. Epibole (rolled edges), undermining and/or tunneling often occur.    Unstageable Pressure Injury:  Full-thickness skin and tissue loss in which the extent of tissue damage within the ulcer  cannot be confirmed because it is obscured by slough or eschar. If slough or eschar is removed, a Stage 3 or Stage 4 pressure injury will be revealed.    Deep Tissue Pressure Injury:  Intact or non-intact skin with localized area of persistent non-blanchable deep red, maroon, purple discoloration or epidermal separation revealing a dark wound bed or blood filled blister. This injury results from intense and/or prolonged pressure and shear forces at the bone-muscle interface. The wound may evolve rapidly to reveal the actual extent of tissue injury, or may resolve without tissue loss. If necrotic tissue, subcutaneous tissue, granulation tissue, fascia, muscle or other underlying structures are visible, this indicates a full thickness pressure injury (Unstageable, Stage 3 or Stage 4). Do not use DTPI to describe vascular, traumatic, neuropathic, or dermatologic conditions.       Provider, please provide the integumentary diagnosis related to the documentation of ( Right Heel ):     [   ] Pressure Injury/Decubitus Ulcer, Stage 1     [   ] Deep Tissue Pressure Injury     [   ]  Pressure Injury/Decubitus Ulcer (please specify stage): _____________     [   ] Other Integumentary Diagnosis (please specify):______________     [ x ] Clinically Undetermined       Please document in your progress notes daily for the duration of treatment until resolved and include in your discharge summary.    Reference:    MARIEL Norton., Dimitrios, JDemetrio DIAS., Goldberg, M., OSCAR Zavala., OSCAR Davis., & ARUN Reeves. (2016). Revised National Pressure Ulcer Advisory Panel Pressure Injury Staging System: Revised Pressure Injury Staging System. J Wound Ostomy Continence Nurs, 43(6), 585-597. doi:10.1097/won.0679280495563696    Form No.[id number]

## 2023-03-21 NOTE — PHYSICIAN QUERY
PT Name: Nelia Brizeula  MR #: [id number]     DOCUMENTATION CLARIFICATION      CDS/: Sydney Weston RN CDI         Contact information: [email or web address]   This form is a permanent document in the medical record.    Query Date: March 21, 2023    By submitting this query, we are merely seeking further clarification of documentation to reflect the severity of illness of your patient. Please utilize your independent clinical judgment when addressing the question(s) below.     The Medical Record contains the following:   Indicators   Supporting Clinical Findings Location in Medical Record    Chest Pain, Angina      Coronary Artery Disease     X EKG A Flutter /SVT vs Atrial fibrillation with rapid ventricular response   ST and T wave abnormality, consider lateral ischemia   Abnormal ECG      Initial EKG without ST elevations   3/12 EKG          H&P 3/12 S Alexandro BRANDON/ PARKER Bean MD   X Troponin  03/12/23 16:22 03/12/23 19:28 03/12/23 22:32 03/13/23 07:52 03/13/23 13:57   Troponin I 4.417 (H) 4.045 (H)  4.045 (H) 2.261 (H) 3.104 (H) 2.889 (H)    Lab   X Echo Results 3/13  The left ventricle is normal in size with severely decreased systolic function.  The estimated ejection fraction is 20%.  There is left ventricular global hypokinesis.  Moderate right ventricular enlargement with moderately to severely reduced right ventricular systolic function.  Biatrial enlargement.  Mild mitral regurgitation.  Moderate tricuspid regurgitation.  The estimated PA systolic pressure is 51 mmHg.  There is pulmonary hypertension.  Elevated central venous pressure (15 mmHg).  There are bilateral pleural effusions.  Atrial fibrillation observed.   Echo      Angiography     X Documentation of acute cardiac condition Troponin elevated on admission. Suspect Type 2 NSTEMI secondary to demand ischemia in the setting of sepsis H&P 3/12 S Alexandro BRANDON/ PARKER Bean MD     X Medication/Treatment PLAN  -Trend troponin to peak   -STAT EKG prn chest pain    H&P 3/12 MELINA Mc MD/ PARKER Bean MD    Other        Provider, please clarify the diagnosis related to the above documentation:  [  x ] NSTEMI/Myocardial Infarction Type 2 due to (please specify): ____septic shock______     [   ] Other Cardiac Diagnosis (please specify): _______________       Please document in your progress notes daily for the duration of treatment until resolved, and include in your discharge summary.    Form No. [id number]

## 2023-03-21 NOTE — PHYSICIAN QUERY
"PT Name: Nelia Brizuela  MR #: [id number]     DOCUMENTATION CLARIFICATION      CDS/: Sydney Weston RN CDI         Contact information: [email or web address]   This form is a permanent document in the medical record.    Query Date: March 21, 2023    By submitting this query, we are merely seeking further clarification of documentation to reflect the severity of illness of your patient. Please utilize your independent clinical judgment when addressing the question(s) below.     The Medical Record contains the following:   Indicators   Supporting Clinical Findings Location in Medical Record   X Documentation of Sepsis, Septic Shock Severe sepsis  Source - skin and soft tissue (location large sacral wound)  Pt w/ severe sacral ulcer with foul smelling purulent discharge appreciated on exam Wound likely source of Sepsis.    Polymicrobial bacteremia   H&P S Alexandro BRANDON / PARKER Bean MD        Attestation 3/13 CC Meed   MELINA Powell MD     X Blood Culture 3/ 12    Proteus Mirabilis   Streptococcus epjidermidis   lab    Respiratory Culture      Urine Culture     X Other Culture 3/14 sacral decubitis  Proteus mirabilis - moferate   Lab     X Acute/Chronic Illness Severe sepsis Shock  RICARDO  Encephalopathy metabolic - septic  AFib  HYN  COPD  Elevated troponin  Severe malnutrition  Pressure injury of sacrum stage 3   H&P S Alexandro BRANDON / PARKER Bean MD   X Medication/Treatment Vancomycin IV   Piperacillin-tazobactam IV  Levophed for map of 65   Med sheets    Other        Provider, please further specify the  "Sepsis"  diagnosis:              Victor Hugo All that apply;  [x   ] Due to Proteus Mirabilis Bacteremia     [ x  ] Due to Proteus Mirabilis to sacral wound     [   ] Due to (please specify): __________________________________     [   ] Other specification (please specify): ____________________     [   ] Clinically Undetermined     Please document in your progress notes daily for the duration of treatment until resolved, and include in your " discharge summary.  Form No. [id number]

## 2023-03-21 NOTE — PHYSICIAN QUERY
PT Name: Nelia Brizuela  MR #: [id number]     DOCUMENTATION CLARIFICATION     CDS/: Sydney Weston RN CDI          Contact information:[email or web address]   This form is a permanent document in the medical record.     Query Date: March 21, 2023    By submitting this query, we are merely seeking further clarification of documentation.  Please utilize your independent clinical judgment when addressing the question(s) below.    The Medical Record contains the following:   Indicators   Supporting Clinical Findings Location in Medical Record    Non-blanchable erythema/redness      Ulcer/Injury/Skin Breakdown      Deep Tissue Injury     X Wound care consult    Altered Skin Integrity 03/12/23 2245 Left medial Heel #2 Ulceration Purple or maroon localized area of discolored intact skin or non-intact skin or a blood-filled blister.   Date First Assessed/Time First Assessed: 03/12/23 2245   Altered Skin Integrity Present on Admission - Did Patient arrive to the hospital with altered skin?: yes  Side: Left  Orientation: medial  Location: Heel       Wound Image    Description of Altered Skin Integrity Intact skin with non-blanchable redness of localized area   Dressing Appearance Open to air   Drainage Amount None   Red (%), Wound Tissue Color 100 %   Wound Edges Defined   Wound Length (cm) 3 cm   Wound Width (cm) 5 cm   Wound Depth (cm) 0.1     Wound care note 3/13 S Sridevi GORDILLO   X Acute/Chronic Illness Severe sepsis w/shock  Severe malnutrition  RICARDO  Metabolic encephalopathy  Chronic Combined Systolic and Diastolic heart failure    CC med 3/15 S Zohra BRANDON/  S Sherry BRANDON   X Medication/Treatment The bi lateral heels treatment is betadine cover with dry gauze and abd pads apply antifungal ointment to feet wrap with cast padding secure with ace bandage.    Wound care note 3/13 S Sridevi GORDILLO    Other       The clinical guidelines noted are only a system guideline. It does not replace the providers clinical judgment.    Per the  National Pressure Injury Advisory Panel:   A pressure injury is localized damage to the skin and underlying soft tissue usually over a bony prominence or related to a medical or other device. The injury can present as intact skin or an open ulcer and may be painful. The injury occurs as a result of intense and/or prolonged pressure or pressure in combination with shear. The tolerance of soft tissue for pressure and shear may also be affected by microclimate, nutrition, perfusion, co-morbidities and condition of the soft tissue.       Stage 1 Pressure Injury:  Intact skin with a localized area of non-blanchable erythema, which may appear differently in darkly pigmented skin. Color changes do not include purple or maroon discoloration; these may indicate deep tissue pressure injury.    Stage 2 Pressure Injury:  Partial-thickness loss of skin with exposed dermis. The wound bed is viable, pink or red, moist, and may also present as an intact or ruptured serum-filled blister.    Stage 3 Pressure Injury:  Full-thickness loss of skin, in which adipose (fat) is visible in the ulcer and granulation tissue and epibole (rolled wound edges) are often present. Slough and/or eschar may be visible. Undermining and tunneling may occur.    Stage 4 Pressure Injury:  Full-thickness skin and tissue loss with exposed or directly palpable fascia, muscle, tendon, ligament, cartilage or bone in the ulcer. Slough and/or eschar may be visible. Epibole (rolled edges), undermining and/or tunneling often occur.    Unstageable Pressure Injury:  Full-thickness skin and tissue loss in which the extent of tissue damage within the ulcer cannot be confirmed because it is obscured by slough or eschar. If slough or eschar is removed, a Stage 3 or Stage 4 pressure injury will be revealed.    Deep Tissue Pressure Injury:  Intact or non-intact skin with localized area of persistent non-blanchable deep red, maroon, purple discoloration or epidermal  separation revealing a dark wound bed or blood filled blister. This injury results from intense and/or prolonged pressure and shear forces at the bone-muscle interface. The wound may evolve rapidly to reveal the actual extent of tissue injury, or may resolve without tissue loss. If necrotic tissue, subcutaneous tissue, granulation tissue, fascia, muscle or other underlying structures are visible, this indicates a full thickness pressure injury (Unstageable, Stage 3 or Stage 4). Do not use DTPI to describe vascular, traumatic, neuropathic, or dermatologic conditions.       Provider, please provide the integumentary diagnosis related to the documentation of ( Left Heel ):     [   ] Pressure Injury/Decubitus Ulcer, Stage 2     [   ] Deep Tissue Pressure Injury     [   ] Pressure Injury/Decubitus Ulcer, specify stage _________________________     [   ] Other Integumentary Diagnosis (please specify):______________     [ x ] Clinically Undetermined     Please document in your progress notes daily for the duration of treatment until resolved and include in your discharge summary.    Reference:    MARIEL Norton., TOBIN Plunkett., Goldberg, M., OSCAR Zavala., OSCAR Davis., & ARUN Reeves. (2016). Revised National Pressure Ulcer Advisory Panel Pressure Injury Staging System: Revised Pressure Injury Staging System. J Wound Ostomy Continence Nurs, 43(6), 585-597. doi:10.1097/won.4865492187652578    Form No.[id number]

## 2023-03-21 NOTE — PHYSICIAN QUERY
PT Name: Nelia Brizuela  MR #: [id number]     DOCUMENTATION CLARIFICATION     CDS/: Sydney Weston RN CDI          Contact information:  [email or web address]    This form is a permanent document in the medical record.     Query Date: March 21, 2023    By submitting this query, we are merely seeking further clarification of documentation.  Please utilize your independent clinical judgment when addressing the question(s) below.  The Medical Record contains the following:   Indicators   Supporting Clinical Findings Location in Medical Record   x Pneumonia documented Multifocal pneumonia within the lung bases and bilateral small pleural effusions, as above. CC Medicine 3/15  Zohra BRANDON/ MELINA Powell MD     x Chest X-Ray/CT Scan Cxr 3/12  Increased small right and new small left pleural effusions with probable bibasilar atelectasis/infiltrate on a background chronic interstitial lung changes.  Interstitial type pneumonia not excluded.    CT abd / pelvis w/out contrast 3/12  Lungs/Pleura: Patchy consolidation in bilateral lung basis.  Superimposed atelectatic changes of the right lower lobe.  Bilateral small pleural effusions, left larger than right.   Radiology     X PaO2    PaCO2     O2 sat          RR    Sat       O2  3/12   40     4 L NC  3/13   35   75-98    5 L NC  3/14   35     3 L NC  3/15   32     4 L NC     3/12 1723        Component 03/12/23 1723   POC PH 7.372    POC PCO2 55.6 High     POC PO2 18 Low     POC HCO3 32.3 High     POC BE 7    POC SATURATED O2 25 Low     POC TCO2 34 High           Vitals                 Venous gas   X WBC 03/12 WBC: 21.24   03/13 WBC: 22.08    Lab   X Vital Signs Vital Signs (24h Range):  Temp:  [97.7 °F (36.5 °C)] 97.7 °F (36.5 °C)  Pulse:  [114-171] 144  Resp:  [27-41] 27  SpO2:  [94 %-100 %] 94 %  BP: ()/(46-70) 108/70 H&P 3/12 PARKER Mc MD/ PARKER Bean MD              Cultures      X Treatment  Vancomycin IV   Piperacillin-tazobactam IV  Levophed for map of  65    Low flow NC- wean targeting SpO2 >90%   PRN bronchodilators   H&P 3/12 S Alexandro BRANDON/ PARKER Bean MD      Pulm CC 3/13 D Gaurav BRANDON   X Supplemental O2 4-5 L NC Vitals      Dysphagia/Swallow study      Other       Provider, please provide the diagnosis related to the above clinical indicators:    [x  ] Unspecified Pneumonia     [   ] Other type of pneumonia (please specify): ________     [   ] Pneumonia ruled out     [   ] Other respiratory diagnosis (please specify): _________     [  ] Clinically undetermined     Please document in your progress notes daily for the duration of treatment, until resolved, and include in your discharge summary.     Form No. [id number]

## 2023-08-16 NOTE — TELEPHONE ENCOUNTER
I called and went over CT of abd and her recent PET scan- it looks like she has a RLL infiltrate.  SHe has no cough and no fever-- CT abdomen shows severe constipations.  She is eating better but could not get megace  
Initiate Treatment: triamcinolone acetonide 0.1 % topical cream BID\\nQuantity: 453.6 g  Days Supply: 30\\nSig: Apply qd x1 week then qod prn\\n\\nclobetasol 0.05 % scalp solution \\nQuantity: 50.0 ml  Days Supply: 30\\nSig: Apply to damp scalp 3 days a week. Do not rinse
Plan: Discussed either MTX or Otezla for treatment
Detail Level: Detailed